# Patient Record
Sex: FEMALE | Race: WHITE | NOT HISPANIC OR LATINO | Employment: OTHER | URBAN - METROPOLITAN AREA
[De-identification: names, ages, dates, MRNs, and addresses within clinical notes are randomized per-mention and may not be internally consistent; named-entity substitution may affect disease eponyms.]

---

## 2017-12-07 ENCOUNTER — OFFICE VISIT (OUTPATIENT)
Dept: URGENT CARE | Facility: CLINIC | Age: 67
End: 2017-12-07
Payer: MEDICARE

## 2017-12-07 VITALS
SYSTOLIC BLOOD PRESSURE: 144 MMHG | BODY MASS INDEX: 43.39 KG/M2 | TEMPERATURE: 99 F | HEIGHT: 66 IN | HEART RATE: 91 BPM | DIASTOLIC BLOOD PRESSURE: 77 MMHG | WEIGHT: 270 LBS | RESPIRATION RATE: 16 BRPM | OXYGEN SATURATION: 95 %

## 2017-12-07 DIAGNOSIS — J32.9 SINUSITIS, UNSPECIFIED CHRONICITY, UNSPECIFIED LOCATION: Primary | ICD-10-CM

## 2017-12-07 DIAGNOSIS — J02.9 SORE THROAT: ICD-10-CM

## 2017-12-07 DIAGNOSIS — R06.02 SOB (SHORTNESS OF BREATH): ICD-10-CM

## 2017-12-07 DIAGNOSIS — J04.0 LARYNGITIS: ICD-10-CM

## 2017-12-07 LAB
CTP QC/QA: YES
CTP QC/QA: YES
FLUAV AG NPH QL: NEGATIVE
FLUBV AG NPH QL: NEGATIVE
S PYO RRNA THROAT QL PROBE: NEGATIVE

## 2017-12-07 PROCEDURE — 87804 INFLUENZA ASSAY W/OPTIC: CPT | Mod: QW,S$GLB,, | Performed by: PHYSICIAN ASSISTANT

## 2017-12-07 PROCEDURE — 87880 STREP A ASSAY W/OPTIC: CPT | Mod: QW,S$GLB,, | Performed by: PHYSICIAN ASSISTANT

## 2017-12-07 PROCEDURE — 99203 OFFICE O/P NEW LOW 30 MIN: CPT | Mod: S$GLB,,, | Performed by: PHYSICIAN ASSISTANT

## 2017-12-07 RX ORDER — AMOXICILLIN AND CLAVULANATE POTASSIUM 875; 125 MG/1; MG/1
1 TABLET, FILM COATED ORAL 2 TIMES DAILY
Qty: 20 TABLET | Refills: 0 | Status: SHIPPED | OUTPATIENT
Start: 2017-12-07 | End: 2017-12-17

## 2017-12-07 RX ORDER — ONDANSETRON HYDROCHLORIDE 8 MG/1
8 TABLET, FILM COATED ORAL EVERY 8 HOURS PRN
Qty: 15 TABLET | Refills: 0 | Status: SHIPPED | OUTPATIENT
Start: 2017-12-07 | End: 2018-01-22 | Stop reason: ALTCHOICE

## 2017-12-07 RX ORDER — BENZONATATE 200 MG/1
200 CAPSULE ORAL 3 TIMES DAILY PRN
Qty: 30 CAPSULE | Refills: 0 | Status: SHIPPED | OUTPATIENT
Start: 2017-12-07 | End: 2017-12-17

## 2017-12-08 NOTE — PATIENT INSTRUCTIONS
- Please return here or go to the Emergency Department for any concerns or worsening of condition.   - If you were prescribed antibiotics, please take them to completion.  - Please follow up with your primary care provider (PCP) or discussed specialist(s) as needed.         Sinusitis (Antibiotic Treatment)    The sinuses are air-filled spaces within the bones of the face. They connect to the inside of the nose. Sinusitis is an inflammation of the tissue lining the sinus cavity. Sinus inflammation can occur during a cold. It can also be due to allergies to pollens and other particles in the air. Sinusitis can cause symptoms of sinus congestion and fullness. A sinus infection causes fever, headache and facial pain. There is often green or yellow drainage from the nose or into the back of the throat (post-nasal drip). You have been given antibiotics to treat this condition.  Home care:  · Take the full course of antibiotics as instructed. Do not stop taking them, even if you feel better.  · Drink plenty of water, hot tea, and other liquids. This may help thin mucus. It also may promote sinus drainage.  · Heat may help soothe painful areas of the face. Use a towel soaked in hot water. Or,  the shower and direct the hot spray onto your face. Using a vaporizer along with a menthol rub at night may also help.   · An expectorant containing guaifenesin may help thin the mucus and promote drainage from the sinuses.  · Over-the-counter decongestants may be used unless a similar medicine was prescribed. Nasal sprays work the fastest. Use one that contains phenylephrine or oxymetazoline. First blow the nose gently. Then use the spray. Do not use these medicines more often than directed on the label or symptoms may get worse. You may also use tablets containing pseudoephedrine. Avoid products that combine ingredients, because side effects may be increased. Read labels. You can also ask the pharmacist for help.  (NOTE: Persons with high blood pressure should not use decongestants. They can raise blood pressure.)  · Over-the-counter antihistamines may help if allergies contributed to your sinusitis.    · Do not use nasal rinses or irrigation during an acute sinus infection, unless told to by your health care provider. Rinsing may spread the infection to other sinuses.  · Use acetaminophen or ibuprofen to control pain, unless another pain medicine was prescribed. (If you have chronic liver or kidney disease or ever had a stomach ulcer, talk with your doctor before using these medicines. Aspirin should never be used in anyone under 18 years of age who is ill with a fever. It may cause severe liver damage.)  · Don't smoke. This can worsen symptoms.  Follow-up care  Follow up with your healthcare provider or our staff if you are not improving within the next week.  When to seek medical advice  Call your healthcare provider if any of these occur:  · Facial pain or headache becoming more severe  · Stiff neck  · Unusual drowsiness or confusion  · Swelling of the forehead or eyelids  · Vision problems, including blurred or double vision  · Fever of 100.4ºF (38ºC) or higher, or as directed by your healthcare provider  · Seizure  · Breathing problems  · Symptoms not resolving within 10 days  Date Last Reviewed: 4/13/2015  © 7170-4969 [employer]. [address] [place] [address]. All rights reserved. This information is not intended as a substitute for professional medical care. Always follow your healthcare professional's instructions.      Laryngitis    Laryngitis is a swelling of the tissues around the vocal cords. Symptoms include a hoarse (scratchy) voice. The voice may be lost completely. It may be caused by a viral illness, such as a head or chest cold. It may also be due to overuse and strain of the voice. Smoking, drinking alcohol, acid reflux, allergies, or inhaling harsh chemicals may also lead to  symptoms. This condition will usually resolve in 1-2 weeks.  Home care  · Rest your voice until it recovers. Talk as little as possible. If your symptoms are severe, rest at home for a day or so.  · Breathing cool steam from a humidifier/vaporizer or in a steamy shower may be helpful.  · Drink plenty of fluids to stay well hydrated.  · Do not smoke  Follow-up care  Follow up with your healthcare provider or this facility if you have not improved after one week.  When to seek medical advice  Contact your healthcare provider for any of the following:  · Severe pain with swallowing  · Trouble opening mouth  · Neck swelling, neck pain, or trouble moving neck  · Noisy breathing or trouble breathing  · Fever of 100.4°F (38.ºC) or higher, or as directed by your healthcare provider  · Drooling  · Symptoms do not resolve in 2 weeks  Date Last Reviewed: 4/26/2015  © 7766-3950 [employer]. [address] [place] [address], [place] [address]. All rights reserved. This information is not intended as a substitute for professional medical care. Always follow your healthcare professional's instructions.

## 2017-12-08 NOTE — PROGRESS NOTES
"Subjective:       Patient ID: Sneha Mcghee is a 67 y.o. female.    Vitals:  height is 5' 6" (1.676 m) and weight is 122.5 kg (270 lb). Her oral temperature is 98.8 °F (37.1 °C). Her blood pressure is 144/77 (abnormal) and her pulse is 91. Her respiration is 16 and oxygen saturation is 95%.     Chief Complaint: Sore Throat (x 1 week); Nausea; and Fatigue (x 1 week)    HPI  ROS    Objective:      Physical Exam    Assessment:       1. Sore throat        Plan:         Sore throat  -     POCT Influenza A/B  -     POCT rapid strep A      ***    "

## 2017-12-08 NOTE — PROGRESS NOTES
"Subjective:       Patient ID: Sneha Mcghee is a 67 y.o. female.    Vitals:  height is 5' 6" (1.676 m) and weight is 122.5 kg (270 lb). Her oral temperature is 98.8 °F (37.1 °C). Her blood pressure is 144/77 (abnormal) and her pulse is 91. Her respiration is 16 and oxygen saturation is 95%.     Chief Complaint: Sore Throat (x 1 week); Nausea; and Fatigue (x 1 week)    Sore Throat    This is a new problem. The current episode started in the past 7 days. The problem has been gradually worsening. Neither side of throat is experiencing more pain than the other. There has been no fever. The pain is at a severity of 7/10. The pain is mild. Associated symptoms include congestion, coughing, headaches, a hoarse voice and shortness of breath. Pertinent negatives include no abdominal pain, diarrhea, ear pain, neck pain or vomiting. She has tried oral narcotic analgesics for the symptoms. The treatment provided no relief.   Nausea   Associated symptoms include congestion, coughing, fatigue, headaches, nausea, a sore throat and weakness. Pertinent negatives include no abdominal pain, chest pain, chills, fever, myalgias, neck pain, numbness, rash or vomiting.   Fatigue   Associated symptoms include congestion, coughing, fatigue, headaches, nausea, a sore throat and weakness. Pertinent negatives include no abdominal pain, chest pain, chills, fever, myalgias, neck pain, numbness, rash or vomiting.   Sinus Problem   Associated symptoms include congestion, coughing, headaches, a hoarse voice, shortness of breath, sinus pressure and a sore throat. Pertinent negatives include no chills, ear pain or neck pain.     Review of Systems   Constitution: Positive for fatigue, weakness and malaise/fatigue. Negative for chills and fever.   HENT: Positive for congestion, hoarse voice, sinus pressure and sore throat. Negative for ear pain.    Eyes: Negative for blurred vision, discharge, pain, redness and visual disturbance.   Cardiovascular: " Negative for chest pain, dyspnea on exertion, irregular heartbeat, leg swelling, near-syncope, orthopnea, palpitations and syncope.   Respiratory: Positive for cough, shortness of breath and sputum production. Negative for hemoptysis and wheezing.    Hematologic/Lymphatic: Negative for adenopathy.   Skin: Negative for itching and rash.   Musculoskeletal: Negative for back pain, myalgias, neck pain and stiffness.   Gastrointestinal: Positive for nausea. Negative for abdominal pain, diarrhea and vomiting.   Neurological: Positive for headaches. Negative for dizziness, light-headedness and numbness.   Psychiatric/Behavioral: Negative for altered mental status.   Allergic/Immunologic: Negative for hives.   All other systems reviewed and are negative.      Objective:      Physical Exam   Constitutional: She is oriented to person, place, and time. She appears well-developed and well-nourished.  Non-toxic appearance. She has a sickly appearance. She does not appear ill. No distress.   HENT:   Head: Normocephalic and atraumatic.   Right Ear: Tympanic membrane, external ear and ear canal normal.   Left Ear: Tympanic membrane, external ear and ear canal normal.   Nose: Mucosal edema present. No epistaxis. Right sinus exhibits maxillary sinus tenderness and frontal sinus tenderness. Left sinus exhibits maxillary sinus tenderness and frontal sinus tenderness.   Mouth/Throat: Uvula is midline and mucous membranes are normal. No uvula swelling. Posterior oropharyngeal erythema present.   Marked hoarseness while speaking   Eyes: Pupils are equal, round, and reactive to light.   Neck: Normal range of motion. Neck supple.   Cardiovascular: Normal rate, regular rhythm and normal heart sounds.  Exam reveals no gallop and no friction rub.    No murmur heard.  Pulmonary/Chest: Effort normal. No accessory muscle usage. No tachypnea and no bradypnea. No respiratory distress. She has no decreased breath sounds. She has no wheezes. She has  rhonchi (very mild) in the right middle field and the left middle field. She has no rales.   Musculoskeletal: Normal range of motion.   Lymphadenopathy:        Head (right side): No submental, no submandibular, no tonsillar, no preauricular, no posterior auricular and no occipital adenopathy present.        Head (left side): No submental, no submandibular, no tonsillar, no preauricular, no posterior auricular and no occipital adenopathy present.     She has no cervical adenopathy.        Right cervical: No posterior cervical adenopathy present.       Left cervical: No posterior cervical adenopathy present.        Right: No supraclavicular adenopathy present.        Left: No supraclavicular adenopathy present.   Neurological: She is alert and oriented to person, place, and time. She is not disoriented. Coordination and gait normal.   Skin: No abrasion, no ecchymosis, no laceration and no rash noted. No erythema.   Psychiatric: She has a normal mood and affect. Her behavior is normal.   Nursing note and vitals reviewed.        Chest PA and lateral. Comparison: None.    Examination somewhat limited by overpenetrated technique. Cardiac silhouette is normal in size. Postsurgical changes with left-sided pacer device and sternotomy wires are seen. Lungs are symmetrically expanded. No evidence of focal consolidative process, pneumothorax, or significant effusion. Bones appear intact. No free air visualized beneath the diaphragm.   Impression   No acute cardiopulmonary process identified.      Electronically signed by: BILLIE CONNER MD  Date: 12/07/17  Time: 19:41      Assessment:       1. Sinusitis, unspecified chronicity, unspecified location    2. Sore throat    3. Laryngitis    4. SOB (shortness of breath)        - Sp02 95% with mild SOB reported. No Hx of smoking. PE/Auscultation has very mild rhonchi. Do not suspect CHF exacerbation or PNA at this time as CXR WNL.   - Rapid Flu and Strep negative at this time.      Plan:         Sinusitis, unspecified chronicity, unspecified location  -     amoxicillin-clavulanate 875-125mg (AUGMENTIN) 875-125 mg per tablet; Take 1 tablet by mouth 2 (two) times daily.  Dispense: 20 tablet; Refill: 0  -     benzonatate (TESSALON) 200 MG capsule; Take 1 capsule (200 mg total) by mouth 3 (three) times daily as needed for Cough.  Dispense: 30 capsule; Refill: 0  -     ondansetron (ZOFRAN) 8 MG tablet; Take 1 tablet (8 mg total) by mouth every 8 (eight) hours as needed.  Dispense: 15 tablet; Refill: 0    Sore throat  -     POCT Influenza A/B  -     POCT rapid strep A    Laryngitis    SOB (shortness of breath)  -     X-Ray Chest PA And Lateral; Future; Expected date: 12/07/2017    - Use Symbicort and Flonase at home for help with symptoms as directed   - Please return here or go to the Emergency Department for any concerns or worsening of condition.   - If you were prescribed antibiotics, please take them to completion.  - Please follow up with your primary care provider (PCP) or discussed specialist(s) as needed.

## 2018-08-06 ENCOUNTER — TELEPHONE (OUTPATIENT)
Dept: OPHTHALMOLOGY | Facility: CLINIC | Age: 68
End: 2018-08-06

## 2018-08-06 NOTE — TELEPHONE ENCOUNTER
----- Message from Linda Spears sent at 8/6/2018 10:40 AM CDT -----  Contact: self  Needs to discuss the lens for her cataract surgery back in 2013. Please call back at

## 2018-08-06 NOTE — TELEPHONE ENCOUNTER
Called and spoke to patient, she stated that she was having eye surgery at [facility] and wanted to know what type of lens was placed in the right eye. I told her to have the office fax over a release form so we could fax over her operating note.

## 2019-07-20 ENCOUNTER — OFFICE VISIT (OUTPATIENT)
Dept: URGENT CARE | Facility: CLINIC | Age: 69
End: 2019-07-20
Payer: MEDICARE

## 2019-07-20 VITALS
BODY MASS INDEX: 43.39 KG/M2 | OXYGEN SATURATION: 96 % | SYSTOLIC BLOOD PRESSURE: 178 MMHG | RESPIRATION RATE: 20 BRPM | DIASTOLIC BLOOD PRESSURE: 100 MMHG | HEART RATE: 75 BPM | HEIGHT: 66 IN | TEMPERATURE: 98 F | WEIGHT: 270 LBS

## 2019-07-20 DIAGNOSIS — J40 BRONCHITIS: Primary | ICD-10-CM

## 2019-07-20 DIAGNOSIS — J32.9 SINUSITIS, UNSPECIFIED CHRONICITY, UNSPECIFIED LOCATION: ICD-10-CM

## 2019-07-20 DIAGNOSIS — R06.02 SHORTNESS OF BREATH: ICD-10-CM

## 2019-07-20 PROCEDURE — 99214 OFFICE O/P EST MOD 30 MIN: CPT | Mod: 25,S$GLB,, | Performed by: INTERNAL MEDICINE

## 2019-07-20 PROCEDURE — 3080F PR MOST RECENT DIASTOLIC BLOOD PRESSURE >= 90 MM HG: ICD-10-PCS | Mod: CPTII,S$GLB,, | Performed by: INTERNAL MEDICINE

## 2019-07-20 PROCEDURE — 3077F PR MOST RECENT SYSTOLIC BLOOD PRESSURE >= 140 MM HG: ICD-10-PCS | Mod: CPTII,S$GLB,, | Performed by: INTERNAL MEDICINE

## 2019-07-20 PROCEDURE — 3080F DIAST BP >= 90 MM HG: CPT | Mod: CPTII,S$GLB,, | Performed by: INTERNAL MEDICINE

## 2019-07-20 PROCEDURE — 99214 PR OFFICE/OUTPT VISIT, EST, LEVL IV, 30-39 MIN: ICD-10-PCS | Mod: 25,S$GLB,, | Performed by: INTERNAL MEDICINE

## 2019-07-20 PROCEDURE — 71046 X-RAY EXAM CHEST 2 VIEWS: CPT | Mod: S$GLB,,, | Performed by: RADIOLOGY

## 2019-07-20 PROCEDURE — 71046 XR CHEST PA AND LATERAL: ICD-10-PCS | Mod: S$GLB,,, | Performed by: RADIOLOGY

## 2019-07-20 PROCEDURE — 3077F SYST BP >= 140 MM HG: CPT | Mod: CPTII,S$GLB,, | Performed by: INTERNAL MEDICINE

## 2019-07-20 PROCEDURE — 94640 AIRWAY INHALATION TREATMENT: CPT | Mod: S$GLB,,, | Performed by: INTERNAL MEDICINE

## 2019-07-20 PROCEDURE — 94640 PR INHAL RX, AIRWAY OBST/DX SPUTUM INDUCT: ICD-10-PCS | Mod: S$GLB,,, | Performed by: INTERNAL MEDICINE

## 2019-07-20 RX ORDER — OMEPRAZOLE 40 MG/1
CAPSULE, DELAYED RELEASE ORAL
COMMUNITY
Start: 2019-06-03 | End: 2021-01-19

## 2019-07-20 RX ORDER — IPRATROPIUM BROMIDE 0.5 MG/2.5ML
0.5 SOLUTION RESPIRATORY (INHALATION)
Status: COMPLETED | OUTPATIENT
Start: 2019-07-20 | End: 2019-07-20

## 2019-07-20 RX ORDER — METOPROLOL SUCCINATE 100 MG/1
TABLET, EXTENDED RELEASE ORAL
COMMUNITY
Start: 2019-06-04 | End: 2021-02-01 | Stop reason: SDUPTHER

## 2019-07-20 RX ORDER — FUROSEMIDE 20 MG/1
20 TABLET ORAL
COMMUNITY
End: 2021-04-12 | Stop reason: SDUPTHER

## 2019-07-20 RX ORDER — SITAGLIPTIN 50 MG/1
TABLET, FILM COATED ORAL
COMMUNITY
Start: 2019-06-06 | End: 2021-01-15 | Stop reason: SDUPTHER

## 2019-07-20 RX ORDER — ALBUTEROL SULFATE 0.83 MG/ML
2.5 SOLUTION RESPIRATORY (INHALATION)
Status: COMPLETED | OUTPATIENT
Start: 2019-07-20 | End: 2019-07-20

## 2019-07-20 RX ORDER — DOXYCYCLINE 100 MG/1
CAPSULE ORAL
Refills: 0 | COMMUNITY
Start: 2019-07-16 | End: 2021-01-19

## 2019-07-20 RX ORDER — VALSARTAN 80 MG/1
80 TABLET ORAL
COMMUNITY
End: 2021-01-19

## 2019-07-20 RX ORDER — DEXBROMPHENIRAMINE MALEATE AND PHENYLEPHRINE HYDROCHLORIDE 2; 10 MG/1; MG/1
1 TABLET ORAL EVERY 8 HOURS PRN
Qty: 21 TABLET | Refills: 0 | Status: SHIPPED | OUTPATIENT
Start: 2019-07-20 | End: 2019-07-27

## 2019-07-20 RX ORDER — LEVOTHYROXINE SODIUM 88 UG/1
TABLET ORAL
COMMUNITY
Start: 2019-06-05 | End: 2021-03-05

## 2019-07-20 RX ORDER — POTASSIUM CHLORIDE 750 MG/1
10 TABLET, EXTENDED RELEASE ORAL DAILY
Status: ON HOLD | COMMUNITY
End: 2023-10-17 | Stop reason: HOSPADM

## 2019-07-20 RX ORDER — LOSARTAN POTASSIUM 25 MG/1
TABLET ORAL
COMMUNITY
Start: 2019-07-06 | End: 2021-01-19

## 2019-07-20 RX ORDER — METHYLPREDNISOLONE 4 MG/1
TABLET ORAL
Refills: 0 | COMMUNITY
Start: 2019-07-16 | End: 2021-01-15 | Stop reason: SDUPTHER

## 2019-07-20 RX ORDER — SIMVASTATIN 5 MG/1
TABLET, FILM COATED ORAL
COMMUNITY
Start: 2019-04-24 | End: 2021-01-15 | Stop reason: SDUPTHER

## 2019-07-20 RX ADMIN — IPRATROPIUM BROMIDE 0.5 MG: 0.5 SOLUTION RESPIRATORY (INHALATION) at 11:07

## 2019-07-20 RX ADMIN — ALBUTEROL SULFATE 2.5 MG: 0.83 SOLUTION RESPIRATORY (INHALATION) at 11:07

## 2019-07-20 NOTE — PROGRESS NOTES
"Subjective:       Patient ID: Sneha Mcghee is a 68 y.o. female.    Vitals:  height is 5' 6" (1.676 m) and weight is 122.5 kg (270 lb). Her oral temperature is 98 °F (36.7 °C). Her blood pressure is 178/100 (abnormal) and her pulse is 75. Her respiration is 20 and oxygen saturation is 96%.     Chief Complaint: Cough    Cough congestion over 2 weeks, was given prednisone, tessalon perles, and doxy 3 days ago but has not helped at all and actually feels worse, cough is purulent and productive. sayys it keeps her up all night. Minor sinus congestion but says she has a lot of chest congestion and SOB/wheezing. Says she is starting to feel weak and fatigue.  Denies any fever.  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  She takes albuterol as needed.  Patient was recently in the hospital for chest pain and she was told did not find any anything.  She was discharged on Medrol Dosepak and doxycycline and Tessalon Perles as needed for cough.    Cough   This is a new problem. The current episode started 1 to 4 weeks ago. The problem has been gradually worsening. The problem occurs constantly. The cough is productive of purulent sputum and productive of sputum. Associated symptoms include headaches, nasal congestion, postnasal drip, rhinorrhea, shortness of breath and wheezing. Pertinent negatives include no chills, ear pain, eye redness, fever, hemoptysis, myalgias, rash or sore throat.       Constitution: Positive for appetite change, sweating and fatigue. Negative for chills and fever.   HENT: Positive for congestion and postnasal drip. Negative for ear pain, ear discharge, sinus pain, sinus pressure, sore throat and voice change.    Neck: Negative for painful lymph nodes.   Eyes: Negative for eye redness.   Respiratory: Positive for chest tightness, cough, sputum production, shortness of breath and wheezing. Negative for bloody sputum, COPD, stridor and asthma.    Gastrointestinal: Negative for nausea, vomiting, " constipation and diarrhea.   Genitourinary: Negative for dysuria, frequency and urgency.   Musculoskeletal: Negative for muscle ache.   Skin: Negative for rash.   Allergic/Immunologic: Negative for seasonal allergies and asthma.   Neurological: Positive for headaches. Negative for light-headedness.   Hematologic/Lymphatic: Negative for swollen lymph nodes.       Objective:      Physical Exam   Constitutional: She is oriented to person, place, and time. She appears well-developed and well-nourished. She is cooperative.  Non-toxic appearance. She does not appear ill. No distress.   HENT:   Head: Normocephalic and atraumatic.   Right Ear: Hearing, tympanic membrane, external ear and ear canal normal.   Left Ear: Hearing, tympanic membrane, external ear and ear canal normal.   Nose: Mucosal edema and rhinorrhea present. No nasal deformity. No epistaxis. Right sinus exhibits no maxillary sinus tenderness and no frontal sinus tenderness. Left sinus exhibits no maxillary sinus tenderness and no frontal sinus tenderness.   Mouth/Throat: Uvula is midline and mucous membranes are normal. No trismus in the jaw. Normal dentition. No uvula swelling. Posterior oropharyngeal edema present. No posterior oropharyngeal erythema.   Eyes: Conjunctivae and lids are normal. Right eye exhibits no discharge. Left eye exhibits no discharge. No scleral icterus.   Sclera clear bilat   Neck: Trachea normal, normal range of motion, full passive range of motion without pain and phonation normal. Neck supple.   Cardiovascular: Normal rate, regular rhythm, normal heart sounds, intact distal pulses and normal pulses.   Pulmonary/Chest: Effort normal. No respiratory distress. She has wheezes.   Abdominal: Soft. Normal appearance and bowel sounds are normal. She exhibits no distension, no pulsatile midline mass and no mass. There is no tenderness.   Musculoskeletal: Normal range of motion. She exhibits no edema or deformity.   Neurological: She is  alert and oriented to person, place, and time. She exhibits normal muscle tone. Coordination normal.   Skin: Skin is warm, dry and intact. She is not diaphoretic. No pallor.   Psychiatric: She has a normal mood and affect. Her speech is normal and behavior is normal. Judgment and thought content normal. Cognition and memory are normal.   Nursing note and vitals reviewed.      Reexam and after breathing treatment, decreased wheezing bilateral and she is feeling much better  Assessment:       1. Bronchitis    2. Shortness of breath    3. Sinusitis, unspecified chronicity, unspecified location        Plan:         Bronchitis  -     albuterol nebulizer solution 2.5 mg  -     ipratropium 0.02 % nebulizer solution 0.5 mg  -     dexbrompheniramine-phenyleph (ALA-HIST PE) 2-10 mg Tab; Take 1 tablet by mouth every 8 (eight) hours as needed.  Dispense: 21 tablet; Refill: 0    Shortness of breath  -     XR CHEST PA AND LATERAL; Future; Expected date: 07/20/2019    Sinusitis, unspecified chronicity, unspecified location     She is confused about her home medication I advised her to take Breo every day and take albuterol as needed every 6 hr for shortness of breath.  Increase fluid intake.  Please note patient has sleep apnea and she is on CPAP  Patient Instructions       Continue prescribed antibiotics and steroids per last hospitalization recommendation  If your condition worsens we recommend that you receive another evaluation at the emergency room immediately or contact your primary medical clinics after hours call service to discuss your concerns. You must understand that you've received an Urgent Care treatment only and that you may be released before all of your medical problems are known or treated. You, the patient, will arrange for follow up care as instructed.  Drink plenty of Fluids  Wash hands frequently using mild antibacterial soap lathering for at least 15 seconds then rinse  Get plenty of Rest  Follow up in 1-2  weeks with Primary Care physician if not significantly better.   If you are not allergic please take Tylenol every 4-6 hours as needed and/or Ibuprofen every 6-8 hours as needed, over the counter for pain or fever.      Acute Bronchitis  Your healthcare provider has told you that you have acute bronchitis. Bronchitis is infection or inflammation of the bronchial tubes (airways in the lungs). Normally, air moves easily in and out of the airways. Bronchitis narrows the airways, making it harder for air to flow in and out of the lungs. This causes symptoms such as shortness of breath, coughing up yellow or green mucus, and wheezing. Bronchitis can be acute or chronic. Acute means the condition comes on quickly and goes away in a short time, usually within 3 to 10 days. Chronic means a condition lasts a long time and often comes back.    What causes acute bronchitis?  Acute bronchitis almost always starts as a viral respiratory infection, such as a cold or the flu. Certain factors make it more likely for a cold or flu to turn into bronchitis. These include being very young, being elderly, having a heart or lung problem, or having a weak immune system. Cigarette smoking also makes bronchitis more likely.  When bronchitis develops, the airways become swollen. The airways may also become infected with bacteria. This is known as a secondary infection.  Diagnosing acute bronchitis  Your healthcare provider will examine you and ask about your symptoms and health history. You may also have a sputum culture to test the fluid in your lungs. Chest X-rays may be done to look for infection in the lungs.  Treating acute bronchitis  Bronchitis usually clears up as the cold or flu goes away. You can help feel better faster by doing the following:  · Take medicine as directed. You may be told to take ibuprofen or other over-the-counter medicines. These help relieve inflammation in your bronchial tubes. Your healthcare provider may  prescribe an inhaler to help open up the bronchial tubes. Most of the time, acute bronchitis is caused by a viral infection. Antibiotics are usually not prescribed for viral infections.  · Drink plenty of fluids, such as water, juice, or warm soup. Fluids loosen mucus so that you can cough it up. This helps you breathe more easily. Fluids also prevent dehydration.  · Make sure you get plenty of rest.  · Do not smoke. Do not allow anyone else to smoke in your home.  Recovery and follow-up  Follow up with your doctor as you are told. You will likely feel better in a week or two. But a dry cough can linger beyond that time. Let your doctor know if you still have symptoms (other than a dry cough) after 2 weeks, or if youre prone to getting bronchial infections. Take steps to protect yourself from future infections. These steps include stopping smoking and avoiding tobacco smoke, washing your hands often, and getting a yearly flu shot.  When to call your healthcare provider  Call the healthcare provider if you have any of the following:  · Fever of 100.4°F (38.0°C) or higher, or as advised  · Symptoms that get worse, or new symptoms  · Trouble breathing  · Symptoms that dont start to improve within a week, or within 3 days of taking antibiotics   Date Last Reviewed: 12/1/2016  © 8991-0675 [employer]. [address] [place] [address], [place] [address]. All rights reserved. This information is not intended as a substitute for professional medical care. Always follow your healthcare professional's instructions.

## 2019-07-22 ENCOUNTER — OFFICE VISIT (OUTPATIENT)
Dept: PODIATRY | Facility: CLINIC | Age: 69
End: 2019-07-22
Payer: MEDICARE

## 2019-07-22 VITALS
BODY MASS INDEX: 43.4 KG/M2 | WEIGHT: 270.06 LBS | HEART RATE: 68 BPM | SYSTOLIC BLOOD PRESSURE: 153 MMHG | DIASTOLIC BLOOD PRESSURE: 76 MMHG | HEIGHT: 66 IN

## 2019-07-22 DIAGNOSIS — E11.51 TYPE II DIABETES MELLITUS WITH PERIPHERAL CIRCULATORY DISORDER: Primary | ICD-10-CM

## 2019-07-22 DIAGNOSIS — B35.1 ONYCHOMYCOSIS DUE TO DERMATOPHYTE: ICD-10-CM

## 2019-07-22 DIAGNOSIS — L60.0 INGROWN NAIL OF GREAT TOE OF RIGHT FOOT: ICD-10-CM

## 2019-07-22 DIAGNOSIS — L60.0 INGROWN NAIL OF GREAT TOE OF LEFT FOOT: ICD-10-CM

## 2019-07-22 PROCEDURE — 1101F PT FALLS ASSESS-DOCD LE1/YR: CPT | Mod: CPTII,S$GLB,, | Performed by: PODIATRIST

## 2019-07-22 PROCEDURE — 99999 PR PBB SHADOW E&M-EST. PATIENT-LVL III: ICD-10-PCS | Mod: PBBFAC,,, | Performed by: PODIATRIST

## 2019-07-22 PROCEDURE — 3077F SYST BP >= 140 MM HG: CPT | Mod: CPTII,S$GLB,, | Performed by: PODIATRIST

## 2019-07-22 PROCEDURE — 1101F PR PT FALLS ASSESS DOC 0-1 FALLS W/OUT INJ PAST YR: ICD-10-PCS | Mod: CPTII,S$GLB,, | Performed by: PODIATRIST

## 2019-07-22 PROCEDURE — 99203 PR OFFICE/OUTPT VISIT, NEW, LEVL III, 30-44 MIN: ICD-10-PCS | Mod: S$GLB,,, | Performed by: PODIATRIST

## 2019-07-22 PROCEDURE — 3078F PR MOST RECENT DIASTOLIC BLOOD PRESSURE < 80 MM HG: ICD-10-PCS | Mod: CPTII,S$GLB,, | Performed by: PODIATRIST

## 2019-07-22 PROCEDURE — 99203 OFFICE O/P NEW LOW 30 MIN: CPT | Mod: S$GLB,,, | Performed by: PODIATRIST

## 2019-07-22 PROCEDURE — 99999 PR PBB SHADOW E&M-EST. PATIENT-LVL III: CPT | Mod: PBBFAC,,, | Performed by: PODIATRIST

## 2019-07-22 PROCEDURE — 3078F DIAST BP <80 MM HG: CPT | Mod: CPTII,S$GLB,, | Performed by: PODIATRIST

## 2019-07-22 PROCEDURE — 3077F PR MOST RECENT SYSTOLIC BLOOD PRESSURE >= 140 MM HG: ICD-10-PCS | Mod: CPTII,S$GLB,, | Performed by: PODIATRIST

## 2019-07-22 NOTE — PROGRESS NOTES
Subjective:      Patient ID: Sneha Mcghee is a 68 y.o. female.    Chief Complaint: Ingrown Toenail (Right great toe, PCP--July 2019) and Diabetes Mellitus (A1c-7.0-Approx a few months ago)    Sneha is a 68 y.o. female who presents to the clinic complaining of painful ingrown toenail on both feet great toes. She has tried to cut them out herself without much improvement She has DM II. She relates that there is pain with pressure and with shoe wear. No other acute pedal complaints at this time.    Review of Systems   Constitution: Negative for chills and fever.   Cardiovascular: Positive for leg swelling. Negative for claudication.   Respiratory: Negative for shortness of breath.    Skin: Positive for nail changes. Negative for itching and rash.   Musculoskeletal: Negative for muscle cramps, muscle weakness and myalgias.   Gastrointestinal: Negative for nausea and vomiting.   Neurological: Negative for focal weakness, loss of balance, numbness and paresthesias.           Objective:      Physical Exam   Constitutional: She is oriented to person, place, and time. She appears well-developed and well-nourished. No distress.   Cardiovascular:   Pulses:       Dorsalis pedis pulses are 1+ on the right side, and 1+ on the left side.        Posterior tibial pulses are 2+ on the right side, and 2+ on the left side.   < 3 sec capillary refill time to toes 1-5 bilateral. Toes and feet are warm to touch proximally with normal distal cooling b/l. There is no hair growth on the feet and toes b/l. There is edema b/l with varicosities present b/l.      Musculoskeletal:   Equinus noted b/l ankles with < 10 deg DF noted. MMT 5/5 in DF/PF/Inv/Ev resistance with no reproduction of pain in any direction. Passive range of motion of ankle and pedal joints is painless b/l.     Neurological: She is alert and oriented to person, place, and time. She has normal strength. She displays no atrophy and no tremor. No sensory deficit. She  exhibits normal muscle tone.   Negative tinel sign bilateral.   Skin: Skin is warm, dry and intact. No abrasion, no bruising, no burn, no ecchymosis, no laceration, no lesion, no petechiae and no rash noted. She is not diaphoretic. No cyanosis or erythema. No pallor. Nails show no clubbing.   Skin is thin shiny and atrophic bilateral    Both hallux nail margins of both feet with ingrown nail plate. No erythema and minimal edema is noted there is no granuloma formation noted. No drainage or malodor      Psychiatric: She has a normal mood and affect. Her behavior is normal.             Assessment:       Encounter Diagnoses   Name Primary?    Type II diabetes mellitus with peripheral circulatory disorder Yes    Onychomycosis due to dermatophyte     Ingrown nail of great toe of left foot     Ingrown nail of great toe of right foot          Plan:       Sneha was seen today for ingrown toenail and diabetes mellitus.    Diagnoses and all orders for this visit:    Type II diabetes mellitus with peripheral circulatory disorder    Onychomycosis due to dermatophyte    Ingrown nail of great toe of left foot    Ingrown nail of great toe of right foot      I counseled the patient on her conditions, their implications and medical management.    Shoe inspection. Diabetic Foot Education. Patient reminded of the importance of good nutrition and blood sugar control to help prevent podiatric complications of diabetes. Patient instructed on proper foot hygeine. We discussed wearing proper shoe gear, daily foot inspections, never walking without protective shoe gear, never putting sharp instruments to feet    Utilizing sterile toenail clippers I aggressively debrided the offending nail border approximately 3 mm from its edge and carried the nail plate incision down at an angle in order to wedge out the offending cryptotic portion of the nail plate. The offending border was then removed in toto. The remaining nail was grinded down  with an electric  down to nail bed.  No blood was drawn. Patient tolerated the procedure well and related significant relief.    Discussed more permanent PNA with phenol, she will return to have the ingrown nail procedure done at my next available appointment    Osbaldo Hensley DPM

## 2019-07-23 ENCOUNTER — TELEPHONE (OUTPATIENT)
Dept: URGENT CARE | Facility: CLINIC | Age: 69
End: 2019-07-23

## 2020-02-18 ENCOUNTER — TELEPHONE (OUTPATIENT)
Dept: PODIATRY | Facility: CLINIC | Age: 70
End: 2020-02-18

## 2020-02-18 NOTE — TELEPHONE ENCOUNTER
Spoke with patient.  Offered her an appointment tomorrow.  She refused.  Stated that she will call back after thinking about it a while.

## 2020-02-18 NOTE — TELEPHONE ENCOUNTER
----- Message from Malini Beard sent at 2/18/2020  2:02 PM CST -----    Type:  Sooner Apoointment Request    Caller is requesting a sooner appointment.  Caller declined first available appointment listed below.  Caller will not accept being placed on the waitlist and is requesting a message be sent to doctor.    Name of Caller: pt  When is the first available appointment? March [facility] Call Back Number:  [phone]  Additional Information:   Calling to   Get   Fitted  In ASAP   Ingrown toenail  Both feet

## 2020-08-11 LAB
LEFT EYE DM RETINOPATHY: NEGATIVE
RIGHT EYE DM RETINOPATHY: NEGATIVE

## 2020-08-14 ENCOUNTER — LAB VISIT (OUTPATIENT)
Dept: PRIMARY CARE CLINIC | Facility: OTHER | Age: 70
End: 2020-08-14
Attending: INTERNAL MEDICINE
Payer: MEDICARE

## 2020-08-14 DIAGNOSIS — Z11.59 SPECIAL SCREENING EXAMINATION FOR UNSPECIFIED VIRAL DISEASE: ICD-10-CM

## 2020-08-14 PROCEDURE — U0003 INFECTIOUS AGENT DETECTION BY NUCLEIC ACID (DNA OR RNA); SEVERE ACUTE RESPIRATORY SYNDROME CORONAVIRUS 2 (SARS-COV-2) (CORONAVIRUS DISEASE [COVID-19]), AMPLIFIED PROBE TECHNIQUE, MAKING USE OF HIGH THROUGHPUT TECHNOLOGIES AS DESCRIBED BY CMS-2020-01-R: HCPCS

## 2020-08-16 LAB — SARS-COV-2 RNA RESP QL NAA+PROBE: NOT DETECTED

## 2021-01-15 RX ORDER — ALPRAZOLAM 0.5 MG/1
TABLET ORAL
COMMUNITY
Start: 2020-11-22 | End: 2021-01-19

## 2021-01-15 RX ORDER — FLUTICASONE FUROATE AND VILANTEROL 100; 25 UG/1; UG/1
POWDER RESPIRATORY (INHALATION)
COMMUNITY
End: 2021-06-08

## 2021-01-15 RX ORDER — INFLUENZA A VIRUS A/MICHIGAN/45/2015 X-275 (H1N1) ANTIGEN (FORMALDEHYDE INACTIVATED), INFLUENZA A VIRUS A/SINGAPORE/INFIMH-16-0019/2016 IVR-186 (H3N2) ANTIGEN (FORMALDEHYDE INACTIVATED), INFLUENZA B VIRUS B/PHUKET/3073/2013 ANTIGEN (FORMALDEHYDE INACTIVATED), AND INFLUENZA B VIRUS B/MARYLAND/15/2016 BX-69A ANTIGEN (FORMALDEHYDE INACTIVATED) 60; 60; 60; 60 UG/.7ML; UG/.7ML; UG/.7ML; UG/.7ML
INJECTION, SUSPENSION INTRAMUSCULAR
COMMUNITY
End: 2021-01-15 | Stop reason: SDUPTHER

## 2021-01-15 RX ORDER — SERTRALINE HYDROCHLORIDE 50 MG/1
TABLET, FILM COATED ORAL
COMMUNITY
End: 2021-06-08

## 2021-01-15 RX ORDER — LATANOPROST 50 UG/ML
SOLUTION/ DROPS OPHTHALMIC
COMMUNITY

## 2021-01-15 RX ORDER — NAPROXEN SODIUM 220 MG/1
1 TABLET ORAL DAILY
COMMUNITY
End: 2021-12-08

## 2021-01-15 RX ORDER — SIMVASTATIN 5 MG/1
TABLET, FILM COATED ORAL
COMMUNITY
End: 2021-02-12

## 2021-01-15 RX ORDER — ACETAMINOPHEN 500 MG
TABLET ORAL
Status: ON HOLD | COMMUNITY
End: 2023-10-17 | Stop reason: HOSPADM

## 2021-01-15 RX ORDER — ASPIRIN 325 MG
1 TABLET ORAL
COMMUNITY
End: 2021-01-15 | Stop reason: SDUPTHER

## 2021-01-15 RX ORDER — METFORMIN HYDROCHLORIDE 500 MG/1
TABLET ORAL
COMMUNITY
End: 2021-01-19

## 2021-01-15 RX ORDER — LANOLIN ALCOHOL/MO/W.PET/CERES
CREAM (GRAM) TOPICAL
COMMUNITY
End: 2022-01-06

## 2021-01-15 RX ORDER — PREGABALIN 50 MG/1
CAPSULE ORAL
COMMUNITY
End: 2021-01-19

## 2021-01-15 RX ORDER — ASPIRIN 325 MG
81 TABLET, DELAYED RELEASE (ENTERIC COATED) ORAL
COMMUNITY
End: 2023-11-27

## 2021-01-19 ENCOUNTER — OFFICE VISIT (OUTPATIENT)
Dept: FAMILY MEDICINE | Facility: CLINIC | Age: 71
End: 2021-01-19
Payer: MEDICARE

## 2021-01-19 VITALS
HEIGHT: 66 IN | TEMPERATURE: 98 F | OXYGEN SATURATION: 99 % | DIASTOLIC BLOOD PRESSURE: 90 MMHG | BODY MASS INDEX: 40.37 KG/M2 | SYSTOLIC BLOOD PRESSURE: 142 MMHG | HEART RATE: 64 BPM | WEIGHT: 251.19 LBS

## 2021-01-19 DIAGNOSIS — N18.32 TYPE 2 DIABETES MELLITUS WITH STAGE 3B CHRONIC KIDNEY DISEASE, WITHOUT LONG-TERM CURRENT USE OF INSULIN: ICD-10-CM

## 2021-01-19 DIAGNOSIS — I48.0 PAROXYSMAL ATRIAL FIBRILLATION: ICD-10-CM

## 2021-01-19 DIAGNOSIS — E11.22 TYPE 2 DIABETES MELLITUS WITH STAGE 3B CHRONIC KIDNEY DISEASE, WITHOUT LONG-TERM CURRENT USE OF INSULIN: ICD-10-CM

## 2021-01-19 DIAGNOSIS — J45.20 MILD INTERMITTENT ASTHMA WITHOUT COMPLICATION: ICD-10-CM

## 2021-01-19 DIAGNOSIS — E66.01 MORBID OBESITY WITH BMI OF 40.0-44.9, ADULT: ICD-10-CM

## 2021-01-19 DIAGNOSIS — F33.0 MAJOR DEPRESSIVE DISORDER, RECURRENT EPISODE, MILD: ICD-10-CM

## 2021-01-19 DIAGNOSIS — Z11.59 ENCOUNTER FOR HEPATITIS C SCREENING TEST FOR LOW RISK PATIENT: ICD-10-CM

## 2021-01-19 DIAGNOSIS — Z23 NEED FOR SHINGLES VACCINE: ICD-10-CM

## 2021-01-19 DIAGNOSIS — Z12.31 ENCOUNTER FOR SCREENING MAMMOGRAM FOR MALIGNANT NEOPLASM OF BREAST: ICD-10-CM

## 2021-01-19 DIAGNOSIS — I87.2 CHRONIC STASIS DERMATITIS: ICD-10-CM

## 2021-01-19 DIAGNOSIS — I50.9 CHRONIC HEART FAILURE, UNSPECIFIED HEART FAILURE TYPE: ICD-10-CM

## 2021-01-19 DIAGNOSIS — E78.5 HYPERLIPIDEMIA, UNSPECIFIED HYPERLIPIDEMIA TYPE: ICD-10-CM

## 2021-01-19 DIAGNOSIS — H40.9 GLAUCOMA OF BOTH EYES, UNSPECIFIED GLAUCOMA TYPE: ICD-10-CM

## 2021-01-19 DIAGNOSIS — M17.11 PRIMARY OSTEOARTHRITIS OF RIGHT KNEE: ICD-10-CM

## 2021-01-19 DIAGNOSIS — G47.33 OSA (OBSTRUCTIVE SLEEP APNEA): ICD-10-CM

## 2021-01-19 DIAGNOSIS — M54.16 LUMBAR RADICULAR PAIN: ICD-10-CM

## 2021-01-19 DIAGNOSIS — I10 ESSENTIAL HYPERTENSION: ICD-10-CM

## 2021-01-19 DIAGNOSIS — N32.81 OAB (OVERACTIVE BLADDER): ICD-10-CM

## 2021-01-19 DIAGNOSIS — I65.22 STENOSIS OF LEFT CAROTID ARTERY: ICD-10-CM

## 2021-01-19 DIAGNOSIS — R26.89 BALANCE PROBLEM: ICD-10-CM

## 2021-01-19 DIAGNOSIS — R41.3 MEMORY PROBLEM: ICD-10-CM

## 2021-01-19 DIAGNOSIS — I73.9 PVD (PERIPHERAL VASCULAR DISEASE): ICD-10-CM

## 2021-01-19 DIAGNOSIS — N18.30 STAGE 3 CHRONIC KIDNEY DISEASE, UNSPECIFIED WHETHER STAGE 3A OR 3B CKD: ICD-10-CM

## 2021-01-19 DIAGNOSIS — Z78.0 ASYMPTOMATIC MENOPAUSAL STATE: ICD-10-CM

## 2021-01-19 DIAGNOSIS — F51.01 PRIMARY INSOMNIA: ICD-10-CM

## 2021-01-19 DIAGNOSIS — I25.10 CORONARY ARTERY DISEASE WITHOUT ANGINA PECTORIS, UNSPECIFIED VESSEL OR LESION TYPE, UNSPECIFIED WHETHER NATIVE OR TRANSPLANTED HEART: Primary | ICD-10-CM

## 2021-01-19 DIAGNOSIS — K76.0 FATTY LIVER: ICD-10-CM

## 2021-01-19 DIAGNOSIS — E03.9 HYPOTHYROIDISM, UNSPECIFIED TYPE: ICD-10-CM

## 2021-01-19 DIAGNOSIS — Z23 NEED FOR DIPHTHERIA-TETANUS-PERTUSSIS (TDAP) VACCINE: ICD-10-CM

## 2021-01-19 DIAGNOSIS — Z12.11 SCREEN FOR COLON CANCER: ICD-10-CM

## 2021-01-19 PROCEDURE — 99204 OFFICE O/P NEW MOD 45 MIN: CPT | Mod: S$GLB,,, | Performed by: INTERNAL MEDICINE

## 2021-01-19 PROCEDURE — 99204 PR OFFICE/OUTPT VISIT, NEW, LEVL IV, 45-59 MIN: ICD-10-PCS | Mod: S$GLB,,, | Performed by: INTERNAL MEDICINE

## 2021-01-19 PROCEDURE — 3008F PR BODY MASS INDEX (BMI) DOCUMENTED: ICD-10-PCS | Mod: CPTII,S$GLB,, | Performed by: INTERNAL MEDICINE

## 2021-01-19 PROCEDURE — 86803 HEPATITIS C AB TEST: CPT

## 2021-01-19 PROCEDURE — 1159F MED LIST DOCD IN RCRD: CPT | Mod: S$GLB,,, | Performed by: INTERNAL MEDICINE

## 2021-01-19 PROCEDURE — 3080F DIAST BP >= 90 MM HG: CPT | Mod: CPTII,S$GLB,, | Performed by: INTERNAL MEDICINE

## 2021-01-19 PROCEDURE — 1125F AMNT PAIN NOTED PAIN PRSNT: CPT | Mod: S$GLB,,, | Performed by: INTERNAL MEDICINE

## 2021-01-19 PROCEDURE — 3008F BODY MASS INDEX DOCD: CPT | Mod: CPTII,S$GLB,, | Performed by: INTERNAL MEDICINE

## 2021-01-19 PROCEDURE — 99499 RISK ADDL DX/OHS AUDIT: ICD-10-PCS | Mod: S$GLB,,, | Performed by: INTERNAL MEDICINE

## 2021-01-19 PROCEDURE — 1125F PR PAIN SEVERITY QUANTIFIED, PAIN PRESENT: ICD-10-PCS | Mod: S$GLB,,, | Performed by: INTERNAL MEDICINE

## 2021-01-19 PROCEDURE — 84443 ASSAY THYROID STIM HORMONE: CPT

## 2021-01-19 PROCEDURE — 1159F PR MEDICATION LIST DOCUMENTED IN MEDICAL RECORD: ICD-10-PCS | Mod: S$GLB,,, | Performed by: INTERNAL MEDICINE

## 2021-01-19 PROCEDURE — 80053 COMPREHEN METABOLIC PANEL: CPT

## 2021-01-19 PROCEDURE — 84439 ASSAY OF FREE THYROXINE: CPT

## 2021-01-19 PROCEDURE — 3077F SYST BP >= 140 MM HG: CPT | Mod: CPTII,S$GLB,, | Performed by: INTERNAL MEDICINE

## 2021-01-19 PROCEDURE — 80061 LIPID PANEL: CPT

## 2021-01-19 PROCEDURE — 99499 UNLISTED E&M SERVICE: CPT | Mod: S$GLB,,, | Performed by: INTERNAL MEDICINE

## 2021-01-19 PROCEDURE — 3077F PR MOST RECENT SYSTOLIC BLOOD PRESSURE >= 140 MM HG: ICD-10-PCS | Mod: CPTII,S$GLB,, | Performed by: INTERNAL MEDICINE

## 2021-01-19 PROCEDURE — 3080F PR MOST RECENT DIASTOLIC BLOOD PRESSURE >= 90 MM HG: ICD-10-PCS | Mod: CPTII,S$GLB,, | Performed by: INTERNAL MEDICINE

## 2021-01-19 RX ORDER — ALBUTEROL SULFATE 90 UG/1
2 AEROSOL, METERED RESPIRATORY (INHALATION) EVERY 6 HOURS PRN
COMMUNITY
End: 2021-01-19 | Stop reason: SDUPTHER

## 2021-01-19 RX ORDER — LEVOTHYROXINE SODIUM 88 UG/1
88 TABLET ORAL
Qty: 90 TABLET | Refills: 3 | Status: SHIPPED | OUTPATIENT
Start: 2021-01-19 | End: 2022-02-03

## 2021-01-19 RX ORDER — ALBUTEROL SULFATE 90 UG/1
2 AEROSOL, METERED RESPIRATORY (INHALATION) EVERY 6 HOURS PRN
Qty: 18 G | Refills: 6 | Status: SHIPPED | OUTPATIENT
Start: 2021-01-19 | End: 2022-01-06 | Stop reason: SDUPTHER

## 2021-01-19 RX ORDER — VALSARTAN AND HYDROCHLOROTHIAZIDE 80; 12.5 MG/1; MG/1
1 TABLET, FILM COATED ORAL DAILY
Qty: 90 TABLET | Refills: 3 | Status: SHIPPED | OUTPATIENT
Start: 2021-01-19 | End: 2021-03-05

## 2021-01-19 RX ORDER — ZOSTER VACCINE RECOMBINANT, ADJUVANTED 50 MCG/0.5
0.5 KIT INTRAMUSCULAR ONCE
Qty: 1 EACH | Refills: 1 | Status: SHIPPED | OUTPATIENT
Start: 2021-01-19 | End: 2021-01-19

## 2021-01-19 RX ORDER — TETANUS TOXOID, REDUCED DIPHTHERIA TOXOID AND ACELLULAR PERTUSSIS VACCINE, ADSORBED 5; 2.5; 8; 8; 2.5 [IU]/.5ML; [IU]/.5ML; UG/.5ML; UG/.5ML; UG/.5ML
0.5 SUSPENSION INTRAMUSCULAR ONCE
Qty: 0.5 ML | Refills: 0 | Status: SHIPPED | OUTPATIENT
Start: 2021-01-19 | End: 2021-01-19

## 2021-01-19 RX ORDER — PROPAFENONE HYDROCHLORIDE 150 MG/1
150 TABLET, COATED ORAL EVERY 8 HOURS
Qty: 180 TABLET | Refills: 2 | Status: SHIPPED | OUTPATIENT
Start: 2021-01-19 | End: 2022-02-02

## 2021-01-19 RX ORDER — OXYBUTYNIN CHLORIDE 5 MG/1
5 TABLET, EXTENDED RELEASE ORAL DAILY
Qty: 90 TABLET | Refills: 3 | Status: SHIPPED | OUTPATIENT
Start: 2021-01-19 | End: 2021-12-10

## 2021-01-19 RX ORDER — SUVOREXANT 15 MG/1
1 TABLET, FILM COATED ORAL NIGHTLY PRN
Status: ON HOLD | COMMUNITY
End: 2023-10-17 | Stop reason: HOSPADM

## 2021-01-20 ENCOUNTER — TELEPHONE (OUTPATIENT)
Dept: GASTROENTEROLOGY | Facility: CLINIC | Age: 71
End: 2021-01-20

## 2021-01-20 DIAGNOSIS — Z11.52 ENCOUNTER FOR PREOPERATIVE SCREENING LABORATORY TESTING FOR COVID-19 VIRUS: ICD-10-CM

## 2021-01-20 DIAGNOSIS — Z01.812 ENCOUNTER FOR PREOPERATIVE SCREENING LABORATORY TESTING FOR COVID-19 VIRUS: ICD-10-CM

## 2021-01-20 LAB
ALBUMIN SERPL BCP-MCNC: 3.9 G/DL (ref 3.5–5.2)
ALP SERPL-CCNC: 89 U/L (ref 55–135)
ALT SERPL W/O P-5'-P-CCNC: 10 U/L (ref 10–44)
ANION GAP SERPL CALC-SCNC: 9 MMOL/L (ref 8–16)
AST SERPL-CCNC: 16 U/L (ref 10–40)
BILIRUB SERPL-MCNC: 0.4 MG/DL (ref 0.1–1)
BUN SERPL-MCNC: 16 MG/DL (ref 8–23)
CALCIUM SERPL-MCNC: 9.7 MG/DL (ref 8.7–10.5)
CHLORIDE SERPL-SCNC: 102 MMOL/L (ref 95–110)
CHOLEST SERPL-MCNC: 207 MG/DL (ref 120–199)
CHOLEST/HDLC SERPL: 5 {RATIO} (ref 2–5)
CO2 SERPL-SCNC: 27 MMOL/L (ref 23–29)
CREAT SERPL-MCNC: 0.8 MG/DL (ref 0.5–1.4)
EST. GFR  (AFRICAN AMERICAN): >60 ML/MIN/1.73 M^2
EST. GFR  (NON AFRICAN AMERICAN): >60 ML/MIN/1.73 M^2
GLUCOSE SERPL-MCNC: 153 MG/DL (ref 70–110)
HCV AB SERPL QL IA: NEGATIVE
HDLC SERPL-MCNC: 41 MG/DL (ref 40–75)
HDLC SERPL: 19.8 % (ref 20–50)
LDLC SERPL CALC-MCNC: 132.4 MG/DL (ref 63–159)
NONHDLC SERPL-MCNC: 166 MG/DL
POTASSIUM SERPL-SCNC: 4.5 MMOL/L (ref 3.5–5.1)
PROT SERPL-MCNC: 7.7 G/DL (ref 6–8.4)
SODIUM SERPL-SCNC: 138 MMOL/L (ref 136–145)
T4 FREE SERPL-MCNC: 1.05 NG/DL (ref 0.71–1.51)
TRIGL SERPL-MCNC: 168 MG/DL (ref 30–150)
TSH SERPL DL<=0.005 MIU/L-ACNC: 4.5 UIU/ML (ref 0.4–4)

## 2021-01-21 ENCOUNTER — PATIENT OUTREACH (OUTPATIENT)
Dept: ADMINISTRATIVE | Facility: HOSPITAL | Age: 71
End: 2021-01-21

## 2021-01-21 ENCOUNTER — TELEPHONE (OUTPATIENT)
Dept: FAMILY MEDICINE | Facility: CLINIC | Age: 71
End: 2021-01-21

## 2021-01-21 DIAGNOSIS — N18.32 TYPE 2 DIABETES MELLITUS WITH STAGE 3B CHRONIC KIDNEY DISEASE, WITHOUT LONG-TERM CURRENT USE OF INSULIN: Primary | ICD-10-CM

## 2021-01-21 DIAGNOSIS — E11.22 TYPE 2 DIABETES MELLITUS WITH STAGE 3B CHRONIC KIDNEY DISEASE, WITHOUT LONG-TERM CURRENT USE OF INSULIN: Primary | ICD-10-CM

## 2021-01-22 ENCOUNTER — PATIENT OUTREACH (OUTPATIENT)
Dept: ADMINISTRATIVE | Facility: HOSPITAL | Age: 71
End: 2021-01-22

## 2021-02-01 PROBLEM — E78.2 MIXED HYPERLIPIDEMIA: Status: ACTIVE | Noted: 2021-01-19

## 2021-02-01 PROBLEM — Z98.62: Status: ACTIVE | Noted: 2021-02-01

## 2021-02-01 PROBLEM — Z95.1 S/P CABG X 4: Status: ACTIVE | Noted: 2021-02-01

## 2021-02-01 PROBLEM — Z95.820 S/P ANGIOPLASTY WITH STENT: Status: ACTIVE | Noted: 2021-02-01

## 2021-02-02 RX ORDER — METOPROLOL SUCCINATE 100 MG/1
100 TABLET, EXTENDED RELEASE ORAL DAILY
Qty: 90 TABLET | Refills: 3 | Status: SHIPPED | OUTPATIENT
Start: 2021-02-02 | End: 2023-11-27

## 2021-02-08 ENCOUNTER — TELEPHONE (OUTPATIENT)
Dept: FAMILY MEDICINE | Facility: CLINIC | Age: 71
End: 2021-02-08

## 2021-02-10 ENCOUNTER — TELEPHONE (OUTPATIENT)
Dept: FAMILY MEDICINE | Facility: CLINIC | Age: 71
End: 2021-02-10

## 2021-02-11 ENCOUNTER — OFFICE VISIT (OUTPATIENT)
Dept: FAMILY MEDICINE | Facility: CLINIC | Age: 71
End: 2021-02-11
Payer: MEDICARE

## 2021-02-11 ENCOUNTER — HOSPITAL ENCOUNTER (OUTPATIENT)
Dept: RADIOLOGY | Facility: HOSPITAL | Age: 71
Discharge: HOME | End: 2021-02-11
Attending: INTERNAL MEDICINE
Payer: MEDICARE

## 2021-02-11 VITALS
OXYGEN SATURATION: 97 % | HEART RATE: 68 BPM | DIASTOLIC BLOOD PRESSURE: 98 MMHG | WEIGHT: 249.81 LBS | BODY MASS INDEX: 40.32 KG/M2 | SYSTOLIC BLOOD PRESSURE: 150 MMHG

## 2021-02-11 DIAGNOSIS — I10 ESSENTIAL HYPERTENSION: ICD-10-CM

## 2021-02-11 DIAGNOSIS — M54.31 RIGHT SIDED SCIATICA: Primary | ICD-10-CM

## 2021-02-11 DIAGNOSIS — Z78.0 ASYMPTOMATIC MENOPAUSAL STATE: ICD-10-CM

## 2021-02-11 PROCEDURE — 99213 PR OFFICE/OUTPT VISIT, EST, LEVL III, 20-29 MIN: ICD-10-PCS | Mod: S$GLB,,, | Performed by: PHYSICIAN ASSISTANT

## 2021-02-11 PROCEDURE — 3008F PR BODY MASS INDEX (BMI) DOCUMENTED: ICD-10-PCS | Mod: CPTII,S$GLB,, | Performed by: PHYSICIAN ASSISTANT

## 2021-02-11 PROCEDURE — 3008F BODY MASS INDEX DOCD: CPT | Mod: CPTII,S$GLB,, | Performed by: PHYSICIAN ASSISTANT

## 2021-02-11 PROCEDURE — 99213 OFFICE O/P EST LOW 20 MIN: CPT | Mod: S$GLB,,, | Performed by: PHYSICIAN ASSISTANT

## 2021-02-11 PROCEDURE — 1125F PR PAIN SEVERITY QUANTIFIED, PAIN PRESENT: ICD-10-PCS | Mod: S$GLB,,, | Performed by: PHYSICIAN ASSISTANT

## 2021-02-11 PROCEDURE — 3080F DIAST BP >= 90 MM HG: CPT | Mod: CPTII,S$GLB,, | Performed by: PHYSICIAN ASSISTANT

## 2021-02-11 PROCEDURE — 3077F SYST BP >= 140 MM HG: CPT | Mod: CPTII,S$GLB,, | Performed by: PHYSICIAN ASSISTANT

## 2021-02-11 PROCEDURE — 1159F PR MEDICATION LIST DOCUMENTED IN MEDICAL RECORD: ICD-10-PCS | Mod: S$GLB,,, | Performed by: PHYSICIAN ASSISTANT

## 2021-02-11 PROCEDURE — 3077F PR MOST RECENT SYSTOLIC BLOOD PRESSURE >= 140 MM HG: ICD-10-PCS | Mod: CPTII,S$GLB,, | Performed by: PHYSICIAN ASSISTANT

## 2021-02-11 PROCEDURE — 99999 PR PBB SHADOW E&M-EST. PATIENT-LVL V: CPT | Mod: PBBFAC,,, | Performed by: PHYSICIAN ASSISTANT

## 2021-02-11 PROCEDURE — 1159F MED LIST DOCD IN RCRD: CPT | Mod: S$GLB,,, | Performed by: PHYSICIAN ASSISTANT

## 2021-02-11 PROCEDURE — 3080F PR MOST RECENT DIASTOLIC BLOOD PRESSURE >= 90 MM HG: ICD-10-PCS | Mod: CPTII,S$GLB,, | Performed by: PHYSICIAN ASSISTANT

## 2021-02-11 PROCEDURE — 99999 PR PBB SHADOW E&M-EST. PATIENT-LVL V: ICD-10-PCS | Mod: PBBFAC,,, | Performed by: PHYSICIAN ASSISTANT

## 2021-02-11 PROCEDURE — 1125F AMNT PAIN NOTED PAIN PRSNT: CPT | Mod: S$GLB,,, | Performed by: PHYSICIAN ASSISTANT

## 2021-02-11 RX ORDER — AMLODIPINE BESYLATE 2.5 MG/1
2.5 TABLET ORAL DAILY
Qty: 30 TABLET | Refills: 11 | Status: SHIPPED | OUTPATIENT
Start: 2021-02-11 | End: 2021-03-05

## 2021-02-12 ENCOUNTER — OFFICE VISIT (OUTPATIENT)
Dept: FAMILY MEDICINE | Facility: CLINIC | Age: 71
End: 2021-02-12
Payer: MEDICARE

## 2021-02-12 ENCOUNTER — TELEPHONE (OUTPATIENT)
Dept: FAMILY MEDICINE | Facility: CLINIC | Age: 71
End: 2021-02-12

## 2021-02-12 DIAGNOSIS — N18.32 TYPE 2 DIABETES MELLITUS WITH STAGE 3B CHRONIC KIDNEY DISEASE, WITHOUT LONG-TERM CURRENT USE OF INSULIN: ICD-10-CM

## 2021-02-12 DIAGNOSIS — E03.9 HYPOTHYROIDISM, UNSPECIFIED TYPE: ICD-10-CM

## 2021-02-12 DIAGNOSIS — E11.22 TYPE 2 DIABETES MELLITUS WITH STAGE 3B CHRONIC KIDNEY DISEASE, WITHOUT LONG-TERM CURRENT USE OF INSULIN: ICD-10-CM

## 2021-02-12 DIAGNOSIS — M54.41 ACUTE LOW BACK PAIN WITH RIGHT-SIDED SCIATICA, UNSPECIFIED BACK PAIN LATERALITY: Primary | ICD-10-CM

## 2021-02-12 DIAGNOSIS — E78.5 HYPERLIPIDEMIA, UNSPECIFIED HYPERLIPIDEMIA TYPE: ICD-10-CM

## 2021-02-12 DIAGNOSIS — I10 ESSENTIAL HYPERTENSION: ICD-10-CM

## 2021-02-12 PROCEDURE — 1159F PR MEDICATION LIST DOCUMENTED IN MEDICAL RECORD: ICD-10-PCS | Mod: 95,,, | Performed by: INTERNAL MEDICINE

## 2021-02-12 PROCEDURE — 3051F HG A1C>EQUAL 7.0%<8.0%: CPT | Mod: CPTII,95,, | Performed by: INTERNAL MEDICINE

## 2021-02-12 PROCEDURE — 99214 OFFICE O/P EST MOD 30 MIN: CPT | Mod: 95,,, | Performed by: INTERNAL MEDICINE

## 2021-02-12 PROCEDURE — 1159F MED LIST DOCD IN RCRD: CPT | Mod: 95,,, | Performed by: INTERNAL MEDICINE

## 2021-02-12 PROCEDURE — 99214 PR OFFICE/OUTPT VISIT, EST, LEVL IV, 30-39 MIN: ICD-10-PCS | Mod: 95,,, | Performed by: INTERNAL MEDICINE

## 2021-02-12 PROCEDURE — 3051F PR MOST RECENT HEMOGLOBIN A1C LEVEL 7.0 - < 8.0%: ICD-10-PCS | Mod: CPTII,95,, | Performed by: INTERNAL MEDICINE

## 2021-02-12 RX ORDER — PREDNISONE 10 MG/1
TABLET ORAL
Qty: 42 TABLET | Refills: 0 | Status: SHIPPED | OUTPATIENT
Start: 2021-02-12 | End: 2021-03-05

## 2021-02-12 RX ORDER — SIMVASTATIN 20 MG/1
20 TABLET, FILM COATED ORAL NIGHTLY
Qty: 90 TABLET | Refills: 3 | Status: SHIPPED | OUTPATIENT
Start: 2021-02-12 | End: 2022-03-08

## 2021-02-12 RX ORDER — VITAMIN E 268 MG
CAPSULE ORAL
COMMUNITY
End: 2021-12-08

## 2021-02-12 RX ORDER — GABAPENTIN 300 MG/1
CAPSULE ORAL
Qty: 180 CAPSULE | Refills: 3 | Status: SHIPPED | OUTPATIENT
Start: 2021-02-12 | End: 2021-06-08

## 2021-02-15 ENCOUNTER — TELEPHONE (OUTPATIENT)
Dept: FAMILY MEDICINE | Facility: CLINIC | Age: 71
End: 2021-02-15

## 2021-02-18 ENCOUNTER — PATIENT OUTREACH (OUTPATIENT)
Dept: ADMINISTRATIVE | Facility: HOSPITAL | Age: 71
End: 2021-02-18

## 2021-02-18 ENCOUNTER — PATIENT OUTREACH (OUTPATIENT)
Dept: ADMINISTRATIVE | Facility: OTHER | Age: 71
End: 2021-02-18

## 2021-02-18 ENCOUNTER — TELEPHONE (OUTPATIENT)
Dept: FAMILY MEDICINE | Facility: CLINIC | Age: 71
End: 2021-02-18

## 2021-02-18 ENCOUNTER — HOSPITAL ENCOUNTER (OUTPATIENT)
Dept: RADIOLOGY | Facility: HOSPITAL | Age: 71
Discharge: HOME | End: 2021-02-18
Attending: INTERNAL MEDICINE
Payer: MEDICARE

## 2021-02-18 PROCEDURE — 77080 DEXA BONE DENSITY SPINE HIP: ICD-10-PCS | Mod: 26,,, | Performed by: RADIOLOGY

## 2021-02-18 PROCEDURE — 77080 DXA BONE DENSITY AXIAL: CPT | Mod: 26,,, | Performed by: RADIOLOGY

## 2021-02-18 PROCEDURE — 77080 DXA BONE DENSITY AXIAL: CPT | Mod: TC,PO

## 2021-02-22 ENCOUNTER — TELEPHONE (OUTPATIENT)
Dept: GASTROENTEROLOGY | Facility: CLINIC | Age: 71
End: 2021-02-22

## 2021-03-05 ENCOUNTER — OFFICE VISIT (OUTPATIENT)
Dept: FAMILY MEDICINE | Facility: CLINIC | Age: 71
End: 2021-03-05
Payer: MEDICARE

## 2021-03-05 DIAGNOSIS — N18.32 TYPE 2 DIABETES MELLITUS WITH STAGE 3B CHRONIC KIDNEY DISEASE, WITHOUT LONG-TERM CURRENT USE OF INSULIN: ICD-10-CM

## 2021-03-05 DIAGNOSIS — E78.5 HYPERLIPIDEMIA, UNSPECIFIED HYPERLIPIDEMIA TYPE: ICD-10-CM

## 2021-03-05 DIAGNOSIS — L40.9 PSORIASIS: ICD-10-CM

## 2021-03-05 DIAGNOSIS — E11.22 TYPE 2 DIABETES MELLITUS WITH STAGE 3B CHRONIC KIDNEY DISEASE, WITHOUT LONG-TERM CURRENT USE OF INSULIN: ICD-10-CM

## 2021-03-05 DIAGNOSIS — M54.41 ACUTE LOW BACK PAIN WITH RIGHT-SIDED SCIATICA, UNSPECIFIED BACK PAIN LATERALITY: ICD-10-CM

## 2021-03-05 DIAGNOSIS — E03.9 HYPOTHYROIDISM, UNSPECIFIED TYPE: ICD-10-CM

## 2021-03-05 DIAGNOSIS — I10 ESSENTIAL HYPERTENSION: Primary | ICD-10-CM

## 2021-03-05 PROCEDURE — 1159F MED LIST DOCD IN RCRD: CPT | Mod: S$GLB,,, | Performed by: INTERNAL MEDICINE

## 2021-03-05 PROCEDURE — 82043 UR ALBUMIN QUANTITATIVE: CPT | Performed by: INTERNAL MEDICINE

## 2021-03-05 PROCEDURE — 82570 ASSAY OF URINE CREATININE: CPT | Performed by: INTERNAL MEDICINE

## 2021-03-05 PROCEDURE — 3077F SYST BP >= 140 MM HG: CPT | Mod: CPTII,S$GLB,, | Performed by: INTERNAL MEDICINE

## 2021-03-05 PROCEDURE — 1159F PR MEDICATION LIST DOCUMENTED IN MEDICAL RECORD: ICD-10-PCS | Mod: S$GLB,,, | Performed by: INTERNAL MEDICINE

## 2021-03-05 PROCEDURE — 3077F PR MOST RECENT SYSTOLIC BLOOD PRESSURE >= 140 MM HG: ICD-10-PCS | Mod: CPTII,S$GLB,, | Performed by: INTERNAL MEDICINE

## 2021-03-05 PROCEDURE — 3079F PR MOST RECENT DIASTOLIC BLOOD PRESSURE 80-89 MM HG: ICD-10-PCS | Mod: CPTII,S$GLB,, | Performed by: INTERNAL MEDICINE

## 2021-03-05 PROCEDURE — 3051F PR MOST RECENT HEMOGLOBIN A1C LEVEL 7.0 - < 8.0%: ICD-10-PCS | Mod: CPTII,S$GLB,, | Performed by: INTERNAL MEDICINE

## 2021-03-05 PROCEDURE — 3051F HG A1C>EQUAL 7.0%<8.0%: CPT | Mod: CPTII,S$GLB,, | Performed by: INTERNAL MEDICINE

## 2021-03-05 PROCEDURE — 1126F AMNT PAIN NOTED NONE PRSNT: CPT | Mod: S$GLB,,, | Performed by: INTERNAL MEDICINE

## 2021-03-05 PROCEDURE — 99214 PR OFFICE/OUTPT VISIT, EST, LEVL IV, 30-39 MIN: ICD-10-PCS | Mod: S$GLB,,, | Performed by: INTERNAL MEDICINE

## 2021-03-05 PROCEDURE — 3008F BODY MASS INDEX DOCD: CPT | Mod: CPTII,S$GLB,, | Performed by: INTERNAL MEDICINE

## 2021-03-05 PROCEDURE — 99214 OFFICE O/P EST MOD 30 MIN: CPT | Mod: S$GLB,,, | Performed by: INTERNAL MEDICINE

## 2021-03-05 PROCEDURE — 1126F PR PAIN SEVERITY QUANTIFIED, NO PAIN PRESENT: ICD-10-PCS | Mod: S$GLB,,, | Performed by: INTERNAL MEDICINE

## 2021-03-05 PROCEDURE — 3008F PR BODY MASS INDEX (BMI) DOCUMENTED: ICD-10-PCS | Mod: CPTII,S$GLB,, | Performed by: INTERNAL MEDICINE

## 2021-03-05 PROCEDURE — 3079F DIAST BP 80-89 MM HG: CPT | Mod: CPTII,S$GLB,, | Performed by: INTERNAL MEDICINE

## 2021-03-05 RX ORDER — MOMETASONE FUROATE 1 MG/G
CREAM TOPICAL DAILY
Qty: 50 G | Refills: 4 | Status: SHIPPED | OUTPATIENT
Start: 2021-03-05 | End: 2021-03-05 | Stop reason: SDUPTHER

## 2021-03-05 RX ORDER — METFORMIN HYDROCHLORIDE 500 MG/1
500 TABLET, EXTENDED RELEASE ORAL 2 TIMES DAILY WITH MEALS
Qty: 180 TABLET | Refills: 3 | Status: SHIPPED | OUTPATIENT
Start: 2021-03-05 | End: 2021-03-05 | Stop reason: SDUPTHER

## 2021-03-05 RX ORDER — AMLODIPINE BESYLATE 5 MG/1
5 TABLET ORAL DAILY
Qty: 90 TABLET | Refills: 3 | Status: SHIPPED | OUTPATIENT
Start: 2021-03-05 | End: 2021-03-05 | Stop reason: SDUPTHER

## 2021-03-06 VITALS
DIASTOLIC BLOOD PRESSURE: 80 MMHG | OXYGEN SATURATION: 98 % | HEART RATE: 69 BPM | WEIGHT: 248.25 LBS | SYSTOLIC BLOOD PRESSURE: 134 MMHG | BODY MASS INDEX: 39.9 KG/M2 | TEMPERATURE: 98 F | HEIGHT: 66 IN

## 2021-03-06 RX ORDER — MOMETASONE FUROATE 1 MG/G
CREAM TOPICAL DAILY
Qty: 50 G | Refills: 4 | Status: SHIPPED | OUTPATIENT
Start: 2021-03-06 | End: 2021-06-08

## 2021-03-06 RX ORDER — AMLODIPINE BESYLATE 5 MG/1
5 TABLET ORAL DAILY
Qty: 90 TABLET | Refills: 3 | Status: SHIPPED | OUTPATIENT
Start: 2021-03-06 | End: 2022-08-23

## 2021-03-06 RX ORDER — METFORMIN HYDROCHLORIDE 500 MG/1
500 TABLET, EXTENDED RELEASE ORAL 2 TIMES DAILY WITH MEALS
Qty: 180 TABLET | Refills: 3 | Status: SHIPPED | OUTPATIENT
Start: 2021-03-06 | End: 2021-12-10

## 2021-03-12 ENCOUNTER — TELEPHONE (OUTPATIENT)
Dept: FAMILY MEDICINE | Facility: CLINIC | Age: 71
End: 2021-03-12

## 2021-03-18 ENCOUNTER — TELEPHONE (OUTPATIENT)
Dept: FAMILY MEDICINE | Facility: CLINIC | Age: 71
End: 2021-03-18

## 2021-03-18 LAB
ALBUMIN/CREAT UR: 8.9 UG/MG (ref 0–30)
CREAT UR-MCNC: 90 MG/DL (ref 15–325)
MICROALBUMIN UR DL<=1MG/L-MCNC: 8 UG/ML

## 2021-03-19 ENCOUNTER — TELEPHONE (OUTPATIENT)
Dept: FAMILY MEDICINE | Facility: CLINIC | Age: 71
End: 2021-03-19

## 2021-04-07 ENCOUNTER — TELEPHONE (OUTPATIENT)
Dept: FAMILY MEDICINE | Facility: CLINIC | Age: 71
End: 2021-04-07

## 2021-04-07 DIAGNOSIS — M25.562 PAIN IN BOTH KNEES, UNSPECIFIED CHRONICITY: Primary | ICD-10-CM

## 2021-04-07 DIAGNOSIS — M25.561 PAIN IN BOTH KNEES, UNSPECIFIED CHRONICITY: Primary | ICD-10-CM

## 2021-04-09 ENCOUNTER — TELEPHONE (OUTPATIENT)
Dept: FAMILY MEDICINE | Facility: CLINIC | Age: 71
End: 2021-04-09

## 2021-04-12 RX ORDER — FUROSEMIDE 20 MG/1
20 TABLET ORAL DAILY
Qty: 90 TABLET | Refills: 1 | Status: SHIPPED | OUTPATIENT
Start: 2021-04-12 | End: 2021-08-18

## 2021-04-13 ENCOUNTER — TELEPHONE (OUTPATIENT)
Dept: FAMILY MEDICINE | Facility: CLINIC | Age: 71
End: 2021-04-13

## 2021-05-25 ENCOUNTER — PATIENT OUTREACH (OUTPATIENT)
Dept: ADMINISTRATIVE | Facility: HOSPITAL | Age: 71
End: 2021-05-25

## 2021-06-04 ENCOUNTER — LAB VISIT (OUTPATIENT)
Dept: LAB | Facility: HOSPITAL | Age: 71
End: 2021-06-04
Attending: INTERNAL MEDICINE
Payer: MEDICARE

## 2021-06-04 DIAGNOSIS — E11.22 TYPE 2 DIABETES MELLITUS WITH STAGE 3B CHRONIC KIDNEY DISEASE, WITHOUT LONG-TERM CURRENT USE OF INSULIN: ICD-10-CM

## 2021-06-04 DIAGNOSIS — N18.32 TYPE 2 DIABETES MELLITUS WITH STAGE 3B CHRONIC KIDNEY DISEASE, WITHOUT LONG-TERM CURRENT USE OF INSULIN: ICD-10-CM

## 2021-06-04 DIAGNOSIS — I10 ESSENTIAL HYPERTENSION: ICD-10-CM

## 2021-06-04 LAB
ANION GAP SERPL CALC-SCNC: 16 MMOL/L (ref 8–16)
BUN SERPL-MCNC: 17 MG/DL (ref 8–23)
CALCIUM SERPL-MCNC: 9.4 MG/DL (ref 8.7–10.5)
CHLORIDE SERPL-SCNC: 105 MMOL/L (ref 95–110)
CHOLEST SERPL-MCNC: 130 MG/DL (ref 120–199)
CHOLEST/HDLC SERPL: 3.6 {RATIO} (ref 2–5)
CO2 SERPL-SCNC: 19 MMOL/L (ref 23–29)
CREAT SERPL-MCNC: 1 MG/DL (ref 0.5–1.4)
EST. GFR  (AFRICAN AMERICAN): >60 ML/MIN/1.73 M^2
EST. GFR  (NON AFRICAN AMERICAN): 57.2 ML/MIN/1.73 M^2
ESTIMATED AVG GLUCOSE: 151 MG/DL (ref 68–131)
GLUCOSE SERPL-MCNC: 132 MG/DL (ref 70–110)
HBA1C MFR BLD: 6.9 % (ref 4–5.6)
HDLC SERPL-MCNC: 36 MG/DL (ref 40–75)
HDLC SERPL: 27.7 % (ref 20–50)
LDLC SERPL CALC-MCNC: 62.2 MG/DL (ref 63–159)
NONHDLC SERPL-MCNC: 94 MG/DL
POTASSIUM SERPL-SCNC: 4.7 MMOL/L (ref 3.5–5.1)
SODIUM SERPL-SCNC: 140 MMOL/L (ref 136–145)
TRIGL SERPL-MCNC: 159 MG/DL (ref 30–150)
TSH SERPL DL<=0.005 MIU/L-ACNC: 3.06 UIU/ML (ref 0.4–4)

## 2021-06-04 PROCEDURE — 84443 ASSAY THYROID STIM HORMONE: CPT | Performed by: INTERNAL MEDICINE

## 2021-06-04 PROCEDURE — 36415 COLL VENOUS BLD VENIPUNCTURE: CPT | Mod: PO | Performed by: INTERNAL MEDICINE

## 2021-06-04 PROCEDURE — 80061 LIPID PANEL: CPT | Performed by: INTERNAL MEDICINE

## 2021-06-04 PROCEDURE — 83036 HEMOGLOBIN GLYCOSYLATED A1C: CPT | Performed by: INTERNAL MEDICINE

## 2021-06-04 PROCEDURE — 80048 BASIC METABOLIC PNL TOTAL CA: CPT | Performed by: INTERNAL MEDICINE

## 2021-06-08 ENCOUNTER — OFFICE VISIT (OUTPATIENT)
Dept: FAMILY MEDICINE | Facility: CLINIC | Age: 71
End: 2021-06-08
Payer: MEDICARE

## 2021-06-08 VITALS
BODY MASS INDEX: 41.01 KG/M2 | HEART RATE: 63 BPM | WEIGHT: 255.19 LBS | DIASTOLIC BLOOD PRESSURE: 86 MMHG | OXYGEN SATURATION: 95 % | SYSTOLIC BLOOD PRESSURE: 142 MMHG | HEIGHT: 66 IN | RESPIRATION RATE: 16 BRPM | TEMPERATURE: 97 F

## 2021-06-08 DIAGNOSIS — I50.9 CHRONIC HEART FAILURE, UNSPECIFIED HEART FAILURE TYPE: ICD-10-CM

## 2021-06-08 DIAGNOSIS — F51.01 PRIMARY INSOMNIA: ICD-10-CM

## 2021-06-08 DIAGNOSIS — I48.0 PAROXYSMAL ATRIAL FIBRILLATION: ICD-10-CM

## 2021-06-08 DIAGNOSIS — I25.10 CORONARY ARTERY DISEASE WITHOUT ANGINA PECTORIS, UNSPECIFIED VESSEL OR LESION TYPE, UNSPECIFIED WHETHER NATIVE OR TRANSPLANTED HEART: Primary | ICD-10-CM

## 2021-06-08 DIAGNOSIS — I87.2 CHRONIC STASIS DERMATITIS: ICD-10-CM

## 2021-06-08 DIAGNOSIS — F33.0 MAJOR DEPRESSIVE DISORDER, RECURRENT EPISODE, MILD: ICD-10-CM

## 2021-06-08 DIAGNOSIS — E66.01 MORBID OBESITY WITH BMI OF 40.0-44.9, ADULT: ICD-10-CM

## 2021-06-08 DIAGNOSIS — I10 ESSENTIAL HYPERTENSION: ICD-10-CM

## 2021-06-08 DIAGNOSIS — Z12.31 ENCOUNTER FOR SCREENING MAMMOGRAM FOR MALIGNANT NEOPLASM OF BREAST: ICD-10-CM

## 2021-06-08 DIAGNOSIS — Z23 NEED FOR DIPHTHERIA-TETANUS-PERTUSSIS (TDAP) VACCINE: ICD-10-CM

## 2021-06-08 DIAGNOSIS — G47.33 OSA (OBSTRUCTIVE SLEEP APNEA): ICD-10-CM

## 2021-06-08 DIAGNOSIS — H40.9 GLAUCOMA OF BOTH EYES, UNSPECIFIED GLAUCOMA TYPE: ICD-10-CM

## 2021-06-08 DIAGNOSIS — E03.9 HYPOTHYROIDISM, UNSPECIFIED TYPE: ICD-10-CM

## 2021-06-08 DIAGNOSIS — Z23 NEED FOR SHINGLES VACCINE: ICD-10-CM

## 2021-06-08 DIAGNOSIS — K76.0 FATTY LIVER: ICD-10-CM

## 2021-06-08 DIAGNOSIS — B37.2 CANDIDAL SKIN INFECTION: ICD-10-CM

## 2021-06-08 DIAGNOSIS — E11.22 TYPE 2 DIABETES MELLITUS WITH STAGE 3B CHRONIC KIDNEY DISEASE, WITHOUT LONG-TERM CURRENT USE OF INSULIN: ICD-10-CM

## 2021-06-08 DIAGNOSIS — L40.0 PLAQUE PSORIASIS: ICD-10-CM

## 2021-06-08 DIAGNOSIS — N32.81 OAB (OVERACTIVE BLADDER): ICD-10-CM

## 2021-06-08 DIAGNOSIS — I65.22 STENOSIS OF LEFT CAROTID ARTERY: ICD-10-CM

## 2021-06-08 DIAGNOSIS — M17.12 PRIMARY OSTEOARTHRITIS OF LEFT KNEE: ICD-10-CM

## 2021-06-08 DIAGNOSIS — Z12.11 SCREEN FOR COLON CANCER: ICD-10-CM

## 2021-06-08 DIAGNOSIS — J45.20 MILD INTERMITTENT ASTHMA WITHOUT COMPLICATION: ICD-10-CM

## 2021-06-08 DIAGNOSIS — I73.9 PVD (PERIPHERAL VASCULAR DISEASE): ICD-10-CM

## 2021-06-08 DIAGNOSIS — L30.9 DERMATITIS: ICD-10-CM

## 2021-06-08 DIAGNOSIS — E78.5 HYPERLIPIDEMIA, UNSPECIFIED HYPERLIPIDEMIA TYPE: ICD-10-CM

## 2021-06-08 DIAGNOSIS — N18.32 TYPE 2 DIABETES MELLITUS WITH STAGE 3B CHRONIC KIDNEY DISEASE, WITHOUT LONG-TERM CURRENT USE OF INSULIN: ICD-10-CM

## 2021-06-08 DIAGNOSIS — N18.30 STAGE 3 CHRONIC KIDNEY DISEASE, UNSPECIFIED WHETHER STAGE 3A OR 3B CKD: ICD-10-CM

## 2021-06-08 PROCEDURE — 1101F PT FALLS ASSESS-DOCD LE1/YR: CPT | Mod: CPTII,S$GLB,, | Performed by: INTERNAL MEDICINE

## 2021-06-08 PROCEDURE — 3008F PR BODY MASS INDEX (BMI) DOCUMENTED: ICD-10-PCS | Mod: CPTII,S$GLB,, | Performed by: INTERNAL MEDICINE

## 2021-06-08 PROCEDURE — 99499 RISK ADDL DX/OHS AUDIT: ICD-10-PCS | Mod: S$GLB,,, | Performed by: INTERNAL MEDICINE

## 2021-06-08 PROCEDURE — 3008F BODY MASS INDEX DOCD: CPT | Mod: CPTII,S$GLB,, | Performed by: INTERNAL MEDICINE

## 2021-06-08 PROCEDURE — 3288F PR FALLS RISK ASSESSMENT DOCUMENTED: ICD-10-PCS | Mod: CPTII,S$GLB,, | Performed by: INTERNAL MEDICINE

## 2021-06-08 PROCEDURE — 99215 OFFICE O/P EST HI 40 MIN: CPT | Mod: S$GLB,,, | Performed by: INTERNAL MEDICINE

## 2021-06-08 PROCEDURE — 1159F MED LIST DOCD IN RCRD: CPT | Mod: S$GLB,,, | Performed by: INTERNAL MEDICINE

## 2021-06-08 PROCEDURE — 1126F AMNT PAIN NOTED NONE PRSNT: CPT | Mod: S$GLB,,, | Performed by: INTERNAL MEDICINE

## 2021-06-08 PROCEDURE — 99499 UNLISTED E&M SERVICE: CPT | Mod: S$GLB,,, | Performed by: INTERNAL MEDICINE

## 2021-06-08 PROCEDURE — 1101F PR PT FALLS ASSESS DOC 0-1 FALLS W/OUT INJ PAST YR: ICD-10-PCS | Mod: CPTII,S$GLB,, | Performed by: INTERNAL MEDICINE

## 2021-06-08 PROCEDURE — 1126F PR PAIN SEVERITY QUANTIFIED, NO PAIN PRESENT: ICD-10-PCS | Mod: S$GLB,,, | Performed by: INTERNAL MEDICINE

## 2021-06-08 PROCEDURE — 1159F PR MEDICATION LIST DOCUMENTED IN MEDICAL RECORD: ICD-10-PCS | Mod: S$GLB,,, | Performed by: INTERNAL MEDICINE

## 2021-06-08 PROCEDURE — 99215 PR OFFICE/OUTPT VISIT, EST, LEVL V, 40-54 MIN: ICD-10-PCS | Mod: S$GLB,,, | Performed by: INTERNAL MEDICINE

## 2021-06-08 PROCEDURE — 3288F FALL RISK ASSESSMENT DOCD: CPT | Mod: CPTII,S$GLB,, | Performed by: INTERNAL MEDICINE

## 2021-06-08 RX ORDER — NYSTATIN 100000 [USP'U]/G
POWDER TOPICAL 3 TIMES DAILY
Qty: 60 G | Refills: 2 | Status: ON HOLD | OUTPATIENT
Start: 2021-06-08 | End: 2023-10-17 | Stop reason: HOSPADM

## 2021-06-08 RX ORDER — TETANUS TOXOID, REDUCED DIPHTHERIA TOXOID AND ACELLULAR PERTUSSIS VACCINE, ADSORBED 5; 2.5; 8; 8; 2.5 [IU]/.5ML; [IU]/.5ML; UG/.5ML; UG/.5ML; UG/.5ML
0.5 SUSPENSION INTRAMUSCULAR ONCE
Qty: 0.5 ML | Refills: 0 | Status: SHIPPED | OUTPATIENT
Start: 2021-06-08 | End: 2021-06-08

## 2021-06-08 RX ORDER — FLUTICASONE PROPIONATE 50 MCG
2 SPRAY, SUSPENSION (ML) NASAL DAILY
Qty: 16 G | Refills: 6 | Status: SHIPPED | OUTPATIENT
Start: 2021-06-08 | End: 2022-07-20

## 2021-06-08 RX ORDER — ZOSTER VACCINE RECOMBINANT, ADJUVANTED 50 MCG/0.5
0.5 KIT INTRAMUSCULAR ONCE
Qty: 1 EACH | Refills: 1 | Status: SHIPPED | OUTPATIENT
Start: 2021-06-08 | End: 2021-06-08

## 2021-06-08 RX ORDER — MOMETASONE FUROATE 1 MG/G
CREAM TOPICAL DAILY
Qty: 45 G | Refills: 3 | Status: ON HOLD | OUTPATIENT
Start: 2021-06-08 | End: 2023-10-17 | Stop reason: HOSPADM

## 2021-06-08 RX ORDER — SERTRALINE HYDROCHLORIDE 100 MG/1
100 TABLET, FILM COATED ORAL DAILY
Qty: 90 TABLET | Refills: 3 | Status: SHIPPED | OUTPATIENT
Start: 2021-06-08 | End: 2022-06-21 | Stop reason: DRUGHIGH

## 2021-06-12 ENCOUNTER — PATIENT OUTREACH (OUTPATIENT)
Dept: ADMINISTRATIVE | Facility: HOSPITAL | Age: 71
End: 2021-06-12

## 2021-08-12 ENCOUNTER — TELEPHONE (OUTPATIENT)
Dept: FAMILY MEDICINE | Facility: CLINIC | Age: 71
End: 2021-08-12

## 2021-08-12 DIAGNOSIS — M17.12 PRIMARY OSTEOARTHRITIS OF LEFT KNEE: Primary | ICD-10-CM

## 2021-08-12 DIAGNOSIS — M65.312 TRIGGER FINGER OF LEFT THUMB: ICD-10-CM

## 2021-08-20 LAB
LEFT EYE DM RETINOPATHY: POSITIVE
RIGHT EYE DM RETINOPATHY: POSITIVE

## 2021-08-21 ENCOUNTER — PATIENT OUTREACH (OUTPATIENT)
Dept: ADMINISTRATIVE | Facility: HOSPITAL | Age: 71
End: 2021-08-21

## 2021-09-08 ENCOUNTER — TELEPHONE (OUTPATIENT)
Dept: FAMILY MEDICINE | Facility: CLINIC | Age: 71
End: 2021-09-08

## 2021-09-08 DIAGNOSIS — M65.319 TRIGGER FINGER OF THUMB, UNSPECIFIED LATERALITY: Primary | ICD-10-CM

## 2021-09-20 ENCOUNTER — OFFICE VISIT (OUTPATIENT)
Dept: ORTHOPEDICS | Facility: CLINIC | Age: 71
End: 2021-09-20
Payer: MEDICARE

## 2021-09-20 VITALS — WEIGHT: 255 LBS | BODY MASS INDEX: 40.98 KG/M2 | HEIGHT: 66 IN

## 2021-09-20 DIAGNOSIS — M65.319 TRIGGER FINGER OF THUMB, UNSPECIFIED LATERALITY: ICD-10-CM

## 2021-09-20 PROCEDURE — 99999 PR PBB SHADOW E&M-EST. PATIENT-LVL IV: CPT | Mod: PBBFAC,,, | Performed by: ORTHOPAEDIC SURGERY

## 2021-09-20 PROCEDURE — 3288F FALL RISK ASSESSMENT DOCD: CPT | Mod: CPTII,S$GLB,, | Performed by: ORTHOPAEDIC SURGERY

## 2021-09-20 PROCEDURE — 99203 OFFICE O/P NEW LOW 30 MIN: CPT | Mod: 25,S$GLB,, | Performed by: ORTHOPAEDIC SURGERY

## 2021-09-20 PROCEDURE — 3061F PR NEG MICROALBUMINURIA RESULT DOCUMENTED/REVIEW: ICD-10-PCS | Mod: CPTII,S$GLB,, | Performed by: ORTHOPAEDIC SURGERY

## 2021-09-20 PROCEDURE — 20550 NJX 1 TENDON SHEATH/LIGAMENT: CPT | Mod: FA,S$GLB,, | Performed by: ORTHOPAEDIC SURGERY

## 2021-09-20 PROCEDURE — 99203 PR OFFICE/OUTPT VISIT, NEW, LEVL III, 30-44 MIN: ICD-10-PCS | Mod: 25,S$GLB,, | Performed by: ORTHOPAEDIC SURGERY

## 2021-09-20 PROCEDURE — 1125F PR PAIN SEVERITY QUANTIFIED, PAIN PRESENT: ICD-10-PCS | Mod: CPTII,S$GLB,, | Performed by: ORTHOPAEDIC SURGERY

## 2021-09-20 PROCEDURE — 3008F PR BODY MASS INDEX (BMI) DOCUMENTED: ICD-10-PCS | Mod: CPTII,S$GLB,, | Performed by: ORTHOPAEDIC SURGERY

## 2021-09-20 PROCEDURE — 1125F AMNT PAIN NOTED PAIN PRSNT: CPT | Mod: CPTII,S$GLB,, | Performed by: ORTHOPAEDIC SURGERY

## 2021-09-20 PROCEDURE — 1160F PR REVIEW ALL MEDS BY PRESCRIBER/CLIN PHARMACIST DOCUMENTED: ICD-10-PCS | Mod: CPTII,S$GLB,, | Performed by: ORTHOPAEDIC SURGERY

## 2021-09-20 PROCEDURE — 3044F HG A1C LEVEL LT 7.0%: CPT | Mod: CPTII,S$GLB,, | Performed by: ORTHOPAEDIC SURGERY

## 2021-09-20 PROCEDURE — 1101F PT FALLS ASSESS-DOCD LE1/YR: CPT | Mod: CPTII,S$GLB,, | Performed by: ORTHOPAEDIC SURGERY

## 2021-09-20 PROCEDURE — 20550 TENDON SHEATH: ICD-10-PCS | Mod: FA,S$GLB,, | Performed by: ORTHOPAEDIC SURGERY

## 2021-09-20 PROCEDURE — 99999 PR PBB SHADOW E&M-EST. PATIENT-LVL IV: ICD-10-PCS | Mod: PBBFAC,,, | Performed by: ORTHOPAEDIC SURGERY

## 2021-09-20 PROCEDURE — 1159F PR MEDICATION LIST DOCUMENTED IN MEDICAL RECORD: ICD-10-PCS | Mod: CPTII,S$GLB,, | Performed by: ORTHOPAEDIC SURGERY

## 2021-09-20 PROCEDURE — 3008F BODY MASS INDEX DOCD: CPT | Mod: CPTII,S$GLB,, | Performed by: ORTHOPAEDIC SURGERY

## 2021-09-20 PROCEDURE — 3044F PR MOST RECENT HEMOGLOBIN A1C LEVEL <7.0%: ICD-10-PCS | Mod: CPTII,S$GLB,, | Performed by: ORTHOPAEDIC SURGERY

## 2021-09-20 PROCEDURE — 1160F RVW MEDS BY RX/DR IN RCRD: CPT | Mod: CPTII,S$GLB,, | Performed by: ORTHOPAEDIC SURGERY

## 2021-09-20 PROCEDURE — 1159F MED LIST DOCD IN RCRD: CPT | Mod: CPTII,S$GLB,, | Performed by: ORTHOPAEDIC SURGERY

## 2021-09-20 PROCEDURE — 3288F PR FALLS RISK ASSESSMENT DOCUMENTED: ICD-10-PCS | Mod: CPTII,S$GLB,, | Performed by: ORTHOPAEDIC SURGERY

## 2021-09-20 PROCEDURE — 3066F NEPHROPATHY DOC TX: CPT | Mod: CPTII,S$GLB,, | Performed by: ORTHOPAEDIC SURGERY

## 2021-09-20 PROCEDURE — 3061F NEG MICROALBUMINURIA REV: CPT | Mod: CPTII,S$GLB,, | Performed by: ORTHOPAEDIC SURGERY

## 2021-09-20 PROCEDURE — 3066F PR DOCUMENTATION OF TREATMENT FOR NEPHROPATHY: ICD-10-PCS | Mod: CPTII,S$GLB,, | Performed by: ORTHOPAEDIC SURGERY

## 2021-09-20 PROCEDURE — 1101F PR PT FALLS ASSESS DOC 0-1 FALLS W/OUT INJ PAST YR: ICD-10-PCS | Mod: CPTII,S$GLB,, | Performed by: ORTHOPAEDIC SURGERY

## 2021-09-20 RX ADMIN — TRIAMCINOLONE ACETONIDE 40 MG: 40 INJECTION, SUSPENSION INTRA-ARTICULAR; INTRAMUSCULAR at 01:09

## 2021-09-28 RX ORDER — TRIAMCINOLONE ACETONIDE 40 MG/ML
40 INJECTION, SUSPENSION INTRA-ARTICULAR; INTRAMUSCULAR
Status: DISCONTINUED | OUTPATIENT
Start: 2021-09-20 | End: 2021-09-28 | Stop reason: HOSPADM

## 2021-10-26 ENCOUNTER — TELEPHONE (OUTPATIENT)
Dept: FAMILY MEDICINE | Facility: CLINIC | Age: 71
End: 2021-10-26
Payer: MEDICARE

## 2021-10-26 ENCOUNTER — OFFICE VISIT (OUTPATIENT)
Dept: FAMILY MEDICINE | Facility: CLINIC | Age: 71
End: 2021-10-26
Payer: MEDICARE

## 2021-10-26 DIAGNOSIS — J40 BRONCHITIS: Primary | ICD-10-CM

## 2021-10-26 PROCEDURE — 99213 PR OFFICE/OUTPT VISIT, EST, LEVL III, 20-29 MIN: ICD-10-PCS | Mod: 95,,, | Performed by: PHYSICIAN ASSISTANT

## 2021-10-26 PROCEDURE — 3044F HG A1C LEVEL LT 7.0%: CPT | Mod: CPTII,95,, | Performed by: PHYSICIAN ASSISTANT

## 2021-10-26 PROCEDURE — 1159F PR MEDICATION LIST DOCUMENTED IN MEDICAL RECORD: ICD-10-PCS | Mod: CPTII,95,, | Performed by: PHYSICIAN ASSISTANT

## 2021-10-26 PROCEDURE — 3044F PR MOST RECENT HEMOGLOBIN A1C LEVEL <7.0%: ICD-10-PCS | Mod: CPTII,95,, | Performed by: PHYSICIAN ASSISTANT

## 2021-10-26 PROCEDURE — 3066F NEPHROPATHY DOC TX: CPT | Mod: CPTII,95,, | Performed by: PHYSICIAN ASSISTANT

## 2021-10-26 PROCEDURE — 1160F PR REVIEW ALL MEDS BY PRESCRIBER/CLIN PHARMACIST DOCUMENTED: ICD-10-PCS | Mod: CPTII,95,, | Performed by: PHYSICIAN ASSISTANT

## 2021-10-26 PROCEDURE — 3066F PR DOCUMENTATION OF TREATMENT FOR NEPHROPATHY: ICD-10-PCS | Mod: CPTII,95,, | Performed by: PHYSICIAN ASSISTANT

## 2021-10-26 PROCEDURE — 3061F NEG MICROALBUMINURIA REV: CPT | Mod: CPTII,95,, | Performed by: PHYSICIAN ASSISTANT

## 2021-10-26 PROCEDURE — 1160F RVW MEDS BY RX/DR IN RCRD: CPT | Mod: CPTII,95,, | Performed by: PHYSICIAN ASSISTANT

## 2021-10-26 PROCEDURE — 3061F PR NEG MICROALBUMINURIA RESULT DOCUMENTED/REVIEW: ICD-10-PCS | Mod: CPTII,95,, | Performed by: PHYSICIAN ASSISTANT

## 2021-10-26 PROCEDURE — 99213 OFFICE O/P EST LOW 20 MIN: CPT | Mod: 95,,, | Performed by: PHYSICIAN ASSISTANT

## 2021-10-26 PROCEDURE — 1159F MED LIST DOCD IN RCRD: CPT | Mod: CPTII,95,, | Performed by: PHYSICIAN ASSISTANT

## 2021-10-26 RX ORDER — AMOXICILLIN AND CLAVULANATE POTASSIUM 875; 125 MG/1; MG/1
1 TABLET, FILM COATED ORAL 2 TIMES DAILY
Qty: 14 TABLET | Refills: 0 | Status: SHIPPED | OUTPATIENT
Start: 2021-10-26 | End: 2021-11-02

## 2021-10-26 RX ORDER — PROMETHAZINE HYDROCHLORIDE AND DEXTROMETHORPHAN HYDROBROMIDE 6.25; 15 MG/5ML; MG/5ML
5 SYRUP ORAL 3 TIMES DAILY PRN
Qty: 180 ML | Refills: 0 | Status: SHIPPED | OUTPATIENT
Start: 2021-10-26 | End: 2021-11-05

## 2021-11-03 ENCOUNTER — PATIENT MESSAGE (OUTPATIENT)
Dept: ADMINISTRATIVE | Facility: OTHER | Age: 71
End: 2021-11-03
Payer: MEDICARE

## 2021-12-03 ENCOUNTER — TELEPHONE (OUTPATIENT)
Dept: FAMILY MEDICINE | Facility: CLINIC | Age: 71
End: 2021-12-03
Payer: MEDICARE

## 2021-12-10 ENCOUNTER — OFFICE VISIT (OUTPATIENT)
Dept: FAMILY MEDICINE | Facility: CLINIC | Age: 71
End: 2021-12-10
Payer: MEDICARE

## 2021-12-10 VITALS
SYSTOLIC BLOOD PRESSURE: 136 MMHG | HEART RATE: 63 BPM | TEMPERATURE: 98 F | RESPIRATION RATE: 18 BRPM | DIASTOLIC BLOOD PRESSURE: 82 MMHG | OXYGEN SATURATION: 96 % | BODY MASS INDEX: 40.97 KG/M2 | WEIGHT: 254.94 LBS | HEIGHT: 66 IN

## 2021-12-10 DIAGNOSIS — M17.12 PRIMARY OSTEOARTHRITIS OF LEFT KNEE: ICD-10-CM

## 2021-12-10 DIAGNOSIS — I50.9 CHRONIC HEART FAILURE, UNSPECIFIED HEART FAILURE TYPE: ICD-10-CM

## 2021-12-10 DIAGNOSIS — I65.22 STENOSIS OF LEFT CAROTID ARTERY: ICD-10-CM

## 2021-12-10 DIAGNOSIS — N32.81 OAB (OVERACTIVE BLADDER): ICD-10-CM

## 2021-12-10 DIAGNOSIS — G47.33 OSA (OBSTRUCTIVE SLEEP APNEA): ICD-10-CM

## 2021-12-10 DIAGNOSIS — K76.0 FATTY LIVER: ICD-10-CM

## 2021-12-10 DIAGNOSIS — I73.9 PVD (PERIPHERAL VASCULAR DISEASE): ICD-10-CM

## 2021-12-10 DIAGNOSIS — I10 ESSENTIAL HYPERTENSION: ICD-10-CM

## 2021-12-10 DIAGNOSIS — I25.10 CORONARY ARTERY DISEASE WITHOUT ANGINA PECTORIS, UNSPECIFIED VESSEL OR LESION TYPE, UNSPECIFIED WHETHER NATIVE OR TRANSPLANTED HEART: Primary | ICD-10-CM

## 2021-12-10 DIAGNOSIS — I87.2 CHRONIC STASIS DERMATITIS: ICD-10-CM

## 2021-12-10 DIAGNOSIS — E66.01 MORBID OBESITY WITH BMI OF 40.0-44.9, ADULT: ICD-10-CM

## 2021-12-10 DIAGNOSIS — L65.9 HAIR LOSS: ICD-10-CM

## 2021-12-10 DIAGNOSIS — L40.0 PLAQUE PSORIASIS: ICD-10-CM

## 2021-12-10 DIAGNOSIS — Z12.11 SCREEN FOR COLON CANCER: ICD-10-CM

## 2021-12-10 DIAGNOSIS — H40.9 GLAUCOMA OF BOTH EYES, UNSPECIFIED GLAUCOMA TYPE: ICD-10-CM

## 2021-12-10 DIAGNOSIS — N18.30 STAGE 3 CHRONIC KIDNEY DISEASE, UNSPECIFIED WHETHER STAGE 3A OR 3B CKD: ICD-10-CM

## 2021-12-10 DIAGNOSIS — F33.0 MAJOR DEPRESSIVE DISORDER, RECURRENT EPISODE, MILD: ICD-10-CM

## 2021-12-10 DIAGNOSIS — I48.0 PAROXYSMAL ATRIAL FIBRILLATION: ICD-10-CM

## 2021-12-10 DIAGNOSIS — N18.32 TYPE 2 DIABETES MELLITUS WITH STAGE 3B CHRONIC KIDNEY DISEASE, WITHOUT LONG-TERM CURRENT USE OF INSULIN: ICD-10-CM

## 2021-12-10 DIAGNOSIS — E03.9 HYPOTHYROIDISM, UNSPECIFIED TYPE: ICD-10-CM

## 2021-12-10 DIAGNOSIS — E78.5 HYPERLIPIDEMIA, UNSPECIFIED HYPERLIPIDEMIA TYPE: ICD-10-CM

## 2021-12-10 DIAGNOSIS — E11.22 TYPE 2 DIABETES MELLITUS WITH STAGE 3B CHRONIC KIDNEY DISEASE, WITHOUT LONG-TERM CURRENT USE OF INSULIN: ICD-10-CM

## 2021-12-10 DIAGNOSIS — J45.20 MILD INTERMITTENT ASTHMA WITHOUT COMPLICATION: ICD-10-CM

## 2021-12-10 DIAGNOSIS — F51.01 PRIMARY INSOMNIA: ICD-10-CM

## 2021-12-10 LAB
ALBUMIN SERPL BCP-MCNC: 3.6 G/DL (ref 3.5–5.2)
ALP SERPL-CCNC: 102 U/L (ref 55–135)
ALT SERPL W/O P-5'-P-CCNC: 8 U/L (ref 10–44)
ANION GAP SERPL CALC-SCNC: 11 MMOL/L (ref 8–16)
AST SERPL-CCNC: 11 U/L (ref 10–40)
BASOPHILS # BLD AUTO: 0.06 K/UL (ref 0–0.2)
BASOPHILS NFR BLD: 0.7 % (ref 0–1.9)
BILIRUB SERPL-MCNC: 0.7 MG/DL (ref 0.1–1)
BUN SERPL-MCNC: 16 MG/DL (ref 8–23)
CALCIUM SERPL-MCNC: 8.8 MG/DL (ref 8.7–10.5)
CHLORIDE SERPL-SCNC: 103 MMOL/L (ref 95–110)
CO2 SERPL-SCNC: 23 MMOL/L (ref 23–29)
CREAT SERPL-MCNC: 0.8 MG/DL (ref 0.5–1.4)
DIFFERENTIAL METHOD: ABNORMAL
EOSINOPHIL # BLD AUTO: 0.2 K/UL (ref 0–0.5)
EOSINOPHIL NFR BLD: 1.9 % (ref 0–8)
ERYTHROCYTE [DISTWIDTH] IN BLOOD BY AUTOMATED COUNT: 15.3 % (ref 11.5–14.5)
EST. GFR  (AFRICAN AMERICAN): >60 ML/MIN/1.73 M^2
EST. GFR  (NON AFRICAN AMERICAN): >60 ML/MIN/1.73 M^2
ESTIMATED AVG GLUCOSE: 143 MG/DL (ref 68–131)
GLUCOSE SERPL-MCNC: 133 MG/DL (ref 70–110)
HBA1C MFR BLD: 6.6 % (ref 4–5.6)
HCT VFR BLD AUTO: 39.7 % (ref 37–48.5)
HGB BLD-MCNC: 12.5 G/DL (ref 12–16)
IMM GRANULOCYTES # BLD AUTO: 0.03 K/UL (ref 0–0.04)
IMM GRANULOCYTES NFR BLD AUTO: 0.3 % (ref 0–0.5)
IRON SERPL-MCNC: 50 UG/DL (ref 30–160)
LYMPHOCYTES # BLD AUTO: 1.5 K/UL (ref 1–4.8)
LYMPHOCYTES NFR BLD: 16.9 % (ref 18–48)
MCH RBC QN AUTO: 26.5 PG (ref 27–31)
MCHC RBC AUTO-ENTMCNC: 31.5 G/DL (ref 32–36)
MCV RBC AUTO: 84 FL (ref 82–98)
MONOCYTES # BLD AUTO: 0.9 K/UL (ref 0.3–1)
MONOCYTES NFR BLD: 10 % (ref 4–15)
NEUTROPHILS # BLD AUTO: 6.3 K/UL (ref 1.8–7.7)
NEUTROPHILS NFR BLD: 70.2 % (ref 38–73)
NRBC BLD-RTO: 0 /100 WBC
PLATELET # BLD AUTO: 276 K/UL (ref 150–450)
PMV BLD AUTO: 10.5 FL (ref 9.2–12.9)
POTASSIUM SERPL-SCNC: 4.4 MMOL/L (ref 3.5–5.1)
PROT SERPL-MCNC: 6.8 G/DL (ref 6–8.4)
RBC # BLD AUTO: 4.72 M/UL (ref 4–5.4)
SATURATED IRON: 13 % (ref 20–50)
SODIUM SERPL-SCNC: 137 MMOL/L (ref 136–145)
TOTAL IRON BINDING CAPACITY: 395 UG/DL (ref 250–450)
TRANSFERRIN SERPL-MCNC: 267 MG/DL (ref 200–375)
WBC # BLD AUTO: 8.93 K/UL (ref 3.9–12.7)

## 2021-12-10 PROCEDURE — 84443 ASSAY THYROID STIM HORMONE: CPT | Performed by: INTERNAL MEDICINE

## 2021-12-10 PROCEDURE — 99499 RISK ADDL DX/OHS AUDIT: ICD-10-PCS | Mod: S$GLB,,, | Performed by: INTERNAL MEDICINE

## 2021-12-10 PROCEDURE — 82728 ASSAY OF FERRITIN: CPT | Performed by: INTERNAL MEDICINE

## 2021-12-10 PROCEDURE — 36415 COLL VENOUS BLD VENIPUNCTURE: CPT | Mod: S$GLB,,, | Performed by: INTERNAL MEDICINE

## 2021-12-10 PROCEDURE — 99499 UNLISTED E&M SERVICE: CPT | Mod: S$GLB,,, | Performed by: INTERNAL MEDICINE

## 2021-12-10 PROCEDURE — 3061F PR NEG MICROALBUMINURIA RESULT DOCUMENTED/REVIEW: ICD-10-PCS | Mod: CPTII,S$GLB,, | Performed by: INTERNAL MEDICINE

## 2021-12-10 PROCEDURE — 36415 PR COLLECTION VENOUS BLOOD,VENIPUNCTURE: ICD-10-PCS | Mod: S$GLB,,, | Performed by: INTERNAL MEDICINE

## 2021-12-10 PROCEDURE — 99215 PR OFFICE/OUTPT VISIT, EST, LEVL V, 40-54 MIN: ICD-10-PCS | Mod: S$GLB,,, | Performed by: INTERNAL MEDICINE

## 2021-12-10 PROCEDURE — 84466 ASSAY OF TRANSFERRIN: CPT | Performed by: INTERNAL MEDICINE

## 2021-12-10 PROCEDURE — 3061F NEG MICROALBUMINURIA REV: CPT | Mod: CPTII,S$GLB,, | Performed by: INTERNAL MEDICINE

## 2021-12-10 PROCEDURE — 3066F PR DOCUMENTATION OF TREATMENT FOR NEPHROPATHY: ICD-10-PCS | Mod: CPTII,S$GLB,, | Performed by: INTERNAL MEDICINE

## 2021-12-10 PROCEDURE — 83036 HEMOGLOBIN GLYCOSYLATED A1C: CPT | Performed by: INTERNAL MEDICINE

## 2021-12-10 PROCEDURE — 85025 COMPLETE CBC W/AUTO DIFF WBC: CPT | Performed by: INTERNAL MEDICINE

## 2021-12-10 PROCEDURE — 80053 COMPREHEN METABOLIC PANEL: CPT | Performed by: INTERNAL MEDICINE

## 2021-12-10 PROCEDURE — 3066F NEPHROPATHY DOC TX: CPT | Mod: CPTII,S$GLB,, | Performed by: INTERNAL MEDICINE

## 2021-12-10 PROCEDURE — 99215 OFFICE O/P EST HI 40 MIN: CPT | Mod: S$GLB,,, | Performed by: INTERNAL MEDICINE

## 2021-12-11 LAB
FERRITIN SERPL-MCNC: 115 NG/ML (ref 20–300)
TSH SERPL DL<=0.005 MIU/L-ACNC: 2.79 UIU/ML (ref 0.4–4)

## 2021-12-13 ENCOUNTER — PATIENT MESSAGE (OUTPATIENT)
Dept: FAMILY MEDICINE | Facility: CLINIC | Age: 71
End: 2021-12-13
Payer: MEDICARE

## 2021-12-28 ENCOUNTER — LAB VISIT (OUTPATIENT)
Dept: PRIMARY CARE CLINIC | Facility: OTHER | Age: 71
End: 2021-12-28
Payer: MEDICARE

## 2021-12-28 DIAGNOSIS — Z11.52 ENCOUNTER FOR PREOPERATIVE SCREENING LABORATORY TESTING FOR COVID-19 VIRUS: ICD-10-CM

## 2021-12-28 DIAGNOSIS — Z01.812 ENCOUNTER FOR PREOPERATIVE SCREENING LABORATORY TESTING FOR COVID-19 VIRUS: ICD-10-CM

## 2021-12-28 PROCEDURE — U0003 INFECTIOUS AGENT DETECTION BY NUCLEIC ACID (DNA OR RNA); SEVERE ACUTE RESPIRATORY SYNDROME CORONAVIRUS 2 (SARS-COV-2) (CORONAVIRUS DISEASE [COVID-19]), AMPLIFIED PROBE TECHNIQUE, MAKING USE OF HIGH THROUGHPUT TECHNOLOGIES AS DESCRIBED BY CMS-2020-01-R: HCPCS | Performed by: INTERNAL MEDICINE

## 2021-12-29 LAB
SARS-COV-2 RNA RESP QL NAA+PROBE: NOT DETECTED
SARS-COV-2- CYCLE NUMBER: NORMAL

## 2021-12-30 ENCOUNTER — TELEPHONE (OUTPATIENT)
Dept: FAMILY MEDICINE | Facility: CLINIC | Age: 71
End: 2021-12-30
Payer: MEDICARE

## 2021-12-30 ENCOUNTER — OFFICE VISIT (OUTPATIENT)
Dept: URGENT CARE | Facility: CLINIC | Age: 71
End: 2021-12-30
Payer: MEDICARE

## 2021-12-30 VITALS
DIASTOLIC BLOOD PRESSURE: 84 MMHG | OXYGEN SATURATION: 93 % | HEART RATE: 68 BPM | RESPIRATION RATE: 20 BRPM | TEMPERATURE: 98 F | SYSTOLIC BLOOD PRESSURE: 166 MMHG

## 2021-12-30 DIAGNOSIS — R05.9 COUGH: Primary | ICD-10-CM

## 2021-12-30 LAB
CTP QC/QA: YES
POC MOLECULAR INFLUENZA A AGN: NEGATIVE
POC MOLECULAR INFLUENZA B AGN: NEGATIVE

## 2021-12-30 PROCEDURE — 1159F PR MEDICATION LIST DOCUMENTED IN MEDICAL RECORD: ICD-10-PCS | Mod: CPTII,S$GLB,, | Performed by: FAMILY MEDICINE

## 2021-12-30 PROCEDURE — 1160F RVW MEDS BY RX/DR IN RCRD: CPT | Mod: CPTII,S$GLB,, | Performed by: FAMILY MEDICINE

## 2021-12-30 PROCEDURE — 3079F PR MOST RECENT DIASTOLIC BLOOD PRESSURE 80-89 MM HG: ICD-10-PCS | Mod: CPTII,S$GLB,, | Performed by: FAMILY MEDICINE

## 2021-12-30 PROCEDURE — 3061F PR NEG MICROALBUMINURIA RESULT DOCUMENTED/REVIEW: ICD-10-PCS | Mod: CPTII,S$GLB,, | Performed by: FAMILY MEDICINE

## 2021-12-30 PROCEDURE — 87502 INFLUENZA DNA AMP PROBE: CPT | Mod: QW,S$GLB,, | Performed by: FAMILY MEDICINE

## 2021-12-30 PROCEDURE — 1160F PR REVIEW ALL MEDS BY PRESCRIBER/CLIN PHARMACIST DOCUMENTED: ICD-10-PCS | Mod: CPTII,S$GLB,, | Performed by: FAMILY MEDICINE

## 2021-12-30 PROCEDURE — 3077F PR MOST RECENT SYSTOLIC BLOOD PRESSURE >= 140 MM HG: ICD-10-PCS | Mod: CPTII,S$GLB,, | Performed by: FAMILY MEDICINE

## 2021-12-30 PROCEDURE — 3066F NEPHROPATHY DOC TX: CPT | Mod: CPTII,S$GLB,, | Performed by: FAMILY MEDICINE

## 2021-12-30 PROCEDURE — 3066F PR DOCUMENTATION OF TREATMENT FOR NEPHROPATHY: ICD-10-PCS | Mod: CPTII,S$GLB,, | Performed by: FAMILY MEDICINE

## 2021-12-30 PROCEDURE — 1159F MED LIST DOCD IN RCRD: CPT | Mod: CPTII,S$GLB,, | Performed by: FAMILY MEDICINE

## 2021-12-30 PROCEDURE — 3077F SYST BP >= 140 MM HG: CPT | Mod: CPTII,S$GLB,, | Performed by: FAMILY MEDICINE

## 2021-12-30 PROCEDURE — 71046 X-RAY EXAM CHEST 2 VIEWS: CPT | Mod: S$GLB,,, | Performed by: RADIOLOGY

## 2021-12-30 PROCEDURE — 3061F NEG MICROALBUMINURIA REV: CPT | Mod: CPTII,S$GLB,, | Performed by: FAMILY MEDICINE

## 2021-12-30 PROCEDURE — 3044F PR MOST RECENT HEMOGLOBIN A1C LEVEL <7.0%: ICD-10-PCS | Mod: CPTII,S$GLB,, | Performed by: FAMILY MEDICINE

## 2021-12-30 PROCEDURE — 3079F DIAST BP 80-89 MM HG: CPT | Mod: CPTII,S$GLB,, | Performed by: FAMILY MEDICINE

## 2021-12-30 PROCEDURE — 3044F HG A1C LEVEL LT 7.0%: CPT | Mod: CPTII,S$GLB,, | Performed by: FAMILY MEDICINE

## 2021-12-30 PROCEDURE — 71046 XR CHEST PA AND LATERAL: ICD-10-PCS | Mod: S$GLB,,, | Performed by: RADIOLOGY

## 2021-12-30 PROCEDURE — 87502 POCT INFLUENZA A/B MOLECULAR: ICD-10-PCS | Mod: QW,S$GLB,, | Performed by: FAMILY MEDICINE

## 2021-12-30 PROCEDURE — 99214 OFFICE O/P EST MOD 30 MIN: CPT | Mod: S$GLB,,, | Performed by: FAMILY MEDICINE

## 2021-12-30 PROCEDURE — 99214 PR OFFICE/OUTPT VISIT, EST, LEVL IV, 30-39 MIN: ICD-10-PCS | Mod: S$GLB,,, | Performed by: FAMILY MEDICINE

## 2021-12-30 RX ORDER — ALBUTEROL SULFATE 90 UG/1
2 AEROSOL, METERED RESPIRATORY (INHALATION) EVERY 6 HOURS PRN
Qty: 18 G | Refills: 2 | Status: SHIPPED | OUTPATIENT
Start: 2021-12-30 | End: 2022-08-23 | Stop reason: SDUPTHER

## 2021-12-30 RX ORDER — DOXYCYCLINE 100 MG/1
100 CAPSULE ORAL 2 TIMES DAILY
Qty: 20 CAPSULE | Refills: 0 | Status: SHIPPED | OUTPATIENT
Start: 2021-12-30 | End: 2022-01-09

## 2022-01-04 ENCOUNTER — NURSE TRIAGE (OUTPATIENT)
Dept: ADMINISTRATIVE | Facility: CLINIC | Age: 72
End: 2022-01-04
Payer: MEDICARE

## 2022-01-04 NOTE — TELEPHONE ENCOUNTER
Spoke with patient she was seen on 12/20 at the Urgent Care and prescribed Atrovent and doxycycline 100 mg. States cough has worsened and she has shortness of breath when walking.  She tested negative for flu and covid.  Temp 96.4 and she has weakness.  Advised patient to be seen today.  No appointment availability per Kady. Message sent to office.  Patient advised to be seen in person if office cannot see her she will go to Urgent Care.   Reason for Disposition   MILD difficulty breathing (e.g., minimal/no SOB at rest, SOB with walking, pulse <100) and still present when not coughing    Additional Information   Negative: Bluish (or gray) lips or face   Negative: SEVERE difficulty breathing (e.g., struggling for each breath, speaks in single words)   Negative: Rapid onset of cough and has hives   Negative: Coughing started suddenly after medicine, an allergic food or bee sting   Negative: Difficulty breathing after exposure to flames, smoke, or fumes   Negative: Sounds like a life-threatening emergency to the triager   Negative: MODERATE difficulty breathing (e.g., speaks in phrases, SOB even at rest, pulse 100-120) and still present when not coughing   Negative: Chest pain present when not coughing   Negative: Passed out (i.e., fainted, collapsed and was not responding)   Negative: Patient sounds very sick or weak to the triager    Protocols used: COUGH-A-[place]

## 2022-01-05 ENCOUNTER — PATIENT MESSAGE (OUTPATIENT)
Dept: FAMILY MEDICINE | Facility: CLINIC | Age: 72
End: 2022-01-05
Payer: MEDICARE

## 2022-01-05 ENCOUNTER — TELEPHONE (OUTPATIENT)
Dept: FAMILY MEDICINE | Facility: CLINIC | Age: 72
End: 2022-01-05
Payer: MEDICARE

## 2022-01-06 ENCOUNTER — OFFICE VISIT (OUTPATIENT)
Dept: FAMILY MEDICINE | Facility: CLINIC | Age: 72
End: 2022-01-06
Payer: MEDICARE

## 2022-01-06 ENCOUNTER — TELEPHONE (OUTPATIENT)
Dept: FAMILY MEDICINE | Facility: CLINIC | Age: 72
End: 2022-01-06

## 2022-01-06 VITALS
OXYGEN SATURATION: 96 % | HEART RATE: 64 BPM | BODY MASS INDEX: 40.97 KG/M2 | WEIGHT: 254.94 LBS | DIASTOLIC BLOOD PRESSURE: 82 MMHG | TEMPERATURE: 98 F | RESPIRATION RATE: 16 BRPM | SYSTOLIC BLOOD PRESSURE: 148 MMHG | HEIGHT: 66 IN

## 2022-01-06 DIAGNOSIS — J18.9 PNEUMONIA DUE TO INFECTIOUS ORGANISM, UNSPECIFIED LATERALITY, UNSPECIFIED PART OF LUNG: ICD-10-CM

## 2022-01-06 DIAGNOSIS — J45.41 MODERATE PERSISTENT ASTHMA WITH EXACERBATION: Primary | ICD-10-CM

## 2022-01-06 DIAGNOSIS — J45.41 MODERATE PERSISTENT ASTHMA WITH EXACERBATION: ICD-10-CM

## 2022-01-06 PROCEDURE — 3008F PR BODY MASS INDEX (BMI) DOCUMENTED: ICD-10-PCS | Mod: CPTII,S$GLB,, | Performed by: INTERNAL MEDICINE

## 2022-01-06 PROCEDURE — 1101F PT FALLS ASSESS-DOCD LE1/YR: CPT | Mod: CPTII,S$GLB,, | Performed by: INTERNAL MEDICINE

## 2022-01-06 PROCEDURE — 1160F PR REVIEW ALL MEDS BY PRESCRIBER/CLIN PHARMACIST DOCUMENTED: ICD-10-PCS | Mod: CPTII,S$GLB,, | Performed by: INTERNAL MEDICINE

## 2022-01-06 PROCEDURE — 99214 PR OFFICE/OUTPT VISIT, EST, LEVL IV, 30-39 MIN: ICD-10-PCS | Mod: 25,S$GLB,, | Performed by: INTERNAL MEDICINE

## 2022-01-06 PROCEDURE — 3288F PR FALLS RISK ASSESSMENT DOCUMENTED: ICD-10-PCS | Mod: CPTII,S$GLB,, | Performed by: INTERNAL MEDICINE

## 2022-01-06 PROCEDURE — 3079F PR MOST RECENT DIASTOLIC BLOOD PRESSURE 80-89 MM HG: ICD-10-PCS | Mod: CPTII,S$GLB,, | Performed by: INTERNAL MEDICINE

## 2022-01-06 PROCEDURE — 3008F BODY MASS INDEX DOCD: CPT | Mod: CPTII,S$GLB,, | Performed by: INTERNAL MEDICINE

## 2022-01-06 PROCEDURE — 3288F FALL RISK ASSESSMENT DOCD: CPT | Mod: CPTII,S$GLB,, | Performed by: INTERNAL MEDICINE

## 2022-01-06 PROCEDURE — 1159F PR MEDICATION LIST DOCUMENTED IN MEDICAL RECORD: ICD-10-PCS | Mod: CPTII,S$GLB,, | Performed by: INTERNAL MEDICINE

## 2022-01-06 PROCEDURE — 99214 OFFICE O/P EST MOD 30 MIN: CPT | Mod: 25,S$GLB,, | Performed by: INTERNAL MEDICINE

## 2022-01-06 PROCEDURE — 1101F PR PT FALLS ASSESS DOC 0-1 FALLS W/OUT INJ PAST YR: ICD-10-PCS | Mod: CPTII,S$GLB,, | Performed by: INTERNAL MEDICINE

## 2022-01-06 PROCEDURE — 3077F SYST BP >= 140 MM HG: CPT | Mod: CPTII,S$GLB,, | Performed by: INTERNAL MEDICINE

## 2022-01-06 PROCEDURE — 1126F AMNT PAIN NOTED NONE PRSNT: CPT | Mod: CPTII,S$GLB,, | Performed by: INTERNAL MEDICINE

## 2022-01-06 PROCEDURE — 3077F PR MOST RECENT SYSTOLIC BLOOD PRESSURE >= 140 MM HG: ICD-10-PCS | Mod: CPTII,S$GLB,, | Performed by: INTERNAL MEDICINE

## 2022-01-06 PROCEDURE — 96372 PR INJECTION,THERAP/PROPH/DIAG2ST, IM OR SUBCUT: ICD-10-PCS | Mod: S$GLB,,, | Performed by: INTERNAL MEDICINE

## 2022-01-06 PROCEDURE — 96372 THER/PROPH/DIAG INJ SC/IM: CPT | Mod: S$GLB,,, | Performed by: INTERNAL MEDICINE

## 2022-01-06 PROCEDURE — 1159F MED LIST DOCD IN RCRD: CPT | Mod: CPTII,S$GLB,, | Performed by: INTERNAL MEDICINE

## 2022-01-06 PROCEDURE — 1160F RVW MEDS BY RX/DR IN RCRD: CPT | Mod: CPTII,S$GLB,, | Performed by: INTERNAL MEDICINE

## 2022-01-06 PROCEDURE — 3079F DIAST BP 80-89 MM HG: CPT | Mod: CPTII,S$GLB,, | Performed by: INTERNAL MEDICINE

## 2022-01-06 PROCEDURE — 1126F PR PAIN SEVERITY QUANTIFIED, NO PAIN PRESENT: ICD-10-PCS | Mod: CPTII,S$GLB,, | Performed by: INTERNAL MEDICINE

## 2022-01-06 RX ORDER — DEXAMETHASONE SODIUM PHOSPHATE 4 MG/ML
8 INJECTION, SOLUTION INTRA-ARTICULAR; INTRALESIONAL; INTRAMUSCULAR; INTRAVENOUS; SOFT TISSUE
Status: COMPLETED | OUTPATIENT
Start: 2022-01-06 | End: 2022-01-06

## 2022-01-06 RX ORDER — PREDNISONE 20 MG/1
60 TABLET ORAL DAILY
Qty: 15 TABLET | Refills: 0 | Status: SHIPPED | OUTPATIENT
Start: 2022-01-06 | End: 2022-01-11

## 2022-01-06 RX ORDER — ALBUTEROL SULFATE 0.83 MG/ML
2.5 SOLUTION RESPIRATORY (INHALATION) EVERY 6 HOURS PRN
Qty: 1 EACH | Refills: 6 | Status: SHIPPED | OUTPATIENT
Start: 2022-01-06 | End: 2023-01-06

## 2022-01-06 RX ORDER — ALBUTEROL SULFATE 0.83 MG/ML
2.5 SOLUTION RESPIRATORY (INHALATION) EVERY 6 HOURS PRN
Qty: 1 EACH | Refills: 6 | Status: SHIPPED | OUTPATIENT
Start: 2022-01-06 | End: 2022-01-06 | Stop reason: SDUPTHER

## 2022-01-06 RX ADMIN — DEXAMETHASONE SODIUM PHOSPHATE 8 MG: 4 INJECTION, SOLUTION INTRA-ARTICULAR; INTRALESIONAL; INTRAMUSCULAR; INTRAVENOUS; SOFT TISSUE at 11:01

## 2022-01-06 NOTE — TELEPHONE ENCOUNTER
----- Message from Tammie Ziegler sent at 1/6/2022  3:41 PM CST -----  Type:  RX Refill Request    Who Called:  Pt  Refill or New Rx:  Refill  RX Name and Strength:  albuterol (PROVENTIL) 2.5 mg /3 mL (0.083 %) nebulizer solution  How is the patient currently taking it? (ex. 1XDay):  As directed  Is this a 30 day or 90 day RX:  30  Preferred Pharmacy with phone number:    [facility][id number] - [facility] [address] [facility] [address] [facility] [address]  [address] [address] [address]  Phone: [phone] Fax: [phone]  Local or Mail Order:  Local  Ordering Provider:  Donnell Knapp Call Back Number:  [phone]  Additional Information:  Pt [facility] pharmacy told her they wouldn't cover the rx--please advise--Thank you

## 2022-01-06 NOTE — PROGRESS NOTES
Subjective:       Patient ID: Sneha Mcghee is a 71 y.o. female.    Medication List with Changes/Refills   New Medications    ALBUTEROL (PROVENTIL) 2.5 MG /3 ML (0.083 %) NEBULIZER SOLUTION    Take 3 mLs (2.5 mg total) by nebulization every 6 (six) hours as needed for Wheezing. Rescue. Dispense one box    PREDNISONE (DELTASONE) 20 MG TABLET    Take 3 tablets (60 mg total) by mouth once daily. for 5 days   Current Medications    ALBUTEROL (PROVENTIL/VENTOLIN HFA) 90 MCG/ACTUATION INHALER    Inhale 2 puffs into the lungs every 6 (six) hours as needed for Wheezing. Rescue    AMLODIPINE (NORVASC) 5 MG TABLET    Take 1 tablet (5 mg total) by mouth once daily.    APIXABAN (ELIQUIS) 5 MG TAB    Take 1 tablet (5 mg total) by mouth 2 (two) times daily.    ASPIRIN (ECOTRIN) 325 MG EC TABLET    aspirin 325 mg tablet,delayed release   Take 1 tablet every day by oral route.    CHOLECALCIFEROL, VITAMIN D3, 125 MCG (5,000 UNIT) TAB    cholecalciferol (vitamin D3) 125 mcg (5,000 unit) tablet   Take by oral route.    DOXYCYCLINE (VIBRAMYCIN) 100 MG CAP    Take 1 capsule (100 mg total) by mouth 2 (two) times daily. for 10 days    FLUTICASONE PROPIONATE (FLONASE) 50 MCG/ACTUATION NASAL SPRAY    2 sprays (100 mcg total) by Each Nostril route once daily.    FUROSEMIDE (LASIX) 20 MG TABLET    TAKE 1 TABLET BY MOUTH EVERY DAY.    IPRATROPIUM (ATROVENT HFA) 17 MCG/ACTUATION INHALER    Inhale 2 puffs into the lungs every 6 (six) hours. Rescue    LATANOPROST 0.005 % OPHTHALMIC SOLUTION    latanoprost 0.005 % eye drops   Instill 1 drop every day by ophthalmic route for 90 days.    LEVOTHYROXINE (SYNTHROID) 88 MCG TABLET    Take 1 tablet (88 mcg total) by mouth before breakfast.    METOPROLOL SUCCINATE (TOPROL-XL) 100 MG 24 HR TABLET    Take 1 tablet (100 mg total) by mouth once daily.    MOMETASONE 0.1% (ELOCON) 0.1 % CREAM    Apply topically once daily.    NYSTATIN (MYCOSTATIN) POWDER    Apply topically 3 (three) times daily.     NYSTATIN-TRIAMCINOLONE (MYCOLOG II) CREAM    PRN    POTASSIUM CHLORIDE (KLOR-CON) 10 MEQ TBSR    Take 10 mEq by mouth.    PROPAFENONE (RHTHYMOL) 150 MG TAB    Take 1 tablet (150 mg total) by mouth every 8 (eight) hours.    SERTRALINE (ZOLOFT) 100 MG TABLET    Take 1 tablet (100 mg total) by mouth once daily.    SIMVASTATIN (ZOCOR) 20 MG TABLET    Take 1 tablet (20 mg total) by mouth every evening.    SITAGLIPTIN (JANUVIA) 100 MG TAB    Take 1 tablet (100 mg total) by mouth once daily.    SUVOREXANT (BELSOMRA) 15 MG TAB    Take 1 tablet by mouth nightly as needed.    TIMOLOL MALEATE 0.5% (TIMOPTIC) 0.5 % DROP    Place 1 drop into both eyes 2 (two) times daily.   Discontinued Medications    ALBUTEROL (VENTOLIN HFA) 90 MCG/ACTUATION INHALER    Inhale 2 puffs into the lungs every 6 (six) hours as needed for Wheezing. Rescue    ASCORBIC ACID (VITAMIN C) 500 MG CPSR    Vitamin C 500 mg capsule,extended release   Take by oral route.       Chief Complaint: Cough, Wheezing, Shortness of Breath (Lack of appetite/), lack of appetite, and Fatigue  She presents with shortness of breath and wheezing. She has known asthma.     She started with symptoms and was seen at [facility] on 12/26/2021. She had CXR and was given doxycycline and atrovent nasal spray. She has albuterol at home. She is using a couple times a day without improvement. No fevers. Her cough is productive of yellow to brown sputum. No blood in sputum. This has improved mildly on doxycyline (day 7/10).  Her covid and influenza vaccine were negative. No sinus pressure. No fevers. No headaches. She is short of breath and wheezing. She has increase work of breathing. She was not given any oral or IM steroids. She is very fatigued and poor appetite.     Review of Systems   Constitutional: Positive for fatigue. Negative for activity change, appetite change, chills and fever.   HENT: Positive for congestion. Negative for ear discharge, ear pain, mouth sores, postnasal  "drip, rhinorrhea, sinus pressure and sore throat.    Eyes: Negative for pain, discharge and redness.   Respiratory: Positive for cough, shortness of breath and wheezing. Negative for chest tightness.    Gastrointestinal: Negative for abdominal pain, constipation, diarrhea, nausea and vomiting.   Genitourinary: Negative for dysuria.   Musculoskeletal: Negative for arthralgias and neck stiffness.   Skin: Negative for rash.   Neurological: Negative for headaches.   Hematological: Negative for adenopathy.       Objective:      Vitals:    01/06/22 1014   BP: (!) 148/82   BP Location: Right arm   Patient Position: Sitting   BP Method: Large (Manual)   Pulse: 64   Resp: 16   Temp: 98.2 °F (36.8 °C)   TempSrc: Skin   SpO2: 96%   Weight: 115.6 kg (254 lb 15.4 oz)   Height: 5' 6" (1.676 m)     Body mass index is 41.15 kg/m².  Physical Exam    General appearance: alert, no acute distress  Head: atraumatic  Eyes: PERRL, EMOI, normal conjunctiva, no drainage  Ears: tm normal with good visualization of landmarks, no erythema or pus, canals normal, external ear normal  Nose: normal mucosa, no polyps or sores, no rhinorrhea  Throat: no erythema, no exudates, tonsils appear normal  Mouth: no sores or lesion, moist mucous membranes  Neck: supple, FROM, no masses, no tenderness  Lymph: no posterior or cervical adenopathy  Lungs: mild distress, no retractions, diffuse wheezing with poor air movement   Heart:: Regular rate and rhythm, no murmur  Abdomen: soft, non-tender, no guarding, no rebound, no peritoneal signs, bowel sounds normal, no hepatosplenomegaly, no masses  Skin: no rashes or lesion  Perfusion: good capillary refill, normal pulses      Assessment:       1. Moderate persistent asthma with exacerbation    2. Pneumonia due to infectious organism, unspecified laterality, unspecified part of lung        Plan:       Moderate persistent asthma with exacerbation  Acute flare and given IM dexamethasone in office today and will " start 5 days of high dose steroids. ADvised to use albuterol via nebulizer every 4-6 hrs as needed. To f/u with me in 3 days (after the weekend).    -     NEBULIZER FOR HOME USE  -     albuterol (PROVENTIL) 2.5 mg /3 mL (0.083 %) nebulizer solution; Take 3 mLs (2.5 mg total) by nebulization every 6 (six) hours as needed for Wheezing. Rescue. Dispense one box  Dispense: 1 each; Refill: 6  -     predniSONE (DELTASONE) 20 MG tablet; Take 3 tablets (60 mg total) by mouth once daily. for 5 days  Dispense: 15 tablet; Refill: 0  -     dexamethasone injection 8 mg    Pneumonia due to infectious organism, unspecified laterality, unspecified part of lung  Improvement on doxycycline. Advised to continue full course.     Follow up in about 3 days (around 1/9/2022) for recheck asthma .

## 2022-01-10 ENCOUNTER — PES CALL (OUTPATIENT)
Dept: ADMINISTRATIVE | Facility: CLINIC | Age: 72
End: 2022-01-10
Payer: MEDICARE

## 2022-01-10 ENCOUNTER — OFFICE VISIT (OUTPATIENT)
Dept: FAMILY MEDICINE | Facility: CLINIC | Age: 72
End: 2022-01-10
Payer: MEDICARE

## 2022-01-10 VITALS
HEART RATE: 69 BPM | HEIGHT: 66 IN | BODY MASS INDEX: 42.31 KG/M2 | TEMPERATURE: 97 F | DIASTOLIC BLOOD PRESSURE: 78 MMHG | SYSTOLIC BLOOD PRESSURE: 134 MMHG | RESPIRATION RATE: 18 BRPM | WEIGHT: 263.25 LBS | OXYGEN SATURATION: 95 %

## 2022-01-10 DIAGNOSIS — J45.41 MODERATE PERSISTENT ASTHMA WITH EXACERBATION: Primary | ICD-10-CM

## 2022-01-10 DIAGNOSIS — J18.9 PNEUMONIA DUE TO INFECTIOUS ORGANISM, UNSPECIFIED LATERALITY, UNSPECIFIED PART OF LUNG: ICD-10-CM

## 2022-01-10 PROCEDURE — 1160F PR REVIEW ALL MEDS BY PRESCRIBER/CLIN PHARMACIST DOCUMENTED: ICD-10-PCS | Mod: CPTII,S$GLB,, | Performed by: INTERNAL MEDICINE

## 2022-01-10 PROCEDURE — 3078F DIAST BP <80 MM HG: CPT | Mod: CPTII,S$GLB,, | Performed by: INTERNAL MEDICINE

## 2022-01-10 PROCEDURE — 1101F PT FALLS ASSESS-DOCD LE1/YR: CPT | Mod: CPTII,S$GLB,, | Performed by: INTERNAL MEDICINE

## 2022-01-10 PROCEDURE — 1159F MED LIST DOCD IN RCRD: CPT | Mod: CPTII,S$GLB,, | Performed by: INTERNAL MEDICINE

## 2022-01-10 PROCEDURE — 99213 PR OFFICE/OUTPT VISIT, EST, LEVL III, 20-29 MIN: ICD-10-PCS | Mod: S$GLB,,, | Performed by: INTERNAL MEDICINE

## 2022-01-10 PROCEDURE — 3008F BODY MASS INDEX DOCD: CPT | Mod: CPTII,S$GLB,, | Performed by: INTERNAL MEDICINE

## 2022-01-10 PROCEDURE — 1126F PR PAIN SEVERITY QUANTIFIED, NO PAIN PRESENT: ICD-10-PCS | Mod: CPTII,S$GLB,, | Performed by: INTERNAL MEDICINE

## 2022-01-10 PROCEDURE — 1126F AMNT PAIN NOTED NONE PRSNT: CPT | Mod: CPTII,S$GLB,, | Performed by: INTERNAL MEDICINE

## 2022-01-10 PROCEDURE — 1160F RVW MEDS BY RX/DR IN RCRD: CPT | Mod: CPTII,S$GLB,, | Performed by: INTERNAL MEDICINE

## 2022-01-10 PROCEDURE — 1159F PR MEDICATION LIST DOCUMENTED IN MEDICAL RECORD: ICD-10-PCS | Mod: CPTII,S$GLB,, | Performed by: INTERNAL MEDICINE

## 2022-01-10 PROCEDURE — 3078F PR MOST RECENT DIASTOLIC BLOOD PRESSURE < 80 MM HG: ICD-10-PCS | Mod: CPTII,S$GLB,, | Performed by: INTERNAL MEDICINE

## 2022-01-10 PROCEDURE — 3288F FALL RISK ASSESSMENT DOCD: CPT | Mod: CPTII,S$GLB,, | Performed by: INTERNAL MEDICINE

## 2022-01-10 PROCEDURE — 3075F PR MOST RECENT SYSTOLIC BLOOD PRESS GE 130-139MM HG: ICD-10-PCS | Mod: CPTII,S$GLB,, | Performed by: INTERNAL MEDICINE

## 2022-01-10 PROCEDURE — 1101F PR PT FALLS ASSESS DOC 0-1 FALLS W/OUT INJ PAST YR: ICD-10-PCS | Mod: CPTII,S$GLB,, | Performed by: INTERNAL MEDICINE

## 2022-01-10 PROCEDURE — 99213 OFFICE O/P EST LOW 20 MIN: CPT | Mod: S$GLB,,, | Performed by: INTERNAL MEDICINE

## 2022-01-10 PROCEDURE — 3288F PR FALLS RISK ASSESSMENT DOCUMENTED: ICD-10-PCS | Mod: CPTII,S$GLB,, | Performed by: INTERNAL MEDICINE

## 2022-01-10 PROCEDURE — 3075F SYST BP GE 130 - 139MM HG: CPT | Mod: CPTII,S$GLB,, | Performed by: INTERNAL MEDICINE

## 2022-01-10 PROCEDURE — 3008F PR BODY MASS INDEX (BMI) DOCUMENTED: ICD-10-PCS | Mod: CPTII,S$GLB,, | Performed by: INTERNAL MEDICINE

## 2022-01-10 NOTE — PROGRESS NOTES
Subjective:       Patient ID: Sneha Mcghee is a 71 y.o. female.    Medication List with Changes/Refills   Current Medications    ALBUTEROL (PROVENTIL) 2.5 MG /3 ML (0.083 %) NEBULIZER SOLUTION    Take 3 mLs (2.5 mg total) by nebulization every 6 (six) hours as needed for Wheezing. Rescue. Dispense one box    ALBUTEROL (PROVENTIL/VENTOLIN HFA) 90 MCG/ACTUATION INHALER    Inhale 2 puffs into the lungs every 6 (six) hours as needed for Wheezing. Rescue    AMLODIPINE (NORVASC) 5 MG TABLET    Take 1 tablet (5 mg total) by mouth once daily.    APIXABAN (ELIQUIS) 5 MG TAB    Take 1 tablet (5 mg total) by mouth 2 (two) times daily.    ASPIRIN (ECOTRIN) 325 MG EC TABLET    aspirin 325 mg tablet,delayed release   Take 1 tablet every day by oral route.    CHOLECALCIFEROL, VITAMIN D3, 125 MCG (5,000 UNIT) TAB    cholecalciferol (vitamin D3) 125 mcg (5,000 unit) tablet   Take by oral route.    FLUTICASONE PROPIONATE (FLONASE) 50 MCG/ACTUATION NASAL SPRAY    2 sprays (100 mcg total) by Each Nostril route once daily.    FUROSEMIDE (LASIX) 20 MG TABLET    TAKE 1 TABLET BY MOUTH EVERY DAY.    IPRATROPIUM (ATROVENT HFA) 17 MCG/ACTUATION INHALER    Inhale 2 puffs into the lungs every 6 (six) hours. Rescue    LATANOPROST 0.005 % OPHTHALMIC SOLUTION    latanoprost 0.005 % eye drops   Instill 1 drop every day by ophthalmic route for 90 days.    LEVOTHYROXINE (SYNTHROID) 88 MCG TABLET    Take 1 tablet (88 mcg total) by mouth before breakfast.    METOPROLOL SUCCINATE (TOPROL-XL) 100 MG 24 HR TABLET    Take 1 tablet (100 mg total) by mouth once daily.    MOMETASONE 0.1% (ELOCON) 0.1 % CREAM    Apply topically once daily.    NYSTATIN (MYCOSTATIN) POWDER    Apply topically 3 (three) times daily.    NYSTATIN-TRIAMCINOLONE (MYCOLOG II) CREAM    PRN    POTASSIUM CHLORIDE (KLOR-CON) 10 MEQ TBSR    Take 10 mEq by mouth.    PREDNISONE (DELTASONE) 20 MG TABLET    Take 3 tablets (60 mg total) by mouth once daily. for 5 days    PROPAFENONE  "(RHTHYMOL) 150 MG TAB    Take 1 tablet (150 mg total) by mouth every 8 (eight) hours.    SERTRALINE (ZOLOFT) 100 MG TABLET    Take 1 tablet (100 mg total) by mouth once daily.    SIMVASTATIN (ZOCOR) 20 MG TABLET    Take 1 tablet (20 mg total) by mouth every evening.    SITAGLIPTIN (JANUVIA) 100 MG TAB    Take 1 tablet (100 mg total) by mouth once daily.    SUVOREXANT (BELSOMRA) 15 MG TAB    Take 1 tablet by mouth nightly as needed.    TIMOLOL MALEATE 0.5% (TIMOPTIC) 0.5 % DROP    Place 1 drop into both eyes 2 (two) times daily.       Chief Complaint: Follow-up (Follow up to illness)  She was seen on 1/6/2022 with asthma exacerbation and pneumonia. She was given IM dexamethasone in office and started on prednisone 60 mg daily for 5 days. She is on day 5 of 5.  She is using albuterol via nebulizer bid. She has completed a 10 day course of doxycycline.  She reports she is doing much better. Her wheezing and shortness of breath has resolved. She continues to have a mild cough. No sore throat or ear pain.  Her cough is not productive. No fevers.     Review of Systems   Constitutional: Negative for activity change, appetite change, chills, fatigue and fever.   HENT: Negative for congestion, ear discharge, ear pain, mouth sores, postnasal drip, rhinorrhea, sinus pressure and sore throat.    Eyes: Negative for pain, discharge and redness.   Respiratory: Positive for cough. Negative for chest tightness, shortness of breath and wheezing.    Gastrointestinal: Negative for abdominal pain, constipation, diarrhea, nausea and vomiting.   Genitourinary: Negative for dysuria.   Musculoskeletal: Negative for arthralgias and neck stiffness.   Skin: Negative for rash.   Neurological: Negative for headaches.   Hematological: Negative for adenopathy.       Objective:      Vitals:    01/10/22 1351   BP: 134/78   Pulse: 69   Resp: 18   Temp: 96.8 °F (36 °C)   SpO2: 95%   Weight: 119.4 kg (263 lb 3.7 oz)   Height: 5' 6" (1.676 m)     Body " mass index is 42.49 kg/m².  Physical Exam    General appearance: alert, no acute distress  Head: atraumatic  Eyes: PERRL, EMOI, normal conjunctiva, no drainage  Ears: tm normal with good visualization of landmarks, no erythema or pus, canals normal, external ear normal  Nose: normal mucosa, no polyps or sores, no rhinorrhea  Throat: no erythema, no exudates, tonsils appear normal  Mouth: no sores or lesion, moist mucous membranes  Neck: supple, FROM, no masses, no tenderness  Lymph: no posterior or cervical adenopathy  Lungs: no distress, no retractions, course breath sounds, no wheezing, bibasilar, no rhonchi  Heart:: Regular rate and rhythm, no murmur  Abdomen: soft, non-tender, no guarding, no rebound, no peritoneal signs, bowel sounds normal, no hepatosplenomegaly, no masses  Skin: no rashes or lesion  Perfusion: good capillary refill, normal pulses      Assessment:       1. Moderate persistent asthma with exacerbation    2. Pneumonia due to infectious organism, unspecified laterality, unspecified part of lung        Plan:       Moderate persistent asthma with exacerbation  She is doing very well and given reassurance. Okay to wean albuterol as tolerates.    Pneumonia due to infectious organism, unspecified laterality, unspecified part of lung  Improved after completion of doxycyline.     Follow up for already scheduled.

## 2022-01-21 ENCOUNTER — TELEPHONE (OUTPATIENT)
Dept: FAMILY MEDICINE | Facility: CLINIC | Age: 72
End: 2022-01-21
Payer: MEDICARE

## 2022-01-21 NOTE — TELEPHONE ENCOUNTER
Returned call to patient.  She advised that she has a history of venous insufficiency and previously had a wound that it took two months to heal.  She bumped her leg on her  and is requesting an appointment to have it checked.  Denies any redness outside of her normal, no drainage.  Patient was scheduled for an appt.  Aware of the time and date.  Patient was instructed to go to urgent care for any worsening symptoms including changes in redness, pain, swelling, drainage.  Verbalized understanding.

## 2022-01-21 NOTE — TELEPHONE ENCOUNTER
----- Message from Noa Zavaleta, Patient Care Assistant sent at 1/21/2022 12:05 PM CST -----  Regarding: advice  Contact: pt  Type: Needs Medical Advice  Who Called:  pt   Symptoms (please be specific):  bruise right leg   How long has patient had these symptoms:  2 days   Pharmacy name and phone #:    [facility][id number] - [facility] [id number] [place] [address] AT [facility] [address]  [address] [place] [address]  Phone: [phone] Fax: [phone]    Best Call Back Number: [phone] (home)   Additional Information: please call pt to advise. Thanks!

## 2022-01-24 DIAGNOSIS — E03.9 HYPOTHYROIDISM, UNSPECIFIED TYPE: ICD-10-CM

## 2022-01-25 ENCOUNTER — OFFICE VISIT (OUTPATIENT)
Dept: FAMILY MEDICINE | Facility: CLINIC | Age: 72
End: 2022-01-25
Payer: MEDICARE

## 2022-01-25 VITALS
RESPIRATION RATE: 16 BRPM | TEMPERATURE: 98 F | SYSTOLIC BLOOD PRESSURE: 124 MMHG | BODY MASS INDEX: 42.98 KG/M2 | HEIGHT: 65 IN | DIASTOLIC BLOOD PRESSURE: 76 MMHG | WEIGHT: 257.94 LBS | OXYGEN SATURATION: 98 % | HEART RATE: 62 BPM

## 2022-01-25 DIAGNOSIS — S81.802A WOUND, OPEN, KNEE, LOWER LEG, OR ANKLE WITH COMPLICATION, LEFT, INITIAL ENCOUNTER: Primary | ICD-10-CM

## 2022-01-25 DIAGNOSIS — S91.002A WOUND, OPEN, KNEE, LOWER LEG, OR ANKLE WITH COMPLICATION, LEFT, INITIAL ENCOUNTER: Primary | ICD-10-CM

## 2022-01-25 DIAGNOSIS — L03.116 CELLULITIS OF LEFT LOWER EXTREMITY: ICD-10-CM

## 2022-01-25 DIAGNOSIS — S81.002A WOUND, OPEN, KNEE, LOWER LEG, OR ANKLE WITH COMPLICATION, LEFT, INITIAL ENCOUNTER: Primary | ICD-10-CM

## 2022-01-25 PROCEDURE — 99214 PR OFFICE/OUTPT VISIT, EST, LEVL IV, 30-39 MIN: ICD-10-PCS | Mod: S$GLB,,, | Performed by: INTERNAL MEDICINE

## 2022-01-25 PROCEDURE — 3288F PR FALLS RISK ASSESSMENT DOCUMENTED: ICD-10-PCS | Mod: CPTII,S$GLB,, | Performed by: INTERNAL MEDICINE

## 2022-01-25 PROCEDURE — 3078F DIAST BP <80 MM HG: CPT | Mod: CPTII,S$GLB,, | Performed by: INTERNAL MEDICINE

## 2022-01-25 PROCEDURE — 3008F PR BODY MASS INDEX (BMI) DOCUMENTED: ICD-10-PCS | Mod: CPTII,S$GLB,, | Performed by: INTERNAL MEDICINE

## 2022-01-25 PROCEDURE — 3078F PR MOST RECENT DIASTOLIC BLOOD PRESSURE < 80 MM HG: ICD-10-PCS | Mod: CPTII,S$GLB,, | Performed by: INTERNAL MEDICINE

## 2022-01-25 PROCEDURE — 1101F PT FALLS ASSESS-DOCD LE1/YR: CPT | Mod: CPTII,S$GLB,, | Performed by: INTERNAL MEDICINE

## 2022-01-25 PROCEDURE — 1159F MED LIST DOCD IN RCRD: CPT | Mod: CPTII,S$GLB,, | Performed by: INTERNAL MEDICINE

## 2022-01-25 PROCEDURE — 3008F BODY MASS INDEX DOCD: CPT | Mod: CPTII,S$GLB,, | Performed by: INTERNAL MEDICINE

## 2022-01-25 PROCEDURE — 99214 OFFICE O/P EST MOD 30 MIN: CPT | Mod: S$GLB,,, | Performed by: INTERNAL MEDICINE

## 2022-01-25 PROCEDURE — 3074F SYST BP LT 130 MM HG: CPT | Mod: CPTII,S$GLB,, | Performed by: INTERNAL MEDICINE

## 2022-01-25 PROCEDURE — 1159F PR MEDICATION LIST DOCUMENTED IN MEDICAL RECORD: ICD-10-PCS | Mod: CPTII,S$GLB,, | Performed by: INTERNAL MEDICINE

## 2022-01-25 PROCEDURE — 1160F RVW MEDS BY RX/DR IN RCRD: CPT | Mod: CPTII,S$GLB,, | Performed by: INTERNAL MEDICINE

## 2022-01-25 PROCEDURE — 1101F PR PT FALLS ASSESS DOC 0-1 FALLS W/OUT INJ PAST YR: ICD-10-PCS | Mod: CPTII,S$GLB,, | Performed by: INTERNAL MEDICINE

## 2022-01-25 PROCEDURE — 1125F AMNT PAIN NOTED PAIN PRSNT: CPT | Mod: CPTII,S$GLB,, | Performed by: INTERNAL MEDICINE

## 2022-01-25 PROCEDURE — 3288F FALL RISK ASSESSMENT DOCD: CPT | Mod: CPTII,S$GLB,, | Performed by: INTERNAL MEDICINE

## 2022-01-25 PROCEDURE — 1125F PR PAIN SEVERITY QUANTIFIED, PAIN PRESENT: ICD-10-PCS | Mod: CPTII,S$GLB,, | Performed by: INTERNAL MEDICINE

## 2022-01-25 PROCEDURE — 1160F PR REVIEW ALL MEDS BY PRESCRIBER/CLIN PHARMACIST DOCUMENTED: ICD-10-PCS | Mod: CPTII,S$GLB,, | Performed by: INTERNAL MEDICINE

## 2022-01-25 PROCEDURE — 3074F PR MOST RECENT SYSTOLIC BLOOD PRESSURE < 130 MM HG: ICD-10-PCS | Mod: CPTII,S$GLB,, | Performed by: INTERNAL MEDICINE

## 2022-01-25 RX ORDER — CLINDAMYCIN HYDROCHLORIDE 300 MG/1
300 CAPSULE ORAL 3 TIMES DAILY
Qty: 30 CAPSULE | Refills: 0 | Status: SHIPPED | OUTPATIENT
Start: 2022-01-25 | End: 2022-02-08 | Stop reason: ALTCHOICE

## 2022-01-25 NOTE — PROGRESS NOTES
Subjective:       Patient ID: Sneha Mcghee is a 71 y.o. female.    Medication List with Changes/Refills   New Medications    CLINDAMYCIN (CLEOCIN) 300 MG CAPSULE    Take 1 capsule (300 mg total) by mouth 3 (three) times daily.   Current Medications    ALBUTEROL (PROVENTIL) 2.5 MG /3 ML (0.083 %) NEBULIZER SOLUTION    Take 3 mLs (2.5 mg total) by nebulization every 6 (six) hours as needed for Wheezing. Rescue. Dispense one box    ALBUTEROL (PROVENTIL/VENTOLIN HFA) 90 MCG/ACTUATION INHALER    Inhale 2 puffs into the lungs every 6 (six) hours as needed for Wheezing. Rescue    AMLODIPINE (NORVASC) 5 MG TABLET    Take 1 tablet (5 mg total) by mouth once daily.    ASPIRIN (ECOTRIN) 325 MG EC TABLET    aspirin 325 mg tablet,delayed release   Take 1 tablet every day by oral route.    CHOLECALCIFEROL, VITAMIN D3, 125 MCG (5,000 UNIT) TAB    cholecalciferol (vitamin D3) 125 mcg (5,000 unit) tablet   Take by oral route.    FLUTICASONE PROPIONATE (FLONASE) 50 MCG/ACTUATION NASAL SPRAY    2 sprays (100 mcg total) by Each Nostril route once daily.    FUROSEMIDE (LASIX) 20 MG TABLET    TAKE 1 TABLET BY MOUTH EVERY DAY.    IPRATROPIUM (ATROVENT HFA) 17 MCG/ACTUATION INHALER    Inhale 2 puffs into the lungs every 6 (six) hours. Rescue    LATANOPROST 0.005 % OPHTHALMIC SOLUTION    latanoprost 0.005 % eye drops   Instill 1 drop every day by ophthalmic route for 90 days.    LEVOTHYROXINE (SYNTHROID) 88 MCG TABLET    Take 1 tablet (88 mcg total) by mouth before breakfast.    METOPROLOL SUCCINATE (TOPROL-XL) 100 MG 24 HR TABLET    Take 1 tablet (100 mg total) by mouth once daily.    MOMETASONE 0.1% (ELOCON) 0.1 % CREAM    Apply topically once daily.    NYSTATIN (MYCOSTATIN) POWDER    Apply topically 3 (three) times daily.    NYSTATIN-TRIAMCINOLONE (MYCOLOG II) CREAM    PRN    POTASSIUM CHLORIDE (KLOR-CON) 10 MEQ TBSR    Take 10 mEq by mouth once daily.    PROPAFENONE (RHTHYMOL) 150 MG TAB    Take 1 tablet (150 mg total) by mouth every  8 (eight) hours.    SERTRALINE (ZOLOFT) 100 MG TABLET    Take 1 tablet (100 mg total) by mouth once daily.    SIMVASTATIN (ZOCOR) 20 MG TABLET    Take 1 tablet (20 mg total) by mouth every evening.    SITAGLIPTIN (JANUVIA) 100 MG TAB    Take 1 tablet (100 mg total) by mouth once daily.    SUVOREXANT (BELSOMRA) 15 MG TAB    Take 1 tablet by mouth nightly as needed.    TIMOLOL MALEATE 0.5% (TIMOPTIC) 0.5 % DROP    Place 1 drop into both eyes 2 (two) times daily.   Discontinued Medications    APIXABAN (ELIQUIS) 5 MG TAB    Take 1 tablet (5 mg total) by mouth 2 (two) times daily.       Chief Complaint: Recurrent Skin Infections (Lower left leg   since Friday )  She hit her left lower leg on the  5 days ago and had a large skin lesion. She wrapped her leg with a compression bandaged and then started with increasing swelling and pain of lower leg. Over the last three days her leg has become red and swollen. It is tender to touch and warm. Drainage is yellow to bloody. No fevers or chills. In the past she has difficulty healing a lower leg lesion due to poor circulation per patient.     Review of Systems   Constitutional: Negative for activity change, appetite change, chills, fatigue and fever.   HENT: Negative for congestion, ear discharge, ear pain, mouth sores, postnasal drip, rhinorrhea, sinus pressure and sore throat.    Eyes: Negative for pain, discharge and redness.   Respiratory: Negative for cough, chest tightness, shortness of breath and wheezing.    Gastrointestinal: Negative for abdominal pain, constipation, diarrhea, nausea and vomiting.   Genitourinary: Negative for dysuria.   Musculoskeletal: Negative for arthralgias and neck stiffness.   Skin: Negative for rash.   Neurological: Negative for headaches.   Hematological: Negative for adenopathy.       Objective:      Vitals:    01/25/22 0923   BP: 124/76   Pulse: 62   Resp: 16   Temp: 97.7 °F (36.5 °C)   TempSrc: Temporal   SpO2: 98%   Weight: 117 kg  "(257 lb 15 oz)   Height: 5' 5" (1.651 m)     Body mass index is 42.92 kg/m².  Physical Exam    General appearance: No acute distress, cooperative  Neck: FROM, soft, supple  Lymph: no anterior or posterior cervical adenopathy  Heart::  Regular rate and rhythm, no murmur  Lung: Clear to ascultation bilaterally, no wheezing, no rales, no rhonchi, no distress  Abdomen: Soft, nontender, no distention, no hepatosplenomegaly, bowel sounds normal, no guarding, no rebound, no peritoneal signs  Skin: no rashes, 2 cm x 3 cm skin tear with surrounding erythema and warmth on left lower leg that extends behind her leg, tense edema, serosanguinous drainage  Extremities: no edema, no cyanosis  Peripheral pulses: diminished pedal pulses bilaterally     left lower leg lesion 2 cm x 3 cm      left lower leg         Assessment:       1. Wound, open, knee, lower leg, or ankle with complication, left, initial encounter    2. Cellulitis of left lower extremity        Plan:       Wound, open, knee, lower leg, or ankle with complication, left, initial encounter  History of difficulty healing wounds in the past due to poor circulation. Will refer to wound clinic and get an arterial u/s.   -     Ambulatory referral/consult to Wound Clinic; Future; Expected date: 02/01/2022  -     US Lower Extremity Arteries Bilateral; Future; Expected date: 01/25/2022    Cellulitis of left lower extremity  Secondary cellulitis and will start treatment with clindamycin. She is to f/u with wound clinic tomorrow.   -     Ambulatory referral/consult to Wound Clinic; Future; Expected date: 02/01/2022  -     clindamycin (CLEOCIN) 300 MG capsule; Take 1 capsule (300 mg total) by mouth 3 (three) times daily.  Dispense: 30 capsule; Refill: 0    Follow up for to f/u with wound care .        "

## 2022-01-25 NOTE — TELEPHONE ENCOUNTER
No new care gaps identified.  Powered by [employer]. Reference number: [id number].   1/24/2022 10:39:52 PM CST

## 2022-01-26 ENCOUNTER — OFFICE VISIT (OUTPATIENT)
Dept: WOUND CARE | Facility: CLINIC | Age: 72
End: 2022-01-26
Payer: MEDICARE

## 2022-01-26 VITALS
WEIGHT: 257 LBS | SYSTOLIC BLOOD PRESSURE: 150 MMHG | BODY MASS INDEX: 42.82 KG/M2 | DIASTOLIC BLOOD PRESSURE: 83 MMHG | HEART RATE: 80 BPM | RESPIRATION RATE: 20 BRPM | TEMPERATURE: 98 F | HEIGHT: 65 IN

## 2022-01-26 DIAGNOSIS — S81.802A WOUND, OPEN, KNEE, LOWER LEG, OR ANKLE WITH COMPLICATION, LEFT, INITIAL ENCOUNTER: ICD-10-CM

## 2022-01-26 DIAGNOSIS — I87.2 VENOUS INSUFFICIENCY: ICD-10-CM

## 2022-01-26 DIAGNOSIS — L03.116 CELLULITIS OF LEFT LOWER EXTREMITY: ICD-10-CM

## 2022-01-26 DIAGNOSIS — S81.802A TRAUMATIC OPEN WOUND OF LEFT LOWER LEG, INITIAL ENCOUNTER: Primary | ICD-10-CM

## 2022-01-26 DIAGNOSIS — S91.002A WOUND, OPEN, KNEE, LOWER LEG, OR ANKLE WITH COMPLICATION, LEFT, INITIAL ENCOUNTER: ICD-10-CM

## 2022-01-26 DIAGNOSIS — S81.002A WOUND, OPEN, KNEE, LOWER LEG, OR ANKLE WITH COMPLICATION, LEFT, INITIAL ENCOUNTER: ICD-10-CM

## 2022-01-26 PROCEDURE — 99999 PR PBB SHADOW E&M-EST. PATIENT-LVL V: ICD-10-PCS | Mod: PBBFAC,,, | Performed by: FAMILY MEDICINE

## 2022-01-26 PROCEDURE — 3077F SYST BP >= 140 MM HG: CPT | Mod: CPTII,S$GLB,, | Performed by: FAMILY MEDICINE

## 2022-01-26 PROCEDURE — 1159F PR MEDICATION LIST DOCUMENTED IN MEDICAL RECORD: ICD-10-PCS | Mod: CPTII,S$GLB,, | Performed by: FAMILY MEDICINE

## 2022-01-26 PROCEDURE — 1159F MED LIST DOCD IN RCRD: CPT | Mod: CPTII,S$GLB,, | Performed by: FAMILY MEDICINE

## 2022-01-26 PROCEDURE — 1101F PR PT FALLS ASSESS DOC 0-1 FALLS W/OUT INJ PAST YR: ICD-10-PCS | Mod: CPTII,S$GLB,, | Performed by: FAMILY MEDICINE

## 2022-01-26 PROCEDURE — 1126F AMNT PAIN NOTED NONE PRSNT: CPT | Mod: CPTII,S$GLB,, | Performed by: FAMILY MEDICINE

## 2022-01-26 PROCEDURE — 1126F PR PAIN SEVERITY QUANTIFIED, NO PAIN PRESENT: ICD-10-PCS | Mod: CPTII,S$GLB,, | Performed by: FAMILY MEDICINE

## 2022-01-26 PROCEDURE — 99202 OFFICE O/P NEW SF 15 MIN: CPT | Mod: S$GLB,,, | Performed by: FAMILY MEDICINE

## 2022-01-26 PROCEDURE — 1101F PT FALLS ASSESS-DOCD LE1/YR: CPT | Mod: CPTII,S$GLB,, | Performed by: FAMILY MEDICINE

## 2022-01-26 PROCEDURE — 3288F FALL RISK ASSESSMENT DOCD: CPT | Mod: CPTII,S$GLB,, | Performed by: FAMILY MEDICINE

## 2022-01-26 PROCEDURE — 3288F PR FALLS RISK ASSESSMENT DOCUMENTED: ICD-10-PCS | Mod: CPTII,S$GLB,, | Performed by: FAMILY MEDICINE

## 2022-01-26 PROCEDURE — 99999 PR PBB SHADOW E&M-EST. PATIENT-LVL V: CPT | Mod: PBBFAC,,, | Performed by: FAMILY MEDICINE

## 2022-01-26 PROCEDURE — 3077F PR MOST RECENT SYSTOLIC BLOOD PRESSURE >= 140 MM HG: ICD-10-PCS | Mod: CPTII,S$GLB,, | Performed by: FAMILY MEDICINE

## 2022-01-26 PROCEDURE — 3008F PR BODY MASS INDEX (BMI) DOCUMENTED: ICD-10-PCS | Mod: CPTII,S$GLB,, | Performed by: FAMILY MEDICINE

## 2022-01-26 PROCEDURE — 1160F RVW MEDS BY RX/DR IN RCRD: CPT | Mod: CPTII,S$GLB,, | Performed by: FAMILY MEDICINE

## 2022-01-26 PROCEDURE — 99202 PR OFFICE/OUTPT VISIT, NEW, LEVL II, 15-29 MIN: ICD-10-PCS | Mod: S$GLB,,, | Performed by: FAMILY MEDICINE

## 2022-01-26 PROCEDURE — 3079F PR MOST RECENT DIASTOLIC BLOOD PRESSURE 80-89 MM HG: ICD-10-PCS | Mod: CPTII,S$GLB,, | Performed by: FAMILY MEDICINE

## 2022-01-26 PROCEDURE — 3079F DIAST BP 80-89 MM HG: CPT | Mod: CPTII,S$GLB,, | Performed by: FAMILY MEDICINE

## 2022-01-26 PROCEDURE — 1160F PR REVIEW ALL MEDS BY PRESCRIBER/CLIN PHARMACIST DOCUMENTED: ICD-10-PCS | Mod: CPTII,S$GLB,, | Performed by: FAMILY MEDICINE

## 2022-01-26 PROCEDURE — 3008F BODY MASS INDEX DOCD: CPT | Mod: CPTII,S$GLB,, | Performed by: FAMILY MEDICINE

## 2022-01-26 NOTE — PATIENT INSTRUCTIONS
"Dressings:    MediHoney, silicone border foam  Compression:    Elevate legs ("recliner position") as able, even when sleeping  Offloading:   Avoid pressure to area  Activity    Limit activity: Rest several times per day. - Keep off area as much as possible.   Dietary:    As tolerated: 3 well-balanced meals   Increased protein: Boost or Ensure, [place] Instant Breakfast (purchase sugarfree if Diabetic) Increasing your protein intake is very helpful with wound healing; if you are a kidney or dialysis patient please check with your Nephrologist as to how much protein you are allowed to take in with your condition. Taking a daily Multivitamin with Zinc as well as Vitamin C, 1000 mg minimum per day will also help with healing your wound. If you are on Coumadin, please contact the Coumadin Clinic before beginning a Multivitamin. High protein items that can be added to your diet include, extra helpings of meats, fish, beans, also High protein bars that add as little as 12 gms of protein and up to 32 gms of protein per bar.   Supplement with: Multivitamin with Zinc and Vitamin C 500mg twice a day.  Return Appointment: 2/2/22      Patient Instructions:  Clean wound and change dressing every day, if possible. Wash hands before and after wound care. Remove old dressing, clean with soap/water or saline/wound cleanser; pat dry. May clean wound while in the shower with soap/water. Apply a small amount of MediHoney to wound then cover with a bandage of choice. If pt is unable to change dressing daily, every other day is ok. Pt may lift silicone border foam, clean wound, apply MediHoney and reapply silicone border foam. Pt should keep wound covered at all times and avoid getting dressing wet. If dressing is saturated, it will need to be changed. Elevate legs when possible. Incorporate a probiotic into diet, such as yogurt or a supplement. Notify wound care clinic for any concerns or changes in wound. Wound care clinic to " request home health for pt for wound care, pt to be seen weekly by home health.     [facility]:  Please admit pt to home health services for wound care. Please see pt once a week when pt goes to wound care and twice a week when pt does not. Pt has a left lower leg wound. Change dressing at each home health visit. Clean with wound cleanser or normal saline; pat dry. Apply a small amount of MediHoney and cover with bandage of choice. Encourage pt to elevate legs when sitting. May use moisturizer of choice to BLE and feet, avoiding spaces between toes. Please leave extra supplies with patient for dressing changes in between home health visits.         Discharge Instructions.    Return Appointment: Should you experience any significant changes in your wound(s) or have any questions regarding your home care instructions please contact [facility], ask for the wound center- [phone].  If after hours, contact your primary care physician or go to the hospital emergency room.     In a diabetic patient with a wound, keeping your blood glucose levels as close to normal can be a great help towards healing your wound. Wounds are very difficult to heal in a high sugar environment so controlling the glucose well can go a long way towards successful wound healing.      Increasing your protein intake is very helpful with wound healing; if you are a kidney or dialysis patient please check with your Nephrologist as to how much protein you are allowed to take in with your condition. Taking a daily Multivitamin with Zinc as well as Vitamin C, 1000 mg minimum per day will also help with healing your wound.If you are on Coumadin, please contact the Coumadin Clinic before beginning a Multivitamin. High protein items that can be added to your diet include, extra helpings of meats, fish, beans, also High protein bars that add as little as 12 gms of protein and up to 32 gms of protein per bar.      The clinic is  open Mon-Fri from 8:00 a.m. until 5:00 p.m. During business hours call [facility], ask for Wound Care @ [phone] for any worsening symptoms; fever >101.0, increased pain, increased drainage, foul odor or problems with the dressing. For after hours problems or emergencies please report to the nearest emergency room.      Patient instructed on proper handwashing technique. Using warm water and soap, apply soap, lather well and vigorously apply friction for a minimum of 20 seconds, rinse well and dry well; wash before and after toileting, before and after woundcare, after sneezing, coughing etc.      Patient to keep the dressing clean, dry and intact. Call for any worsening symptoms or problems. [phone]. - Use cast protector if showering. Keep wrap dry and intact until next appt.       Patient Education       Cellulitis (Skin Infection) Discharge Instructions, Adult   About this topic   Cellulitis is a skin infection. All people have germs on their skin. Most of the time, these germs do not cause a problem. A skin infection means the germs have gotten into the layers of your skin. Germs can enter your body from a cut, scratch, or bite. The infected part gets red, warm, painful, swollen, and irritated. You need antibiotics to treat the infection. It is important to take all of your antibiotics even if you start to feel better. If not, your infection may come back and be more serious than before.     What care is needed at home?   · Ask your doctor what you need to do when you go home. Make sure you ask questions if you do not understand what the doctor says. This way you will know what you need to do.  · Prop your painful body part on pillows, keeping it above the level of your heart. This may help lessen pain and swelling.  · Keep your infected area clean and dry. Do not squeeze, scratch, or rub it. You can gently wash the area with soap and water or take a shower. Pat the area dry with a clean  towel.  · Wash your hands before and after you touch your infected area.  · Do not put an antibiotic ointment on the infected area.  · Use clean, warm compresses to help ease pain and swelling. Wet a clean wash cloth or small towel with hot water. Put it on the area for 5 to 10 minutes.  · You can draw a line with a waterproof marker around the red area to see if it is getting bigger or smaller.  What follow-up care is needed?   · Your doctor may ask you to make visits to the office to check on your progress. Be sure to keep these visits.  · If surgery was needed to drain the infection, your doctor may not stitch the area closed. Keep this area clean and covered.  · If you do have stitches or staples, you will need to have them taken out. Your doctor will often want to do this in 1 to 2 weeks.  What drugs may be needed?   The doctor may order drugs to:  · Treat the infection. Finish all of the antibiotics, even if the infection appears to have gone away.  · Help with pain and swelling  Will physical activity be limited?   Your activity may be decreased if a leg or arm is infected. It may be painful to move that part of the body. You may not feel well for a few days until the drug to treat the infection starts working.  What problems could happen?   · Infection in the blood. This is sepsis and is very serious.  · Bone infection  · Inflammation of the lymph vessels  · Inflammation of the heart  · Meningitis  · Shock  · Tissue death or gangrene  What can be done to prevent this health problem?   · Wear proper clothing and shoes to cover your body and prevent cuts and bruises.  · Keep your nails trimmed to avoid scratches.  · Keep skin moisturized. Use lotion that does not have fragrance added to avoid dry, cracked skin.  · Treat athlete's foot as directed by your doctor.  · If you have a chronic skin condition, talk with your doctor about how to keep it under the best control.  · If you have chronic swelling (edema) of  an arm or leg, talk with your doctor about how to lower the swelling. Your doctor may want you to wear support stockings.  · Practice good hygiene. Wash your hands often with soap and water.       · If you often have fungal infections, ask your doctor if you should be using an antifungal drug to prevent the cellulitis from occurring again.  · If you scratch or cut your skin, wash the area carefully with soap and water.  When do I need to call the doctor?   · Signs of infection. These include a fever of 100.4°F (38°C) or higher, chills, or wound that will not heal.  · You have a fever of 100.4°F (38°C) or higher or chills.  · The area becomes more red, swollen, or painful.  · The redness or swelling spreads up your leg or arm or to a larger area.  · The infected area is not better after 3 days of taking antibiotics.  Teach Back: Helping You Understand   The Teach Back Method helps you understand the information we are giving you. After you talk with the staff, tell them in your own words what you learned. This helps to make sure the staff has described each thing clearly. It also helps to explain things that may have been confusing. Before going home, make sure you can do these:  · I can tell you about my condition.  · I can tell you about to care for my wound.  · I can tell you what I will do if I have swelling, redness, or warmth around my wound.  Where can I learn more?   American Academy of Family Physicians  https://familydoctor.org/condition/cellulitis/   [facility] Choices  http://www.nhs.uk/Conditions/Cellulitis/Pages/Causes.aspx   Last Reviewed Date   2021-06-03  Consumer Information Use and Disclaimer   This information is not specific medical advice and does not replace information you receive from your health care provider. This is only a brief summary of general information. It does NOT include all information about conditions, illnesses, injuries, tests, procedures, treatments, therapies, discharge  instructions or life-style choices that may apply to you. You must talk with your health care provider for complete information about your health and treatment options. This information should not be used to decide whether or not to accept your health care providers advice, instructions or recommendations. Only your health care provider has the knowledge and training to provide advice that is right for you.  Copyright   Copyright © 2021 [employer] and its affiliates and/or licensors. All rights reserved.  Patient Education       Wound Care   Why is this procedure done?   A wound is an injury to the skin that breaks the barrier to the outside. The skin protects the inside of the body from the outside world. When the skin is damaged or broken open, the wound can become infected or bleed.  Cuts and scrapes are one type of wound. Scrapes on the surface leave deeper skin layers in place. These are often caused by friction or rubbing against a rough surface.  Another kind is a puncture wound. This comes from something like a bite or stepping on a nail. Puncture wounds are caused by a pointed or sharp object, such as a nail, needle, or tooth entering the skin. Bleeding can be very little, and the wound may be barely obvious. Bites from a human or an animal always have germs in them and need extra care.  A laceration is a cut on your skin. It is most often caused by a sharp object like a knife blade, glass, or from other things with sharp edges. If the cut is shallow, it does not need stitches.     What happens before the procedure?   Different kinds of wounds may need different types of care. This is based on how they happened and how bad they are. Your doctor will look at your wound and decide how to treat it. Some wounds are closed with strips of tape, special skin glue, stitches, or staples. Some wounds need a surgeon to do the repair. Other wounds just need to be cleaned and covered with a bandage.  · Your  doctor will ask you about your health history. Talk to your doctor about:  ? All the drugs you are taking. Be sure to include all prescription and over-the-counter (OTC) drugs, and herbal supplements. Tell the doctor about any drug allergy. Bring a list of drugs you take with you.  ? Any bleeding problems. Be sure to tell your doctor if you are taking any drugs that may cause bleeding. Some of these are warfarin, rivaroxaban, apixaban, ticagrelor, clopidogrel, ketorolac, ibuprofen, naproxen, or aspirin. Certain vitamins and herbs, such as garlic and fish oil, may also add to the risk for bleeding. You may need to stop these drugs as well. Talk to your doctor about them.  ? If you will need someone to drive you home  What happens during the procedure?   · Your wound is cleaned using a special soap.  · The doctor may give you a drug to numb the area.  · Abrasions   ? Most often, abrasions only need cleaning and a bandage.  ? The doctor may put a thin layer of antibiotic ointment on your wound and cover it with a bandage.  · Puncture Wounds   ? These wounds may or may not need to have stitches. This will depend on what has caused the wound, how bad the puncture is, and where it is located on the body.  ? The doctor may order drugs to prevent infection. This is more common with animal bites.  · Lacerations   ? These wounds may or may not need to have stitches. This will depend on what has caused the cut, how bad it is, and where it is located on the body.  ? After the cut is clean, the doctor may use special strips of tape called steri-strips to hold the edges together while the cut heals.  · The time the procedure will take depends on the size of the wound and how much cleaning is needed.  What happens after the procedure?   Most often, you will be able to go home after the procedure.  What drugs may be needed?   The doctor may order drugs to:  · Help with pain  · Prevent infection  You may need to have a tetanus shot.    What problems could happen?   · Bleeding  · Infection  · Scarring  · Poor healing  Where can I learn more?   American Academy of Pediatrics  https://www.healthychildren.org/English/health-issues/injuries-emergencies/Pages/Treating-Cuts.aspx   [employer] [facility]  https://www.betterhealth.kj.gov.au/health/conditionsandtreatments/skin-cuts-and-abrasions   Kids Health  http://kidshealth.org/en/teens/wounds.html?ref=search   Last Reviewed Date   2021-06-22  Consumer Information Use and Disclaimer   This information is not specific medical advice and does not replace information you receive from your health care provider. This is only a brief summary of general information. It does NOT include all information about conditions, illnesses, injuries, tests, procedures, treatments, therapies, discharge instructions or life-style choices that may apply to you. You must talk with your health care provider for complete information about your health and treatment options. This information should not be used to decide whether or not to accept your health care providers advice, instructions or recommendations. Only your health care provider has the knowledge and training to provide advice that is right for you.  Copyright   Copyright © 2021 [employer] and its affiliates and/or licensors. All rights reserved.

## 2022-01-26 NOTE — PROGRESS NOTES
[facility]                         Wound Care Clinic                Progress Note    Subjective:       Patient ID: Sneha Mcghee is a 71 y.o. female.    Chief Complaint: Wound Check (Left lower leg)    HPI   Case Summary:    Initial Consult Date: 1/26/22  Wound onset: Jan 21 or 22, 2022  Referring MD:  Physician List:  Vascular Status/SARITHA:  Has ultrasound scheduled 1/27/22  Sensilase or Vascular Studies:  Nutrition Risk/Labs: 12/21  Other Labs:  Recent Cultures & Dates:  Radiology/Xray:  Diabetes Status/HbA1c: Dec' 2021: 6.6  Offloading/Immobilization:  Edema/Compression: has compression stockings but does not wear  Tobacco Use:  Other:  Recent Antibiotics:  Recent MD visit:  Upcoming MD Visit:    1/26/22: Injured left knee a few years ago- does not stand for long periods of time. Had EBLT due to venous insufficiency, Dr. BRENDAN Arias at [place] approx 2 years ago.     Review of Systems      Objective:        Physical Exam    Vitals:    01/26/22 1259   BP: (!) 150/83   Pulse: 80   Resp: 20   Temp: 97.7 °F (36.5 °C)       Assessment:           ICD-10-CM ICD-9-CM   1. Traumatic open wound of left lower leg, initial encounter  S81.802A 891.0   2. Wound, open, knee, lower leg, or ankle with complication, left, initial encounter  S81.002A 891.1    S81.802A     S91.002A    3. Cellulitis of left lower extremity  L03.116 682.6   4. Venous insufficiency  I87.2 459.81            Wound 01/26/22 1312 Traumatic Left lower;lateral Leg (Active)   01/26/22 1312    Pre-existing:    Primary Wound Type: Traumatic   Side: Left   Orientation: lower;lateral   Location: Leg   Wound Number:    Ankle-Brachial Index:    Pulses:    Removal Indication and Assessment:    Wound Outcome:    (Retired) Wound Type:    (Retired) Wound Length (cm):    (Retired) Wound Width (cm):    (Retired) Depth (cm):    Wound Description (Comments):    Removal Indications:    Wound Image   01/26/22 1300  "  Dressing Appearance Intact;Dried drainage 01/26/22 1300   Drainage Amount Scant 01/26/22 1300   Drainage Characteristics/Odor Serosanguineous;No odor 01/26/22 1300   Appearance Black;Maroon;Purple;Red;Moist;Dry 01/26/22 1300   Black (%), Wound Tissue Color 10 % 01/26/22 1300   Red (%), Wound Tissue Color 90 % 01/26/22 1300   Periwound Area Hemosiderin Staining;Redness;Swelling;Scar tissue 01/26/22 1300   Wound Edges Irregular;Jagged 01/26/22 1300   Wound Length (cm) 3 cm 01/26/22 1300   Wound Width (cm) 2.7 cm 01/26/22 1300   Wound Depth (cm) 0.1 cm 01/26/22 1300   Wound Volume (cm^3) 0.81 cm^3 01/26/22 1300   Wound Surface Area (cm^2) 8.1 cm^2 01/26/22 1300   Care Cleansed with:;Wound cleanser 01/26/22 1300   Dressing Applied;Honey;Foam 01/26/22 1300   Periwound Care Skin barrier film applied 01/26/22 1300   Dressing Change Due 01/28/22 01/26/22 1300           Plan:            MD Orders:  Obtained wound photos and measurements.  Anesthetic:    Topical 4% Lidocaine Cream to wound bed prior to cleansing and/or debridement: none used today  Wound cleansing:    Saline or wound cleanser to cleanse wound   Debridement:   Wounds were mechanically debrided with gauze and wound cleanser by RN; dried skin lifted by Dr. Carlson  Topical Treatments:   Skin prep as needed to periwound areas  Dressings:    MediHoney, silicone border foam  Compression:    Elevate legs ("recliner position") as able, even when sleeping  Offloading:   Avoid pressure to area  Activity    Limit activity: Rest several times per day. - Keep off area as much as possible.   Dietary:    As tolerated: 3 well-balanced meals   Increased protein: Boost or Ensure, [place] Instant Breakfast (purchase sugarfree if Diabetic) Increasing your protein intake is very helpful with wound healing; if you are a kidney or dialysis patient please check with your Nephrologist as to how much protein you are allowed to take in with your condition. Taking a daily " Multivitamin with Zinc as well as Vitamin C, 1000 mg minimum per day will also help with healing your wound. If you are on Coumadin, please contact the Coumadin Clinic before beginning a Multivitamin. High protein items that can be added to your diet include, extra helpings of meats, fish, beans, also High protein bars that add as little as 12 gms of protein and up to 32 gms of protein per bar.   Supplement with: Multivitamin with Zinc and Vitamin C 500mg twice a day.  Return Appointment: 2/2/22      Patient Instructions:  Clean wound and change dressing every day, if possible. Wash hands before and after wound care. Remove old dressing, clean with soap/water or saline/wound cleanser; pat dry. May clean wound while in the shower with soap/water. Apply a small amount of MediHoney to wound then cover with a bandage of choice. If pt is unable to change dressing daily, every other day is ok. Pt may lift silicone border foam, clean wound, apply MediHoney and reapply silicone border foam. Pt should keep wound covered at all times and avoid getting dressing wet. If dressing is saturated, it will need to be changed. Elevate legs when possible. Incorporate a probiotic into diet, such as yogurt or a supplement. Notify wound care clinic for any concerns or changes in wound. Wound care clinic to request home health for pt for wound care, pt to be seen weekly by home health.     HOME HEALTH:  Please admit pt to home health services for wound care. Please see pt once a week when pt goes to wound care and twice a week when pt does not. Pt has a left lower leg wound. Change dressing at each home health visit. Clean with wound cleanser or normal saline; pat dry. Apply a small amount of MediHoney and cover with bandage of choice. Encourage pt to elevate legs when sitting. May use moisturizer of choice to BLE and feet, avoiding spaces between toes. Please leave extra supplies with patient for dressing changes in between home health  visits.       Sneha was seen today for wound check.    Diagnoses and all orders for this visit:    Traumatic open wound of left lower leg, initial encounter  -     US Lower Extremity Veins Bilateral; Future  -     Change dressing    Wound, open, knee, lower leg, or ankle with complication, left, initial encounter  -     Ambulatory referral/consult to Wound Clinic  -     US Lower Extremity Veins Bilateral; Future    Cellulitis of left lower extremity  -     Ambulatory referral/consult to Wound Clinic  -     Change dressing    Venous insufficiency  -     US Lower Extremity Veins Bilateral; Future  -     Change dressing

## 2022-01-27 ENCOUNTER — HOSPITAL ENCOUNTER (OUTPATIENT)
Dept: RADIOLOGY | Facility: HOSPITAL | Age: 72
Discharge: HOME | End: 2022-01-27
Attending: FAMILY MEDICINE
Payer: MEDICARE

## 2022-01-27 ENCOUNTER — HOSPITAL ENCOUNTER (OUTPATIENT)
Dept: RADIOLOGY | Facility: HOSPITAL | Age: 72
Discharge: HOME | End: 2022-01-27
Attending: INTERNAL MEDICINE
Payer: MEDICARE

## 2022-01-27 DIAGNOSIS — S91.002A WOUND, OPEN, KNEE, LOWER LEG, OR ANKLE WITH COMPLICATION, LEFT, INITIAL ENCOUNTER: ICD-10-CM

## 2022-01-27 DIAGNOSIS — S81.002A WOUND, OPEN, KNEE, LOWER LEG, OR ANKLE WITH COMPLICATION, LEFT, INITIAL ENCOUNTER: ICD-10-CM

## 2022-01-27 DIAGNOSIS — S81.802A TRAUMATIC OPEN WOUND OF LEFT LOWER LEG, INITIAL ENCOUNTER: ICD-10-CM

## 2022-01-27 DIAGNOSIS — S81.802A WOUND, OPEN, KNEE, LOWER LEG, OR ANKLE WITH COMPLICATION, LEFT, INITIAL ENCOUNTER: ICD-10-CM

## 2022-01-27 DIAGNOSIS — I87.2 VENOUS INSUFFICIENCY: ICD-10-CM

## 2022-01-27 PROCEDURE — 93922 US ARTERIAL LOWER EXTREMITY BILAT WITH ABI (XPD): ICD-10-PCS | Mod: 26,,, | Performed by: RADIOLOGY

## 2022-01-27 PROCEDURE — 93970 EXTREMITY STUDY: CPT | Mod: 26,,, | Performed by: RADIOLOGY

## 2022-01-27 PROCEDURE — 93925 LOWER EXTREMITY STUDY: CPT | Mod: 26,,, | Performed by: RADIOLOGY

## 2022-01-27 PROCEDURE — 93925 US ARTERIAL LOWER EXTREMITY BILAT WITH ABI (XPD): ICD-10-PCS | Mod: 26,,, | Performed by: RADIOLOGY

## 2022-01-27 PROCEDURE — 93925 LOWER EXTREMITY STUDY: CPT | Mod: TC,PO

## 2022-01-27 PROCEDURE — 93970 US LOWER EXTREMITY VEINS BILATERAL INSUFFICIENCY: ICD-10-PCS | Mod: 26,,, | Performed by: RADIOLOGY

## 2022-01-27 PROCEDURE — 93970 EXTREMITY STUDY: CPT | Mod: TC,PO

## 2022-01-27 PROCEDURE — 93922 UPR/L XTREMITY ART 2 LEVELS: CPT | Mod: 26,,, | Performed by: RADIOLOGY

## 2022-01-27 NOTE — PROGRESS NOTES
Chief complaint:  Wound Check (Left lower leg)      HPI:  Sneha Mcghee is a 71 y.o. female presenting with an open wound to the left lower leg. Pt walked into the open door of her  at home last week and tore the skin open. Pt has a Hx of venous insufficiency, and had multiple venous ablations. She has been putting medihoney on the wound since it occurred and it seems to be helping. She has no new complaints at this time other than her ambulatory difficulties.    PMH:  As per HPI and below:  Past Medical History:   Diagnosis Date    Asthma     Atrial fibrillation     CAD (coronary artery disease)     CABG x 4    Cataract     os    Depression     Diabetes mellitus type II     Heart failure     Hyperlipidemia     Hypertension     Primary osteoarthritis of right knee 1/19/2021    PVD (peripheral vascular disease)     Thyroid disease        Social History     Socioeconomic History    Marital status:    Occupational History    Occupation: retired     Tobacco Use    Smoking status: Never Smoker    Smokeless tobacco: Never Used   Substance and Sexual Activity    Alcohol use: Not Currently    Drug use: No    Sexual activity: Not Currently       Past Surgical History:   Procedure Laterality Date    APPENDECTOMY      BACK SURGERY      discetomy     CARPAL TUNNEL RELEASE      R     CATARACT EXTRACTION      od d 5-15-13    CORONARY ARTERY BYPASS GRAFT  2012    x 4    CORONARY STENT PLACEMENT      8 stents    KNEE ARTHROSCOPY W/ PARTIAL MEDIAL MENISCECTOMY         Family History   Problem Relation Age of Onset    Glaucoma Paternal Grandmother     Glaucoma Paternal Grandfather     Diabetes Mother     Breast cancer Mother     Coronary artery disease Mother     Skin cancer Father     Coronary artery disease Father     Stroke Father     Diabetes Brother     Coronary artery disease Maternal Grandmother     Coronary artery disease Maternal Grandfather      Amblyopia Neg Hx     Blindness Neg Hx     Cataracts Neg Hx     Hypertension Neg Hx     Macular degeneration Neg Hx     Retinal detachment Neg Hx     Strabismus Neg Hx     Thyroid disease Neg Hx        Review of patient's allergies indicates:   Allergen Reactions    Codeine Nausea Only       Current Outpatient Medications on File Prior to Visit   Medication Sig Dispense Refill    albuterol (PROVENTIL) 2.5 mg /3 mL (0.083 %) nebulizer solution Take 3 mLs (2.5 mg total) by nebulization every 6 (six) hours as needed for Wheezing. Rescue. Dispense one box 1 each 6    albuterol (PROVENTIL/VENTOLIN HFA) 90 mcg/actuation inhaler Inhale 2 puffs into the lungs every 6 (six) hours as needed for Wheezing. Rescue 18 g 2    amLODIPine (NORVASC) 5 MG tablet Take 1 tablet (5 mg total) by mouth once daily. 90 tablet 3    aspirin (ECOTRIN) 325 MG EC tablet aspirin 325 mg tablet,delayed release   Take 1 tablet every day by oral route.      cholecalciferol, vitamin D3, 125 mcg (5,000 unit) Tab cholecalciferol (vitamin D3) 125 mcg (5,000 unit) tablet   Take by oral route.      clindamycin (CLEOCIN) 300 MG capsule Take 1 capsule (300 mg total) by mouth 3 (three) times daily. 30 capsule 0    fluticasone propionate (FLONASE) 50 mcg/actuation nasal spray 2 sprays (100 mcg total) by Each Nostril route once daily. 16 g 6    furosemide (LASIX) 20 MG tablet TAKE 1 TABLET BY MOUTH EVERY DAY. 90 tablet 1    ipratropium (ATROVENT HFA) 17 mcg/actuation inhaler Inhale 2 puffs into the lungs every 6 (six) hours. Rescue 12.9 g 0    latanoprost 0.005 % ophthalmic solution latanoprost 0.005 % eye drops   Instill 1 drop every day by ophthalmic route for 90 days.      metoprolol succinate (TOPROL-XL) 100 MG 24 hr tablet Take 1 tablet (100 mg total) by mouth once daily. 90 tablet 3    mometasone 0.1% (ELOCON) 0.1 % cream Apply topically once daily. (Patient taking differently: Apply topically once daily. PRN) 45 g 3    nystatin  "(MYCOSTATIN) powder Apply topically 3 (three) times daily. (Patient taking differently: Apply topically 3 (three) times daily. PRN) 60 g 2    nystatin-triamcinolone (MYCOLOG II) cream PRN      potassium chloride (KLOR-CON) 10 MEQ TbSR Take 10 mEq by mouth once daily.      propafenone (RHTHYMOL) 150 MG Tab Take 1 tablet (150 mg total) by mouth every 8 (eight) hours. 180 tablet 2    sertraline (ZOLOFT) 100 MG tablet Take 1 tablet (100 mg total) by mouth once daily. 90 tablet 3    simvastatin (ZOCOR) 20 MG tablet Take 1 tablet (20 mg total) by mouth every evening. 90 tablet 3    SITagliptin (JANUVIA) 100 MG Tab Take 1 tablet (100 mg total) by mouth once daily. 90 tablet 3    suvorexant (BELSOMRA) 15 mg Tab Take 1 tablet by mouth nightly as needed.      timolol maleate 0.5% (TIMOPTIC) 0.5 % Drop Place 1 drop into both eyes 2 (two) times daily. 5 mL 11    levothyroxine (SYNTHROID) 88 MCG tablet Take 1 tablet (88 mcg total) by mouth before breakfast. 90 tablet 3     No current facility-administered medications on file prior to visit.       ROS: As per HPI and below:  101 point ROS all negative except that noted in the HPI      Physical Exam:     Vitals:    01/26/22 1259   BP: (!) 150/83   BP Location: Left arm   Patient Position: Sitting   BP Method: Large (Automatic)   Pulse: 80   Resp: 20   Temp: 97.7 °F (36.5 °C)   TempSrc: Oral   Weight: 116.6 kg (257 lb)   Height: 5' 5" (1.651 m)           Body mass index is 42.77 kg/m².          General:             Well developed, well nourished, no apparent distress  HEENT:              NCAT, no JVD, mucous membranes moist, EOM intact  Cardiovascular:  Regular rate and rhythm, normal S1, normal S2, No murmurs, rubs, or gallops  Respiratory:        Normal breath sounds, no wheezes, no rales, no rhonchi  Abdomen:           Bowel sounds present, non tender, non distended, no masses, no hepatojugular reflux  Extremities:        No clubbing, no cyanosis, no " edema  Neurological:      No focal deficits  Skin:                   No obvious rashes or erythema, traumatic wound to the left leg    Lab Results   Component Value Date    WBC 8.93 12/10/2021    HGB 12.5 12/10/2021    HCT 39.7 12/10/2021    MCV 84 12/10/2021     12/10/2021     Lab Results   Component Value Date    CHOL 130 06/04/2021    CHOL 207 (H) 01/19/2021    CHOL 194 02/04/2013     Lab Results   Component Value Date    HDL 36 (L) 06/04/2021    HDL 41 01/19/2021    HDL 34 (L) 02/04/2013     Lab Results   Component Value Date    LDLCALC 62.2 (L) 06/04/2021    LDLCALC 132.4 01/19/2021    LDLCALC 132.0 02/04/2013     Lab Results   Component Value Date    TRIG 159 (H) 06/04/2021    TRIG 168 (H) 01/19/2021    TRIG 140 02/04/2013     Lab Results   Component Value Date    CHOLHDL 27.7 06/04/2021    CHOLHDL 19.8 (L) 01/19/2021    CHOLHDL 17.5 (L) 02/04/2013     CMP     Lab Results   Component Value Date    TSH 2.790 12/10/2021             Assessment and Recommendations       Diagnoses:    1. Traumatic open wound to the left leg with cellulitis    Plan:  1. Continue medihoney daily to the wound  2. FU 1 week

## 2022-01-28 ENCOUNTER — CLINICAL SUPPORT (OUTPATIENT)
Dept: CARDIOLOGY | Facility: HOSPITAL | Age: 72
End: 2022-01-28
Attending: INTERNAL MEDICINE
Payer: MEDICARE

## 2022-01-28 VITALS — BODY MASS INDEX: 42.82 KG/M2 | WEIGHT: 257 LBS | HEIGHT: 65 IN

## 2022-01-28 DIAGNOSIS — I50.9 CHRONIC HEART FAILURE, UNSPECIFIED HEART FAILURE TYPE: ICD-10-CM

## 2022-01-28 PROCEDURE — 93306 TTE W/DOPPLER COMPLETE: CPT | Mod: PO

## 2022-01-28 PROCEDURE — 93306 TTE W/DOPPLER COMPLETE: CPT | Mod: 26,,, | Performed by: INTERNAL MEDICINE

## 2022-01-28 PROCEDURE — 93306 ECHO (CUPID ONLY): ICD-10-PCS | Mod: 26,,, | Performed by: INTERNAL MEDICINE

## 2022-01-31 ENCOUNTER — PATIENT MESSAGE (OUTPATIENT)
Dept: FAMILY MEDICINE | Facility: CLINIC | Age: 72
End: 2022-01-31
Payer: MEDICARE

## 2022-01-31 ENCOUNTER — TELEPHONE (OUTPATIENT)
Dept: WOUND CARE | Facility: CLINIC | Age: 72
End: 2022-01-31
Payer: MEDICARE

## 2022-01-31 LAB
ASCENDING AORTA: 2.5 CM
AV INDEX (PROSTH): 0.34
AV MEAN GRADIENT: 23 MMHG
AV PEAK GRADIENT: 38 MMHG
AV VALVE AREA: 1.19 CM2
AV VELOCITY RATIO: 0.35
BSA FOR ECHO PROCEDURE: 2.31 M2
CV ECHO LV RWT: 0.68 CM
DOP CALC AO PEAK VEL: 3.08 M/S
DOP CALC AO VTI: 79.6 CM
DOP CALC LVOT AREA: 3.5 CM2
DOP CALC LVOT DIAMETER: 2.1 CM
DOP CALC LVOT PEAK VEL: 1.07 M/S
DOP CALC LVOT STROKE VOLUME: 94.47 CM3
DOP CALCLVOT PEAK VEL VTI: 27.29 CM
E WAVE DECELERATION TIME: 212.96 MSEC
E/A RATIO: 1.16
E/E' RATIO: 19.33 M/S
ECHO LV POSTERIOR WALL: 1.3 CM (ref 0.6–1.1)
EJECTION FRACTION: 65 %
FRACTIONAL SHORTENING: 39 % (ref 28–44)
INTERVENTRICULAR SEPTUM: 0.96 CM (ref 0.6–1.1)
IVRT: 119.89 MSEC
LA MAJOR: 5.9 CM
LA MINOR: 5.76 CM
LA WIDTH: 4.56 CM
LEFT ATRIUM SIZE: 5.16 CM
LEFT ATRIUM VOLUME INDEX: 53 ML/M2
LEFT ATRIUM VOLUME: 116.58 CM3
LEFT INTERNAL DIMENSION IN SYSTOLE: 2.34 CM (ref 2.1–4)
LEFT VENTRICLE DIASTOLIC VOLUME INDEX: 28.78 ML/M2
LEFT VENTRICLE DIASTOLIC VOLUME: 63.31 ML
LEFT VENTRICLE MASS INDEX: 64 G/M2
LEFT VENTRICLE SYSTOLIC VOLUME INDEX: 8.6 ML/M2
LEFT VENTRICLE SYSTOLIC VOLUME: 18.98 ML
LEFT VENTRICULAR INTERNAL DIMENSION IN DIASTOLE: 3.83 CM (ref 3.5–6)
LEFT VENTRICULAR MASS: 141.78 G
LV LATERAL E/E' RATIO: 16.57 M/S
LV SEPTAL E/E' RATIO: 23.2 M/S
MV A" WAVE DURATION": 7.71 MSEC
MV PEAK A VEL: 1 M/S
MV PEAK E VEL: 1.16 M/S
MV STENOSIS PRESSURE HALF TIME: 61.76 MS
MV VALVE AREA P 1/2 METHOD: 3.56 CM2
PISA TR MAX VEL: 2.98 M/S
PULM VEIN S/D RATIO: 0.93
PV PEAK D VEL: 0.55 M/S
PV PEAK S VEL: 0.51 M/S
RA MAJOR: 4.52 CM
RA PRESSURE: 3 MMHG
RA WIDTH: 3.89 CM
RIGHT VENTRICULAR END-DIASTOLIC DIMENSION: 4.64 CM
RV TISSUE DOPPLER FREE WALL SYSTOLIC VELOCITY 1 (APICAL 4 CHAMBER VIEW): 7.17 CM/S
SINUS: 2.52 CM
STJ: 2.41 CM
TDI LATERAL: 0.07 M/S
TDI SEPTAL: 0.05 M/S
TDI: 0.06 M/S
TR MAX PG: 36 MMHG
TRICUSPID ANNULAR PLANE SYSTOLIC EXCURSION: 1.7 CM
TV REST PULMONARY ARTERY PRESSURE: 39 MMHG

## 2022-01-31 NOTE — TELEPHONE ENCOUNTER
----- Message from Dian Koroma sent at 1/29/2022  1:05 PM CST -----  Regarding: reschedule  Contact: [phone]  Reschedule Request      Date/Time/Type:  2/2 she will like to see if 2/1 is available   Contact #[phone]

## 2022-01-31 NOTE — TELEPHONE ENCOUNTER
Patient requested to change appointment. Rescheduled for 2/1/22 at 8 am. Verbalized understanding and agreeable.

## 2022-02-01 ENCOUNTER — OFFICE VISIT (OUTPATIENT)
Dept: WOUND CARE | Facility: CLINIC | Age: 72
End: 2022-02-01
Payer: MEDICARE

## 2022-02-01 VITALS
TEMPERATURE: 99 F | RESPIRATION RATE: 20 BRPM | SYSTOLIC BLOOD PRESSURE: 148 MMHG | DIASTOLIC BLOOD PRESSURE: 72 MMHG | HEART RATE: 68 BPM

## 2022-02-01 DIAGNOSIS — I48.0 PAROXYSMAL ATRIAL FIBRILLATION: ICD-10-CM

## 2022-02-01 DIAGNOSIS — I73.9 PVD (PERIPHERAL VASCULAR DISEASE): ICD-10-CM

## 2022-02-01 DIAGNOSIS — L03.116 CELLULITIS OF LEFT LOWER EXTREMITY: ICD-10-CM

## 2022-02-01 DIAGNOSIS — S81.802D OPEN WOUND OF LEFT LOWER LEG, SUBSEQUENT ENCOUNTER: Primary | ICD-10-CM

## 2022-02-01 DIAGNOSIS — I87.2 VENOUS INSUFFICIENCY: ICD-10-CM

## 2022-02-01 PROBLEM — S81.802A OPEN WOUND OF LEFT LOWER LEG: Status: ACTIVE | Noted: 2022-02-01

## 2022-02-01 PROCEDURE — 1159F PR MEDICATION LIST DOCUMENTED IN MEDICAL RECORD: ICD-10-PCS | Mod: CPTII,S$GLB,, | Performed by: FAMILY MEDICINE

## 2022-02-01 PROCEDURE — 3077F PR MOST RECENT SYSTOLIC BLOOD PRESSURE >= 140 MM HG: ICD-10-PCS | Mod: CPTII,S$GLB,, | Performed by: FAMILY MEDICINE

## 2022-02-01 PROCEDURE — 3288F FALL RISK ASSESSMENT DOCD: CPT | Mod: CPTII,S$GLB,, | Performed by: FAMILY MEDICINE

## 2022-02-01 PROCEDURE — 3078F DIAST BP <80 MM HG: CPT | Mod: CPTII,S$GLB,, | Performed by: FAMILY MEDICINE

## 2022-02-01 PROCEDURE — 11042 PR DEBRIDEMENT, SKIN, SUB-Q TISSUE,=<20 SQ CM: ICD-10-PCS | Mod: S$GLB,,, | Performed by: FAMILY MEDICINE

## 2022-02-01 PROCEDURE — 3077F SYST BP >= 140 MM HG: CPT | Mod: CPTII,S$GLB,, | Performed by: FAMILY MEDICINE

## 2022-02-01 PROCEDURE — 3078F PR MOST RECENT DIASTOLIC BLOOD PRESSURE < 80 MM HG: ICD-10-PCS | Mod: CPTII,S$GLB,, | Performed by: FAMILY MEDICINE

## 2022-02-01 PROCEDURE — 99499 NO LOS: ICD-10-PCS | Mod: S$GLB,,, | Performed by: FAMILY MEDICINE

## 2022-02-01 PROCEDURE — 1125F AMNT PAIN NOTED PAIN PRSNT: CPT | Mod: CPTII,S$GLB,, | Performed by: FAMILY MEDICINE

## 2022-02-01 PROCEDURE — 1101F PR PT FALLS ASSESS DOC 0-1 FALLS W/OUT INJ PAST YR: ICD-10-PCS | Mod: CPTII,S$GLB,, | Performed by: FAMILY MEDICINE

## 2022-02-01 PROCEDURE — 1160F RVW MEDS BY RX/DR IN RCRD: CPT | Mod: CPTII,S$GLB,, | Performed by: FAMILY MEDICINE

## 2022-02-01 PROCEDURE — 3288F PR FALLS RISK ASSESSMENT DOCUMENTED: ICD-10-PCS | Mod: CPTII,S$GLB,, | Performed by: FAMILY MEDICINE

## 2022-02-01 PROCEDURE — 99499 UNLISTED E&M SERVICE: CPT | Mod: S$GLB,,, | Performed by: FAMILY MEDICINE

## 2022-02-01 PROCEDURE — 99999 PR PBB SHADOW E&M-EST. PATIENT-LVL V: ICD-10-PCS | Mod: PBBFAC,,, | Performed by: FAMILY MEDICINE

## 2022-02-01 PROCEDURE — 1101F PT FALLS ASSESS-DOCD LE1/YR: CPT | Mod: CPTII,S$GLB,, | Performed by: FAMILY MEDICINE

## 2022-02-01 PROCEDURE — 11042 DBRDMT SUBQ TIS 1ST 20SQCM/<: CPT | Mod: S$GLB,,, | Performed by: FAMILY MEDICINE

## 2022-02-01 PROCEDURE — 1159F MED LIST DOCD IN RCRD: CPT | Mod: CPTII,S$GLB,, | Performed by: FAMILY MEDICINE

## 2022-02-01 PROCEDURE — 1160F PR REVIEW ALL MEDS BY PRESCRIBER/CLIN PHARMACIST DOCUMENTED: ICD-10-PCS | Mod: CPTII,S$GLB,, | Performed by: FAMILY MEDICINE

## 2022-02-01 PROCEDURE — 1125F PR PAIN SEVERITY QUANTIFIED, PAIN PRESENT: ICD-10-PCS | Mod: CPTII,S$GLB,, | Performed by: FAMILY MEDICINE

## 2022-02-01 PROCEDURE — 99999 PR PBB SHADOW E&M-EST. PATIENT-LVL V: CPT | Mod: PBBFAC,,, | Performed by: FAMILY MEDICINE

## 2022-02-01 NOTE — PATIENT INSTRUCTIONS
"  Dressings:   · MediHoney, silicone border foam  Compression:   · Elevate legs ("recliner position") as able, even when sleeping  Offloading:  · Avoid pressure to area  Activity   · Limit activity: Rest several times per day. - Keep off area as much as possible.   Dietary:   · As tolerated: 3 well-balanced meals  · Increased protein: Boost or Ensure, [place] Instant Breakfast (purchase sugarfree if Diabetic) Increasing your protein intake is very helpful with wound healing; if you are a kidney or dialysis patient please check with your Nephrologist as to how much protein you are allowed to take in with your condition. Taking a daily Multivitamin with Zinc as well as Vitamin C, 1000 mg minimum per day will also help with healing your wound. If you are on Coumadin, please contact the Coumadin Clinic before beginning a Multivitamin. High protein items that can be added to your diet include, extra helpings of meats, fish, beans, also High protein bars that add as little as 12 gms of protein and up to 32 gms of protein per bar.  · Supplement with: Multivitamin with Zinc and Vitamin C 500mg twice a day.  Return Appointment:2/8/2022        Patient Instructions:  Clean wound and change dressing every day, if possible. Wash hands before and after wound care. Remove old dressing, clean with soap/water or saline/wound cleanser; pat dry. May clean wound while in the shower with soap/water. Apply a small amount of MediHoney to wound then cover with a bandage of choice. If pt is unable to change dressing daily, every other day is ok. Pt may lift silicone border foam, clean wound, apply MediHoney and reapply silicone border foam. Pt should keep wound covered at all times and avoid getting dressing wet. If dressing is saturated, it will need to be changed. Elevate legs when possible. Incorporate a probiotic into diet, such as yogurt or a supplement. Notify wound care clinic for any concerns or changes in wound. Home Health on " hold at this time.    Discharge Instructions:     The clinic is open Mon-Fri from 8:00 a.m. until 5:00 p.m. During business hours call the [facility] at [phone]  and ask for Wound Care Department for any worsening symptoms; fever >101.0, increased pain, increased drainage, foul odor or problems with the dressing. For after hours problems or emergencies please report to the nearest emergency room.      Patient instructed on proper handwashing technique. Using warm water and soap, apply soap, lather well and vigorously apply friction for a minimum of 20 seconds, rinse well and dry well; wash before and after toileting, before and after woundcare, after sneezing, coughing etc.     Routine Dressing instructions: Patient to keep the dressing clean, dry and intact. Call for any worsening symptoms or problems. [phone].

## 2022-02-01 NOTE — PROGRESS NOTES
[facility]                         Wound Care Clinic                Progress Note    Subjective:       Patient ID: Sneha Mcghee is a 71 y.o. female.    Chief Complaint: Wound Check    HPI   Pt seen in clinic for a FU visit for a left leg open wound. Pt stillm on the clindamycin, tolerating, but has some loose stools. She has no new complaints at this visit.    Initial Consult Date: 1/26/22  Wound onset: Jan 21 or 22, 2022  Referring MD:Dr. Elizabeth Jacobo  Physician List:  Vascular Status/SARITHA:  Had ultrasounds done 1/27/22  Sensilase or Vascular Studies:  Nutrition Risk/Labs: 12/21  Other Labs:  Recent Cultures & Dates:  Radiology/Xray:  Diabetes Status/HbA1c: Dec' 2021: 6.6  Offloading/Immobilization:  Edema/Compression: has compression stockings but does not wear  Tobacco Use:  Other:  Recent Antibiotics:Clindamycin 1/2022  Recent MD visit:1/25/22 Dr. Jacobo  Upcoming MD Visit:3/8/22 Dr. Parson 3/8/22; Dr. Jacobo 6/21/22 2/1/2022: Patient states she does not want Home Health at this time. She feels she is able to perform her own wound care.            Review of Systems   Skin: Positive for wound.        Left leg open wound   All other systems reviewed and are negative.        Objective:        Physical Exam  Vitals and nursing note reviewed.   Constitutional:       General: She is not in acute distress.     Appearance: Normal appearance. She is not ill-appearing, toxic-appearing or diaphoretic.   Skin:     General: Skin is warm and dry.      Coloration: Skin is not jaundiced or pale.      Findings: Lesion present. No bruising, erythema or rash.      Comments: Left leg open wound, slough covered, serous drainage   Neurological:      General: No focal deficit present.      Mental Status: She is alert and oriented to person, place, and time.   Psychiatric:         Mood and Affect: Mood normal.         Behavior: Behavior normal.         Thought Content:  Thought content normal.         Judgment: Judgment normal.         Vitals:    02/01/22 0758   BP: (!) 148/72   Pulse: 68   Resp: 20   Temp: 98.5 °F (36.9 °C)       Assessment:           ICD-10-CM ICD-9-CM   1. Open wound of left lower leg, subsequent encounter  S81.802D V58.89     891.0   2. PVD (peripheral vascular disease)  I73.9 443.9   3. Venous insufficiency  I87.2 459.81   4. Cellulitis of left lower extremity  L03.116 682.6            Wound 01/26/22 1312 Traumatic Left lower;lateral Leg (Active)   01/26/22 1312    Pre-existing:    Primary Wound Type: Traumatic   Side: Left   Orientation: lower;lateral   Location: Leg   Wound Number:    Ankle-Brachial Index:    Pulses:    Removal Indication and Assessment:    Wound Outcome:    (Retired) Wound Type:    (Retired) Wound Length (cm):    (Retired) Wound Width (cm):    (Retired) Depth (cm):    Wound Description (Comments):    Removal Indications:    Wound Image   02/01/22 0829   Dressing Appearance Intact;Moist drainage 02/01/22 0800   Drainage Amount Small 02/01/22 0800   Drainage Characteristics/Odor Serosanguineous;Yellow 02/01/22 0800   Appearance Pink;Red 02/01/22 0829   Tissue loss description Full thickness 02/01/22 0800   Red (%), Wound Tissue Color 75 % 02/01/22 0829   Yellow (%), Wound Tissue Color 25 % 02/01/22 0829   Periwound Area Intact;[place]iv[place] 02/01/22 0800   Wound Edges Irregular 02/01/22 0800   Wound Length (cm) 3 cm 02/01/22 0800   Wound Width (cm) 2.5 cm 02/01/22 0800   Wound Depth (cm) 0.3 cm 02/01/22 0800   Wound Volume (cm^3) 2.25 cm^3 02/01/22 0800   Wound Surface Area (cm^2) 7.5 cm^2 02/01/22 0800   Care Cleansed with:;Wound cleanser 02/01/22 0829   Dressing Applied;Honey;Foam 02/01/22 0829   Periwound Care Skin barrier film applied 02/01/22 0829   Dressing Change Due 02/02/22 02/01/22 0829           Plan:          MD Orders:  Obtained wound photos and measurements.  Anesthetic:    No anesthetic needed.    Topical 4% Lidocaine Cream to wound  "bed prior to cleansing and/or debridement:used today  Wound cleansing:    Saline or wound cleanser to cleanse wound   Debridement:   Wounds were mechanically debrided with gauze and wound cleanser by RN   Dr Carlson debrided with 3.0 curette  Topical Treatments:   Skin prep as needed to periwound areas  Dressings:   · MediHoney, silicone border foam  Compression:   · Elevate legs ("recliner position") as able, even when sleeping  Offloading:  · Avoid pressure to area  Activity   · Limit activity: Rest several times per day. - Keep off area as much as possible.   Dietary:   · As tolerated: 3 well-balanced meals  · Increased protein: Boost or Ensure, [place] Instant Breakfast (purchase sugarfree if Diabetic) Increasing your protein intake is very helpful with wound healing; if you are a kidney or dialysis patient please check with your Nephrologist as to how much protein you are allowed to take in with your condition. Taking a daily Multivitamin with Zinc as well as Vitamin C, 1000 mg minimum per day will also help with healing your wound. If you are on Coumadin, please contact the Coumadin Clinic before beginning a Multivitamin. High protein items that can be added to your diet include, extra helpings of meats, fish, beans, also High protein bars that add as little as 12 gms of protein and up to 32 gms of protein per bar.  · Supplement with: Multivitamin with Zinc and Vitamin C 500mg twice a day.  Return Appointment:2/8/2022        Patient Instructions:  Clean wound and change dressing every day, if possible. Wash hands before and after wound care. Remove old dressing, clean with soap/water or saline/wound cleanser; pat dry. May clean wound while in the shower with soap/water. Apply a small amount of MediHoney to wound then cover with a bandage of choice. If pt is unable to change dressing daily, every other day is ok. Pt may lift silicone border foam, clean wound, apply MediHoney and reapply silicone border " foam. Pt should keep wound covered at all times and avoid getting dressing wet. If dressing is saturated, it will need to be changed. Elevate legs when possible. Incorporate a probiotic into diet, such as yogurt or a supplement. Notify wound care clinic for any concerns or changes in wound. Home Health on hold at this time.      HOME HEALTH:  On hold at this time         Sneha was seen today for wound check.    Diagnoses and all orders for this visit:    Open wound of left lower leg, subsequent encounter  -     Change dressing  -     Debridement    PVD (peripheral vascular disease)  -     Ambulatory referral/consult to Cardiology; Future  -     Change dressing    Venous insufficiency  -     Change dressing    Cellulitis of left lower extremity  -     Change dressing

## 2022-02-02 RX ORDER — PROPAFENONE HYDROCHLORIDE 150 MG/1
TABLET, COATED ORAL
Qty: 180 TABLET | Refills: 2 | Status: SHIPPED | OUTPATIENT
Start: 2022-02-02 | End: 2022-11-02

## 2022-02-02 NOTE — TELEPHONE ENCOUNTER
----- Message from Ann-Marie Serna sent at 2/2/2022  3:24 PM CST -----  Regarding: refill  Contact: GISSELLE CORONADO [[id number]]  Patient requesting a refill on levothyroxine (SYNTHROID) 88 MCG tablet.  Patient stated that she is completely out of medication, and has been out for a couple days.     Patient will be using   [id number] [id number] [place] [address] AT [facility] [place] [address]  [address] [place] [id number]  Phone: [phone] Fax: [phone]    Please call patient at [phone].    Thanks!

## 2022-02-03 ENCOUNTER — TELEPHONE (OUTPATIENT)
Dept: CARDIOLOGY | Facility: CLINIC | Age: 72
End: 2022-02-03
Payer: MEDICARE

## 2022-02-03 ENCOUNTER — TELEPHONE (OUTPATIENT)
Dept: FAMILY MEDICINE | Facility: CLINIC | Age: 72
End: 2022-02-03
Payer: MEDICARE

## 2022-02-03 RX ORDER — LEVOTHYROXINE SODIUM 88 UG/1
TABLET ORAL
Qty: 90 TABLET | Refills: 3 | Status: SHIPPED | OUTPATIENT
Start: 2022-02-03 | End: 2023-01-22

## 2022-02-03 NOTE — TELEPHONE ENCOUNTER
----- Message from Georgia Graham MA sent at 2/3/2022  2:19 PM CST -----  Type:  Sooner Apoointment Request    Caller is requesting a sooner appointment.      Name of Caller:  Sneha  When is the first available appointment?  March 8  Symptoms:  Echo Results   Best Call Back Number:  [phone]  Additional Information:

## 2022-02-03 NOTE — TELEPHONE ENCOUNTER
----- Message from Georgia Graham MA sent at 2/3/2022  2:17 PM CST -----  Type: Needs Medical Advice  Who Called:  Sneha Knapp Call Back Number: [phone]  Additional Information: patient would like for someone to go over the results of her Echo.  Please call to discuss

## 2022-02-03 NOTE — TELEPHONE ENCOUNTER
This is what I sent in her [facility] message    Your echo showed normal pumping function. There is some filling dysfunction which contributes to your lower leg swelling.  You have a tight valve and we will continue to monitor it.  You have some enlargement of the upper chambers of your heart. Cardiology can also review this with you.        Thanks

## 2022-02-08 ENCOUNTER — OFFICE VISIT (OUTPATIENT)
Dept: WOUND CARE | Facility: CLINIC | Age: 72
End: 2022-02-08
Payer: MEDICARE

## 2022-02-08 VITALS
RESPIRATION RATE: 20 BRPM | SYSTOLIC BLOOD PRESSURE: 177 MMHG | DIASTOLIC BLOOD PRESSURE: 98 MMHG | HEART RATE: 77 BPM | TEMPERATURE: 98 F

## 2022-02-08 DIAGNOSIS — S81.802D OPEN WOUND OF LEFT LOWER LEG, SUBSEQUENT ENCOUNTER: ICD-10-CM

## 2022-02-08 DIAGNOSIS — L03.116 CELLULITIS OF LEFT LOWER EXTREMITY: Primary | ICD-10-CM

## 2022-02-08 DIAGNOSIS — I73.9 PVD (PERIPHERAL VASCULAR DISEASE): ICD-10-CM

## 2022-02-08 PROCEDURE — 1159F PR MEDICATION LIST DOCUMENTED IN MEDICAL RECORD: ICD-10-PCS | Mod: CPTII,S$GLB,, | Performed by: FAMILY MEDICINE

## 2022-02-08 PROCEDURE — 1125F PR PAIN SEVERITY QUANTIFIED, PAIN PRESENT: ICD-10-PCS | Mod: CPTII,S$GLB,, | Performed by: FAMILY MEDICINE

## 2022-02-08 PROCEDURE — 99213 OFFICE O/P EST LOW 20 MIN: CPT | Mod: 25,S$GLB,, | Performed by: FAMILY MEDICINE

## 2022-02-08 PROCEDURE — 1159F MED LIST DOCD IN RCRD: CPT | Mod: CPTII,S$GLB,, | Performed by: FAMILY MEDICINE

## 2022-02-08 PROCEDURE — 1101F PT FALLS ASSESS-DOCD LE1/YR: CPT | Mod: CPTII,S$GLB,, | Performed by: FAMILY MEDICINE

## 2022-02-08 PROCEDURE — 99499 UNLISTED E&M SERVICE: CPT | Mod: S$GLB,,, | Performed by: FAMILY MEDICINE

## 2022-02-08 PROCEDURE — 99213 PR OFFICE/OUTPT VISIT, EST, LEVL III, 20-29 MIN: ICD-10-PCS | Mod: 25,S$GLB,, | Performed by: FAMILY MEDICINE

## 2022-02-08 PROCEDURE — 87186 SC STD MICRODIL/AGAR DIL: CPT | Performed by: FAMILY MEDICINE

## 2022-02-08 PROCEDURE — 87070 CULTURE OTHR SPECIMN AEROBIC: CPT | Performed by: FAMILY MEDICINE

## 2022-02-08 PROCEDURE — 99999 PR PBB SHADOW E&M-EST. PATIENT-LVL IV: CPT | Mod: PBBFAC,,, | Performed by: FAMILY MEDICINE

## 2022-02-08 PROCEDURE — 1101F PR PT FALLS ASSESS DOC 0-1 FALLS W/OUT INJ PAST YR: ICD-10-PCS | Mod: CPTII,S$GLB,, | Performed by: FAMILY MEDICINE

## 2022-02-08 PROCEDURE — 3288F FALL RISK ASSESSMENT DOCD: CPT | Mod: CPTII,S$GLB,, | Performed by: FAMILY MEDICINE

## 2022-02-08 PROCEDURE — 3077F SYST BP >= 140 MM HG: CPT | Mod: CPTII,S$GLB,, | Performed by: FAMILY MEDICINE

## 2022-02-08 PROCEDURE — 87077 CULTURE AEROBIC IDENTIFY: CPT | Performed by: FAMILY MEDICINE

## 2022-02-08 PROCEDURE — 11042 DBRDMT SUBQ TIS 1ST 20SQCM/<: CPT | Mod: S$GLB,,, | Performed by: FAMILY MEDICINE

## 2022-02-08 PROCEDURE — 1125F AMNT PAIN NOTED PAIN PRSNT: CPT | Mod: CPTII,S$GLB,, | Performed by: FAMILY MEDICINE

## 2022-02-08 PROCEDURE — 99999 PR PBB SHADOW E&M-EST. PATIENT-LVL IV: ICD-10-PCS | Mod: PBBFAC,,, | Performed by: FAMILY MEDICINE

## 2022-02-08 PROCEDURE — 1160F RVW MEDS BY RX/DR IN RCRD: CPT | Mod: CPTII,S$GLB,, | Performed by: FAMILY MEDICINE

## 2022-02-08 PROCEDURE — 99499 RISK ADDL DX/OHS AUDIT: ICD-10-PCS | Mod: S$GLB,,, | Performed by: FAMILY MEDICINE

## 2022-02-08 PROCEDURE — 3288F PR FALLS RISK ASSESSMENT DOCUMENTED: ICD-10-PCS | Mod: CPTII,S$GLB,, | Performed by: FAMILY MEDICINE

## 2022-02-08 PROCEDURE — 3080F DIAST BP >= 90 MM HG: CPT | Mod: CPTII,S$GLB,, | Performed by: FAMILY MEDICINE

## 2022-02-08 PROCEDURE — 3077F PR MOST RECENT SYSTOLIC BLOOD PRESSURE >= 140 MM HG: ICD-10-PCS | Mod: CPTII,S$GLB,, | Performed by: FAMILY MEDICINE

## 2022-02-08 PROCEDURE — 1160F PR REVIEW ALL MEDS BY PRESCRIBER/CLIN PHARMACIST DOCUMENTED: ICD-10-PCS | Mod: CPTII,S$GLB,, | Performed by: FAMILY MEDICINE

## 2022-02-08 PROCEDURE — 11042 DEBRIDEMENT: ICD-10-PCS | Mod: S$GLB,,, | Performed by: FAMILY MEDICINE

## 2022-02-08 PROCEDURE — 3080F PR MOST RECENT DIASTOLIC BLOOD PRESSURE >= 90 MM HG: ICD-10-PCS | Mod: CPTII,S$GLB,, | Performed by: FAMILY MEDICINE

## 2022-02-08 RX ORDER — AMOXICILLIN AND CLAVULANATE POTASSIUM 875; 125 MG/1; MG/1
1 TABLET, FILM COATED ORAL 2 TIMES DAILY
Qty: 20 TABLET | Refills: 0 | Status: SHIPPED | OUTPATIENT
Start: 2022-02-08 | End: 2022-02-18

## 2022-02-08 NOTE — PROGRESS NOTES
[facility]                         Wound Care Clinic                Progress Note    Subjective:       Patient ID: Sneha Mcghee is a 71 y.o. female.    Chief Complaint: Wound Check (F/u left lower leg)    HPI   Pt seen in clinic for a FU visit for a Left LE open wound. Pt today c/o increased redness and tenderness suggesting infected wound or cellulitis of the leg. Pt has no other new complaints today at this visit.   Initial Consult Date: 1/26/22  Wound onset: Jan 21 or 22, 2022  Referring MD:Dr. Elizabeth Jacobo  Physician List:  Vascular Status/SARITHA:  Had ultrasounds done 1/27/22  Sensilase or Vascular Studies:  Nutrition Risk/Labs: 12/21  Other Labs:  Recent Cultures & Dates:  Radiology/Xray:  Diabetes Status/HbA1c: Dec' 2021: 6.6  Offloading/Immobilization:  Edema/Compression: has compression stockings but does not wear  Tobacco Use:  Other:  Recent Antibiotics:Clindamycin 1/2022  Recent MD visit:1/25/22 Dr. Jacobo  Upcoming MD Visit:3/8/22 Dr. Parson 3/8/22; Dr. Jacobo 6/21/22            Review of Systems   Skin: Positive for color change and wound.        Left leg open wound  Increased redness   All other systems reviewed and are negative.        Objective:        Physical Exam  Vitals and nursing note reviewed.   Constitutional:       General: She is not in acute distress.     Appearance: Normal appearance. She is not ill-appearing, toxic-appearing or diaphoretic.   Skin:     General: Skin is dry.      Coloration: Skin is not jaundiced or pale.      Findings: Erythema and lesion present. No bruising or rash.      Comments: Left leg open wound, increased erythema, tender to touch   Neurological:      General: No focal deficit present.      Mental Status: She is alert and oriented to person, place, and time.   Psychiatric:         Mood and Affect: Mood normal.         Behavior: Behavior normal.         Thought Content: Thought content normal.          Judgment: Judgment normal.         Vitals:    02/08/22 0851   BP: (!) 177/98   Pulse: 77   Resp: 20   Temp: 97.9 °F (36.6 °C)       Assessment:           ICD-10-CM ICD-9-CM   1. Cellulitis of left lower extremity  L03.116 682.6   2. PVD (peripheral vascular disease)  I73.9 443.9   3. Open wound of left lower leg, subsequent encounter  S81.802D V58.89     891.0            Wound 01/26/22 1312 Traumatic Left lower;lateral Leg (Active)   01/26/22 1312    Pre-existing:    Primary Wound Type: Traumatic   Side: Left   Orientation: lower;lateral   Location: Leg   Wound Number:    Ankle-Brachial Index:    Pulses:    Removal Indication and Assessment:    Wound Outcome:    (Retired) Wound Type:    (Retired) Wound Length (cm):    (Retired) Wound Width (cm):    (Retired) Depth (cm):    Wound Description (Comments):    Removal Indications:    Wound Image   02/08/22 0830   Dressing Appearance Intact;Dried drainage 02/08/22 0800   Drainage Amount Small 02/08/22 0800   Drainage Characteristics/Odor Serosanguineous;No odor 02/08/22 0800   Appearance Red;Yellow;Moist;Fibrin 02/08/22 0830   Red (%), Wound Tissue Color 30 % 02/08/22 0830   Yellow (%), Wound Tissue Color 70 % 02/08/22 0830   Periwound Area Hemosiderin Staining;Moist;Redness;Pink 02/08/22 0800   Wound Edges Irregular 02/08/22 0800   Wound Length (cm) 3.5 cm 02/08/22 0830   Wound Width (cm) 3 cm 02/08/22 0830   Wound Depth (cm) 0.3 cm 02/08/22 0830   Wound Volume (cm^3) 3.15 cm^3 02/08/22 0830   Wound Surface Area (cm^2) 10.5 cm^2 02/08/22 0830   Care Cleansed with:;Wound cleanser 02/08/22 0830   Dressing Applied;Honey;Foam;Non-adherent 02/08/22 0830   Periwound Care Skin barrier film applied 02/08/22 0830   Dressing Change Due 02/09/22 02/08/22 0830           Plan:            MD Orders:  Obtained wound photos and measurements.  Anesthetic:   · Topical 4% Lidocaine Cream to wound bed prior to cleansing and/or debridement:used today  Wound cleansing:   · Saline or wound  "cleanser to cleanse wound   Debridement:  · Wounds were mechanically debrided with gauze and wound cleanser by RN  · Dr Carlson debrided with 3.0 curette  Topical Treatments:  · Skin prep as needed to periwound areas  Dressings:   · MediHoney, adaptic, silicone border foam  Compression:   · Elevate legs ("recliner position") as able, even when sleeping  Offloading:  · Avoid pressure to area  Activity   · Limit activity: Rest several times per day. - Keep off area as much as possible.   Dietary:   · As tolerated: 3 well-balanced meals  · Increased protein: Boost or Ensure, [place] Instant Breakfast (purchase sugarfree if Diabetic) Increasing your protein intake is very helpful with wound healing; if you are a kidney or dialysis patient please check with your Nephrologist as to how much protein you are allowed to take in with your condition. Taking a daily Multivitamin with Zinc as well as Vitamin C, 1000 mg minimum per day will also help with healing your wound. If you are on Coumadin, please contact the Coumadin Clinic before beginning a Multivitamin. High protein items that can be added to your diet include, extra helpings of meats, fish, beans, also High protein bars that add as little as 12 gms of protein and up to 32 gms of protein per bar.  · Supplement with: Multivitamin with Zinc and Vitamin C 500mg twice a day.  Return Appointment: 2/15/2022        Patient Instructions:  Clean wound and change dressing every day, if possible. Wash hands before and after wound care. Remove old dressing, clean with soap/water or saline/wound cleanser; pat dry. May clean wound while in the shower with soap/water. Apply a small amount of MediHoney to wound then cover with a bandage of choice. If pt is unable to change dressing daily, every other day is ok. Pt may lift silicone border foam, clean wound, apply MediHoney and reapply silicone border foam. Pt should keep wound covered at all times and avoid getting dressing wet. If " dressing is saturated, it will need to be changed. Elevate legs when possible. Incorporate a probiotic into diet, such as yogurt or a supplement. Notify wound care clinic for any concerns or changes in wound. Home Health on hold at this time.      Sneha was seen today for wound check.    Diagnoses and all orders for this visit:    Cellulitis of left lower extremity  -     Aerobic culture    PVD (peripheral vascular disease)    Open wound of left lower leg, subsequent encounter    Other orders  -     amoxicillin-clavulanate 875-125mg (AUGMENTIN) 875-125 mg per tablet; Take 1 tablet by mouth 2 (two) times daily. for 10 days

## 2022-02-08 NOTE — PATIENT INSTRUCTIONS
"Dressings:   · MediHoney, adaptic, silicone border foam  Compression:   · Elevate legs ("recliner position") as able, even when sleeping  Offloading:  · Avoid pressure to area  Activity   · Limit activity: Rest several times per day. - Keep off area as much as possible.   Dietary:   · As tolerated: 3 well-balanced meals  · Increased protein: Boost or Ensure, [place] Instant Breakfast (purchase sugarfree if Diabetic) Increasing your protein intake is very helpful with wound healing; if you are a kidney or dialysis patient please check with your Nephrologist as to how much protein you are allowed to take in with your condition. Taking a daily Multivitamin with Zinc as well as Vitamin C, 1000 mg minimum per day will also help with healing your wound. If you are on Coumadin, please contact the Coumadin Clinic before beginning a Multivitamin. High protein items that can be added to your diet include, extra helpings of meats, fish, beans, also High protein bars that add as little as 12 gms of protein and up to 32 gms of protein per bar.  · Supplement with: Multivitamin with Zinc and Vitamin C 500mg twice a day.  Return Appointment: 2/15/2022        Patient Instructions:  Clean wound and change dressing every day, if possible. Wash hands before and after wound care. Remove old dressing, clean with soap/water or saline/wound cleanser; pat dry. May clean wound while in the shower with soap/water. Apply a small amount of MediHoney to wound then cover with a bandage of choice. If pt is unable to change dressing daily, every other day is ok. Pt may lift silicone border foam, clean wound, apply MediHoney and reapply silicone border foam. Pt should keep wound covered at all times and avoid getting dressing wet. If dressing is saturated, it will need to be changed. Elevate legs when possible. Incorporate a probiotic into diet, such as yogurt or a supplement. Notify wound care clinic for any concerns or changes in wound. Home " Health on hold at this time.    Discharge Instructions.    Return Appointment: Should you experience any significant changes in your wound(s) or have any questions regarding your home care instructions please contact [facility], ask for the wound center- [phone].  If after hours, contact your primary care physician or go to the hospital emergency room.     In a diabetic patient with a wound, keeping your blood glucose levels as close to normal can be a great help towards healing your wound. Wounds are very difficult to heal in a high sugar environment so controlling the glucose well can go a long way towards successful wound healing.      Increasing your protein intake is very helpful with wound healing; if you are a kidney or dialysis patient please check with your Nephrologist as to how much protein you are allowed to take in with your condition. Taking a daily Multivitamin with Zinc as well as Vitamin C, 1000 mg minimum per day will also help with healing your wound.If you are on Coumadin, please contact the Coumadin Clinic before beginning a Multivitamin. High protein items that can be added to your diet include, extra helpings of meats, fish, beans, also High protein bars that add as little as 12 gms of protein and up to 32 gms of protein per bar.      The clinic is open Mon-Fri from 8:00 a.m. until 5:00 p.m. During business hours call [facility], ask for Wound Care @ [phone] for any worsening symptoms; fever >101.0, increased pain, increased drainage, foul odor or problems with the dressing. For after hours problems or emergencies please report to the nearest emergency room.      Patient instructed on proper handwashing technique. Using warm water and soap, apply soap, lather well and vigorously apply friction for a minimum of 20 seconds, rinse well and dry well; wash before and after toileting, before and after woundcare, after sneezing, coughing etc.      Patient to  keep the dressing clean, dry and intact. Call for any worsening symptoms or problems. [phone]. - Use cast protector if showering. Keep wrap dry and intact until next appt.

## 2022-02-08 NOTE — PROCEDURES
"Debridement    Date/Time: 2/1/2022 8:00 AM  Performed by: Didier Carlson DO  Authorized by: Didier Carlson DO     Time out: Immediately prior to procedure a "time out" was called to verify the correct patient, procedure, equipment, support staff and site/side marked as required.    Consent Done?:  Yes (Written)    Preparation: Patient was prepped and draped in usual sterile fashion    Local anesthesia used?: Yes    Anesthesia:  Local infiltration  Local anesthetic:  Lidocaine 2% topical gel  Anesthetic total (ml):  5    Wound Details:    Location:  Left leg    Type of Debridement:  Excisional       Length (cm):  3       Area (sq cm):  7.5       Width (cm):  2.5       Percent Debrided (%):  100       Depth (cm):  0.3       Total Area Debrided (sq cm):  7.5    Depth of debridement:  Subcutaneous tissue    Tissue debrided:  Subcutaneous    Devitalized tissue debrided:  Biofilm, Exudate, Fibrin and Slough    Instruments:  Curette    Bleeding:  Minimal  Hemostasis Achieved: Yes    Method Used:  Pressure  Patient tolerance:  Patient tolerated the procedure well with no immediate complications      "

## 2022-02-09 NOTE — PROCEDURES
"Debridement    Date/Time: 2/8/2022 9:00 AM  Performed by: Didier Carlson DO  Authorized by: Didier Carlson DO     Time out: Immediately prior to procedure a "time out" was called to verify the correct patient, procedure, equipment, support staff and site/side marked as required.    Consent Done?:  Yes (Written)    Preparation: Patient was prepped and draped in usual sterile fashion    Local anesthetic:  Lidocaine 2% topical gel  Anesthetic total (ml):  5    Wound Details:    Location:  Left leg       Length (cm):  3.5       Area (sq cm):  10.5       Width (cm):  3       Percent Debrided (%):  100       Depth (cm):  0.3       Total Area Debrided (sq cm):  10.5    Depth of debridement:  Subcutaneous tissue    Tissue debrided:  Subcutaneous    Devitalized tissue debrided:  Biofilm, Exudate, Fibrin, Necrotic/Eschar and Slough    Instruments:  Curette    Bleeding:  Minimal  Hemostasis Achieved: Yes    Method Used:  Pressure  Patient tolerance:  Patient tolerated the procedure well with no immediate complications      "

## 2022-02-10 ENCOUNTER — TELEPHONE (OUTPATIENT)
Dept: WOUND CARE | Facility: CLINIC | Age: 72
End: 2022-02-10
Payer: MEDICARE

## 2022-02-10 NOTE — TELEPHONE ENCOUNTER
Returned call to pt; discussed pt's concerns, reviewed wound care plan. Pt verbalized understanding. Pt has a f/u appmt scheduled for 2/15/22.    ----- Message from Madalyn Gregory sent at 2/10/2022  3:05 PM CST -----  Contact: patient  Type: Needs Medical Advice  Who Called:  patient   Symptoms (please be specific): skin around ankle wound feel  red, itchy around the edges, is moist  How long has patient had these symptoms:  since yesterday  Pharmacy name and phone #:    [id number] - [facility] [id number] [place] [address] AT [facility] [address]  [id number] [place] [id number]  Phone: [phone] Fax: [phone]  Best Call Back Number: [phone]  Additional Information: would like to know what type of soap to use and would like to discuss home health

## 2022-02-12 LAB — BACTERIA SPEC AEROBE CULT: ABNORMAL

## 2022-02-15 ENCOUNTER — TELEPHONE (OUTPATIENT)
Dept: WOUND CARE | Facility: CLINIC | Age: 72
End: 2022-02-15
Payer: MEDICARE

## 2022-02-15 ENCOUNTER — OFFICE VISIT (OUTPATIENT)
Dept: WOUND CARE | Facility: CLINIC | Age: 72
End: 2022-02-15
Payer: MEDICARE

## 2022-02-15 VITALS
HEART RATE: 66 BPM | DIASTOLIC BLOOD PRESSURE: 91 MMHG | TEMPERATURE: 98 F | SYSTOLIC BLOOD PRESSURE: 166 MMHG | RESPIRATION RATE: 20 BRPM

## 2022-02-15 DIAGNOSIS — L03.116 CELLULITIS OF LEFT LOWER EXTREMITY: Primary | ICD-10-CM

## 2022-02-15 DIAGNOSIS — I87.2 VENOUS INSUFFICIENCY: ICD-10-CM

## 2022-02-15 DIAGNOSIS — S81.802D OPEN WOUND OF LEFT LOWER LEG, SUBSEQUENT ENCOUNTER: ICD-10-CM

## 2022-02-15 DIAGNOSIS — I73.9 PVD (PERIPHERAL VASCULAR DISEASE): ICD-10-CM

## 2022-02-15 PROCEDURE — 99499 UNLISTED E&M SERVICE: CPT | Mod: S$GLB,,, | Performed by: FAMILY MEDICINE

## 2022-02-15 PROCEDURE — 3288F PR FALLS RISK ASSESSMENT DOCUMENTED: ICD-10-PCS | Mod: CPTII,S$GLB,, | Performed by: FAMILY MEDICINE

## 2022-02-15 PROCEDURE — 3080F DIAST BP >= 90 MM HG: CPT | Mod: CPTII,S$GLB,, | Performed by: FAMILY MEDICINE

## 2022-02-15 PROCEDURE — 3288F FALL RISK ASSESSMENT DOCD: CPT | Mod: CPTII,S$GLB,, | Performed by: FAMILY MEDICINE

## 2022-02-15 PROCEDURE — 1159F PR MEDICATION LIST DOCUMENTED IN MEDICAL RECORD: ICD-10-PCS | Mod: CPTII,S$GLB,, | Performed by: FAMILY MEDICINE

## 2022-02-15 PROCEDURE — 1160F RVW MEDS BY RX/DR IN RCRD: CPT | Mod: CPTII,S$GLB,, | Performed by: FAMILY MEDICINE

## 2022-02-15 PROCEDURE — 99999 PR PBB SHADOW E&M-EST. PATIENT-LVL V: CPT | Mod: PBBFAC,,, | Performed by: FAMILY MEDICINE

## 2022-02-15 PROCEDURE — 3077F SYST BP >= 140 MM HG: CPT | Mod: CPTII,S$GLB,, | Performed by: FAMILY MEDICINE

## 2022-02-15 PROCEDURE — 1125F PR PAIN SEVERITY QUANTIFIED, PAIN PRESENT: ICD-10-PCS | Mod: CPTII,S$GLB,, | Performed by: FAMILY MEDICINE

## 2022-02-15 PROCEDURE — 1160F PR REVIEW ALL MEDS BY PRESCRIBER/CLIN PHARMACIST DOCUMENTED: ICD-10-PCS | Mod: CPTII,S$GLB,, | Performed by: FAMILY MEDICINE

## 2022-02-15 PROCEDURE — 11042 DBRDMT SUBQ TIS 1ST 20SQCM/<: CPT | Mod: S$GLB,,, | Performed by: FAMILY MEDICINE

## 2022-02-15 PROCEDURE — 3077F PR MOST RECENT SYSTOLIC BLOOD PRESSURE >= 140 MM HG: ICD-10-PCS | Mod: CPTII,S$GLB,, | Performed by: FAMILY MEDICINE

## 2022-02-15 PROCEDURE — 99499 NO LOS: ICD-10-PCS | Mod: S$GLB,,, | Performed by: FAMILY MEDICINE

## 2022-02-15 PROCEDURE — 1125F AMNT PAIN NOTED PAIN PRSNT: CPT | Mod: CPTII,S$GLB,, | Performed by: FAMILY MEDICINE

## 2022-02-15 PROCEDURE — 1159F MED LIST DOCD IN RCRD: CPT | Mod: CPTII,S$GLB,, | Performed by: FAMILY MEDICINE

## 2022-02-15 PROCEDURE — 11042 DEBRIDEMENT: ICD-10-PCS | Mod: S$GLB,,, | Performed by: FAMILY MEDICINE

## 2022-02-15 PROCEDURE — 3080F PR MOST RECENT DIASTOLIC BLOOD PRESSURE >= 90 MM HG: ICD-10-PCS | Mod: CPTII,S$GLB,, | Performed by: FAMILY MEDICINE

## 2022-02-15 PROCEDURE — 1101F PR PT FALLS ASSESS DOC 0-1 FALLS W/OUT INJ PAST YR: ICD-10-PCS | Mod: CPTII,S$GLB,, | Performed by: FAMILY MEDICINE

## 2022-02-15 PROCEDURE — 1101F PT FALLS ASSESS-DOCD LE1/YR: CPT | Mod: CPTII,S$GLB,, | Performed by: FAMILY MEDICINE

## 2022-02-15 PROCEDURE — 99999 PR PBB SHADOW E&M-EST. PATIENT-LVL V: ICD-10-PCS | Mod: PBBFAC,,, | Performed by: FAMILY MEDICINE

## 2022-02-15 RX ORDER — COLLAGENASE SANTYL 250 [ARB'U]/G
OINTMENT TOPICAL DAILY
Qty: 30 G | Refills: 2 | Status: SHIPPED | OUTPATIENT
Start: 2022-02-15 | End: 2022-03-17

## 2022-02-15 NOTE — PATIENT INSTRUCTIONS
"  Topical Treatments:  · Skin prep as needed to periwound areas  Dressings:   · Lidocaine silicone border foam  Compression:   · Elevate legs ("recliner position") as able, even when sleeping  Offloading:  · Avoid pressure to area  Activity   · Limit activity: Rest several times per day. - Keep off area as much as possible.   Dietary:   · As tolerated: 3 well-balanced meals  · Increased protein: Boost or Ensure, [place] Instant Breakfast (purchase sugarfree if Diabetic) Increasing your protein intake is very helpful with wound healing; if you are a kidney or dialysis patient please check with your Nephrologist as to how much protein you are allowed to take in with your condition. Taking a daily Multivitamin with Zinc as well as Vitamin C, 1000 mg minimum per day will also help with healing your wound. If you are on Coumadin, please contact the Coumadin Clinic before beginning a Multivitamin. High protein items that can be added to your diet include, extra helpings of meats, fish, beans, also High protein bars that add as little as 12 gms of protein and up to 32 gms of protein per bar.  · Supplement with: Multivitamin with Zinc and Vitamin C 500mg twice a day.  Return Appointment: 2/22/22        Patient Instructions:  Clean wound and change dressing every day. Wash hands before and after wound care. Remove old dressing, clean with soap/water or saline/wound cleanser; pat dry. May clean wound while in the shower with soap/water. Apply a nickel size amount of Santyl to wound then cover with a bandage of choice daily. Pt may lift silicone border foam, clean wound, apply Santyl and reapply silicone border foam. Printed written instruction on application of Santyl for patient. Pt should keep wound covered at all times and avoid getting dressing wet. If dressing is saturated, it will need to be changed. Elevate legs when possible. Incorporate a probiotic into diet, such as yogurt or a supplement. Notify wound care clinic " for any concerns or changes in wound. Home Health on hold at this time. Instructed patient on using Medi honey if unable to obtain Santyl.    Discharge Instructions.    Return Appointment: Should you experience any significant changes in your wound(s) or have any questions regarding your home care instructions please contact [facility], ask for the wound center- [phone].  If after hours, contact your primary care physician or go to the hospital emergency room.     In a diabetic patient with a wound, keeping your blood glucose levels as close to normal can be a great help towards healing your wound. Wounds are very difficult to heal in a high sugar environment so controlling the glucose well can go a long way towards successful wound healing.      Increasing your protein intake is very helpful with wound healing; if you are a kidney or dialysis patient please check with your Nephrologist as to how much protein you are allowed to take in with your condition. Taking a daily Multivitamin with Zinc as well as Vitamin C, 1000 mg minimum per day will also help with healing your wound.If you are on Coumadin, please contact the Coumadin Clinic before beginning a Multivitamin. High protein items that can be added to your diet include, extra helpings of meats, fish, beans, also High protein bars that add as little as 12 gms of protein and up to 32 gms of protein per bar.      The clinic is open Mon-Fri from 8:00 a.m. until 5:00 p.m. During business hours call [facility], ask for Wound Care @ [phone] for any worsening symptoms; fever >101.0, increased pain, increased drainage, foul odor or problems with the dressing. For after hours problems or emergencies please report to the nearest emergency room.      Patient instructed on proper handwashing technique. Using warm water and soap, apply soap, lather well and vigorously apply friction for a minimum of 20 seconds, rinse well and dry well;  wash before and after toileting, before and after woundcare, after sneezing, coughing etc.      Patient to keep the dressing clean, dry and intact. Call for any worsening symptoms or problems. [phone]. - Use cast protector if showering. Keep wrap dry and intact until next appt.

## 2022-02-15 NOTE — TELEPHONE ENCOUNTER
----- Message from Alexandro Samuels sent at 2/15/2022  8:31 AM CST -----  Type: Needs Medical Advice  Who Called:  Patient    Best Call Back Number: [phone]  Additional Information:  Patient states that she would like a callback regarding rescheduling today's appointment due to traffic delays.

## 2022-02-15 NOTE — PROGRESS NOTES
[facility]                         Wound Care Clinic                Progress Note    Subjective:       Patient ID: Sneha Mcghee is a 71 y.o. female.    Chief Complaint: Wound Check    HPI   Pt seen in clinic for a FU visit for a left leg open wound. Pt is doing well, taking her antibiotics. She has no new complaints at this visit.    Initial Consult Date: 1/26/22  Wound onset: Jan 21 or 22, 2022  Referring MD:Dr. Elizabeth Jacobo  Physician List:  Vascular Status/SARITHA:  Had ultrasounds done 1/27/22  Sensilase or Vascular Studies:  Nutrition Risk/Labs: 12/21  Other Labs:  Recent Cultures & Dates:2/8/22  Radiology/Xray:  Diabetes Status/HbA1c: Dec' 2021: 6.6  Offloading/Immobilization:  Edema/Compression: has compression stockings but does not wear  Tobacco Use:  Other:  Recent Antibiotics:Clindamycin 1/2022 2/2022 Amoxicillin   Recent MD visit:1/25/22 Dr. Jacobo  Upcoming MD Visit:3/8/22 Dr. Parson 3/8/22; Dr. Jacobo 6/21/22          Review of Systems   Skin: Positive for wound.        Left leg open wound   All other systems reviewed and are negative.        Objective:        Physical Exam  Vitals and nursing note reviewed.   Constitutional:       General: She is not in acute distress.     Appearance: Normal appearance. She is not ill-appearing, toxic-appearing or diaphoretic.   Skin:     General: Skin is warm and dry.      Coloration: Skin is not jaundiced or pale.      Findings: Lesion present. No bruising, erythema or rash.      Comments: Left leg open wound, decreased erythema, slough covered   Neurological:      General: No focal deficit present.      Mental Status: She is alert and oriented to person, place, and time.   Psychiatric:         Mood and Affect: Mood normal.         Behavior: Behavior normal.         Thought Content: Thought content normal.         Judgment: Judgment normal.         Vitals:    02/15/22 1028   BP: (!) 166/91   Pulse: 66    Resp: 20   Temp: 97.8 °F (36.6 °C)       Assessment:           ICD-10-CM ICD-9-CM   1. Cellulitis of left lower extremity  L03.116 682.6   2. PVD (peripheral vascular disease)  I73.9 443.9   3. Open wound of left lower leg, subsequent encounter  S81.802D V58.89     891.0   4. Venous insufficiency  I87.2 459.81            Wound 01/26/22 1312 Traumatic Left lower;lateral Leg (Active)   01/26/22 1312    Pre-existing:    Primary Wound Type: Traumatic   Side: Left   Orientation: lower;lateral   Location: Leg   Wound Number:    Ankle-Brachial Index:    Pulses:    Removal Indication and Assessment:    Wound Outcome:    (Retired) Wound Type:    (Retired) Wound Length (cm):    (Retired) Wound Width (cm):    (Retired) Depth (cm):    Wound Description (Comments):    Removal Indications:    Wound Image   02/15/22 1100   Dressing Appearance Moist drainage 02/15/22 1000   Drainage Amount Small 02/15/22 1000   Drainage Characteristics/Odor Serosanguineous 02/15/22 1000   Appearance Yellow;Slough 02/15/22 1000   Tissue loss description Full thickness 02/15/22 1000   Red (%), Wound Tissue Color 15 % 02/15/22 1100   Yellow (%), Wound Tissue Color 85 % 02/15/22 1100   Periwound Area [place] 02/15/22 1000   Wound Edges Irregular 02/15/22 1000   Foley Classification (diabetic foot ulcers only) Grade 2 02/15/22 1000   Wound Length (cm) 2.9 cm 02/15/22 1100   Wound Width (cm) 3.1 cm 02/15/22 1100   Wound Depth (cm) 0.03 cm 02/15/22 1100   Wound Volume (cm^3) 0.2697 cm^3 02/15/22 1100   Wound Surface Area (cm^2) 8.99 cm^2 02/15/22 1100   Care Cleansed with:;Wound cleanser;Applied:;Skin Barrier 02/15/22 1100   Dressing Applied;Foam 02/15/22 1100   Dressing Change Due 02/16/22 02/15/22 1100           Plan:          MD Orders:  Obtained wound photos and measurements.  Anesthetic:   · Topical 4% Lidocaine Cream to wound bed prior to cleansing and/or debridement:used today  Wound cleansing:   · Saline or wound cleanser to cleanse wound  "  Debridement:  · Wounds were mechanically debrided with gauze and wound cleanser by RN  · Dr Carlson debrided with 5.0 curette  Topical Treatments:  · Skin prep as needed to periwound areas  Dressings:   · Lidocaine silicone border foam  Compression:   · Elevate legs ("recliner position") as able, even when sleeping  Offloading:  · Avoid pressure to area  Activity   · Limit activity: Rest several times per day. - Keep off area as much as possible.   Dietary:   · As tolerated: 3 well-balanced meals  · Increased protein: Boost or Ensure, [place] Instant Breakfast (purchase sugarfree if Diabetic) Increasing your protein intake is very helpful with wound healing; if you are a kidney or dialysis patient please check with your Nephrologist as to how much protein you are allowed to take in with your condition. Taking a daily Multivitamin with Zinc as well as Vitamin C, 1000 mg minimum per day will also help with healing your wound. If you are on Coumadin, please contact the Coumadin Clinic before beginning a Multivitamin. High protein items that can be added to your diet include, extra helpings of meats, fish, beans, also High protein bars that add as little as 12 gms of protein and up to 32 gms of protein per bar.  · Supplement with: Multivitamin with Zinc and Vitamin C 500mg twice a day.  Return Appointment: 2/22/22        Patient Instructions:  Clean wound and change dressing every day. Wash hands before and after wound care. Remove old dressing, clean with soap/water or saline/wound cleanser; pat dry. May clean wound while in the shower with soap/water. Apply a nickel size amount of Santyl to wound then cover with a bandage of choice daily. Pt may lift silicone border foam, clean wound, apply Santyl and reapply silicone border foam. Printed written instruction on application of Santyl for patient. Pt should keep wound covered at all times and avoid getting dressing wet. If dressing is saturated, it will need to be " changed. Elevate legs when possible. Incorporate a probiotic into diet, such as yogurt or a supplement. Notify wound care clinic for any concerns or changes in wound. Home Health on hold at this time. Instructed patient on using Medi honey if unable to obtain Santyl.           Sneha was seen today for wound check.    Diagnoses and all orders for this visit:    Cellulitis of left lower extremity  -     Change dressing    PVD (peripheral vascular disease)  -     Change dressing    Open wound of left lower leg, subsequent encounter  -     Change dressing    Venous insufficiency  -     Change dressing    Other orders  -     collagenase (SANTYL) ointment; Apply topically once daily.

## 2022-02-16 NOTE — PROCEDURES
"Debridement    Date/Time: 2/15/2022 10:30 AM  Performed by: Didier Carlson DO  Authorized by: Didier Carlson DO     Time out: Immediately prior to procedure a "time out" was called to verify the correct patient, procedure, equipment, support staff and site/side marked as required.    Consent Done?:  Yes (Written)    Preparation: Patient was prepped and draped in usual sterile fashion    Local anesthesia used?: Yes    Anesthesia:  Local infiltration  Local anesthetic:  Lidocaine 2% topical gel  Anesthetic total (ml):  5    Wound Details:    Location:  Left leg       Length (cm):  2.9       Area (sq cm):  8.99       Width (cm):  3.1       Percent Debrided (%):  100       Depth (cm):  0       Total Area Debrided (sq cm):  8.99    Depth of debridement:  Subcutaneous tissue    Tissue debrided:  Subcutaneous    Devitalized tissue debrided:  Biofilm, Exudate, Fibrin, Slough and Necrotic/Eschar    Instruments:  Curette    Bleeding:  Minimal  Hemostasis Achieved: Yes    Method Used:  Pressure  Patient tolerance:  Patient tolerated the procedure well with no immediate complications      "

## 2022-02-22 ENCOUNTER — OFFICE VISIT (OUTPATIENT)
Dept: WOUND CARE | Facility: CLINIC | Age: 72
End: 2022-02-22
Payer: MEDICARE

## 2022-02-22 VITALS
TEMPERATURE: 98 F | WEIGHT: 258.38 LBS | HEART RATE: 75 BPM | BODY MASS INDEX: 43 KG/M2 | RESPIRATION RATE: 20 BRPM | DIASTOLIC BLOOD PRESSURE: 86 MMHG | SYSTOLIC BLOOD PRESSURE: 145 MMHG

## 2022-02-22 DIAGNOSIS — S81.802D OPEN WOUND OF LEFT LOWER LEG, SUBSEQUENT ENCOUNTER: ICD-10-CM

## 2022-02-22 DIAGNOSIS — A49.8 ENTEROCOCCUS FAECALIS INFECTION: ICD-10-CM

## 2022-02-22 DIAGNOSIS — L03.116 CELLULITIS OF LEFT LOWER EXTREMITY: Primary | ICD-10-CM

## 2022-02-22 DIAGNOSIS — I73.9 PVD (PERIPHERAL VASCULAR DISEASE): ICD-10-CM

## 2022-02-22 PROCEDURE — 3288F PR FALLS RISK ASSESSMENT DOCUMENTED: ICD-10-PCS | Mod: CPTII,S$GLB,, | Performed by: FAMILY MEDICINE

## 2022-02-22 PROCEDURE — 99499 RISK ADDL DX/OHS AUDIT: ICD-10-PCS | Mod: S$GLB,,, | Performed by: FAMILY MEDICINE

## 2022-02-22 PROCEDURE — 99213 PR OFFICE/OUTPT VISIT, EST, LEVL III, 20-29 MIN: ICD-10-PCS | Mod: S$GLB,,, | Performed by: FAMILY MEDICINE

## 2022-02-22 PROCEDURE — 3008F BODY MASS INDEX DOCD: CPT | Mod: CPTII,S$GLB,, | Performed by: FAMILY MEDICINE

## 2022-02-22 PROCEDURE — 3079F DIAST BP 80-89 MM HG: CPT | Mod: CPTII,S$GLB,, | Performed by: FAMILY MEDICINE

## 2022-02-22 PROCEDURE — 3079F PR MOST RECENT DIASTOLIC BLOOD PRESSURE 80-89 MM HG: ICD-10-PCS | Mod: CPTII,S$GLB,, | Performed by: FAMILY MEDICINE

## 2022-02-22 PROCEDURE — 1101F PR PT FALLS ASSESS DOC 0-1 FALLS W/OUT INJ PAST YR: ICD-10-PCS | Mod: CPTII,S$GLB,, | Performed by: FAMILY MEDICINE

## 2022-02-22 PROCEDURE — 3288F FALL RISK ASSESSMENT DOCD: CPT | Mod: CPTII,S$GLB,, | Performed by: FAMILY MEDICINE

## 2022-02-22 PROCEDURE — 1125F PR PAIN SEVERITY QUANTIFIED, PAIN PRESENT: ICD-10-PCS | Mod: CPTII,S$GLB,, | Performed by: FAMILY MEDICINE

## 2022-02-22 PROCEDURE — 3008F PR BODY MASS INDEX (BMI) DOCUMENTED: ICD-10-PCS | Mod: CPTII,S$GLB,, | Performed by: FAMILY MEDICINE

## 2022-02-22 PROCEDURE — 1101F PT FALLS ASSESS-DOCD LE1/YR: CPT | Mod: CPTII,S$GLB,, | Performed by: FAMILY MEDICINE

## 2022-02-22 PROCEDURE — 1159F PR MEDICATION LIST DOCUMENTED IN MEDICAL RECORD: ICD-10-PCS | Mod: CPTII,S$GLB,, | Performed by: FAMILY MEDICINE

## 2022-02-22 PROCEDURE — 1160F PR REVIEW ALL MEDS BY PRESCRIBER/CLIN PHARMACIST DOCUMENTED: ICD-10-PCS | Mod: CPTII,S$GLB,, | Performed by: FAMILY MEDICINE

## 2022-02-22 PROCEDURE — 99999 PR PBB SHADOW E&M-EST. PATIENT-LVL III: ICD-10-PCS | Mod: PBBFAC,,, | Performed by: FAMILY MEDICINE

## 2022-02-22 PROCEDURE — 1125F AMNT PAIN NOTED PAIN PRSNT: CPT | Mod: CPTII,S$GLB,, | Performed by: FAMILY MEDICINE

## 2022-02-22 PROCEDURE — 99999 PR PBB SHADOW E&M-EST. PATIENT-LVL III: CPT | Mod: PBBFAC,,, | Performed by: FAMILY MEDICINE

## 2022-02-22 PROCEDURE — 99213 OFFICE O/P EST LOW 20 MIN: CPT | Mod: S$GLB,,, | Performed by: FAMILY MEDICINE

## 2022-02-22 PROCEDURE — 3077F SYST BP >= 140 MM HG: CPT | Mod: CPTII,S$GLB,, | Performed by: FAMILY MEDICINE

## 2022-02-22 PROCEDURE — 1159F MED LIST DOCD IN RCRD: CPT | Mod: CPTII,S$GLB,, | Performed by: FAMILY MEDICINE

## 2022-02-22 PROCEDURE — 3077F PR MOST RECENT SYSTOLIC BLOOD PRESSURE >= 140 MM HG: ICD-10-PCS | Mod: CPTII,S$GLB,, | Performed by: FAMILY MEDICINE

## 2022-02-22 PROCEDURE — 99499 UNLISTED E&M SERVICE: CPT | Mod: S$GLB,,, | Performed by: FAMILY MEDICINE

## 2022-02-22 PROCEDURE — 1160F RVW MEDS BY RX/DR IN RCRD: CPT | Mod: CPTII,S$GLB,, | Performed by: FAMILY MEDICINE

## 2022-02-22 RX ORDER — AMPICILLIN 500 MG/1
500 CAPSULE ORAL 4 TIMES DAILY
Qty: 40 CAPSULE | Refills: 0 | Status: SHIPPED | OUTPATIENT
Start: 2022-02-22 | End: 2022-03-04

## 2022-02-22 NOTE — PATIENT INSTRUCTIONS
"MDressings:   Medi-Honey, foam, kerlix/coban  Compression:   Elevate legs ("recliner position") as able, even when sleeping  Offloading:  Avoid pressure to area  Activity   Limit activity: Rest several times per day. - Keep off area as much as possible.   Dietary:   As tolerated: 3 well-balanced meals  Increased protein: Boost or Ensure, [place] Instant Breakfast (purchase sugarfree if Diabetic) Increasing your protein intake is very helpful with wound healing; if you are a kidney or dialysis patient please check with your Nephrologist as to how much protein you are allowed to take in with your condition. Taking a daily Multivitamin with Zinc as well as Vitamin C, 1000 mg minimum per day will also help with healing your wound. If you are on Coumadin, please contact the Coumadin Clinic before beginning a Multivitamin. High protein items that can be added to your diet include, extra helpings of meats, fish, beans, also High protein bars that add as little as 12 gms of protein and up to 32 gms of protein per bar.  Supplement with: Multivitamin with Zinc and Vitamin C 500mg twice a day.  Return Appointment: 3/9/22        Patient Instructions:  Clean wound and change dressing every day. Wash hands before and after wound care. Remove old dressing, clean with soap/water or saline/wound cleanser; pat dry. May clean wound while in the shower with soap/water. Apply Medi-Honey to wound then cover with a bandage of choice daily. Pt should keep wound covered at all times and avoid getting dressing wet. If dressing is saturated, it will need to be changed. Elevate legs when possible. Take full course of antibiotics. Incorporate a probiotic into diet, such as yogurt or a supplement. Notify wound care clinic for any concerns or changes in wound. Referral placed for  at [facility] for Interventional Cardiology. Home Health on hold at this time.    Discharge Instructions.   Return Appointment: Should you experience " any significant changes in your wound(s) or have any questions regarding your home care instructions please contact [facility], ask for the wound center- [phone].  If after hours, contact your primary care physician or go to the hospital emergency room.    In a diabetic patient with a wound, keeping your blood glucose levels as close to normal can be a great help towards healing your wound. Wounds are very difficult to heal in a high sugar environment so controlling the glucose well can go a long way towards successful wound healing.     Increasing your protein intake is very helpful with wound healing; if you are a kidney or dialysis patient please check with your Nephrologist as to how much protein you are allowed to take in with your condition. Taking a daily Multivitamin with Zinc as well as Vitamin C, 1000 mg minimum per day will also help with healing your wound.If you are on Coumadin, please contact the Coumadin Clinic before beginning a Multivitamin. High protein items that can be added to your diet include, extra helpings of meats, fish, beans, also High protein bars that add as little as 12 gms of protein and up to 32 gms of protein per bar.     The clinic is open Mon-Fri from 8:00 a.m. until 5:00 p.m. During business hours call [facility], ask for Wound Care @ [phone] for any worsening symptoms; fever >101.0, increased pain, increased drainage, foul odor or problems with the dressing. For after hours problems or emergencies please report to the nearest emergency room.     Patient instructed on proper handwashing technique. Using warm water and soap, apply soap, lather well and vigorously apply friction for a minimum of 20 seconds, rinse well and dry well; wash before and after toileting, before and after woundcare, after sneezing, coughing etc.     Patient to keep the dressing clean, dry and intact. Call for any worsening symptoms or problems. [phone]. - Use cast  protector if showering. Keep wrap dry and intact until next appt.

## 2022-02-22 NOTE — PROGRESS NOTES
[facility]                         Wound Care Clinic                Progress Note    Subjective:       Patient ID: Sneha Mcghee is a 71 y.o. female.    Chief Complaint: Wound Check (F/u left lower leg)    HPI   Pt seen in clinic for a FU visit for left leg open wound. Wound is + fro enterococcus that is sensitive to ampicillin. Pt also needs a consult from [facility] cardiology and would like to go to [place] for the intervention. She has no other new complaints at this visit.   Initial Consult Date: 1/26/22  Wound onset: Jan 21 or 22, 2022  Referring MD:Dr. Elizabeth Jacobo  Physician List:  Vascular Status/SARITHA:  Had ultrasounds done 1/27/22  Sensilase or Vascular Studies:  Nutrition Risk/Labs: 12/21  Other Labs:  Recent Cultures & Dates:2/8/22  Radiology/Xray:  Diabetes Status/HbA1c: Dec' 2021: 6.6  Offloading/Immobilization:  Edema/Compression: has compression stockings but does not wear  Tobacco Use:  Other:  Recent Antibiotics:Clindamycin 1/2022 2/2022 Amoxicillin   Recent MD visit:1/25/22 Dr. Jacobo  Upcoming MD Visit:3/8/22 Dr. Parson 3/8/22; Dr. Jacobo 6/21    Review of Systems   Skin: Positive for wound.        Left leg venous ulcer   All other systems reviewed and are negative.        Objective:        Physical Exam  Vitals and nursing note reviewed.   Constitutional:       General: She is not in acute distress.     Appearance: Normal appearance. She is not ill-appearing, toxic-appearing or diaphoretic.   Skin:     General: Skin is warm and dry.      Coloration: Skin is not jaundiced or pale.      Findings: Erythema and lesion present. No bruising or rash.      Comments: Left leg venous ulcer, increased erythema, tender to touch   Neurological:      General: No focal deficit present.      Mental Status: She is alert and oriented to person, place, and time.   Psychiatric:         Mood and Affect: Mood normal.         Behavior: Behavior normal.          Thought Content: Thought content normal.         Judgment: Judgment normal.         Vitals:    02/22/22 0955   BP: (!) 145/86   Pulse: 75   Resp: 20   Temp: 97.8 °F (36.6 °C)       Assessment:           ICD-10-CM ICD-9-CM   1. Cellulitis of left lower extremity  L03.116 682.6   2. Open wound of left lower leg, subsequent encounter  S81.802D V58.89     891.0   3. PVD (peripheral vascular disease)  I73.9 443.9   4. Enterococcus faecalis infection  B95.2 041.04            Wound 01/26/22 1312 Traumatic Left lower;lateral Leg (Active)   01/26/22 1312    Pre-existing:    Primary Wound Type: Traumatic   Side: Left   Orientation: lower;lateral   Location: Leg   Wound Number:    Ankle-Brachial Index:    Pulses:    Removal Indication and Assessment:    Wound Outcome:    (Retired) Wound Type:    (Retired) Wound Length (cm):    (Retired) Wound Width (cm):    (Retired) Depth (cm):    Wound Description (Comments):    Removal Indications:    Wound Image   02/22/22 1000   Dressing Appearance Intact;Moist drainage 02/22/22 1000   Drainage Amount Small 02/22/22 1000   Drainage Characteristics/Odor Serosanguineous;No odor 02/22/22 1000   Appearance Slough;Moist;Yellow;Red 02/22/22 1000   Red (%), Wound Tissue Color 10 % 02/22/22 1000   Yellow (%), Wound Tissue Color 90 % 02/22/22 1000   Periwound Area Satellite lesion;Dry;Redness 02/22/22 1000   Wound Edges Irregular 02/22/22 1000   Wound Length (cm) 4.2 cm 02/22/22 1000   Wound Width (cm) 3 cm 02/22/22 1000   Wound Depth (cm) 0.3 cm 02/22/22 1000   Wound Volume (cm^3) 3.78 cm^3 02/22/22 1000   Wound Surface Area (cm^2) 12.6 cm^2 02/22/22 1000   Care Wound cleanser;Cleansed with: 02/22/22 1000   Dressing Applied;Honey;Foam;Rolled gauze 02/22/22 1000   Dressing Change Due 02/23/22 02/22/22 1000           Plan:            MD Orders:  Obtained wound photos and measurements.  Anesthetic:   · Topical 4% Lidocaine Cream to wound bed prior to cleansing and/or debridement:used  "today  Wound cleansing:   · Saline or wound cleanser to cleanse wound   Debridement:  · Wounds were mechanically debrided with gauze and wound cleanser by RN  Topical Treatments:  · Skin prep as needed to periwound areas  Dressings:   · Medi-Honey, foam, kerlix/coban  Compression:   · Elevate legs ("recliner position") as able, even when sleeping  Offloading:  · Avoid pressure to area  Activity   · Limit activity: Rest several times per day. - Keep off area as much as possible.   Dietary:   · As tolerated: 3 well-balanced meals  · Increased protein: Boost or Ensure, [place] Instant Breakfast (purchase sugarfree if Diabetic) Increasing your protein intake is very helpful with wound healing; if you are a kidney or dialysis patient please check with your Nephrologist as to how much protein you are allowed to take in with your condition. Taking a daily Multivitamin with Zinc as well as Vitamin C, 1000 mg minimum per day will also help with healing your wound. If you are on Coumadin, please contact the Coumadin Clinic before beginning a Multivitamin. High protein items that can be added to your diet include, extra helpings of meats, fish, beans, also High protein bars that add as little as 12 gms of protein and up to 32 gms of protein per bar.  · Supplement with: Multivitamin with Zinc and Vitamin C 500mg twice a day.  Return Appointment: 3/9/22        Patient Instructions:  Clean wound and change dressing every day. Wash hands before and after wound care. Remove old dressing, clean with soap/water or saline/wound cleanser; pat dry. May clean wound while in the shower with soap/water. Apply Medi-Honey to wound then cover with a bandage of choice daily. Pt should keep wound covered at all times and avoid getting dressing wet. If dressing is saturated, it will need to be changed. Elevate legs when possible. Take full course of antibiotics. Incorporate a probiotic into diet, such as yogurt or a supplement. Notify wound " care clinic for any concerns or changes in wound. Referral placed for  at [facility] for Interventional Cardiology. Home Health on hold at this time.            Sneha was seen today for wound check.    Diagnoses and all orders for this visit:    Cellulitis of left lower extremity  -     Change dressing    Open wound of left lower leg, subsequent encounter  -     Change dressing    PVD (peripheral vascular disease)  -     Ambulatory referral/consult to Interventional Cardiology; Future  -     Change dressing    Enterococcus faecalis infection  -     Change dressing    Other orders  -     ampicillin (PRINCIPEN) 500 MG capsule; Take 1 capsule (500 mg total) by mouth 4 (four) times daily. for 10 days

## 2022-02-23 NOTE — PROGRESS NOTES
Patient, Sneha Mcghee (MRN #[id number]), presented with a recorded BMI of 43 kg/m^2 consistent with the definition of morbid obesity (ICD-10 E66.01). The patient's morbid obesity was monitored, evaluated, addressed and/or treated. This addendum to the medical record is made on 02/23/2022.

## 2022-02-25 ENCOUNTER — TELEPHONE (OUTPATIENT)
Dept: FAMILY MEDICINE | Facility: CLINIC | Age: 72
End: 2022-02-25
Payer: MEDICARE

## 2022-02-25 NOTE — TELEPHONE ENCOUNTER
----- Message from Briseida Robles sent at 2/25/2022 10:53 AM CST -----  Type: Needs Medical Advice  Who Called:  Rhianna at [employer]   Symptoms (please be specific):  Pt is needing wound care supplies, asking for clinicals to get her approved     Best Call Back Number: Fax- [phone]   Additional Information: Please send

## 2022-03-04 ENCOUNTER — TELEPHONE (OUTPATIENT)
Dept: VASCULAR SURGERY | Facility: CLINIC | Age: 72
End: 2022-03-04
Payer: MEDICARE

## 2022-03-04 NOTE — TELEPHONE ENCOUNTER
----- Message from Lin Jaime sent at 3/4/2022 10:45 AM CST -----  Contact: marixa  Type: Needs Medical Advice  Who Called:  Marixa with [facility] Call Back Number: [phone]  Additional Information: requesting a call back from nurse- drainage has to be moderate for the dressing that they requested, is the length 4.2 or 42? You can only get the foam 3x a week so patient won't be able to change daily unless you want them to change to something else. Please advise

## 2022-03-04 NOTE — TELEPHONE ENCOUNTER
----- Message from Cathryn Nunez sent at 3/4/2022 10:09 AM CST -----  Type:  Needs Medical Advice    Who Called:  Iris with [facility]       Would the patient rather a call back or a response via [facility]?  Call    Best Call Back Number:  [phone]    Additional Information: Iris greg [facility] needs to speak to the nurse about wound care orders that were sent to [facility] for patient     Please call to advise

## 2022-03-08 ENCOUNTER — OFFICE VISIT (OUTPATIENT)
Dept: CARDIOLOGY | Facility: CLINIC | Age: 72
End: 2022-03-08
Payer: MEDICARE

## 2022-03-08 ENCOUNTER — DOCUMENTATION ONLY (OUTPATIENT)
Dept: CARDIOLOGY | Facility: HOSPITAL | Age: 72
End: 2022-03-08
Payer: MEDICARE

## 2022-03-08 ENCOUNTER — CLINICAL SUPPORT (OUTPATIENT)
Dept: CARDIOLOGY | Facility: HOSPITAL | Age: 72
End: 2022-03-08
Attending: INTERNAL MEDICINE
Payer: MEDICARE

## 2022-03-08 VITALS
DIASTOLIC BLOOD PRESSURE: 72 MMHG | BODY MASS INDEX: 42.54 KG/M2 | HEART RATE: 69 BPM | WEIGHT: 255.31 LBS | HEIGHT: 65 IN | SYSTOLIC BLOOD PRESSURE: 131 MMHG

## 2022-03-08 DIAGNOSIS — I50.9 CHRONIC HEART FAILURE, UNSPECIFIED HEART FAILURE TYPE: ICD-10-CM

## 2022-03-08 DIAGNOSIS — Z98.62 HX OF CAROTID ANGIOPLASTY: ICD-10-CM

## 2022-03-08 DIAGNOSIS — I50.32 CHRONIC DIASTOLIC HEART FAILURE: ICD-10-CM

## 2022-03-08 DIAGNOSIS — Z95.820 S/P ANGIOPLASTY WITH STENT: ICD-10-CM

## 2022-03-08 DIAGNOSIS — Z95.1 S/P CABG X 4: ICD-10-CM

## 2022-03-08 DIAGNOSIS — Z95.0 STATUS POST PLACEMENT OF CARDIAC PACEMAKER: Primary | ICD-10-CM

## 2022-03-08 DIAGNOSIS — I10 ESSENTIAL HYPERTENSION: ICD-10-CM

## 2022-03-08 DIAGNOSIS — Z95.0 STATUS POST PLACEMENT OF CARDIAC PACEMAKER: ICD-10-CM

## 2022-03-08 DIAGNOSIS — E78.2 MIXED HYPERLIPIDEMIA: ICD-10-CM

## 2022-03-08 DIAGNOSIS — I48.0 PAROXYSMAL ATRIAL FIBRILLATION: ICD-10-CM

## 2022-03-08 DIAGNOSIS — I25.10 CORONARY ARTERY DISEASE INVOLVING NATIVE CORONARY ARTERY OF NATIVE HEART WITHOUT ANGINA PECTORIS: Primary | ICD-10-CM

## 2022-03-08 DIAGNOSIS — I35.0 NONRHEUMATIC AORTIC VALVE STENOSIS: ICD-10-CM

## 2022-03-08 DIAGNOSIS — R09.89 BRUIT: ICD-10-CM

## 2022-03-08 DIAGNOSIS — I48.0 PAROXYSMAL ATRIAL FIBRILLATION: Primary | ICD-10-CM

## 2022-03-08 DIAGNOSIS — I25.10 CORONARY ARTERY DISEASE WITHOUT ANGINA PECTORIS, UNSPECIFIED VESSEL OR LESION TYPE, UNSPECIFIED WHETHER NATIVE OR TRANSPLANTED HEART: ICD-10-CM

## 2022-03-08 PROBLEM — I65.22 STENOSIS OF LEFT CAROTID ARTERY: Status: RESOLVED | Noted: 2021-01-19 | Resolved: 2022-03-08

## 2022-03-08 PROCEDURE — 1101F PT FALLS ASSESS-DOCD LE1/YR: CPT | Mod: CPTII,S$GLB,, | Performed by: INTERNAL MEDICINE

## 2022-03-08 PROCEDURE — 99205 OFFICE O/P NEW HI 60 MIN: CPT | Mod: S$GLB,,, | Performed by: INTERNAL MEDICINE

## 2022-03-08 PROCEDURE — 99999 PR PBB SHADOW E&M-EST. PATIENT-LVL V: CPT | Mod: PBBFAC,,, | Performed by: INTERNAL MEDICINE

## 2022-03-08 PROCEDURE — 1160F RVW MEDS BY RX/DR IN RCRD: CPT | Mod: CPTII,S$GLB,, | Performed by: INTERNAL MEDICINE

## 2022-03-08 PROCEDURE — 3078F PR MOST RECENT DIASTOLIC BLOOD PRESSURE < 80 MM HG: ICD-10-PCS | Mod: CPTII,S$GLB,, | Performed by: INTERNAL MEDICINE

## 2022-03-08 PROCEDURE — 3288F PR FALLS RISK ASSESSMENT DOCUMENTED: ICD-10-PCS | Mod: CPTII,S$GLB,, | Performed by: INTERNAL MEDICINE

## 2022-03-08 PROCEDURE — 93005 ELECTROCARDIOGRAM TRACING: CPT | Mod: PO

## 2022-03-08 PROCEDURE — 99205 PR OFFICE/OUTPT VISIT, NEW, LEVL V, 60-74 MIN: ICD-10-PCS | Mod: S$GLB,,, | Performed by: INTERNAL MEDICINE

## 2022-03-08 PROCEDURE — 3288F FALL RISK ASSESSMENT DOCD: CPT | Mod: CPTII,S$GLB,, | Performed by: INTERNAL MEDICINE

## 2022-03-08 PROCEDURE — 1159F PR MEDICATION LIST DOCUMENTED IN MEDICAL RECORD: ICD-10-PCS | Mod: CPTII,S$GLB,, | Performed by: INTERNAL MEDICINE

## 2022-03-08 PROCEDURE — 1101F PR PT FALLS ASSESS DOC 0-1 FALLS W/OUT INJ PAST YR: ICD-10-PCS | Mod: CPTII,S$GLB,, | Performed by: INTERNAL MEDICINE

## 2022-03-08 PROCEDURE — 93280 CARDIAC DEVICE CHECK - IN CLINIC & HOSPITAL: ICD-10-PCS | Mod: 26,,, | Performed by: INTERNAL MEDICINE

## 2022-03-08 PROCEDURE — 3008F PR BODY MASS INDEX (BMI) DOCUMENTED: ICD-10-PCS | Mod: CPTII,S$GLB,, | Performed by: INTERNAL MEDICINE

## 2022-03-08 PROCEDURE — 1159F MED LIST DOCD IN RCRD: CPT | Mod: CPTII,S$GLB,, | Performed by: INTERNAL MEDICINE

## 2022-03-08 PROCEDURE — 3075F SYST BP GE 130 - 139MM HG: CPT | Mod: CPTII,S$GLB,, | Performed by: INTERNAL MEDICINE

## 2022-03-08 PROCEDURE — 1126F AMNT PAIN NOTED NONE PRSNT: CPT | Mod: CPTII,S$GLB,, | Performed by: INTERNAL MEDICINE

## 2022-03-08 PROCEDURE — 93010 EKG 12-LEAD: ICD-10-PCS | Mod: S$GLB,,, | Performed by: INTERNAL MEDICINE

## 2022-03-08 PROCEDURE — 3075F PR MOST RECENT SYSTOLIC BLOOD PRESS GE 130-139MM HG: ICD-10-PCS | Mod: CPTII,S$GLB,, | Performed by: INTERNAL MEDICINE

## 2022-03-08 PROCEDURE — 93280 PM DEVICE PROGR EVAL DUAL: CPT | Mod: 26,,, | Performed by: INTERNAL MEDICINE

## 2022-03-08 PROCEDURE — 3008F BODY MASS INDEX DOCD: CPT | Mod: CPTII,S$GLB,, | Performed by: INTERNAL MEDICINE

## 2022-03-08 PROCEDURE — 99999 PR PBB SHADOW E&M-EST. PATIENT-LVL V: ICD-10-PCS | Mod: PBBFAC,,, | Performed by: INTERNAL MEDICINE

## 2022-03-08 PROCEDURE — 1160F PR REVIEW ALL MEDS BY PRESCRIBER/CLIN PHARMACIST DOCUMENTED: ICD-10-PCS | Mod: CPTII,S$GLB,, | Performed by: INTERNAL MEDICINE

## 2022-03-08 PROCEDURE — 1126F PR PAIN SEVERITY QUANTIFIED, NO PAIN PRESENT: ICD-10-PCS | Mod: CPTII,S$GLB,, | Performed by: INTERNAL MEDICINE

## 2022-03-08 PROCEDURE — 93010 ELECTROCARDIOGRAM REPORT: CPT | Mod: S$GLB,,, | Performed by: INTERNAL MEDICINE

## 2022-03-08 PROCEDURE — 3078F DIAST BP <80 MM HG: CPT | Mod: CPTII,S$GLB,, | Performed by: INTERNAL MEDICINE

## 2022-03-08 RX ORDER — ATORVASTATIN CALCIUM 40 MG/1
40 TABLET, FILM COATED ORAL DAILY
Qty: 90 TABLET | Refills: 3 | Status: SHIPPED | OUTPATIENT
Start: 2022-03-08 | End: 2023-04-14

## 2022-03-08 RX ORDER — ASCORBIC ACID 500 MG
500 TABLET ORAL DAILY
Status: ON HOLD | COMMUNITY
End: 2023-10-17 | Stop reason: HOSPADM

## 2022-03-08 RX ORDER — VITAMIN E MIXED 400 UNIT
400 CAPSULE ORAL DAILY
Status: ON HOLD | COMMUNITY
End: 2023-10-17 | Stop reason: HOSPADM

## 2022-03-08 RX ORDER — AMOXICILLIN 500 MG
1 CAPSULE ORAL DAILY
COMMUNITY

## 2022-03-08 NOTE — PROGRESS NOTES
Subjective:    Patient ID:  Sneha Mcghee is a 71 y.o. female who presents for evaluation of Establish Care      Problem List Items Addressed This Visit        Cardiac/Vascular    Chronic diastolic heart failure    Coronary artery disease involving native coronary artery of native heart without angina pectoris - Primary    Essential hypertension    Relevant Orders    Comprehensive Metabolic Panel    Hx of carotid angioplasty    Overview     Left           Mixed hyperlipidemia    Nonrheumatic aortic valve stenosis    Relevant Orders    Echo    Paroxysmal atrial fibrillation    S/P angioplasty with stent    Overview     PCI x 8           S/P CABG x 4    Overview     2012           Status post placement of cardiac pacemaker      Other Visit Diagnoses     Coronary artery disease without angina pectoris, unspecified vessel or lesion type, unspecified whether native or transplanted heart        Chronic heart failure, unspecified heart failure type        Bruit        Relevant Orders    CV Ultrasound Bilateral Doppler Carotid          HPI    Referred by Dr. Jacobo    The patient states that she feels like she has a lot going on.  Was told by a doctor that she needs EVLT  50-75% left anterior tibial artery stenosis, but no obvious claudication symptoms    No chest pain.  No shortness of breath.    Frequency of Lasix use - 20mg once daily  AFib history - On rhythmol  Anticoagulation history - Never on full anticoagulant, Eliquis was recommended, but felt like it was too much    Personal history of heart attack or stroke - as above  Family history of heart disease - Extensive family history of heart disease    Past Medical History:   Diagnosis Date    Asthma     Atrial fibrillation     CAD (coronary artery disease)     CABG x 4    Cataract     os    Depression     Diabetes mellitus type II     Heart failure     Hyperlipidemia     Hypertension     Primary osteoarthritis of right knee 1/19/2021    PVD (peripheral  vascular disease)     Stenosis of left carotid artery 1/19/2021    Thyroid disease        Past Surgical History:   Procedure Laterality Date    APPENDECTOMY      BACK SURGERY      discetomy     CARPAL TUNNEL RELEASE      R     CATARACT EXTRACTION      od d 5-15-13    CORONARY ARTERY BYPASS GRAFT  2012    x 4    CORONARY STENT PLACEMENT      8 stents    KNEE ARTHROSCOPY W/ PARTIAL MEDIAL MENISCECTOMY         Family History   Problem Relation Age of Onset    Glaucoma Paternal Grandmother     Glaucoma Paternal Grandfather     Diabetes Mother     Breast cancer Mother     Coronary artery disease Mother     Skin cancer Father     Coronary artery disease Father     Stroke Father     Diabetes Brother     Coronary artery disease Maternal Grandmother     Coronary artery disease Maternal Grandfather     Amblyopia Neg Hx     Blindness Neg Hx     Cataracts Neg Hx     Hypertension Neg Hx     Macular degeneration Neg Hx     Retinal detachment Neg Hx     Strabismus Neg Hx     Thyroid disease Neg Hx        Social History     Socioeconomic History    Marital status:    Occupational History    Occupation: retired     Tobacco Use    Smoking status: Never Smoker    Smokeless tobacco: Never Used   Substance and Sexual Activity    Alcohol use: Not Currently    Drug use: No    Sexual activity: Not Currently       Review of patient's allergies indicates:   Allergen Reactions    Codeine Nausea Only       Review of Systems   Constitutional: Negative for decreased appetite, fever and malaise/fatigue.   Eyes: Negative for blurred vision.   Cardiovascular: Negative for chest pain, dyspnea on exertion, irregular heartbeat and leg swelling.   Respiratory: Negative for cough, hemoptysis, shortness of breath and wheezing.    Endocrine: Negative for cold intolerance and heat intolerance.   Hematologic/Lymphatic: Negative for bleeding problem.   Musculoskeletal: Negative for muscle  "weakness and myalgias.   Gastrointestinal: Negative for abdominal pain, constipation and diarrhea.   Genitourinary: Negative for bladder incontinence.   Neurological: Negative for dizziness and weakness.   Psychiatric/Behavioral: Negative for depression.        Objective:     Vitals:    03/08/22 1433   BP: 131/72   BP Location: Left arm   Patient Position: Sitting   BP Method: Large (Automatic)   Pulse: 69   Weight: 115.8 kg (255 lb 4.7 oz)   Height: 5' 5" (1.651 m)        Physical Exam  Vitals and nursing note reviewed.   Constitutional:       General: She is not in acute distress.     Appearance: She is well-developed.   HENT:      Head: Normocephalic and atraumatic.   Neck:      Vascular: No JVD.   Cardiovascular:      Rate and Rhythm: Normal rate and regular rhythm.      Heart sounds: Normal heart sounds. No murmur heard.    No friction rub. No gallop.   Pulmonary:      Effort: Pulmonary effort is normal. No respiratory distress.      Breath sounds: Normal breath sounds. No wheezing or rales.   Abdominal:      General: Bowel sounds are normal.      Palpations: Abdomen is soft.      Tenderness: There is no abdominal tenderness. There is no guarding or rebound.   Musculoskeletal:         General: No tenderness.      Cervical back: Neck supple.   Skin:     General: Skin is warm and dry.   Neurological:      Mental Status: She is alert and oriented to person, place, and time.   Psychiatric:         Behavior: Behavior normal.             Current Outpatient Medications on File Prior to Visit   Medication Sig    ascorbic acid, vitamin C, (VITAMIN C WITH HAYDEN HIPS) 500 MG tablet Take 500 mg by mouth once daily.    aspirin (ECOTRIN) 325 MG EC tablet aspirin 325 mg tablet,delayed release   Take 1 tablet every day by oral route.    calcium/magnesium (CALCIUM AND MAGNESIUM ORAL) Take by mouth.    cholecalciferol, vitamin D3, 125 mcg (5,000 unit) Tab cholecalciferol (vitamin D3) 125 mcg (5,000 unit) tablet   Take by oral " route.    fluticasone propionate (FLONASE) 50 mcg/actuation nasal spray 2 sprays (100 mcg total) by Each Nostril route once daily.    furosemide (LASIX) 20 MG tablet TAKE 1 TABLET BY MOUTH EVERY DAY.    Lactobacillus rhamnosus GG (CULTURELLE) 10 billion cell capsule Take 1 capsule by mouth once daily.    latanoprost 0.005 % ophthalmic solution latanoprost 0.005 % eye drops   Instill 1 drop every day by ophthalmic route for 90 days.    levothyroxine (SYNTHROID) 88 MCG tablet TAKE 1 TABLET(88 MCG) BY MOUTH BEFORE BREAKFAST    metoprolol succinate (TOPROL-XL) 100 MG 24 hr tablet Take 1 tablet (100 mg total) by mouth once daily.    omega-3 fatty acids/fish oil (FISH OIL-OMEGA-3 FATTY ACIDS) 300-1,000 mg capsule Take 1 capsule by mouth once daily.    potassium chloride (KLOR-CON) 10 MEQ TbSR Take 10 mEq by mouth once daily.    propafenone (RHTHYMOL) 150 MG Tab TAKE 1 TABLET(150 MG) BY MOUTH EVERY 8 HOURS    SITagliptin (JANUVIA) 100 MG Tab Take 1 tablet (100 mg total) by mouth once daily.    timolol maleate 0.5% (TIMOPTIC) 0.5 % Drop Place 1 drop into both eyes 2 (two) times daily.    vit C/E/Zn/coppr/lutein/zeaxan (PRESERVISION AREDS-2 ORAL) Take by mouth.    vitamin E, dl,tocopheryl acet, (VITAMIN E, DL, ACETATE,) 180 mg (400 unit) Cap Take 400 Units by mouth once daily.    [DISCONTINUED] simvastatin (ZOCOR) 20 MG tablet Take 1 tablet (20 mg total) by mouth every evening.    albuterol (PROVENTIL) 2.5 mg /3 mL (0.083 %) nebulizer solution Take 3 mLs (2.5 mg total) by nebulization every 6 (six) hours as needed for Wheezing. Rescue. Dispense one box (Patient not taking: Reported on 3/8/2022)    albuterol (PROVENTIL/VENTOLIN HFA) 90 mcg/actuation inhaler Inhale 2 puffs into the lungs every 6 (six) hours as needed for Wheezing. Rescue (Patient not taking: Reported on 3/8/2022)    amLODIPine (NORVASC) 5 MG tablet Take 1 tablet (5 mg total) by mouth once daily.    collagenase (SANTYL) ointment Apply  topically once daily. (Patient not taking: Reported on 3/8/2022)    ipratropium (ATROVENT HFA) 17 mcg/actuation inhaler Inhale 2 puffs into the lungs every 6 (six) hours. Rescue (Patient not taking: Reported on 3/8/2022)    mometasone 0.1% (ELOCON) 0.1 % cream Apply topically once daily. (Patient not taking: Reported on 3/8/2022)    nystatin (MYCOSTATIN) powder Apply topically 3 (three) times daily. (Patient not taking: Reported on 3/8/2022)    nystatin-triamcinolone (MYCOLOG II) cream PRN    sertraline (ZOLOFT) 100 MG tablet Take 1 tablet (100 mg total) by mouth once daily. (Patient not taking: Reported on 3/8/2022)    suvorexant (BELSOMRA) 15 mg Tab Take 1 tablet by mouth nightly as needed.     No current facility-administered medications on file prior to visit.       Lipid Panel:   Lab Results   Component Value Date    CHOL 130 06/04/2021    HDL 36 (L) 06/04/2021    LDLCALC 62.2 (L) 06/04/2021    TRIG 159 (H) 06/04/2021    CHOLHDL 27.7 06/04/2021         The 10-year ASCVD risk score (Gallitoadalid ELKINS Jr., et al., 2013) is: 25.3%    Values used to calculate the score:      Age: 71 years      Sex: Female      Is Non- : No      Diabetic: Yes      Tobacco smoker: No      Systolic Blood Pressure: 131 mmHg      Is BP treated: Yes      HDL Cholesterol: 36 mg/dL      Total Cholesterol: 130 mg/dL    All pertinent labs, imaging, and EKGs reviewed.  Patient's most recent EKG tracing was personally interpreted by this provider.    Assessment:       1. Coronary artery disease involving native coronary artery of native heart without angina pectoris    2. Paroxysmal atrial fibrillation    3. Essential hypertension    4. Mixed hyperlipidemia    5. S/P CABG x 4    6. S/P angioplasty with stent    7. Hx of carotid angioplasty    8. Chronic diastolic heart failure    9. Nonrheumatic aortic valve stenosis    10. Coronary artery disease without angina pectoris, unspecified vessel or lesion type, unspecified  whether native or transplanted heart    11. Chronic heart failure, unspecified heart failure type    12. Bruit    13. Status post placement of cardiac pacemaker         Plan:     Symptoms OK today  BP/Pulse OK today  Most recent echocardiogram reviewed personally   Was told by a doctor that she needs EVLT  50-75% left anterior tibial artery stenosis, but no obvious claudication symptoms  Volume OK on exam    Continue amlodipine 5mg PO Daily  Continue aspirin, 81mg O Daily OK, but OK to keep on 325mg PO Daily if patient prefers  Continue Lasix 20 mg PO Daily   Continue metoprolol succinate 100 mg PO Daily  Continue propafenone 150 mg PO BID  Change simvastatin to atorvastatin 40 mg PO Daily - Educated on risks/benefits of medication   CMP in 3 weeks   Echocardiogram prior to next visit to reassess aortic stenosis   Carotid Doppler prior to next visit to reassess carotid intervention   Will watch for recurrence of aFib on the pacemaker, if aFib recurs, will need to start anticoagulation  Enroll in our device clinic    Continue other cardiac medications  Mediterranean Diet/Cardiovascular Exercise Program    Greater than 60 minutes of total time was spent for this patient on the date of the encounter including chart review, interview, and medical decision making.    Patient queried and all questions were answered.    F/u in 4-6 months with echo and carotid prior      Signed:    Justin Parson MD  3/8/2022 8:31 AM

## 2022-03-09 ENCOUNTER — OFFICE VISIT (OUTPATIENT)
Dept: WOUND CARE | Facility: CLINIC | Age: 72
End: 2022-03-09
Payer: MEDICARE

## 2022-03-09 VITALS
TEMPERATURE: 99 F | DIASTOLIC BLOOD PRESSURE: 77 MMHG | HEART RATE: 67 BPM | RESPIRATION RATE: 20 BRPM | SYSTOLIC BLOOD PRESSURE: 119 MMHG

## 2022-03-09 DIAGNOSIS — I73.9 PVD (PERIPHERAL VASCULAR DISEASE): ICD-10-CM

## 2022-03-09 DIAGNOSIS — L03.116 CELLULITIS OF LEFT LOWER EXTREMITY: Primary | ICD-10-CM

## 2022-03-09 DIAGNOSIS — S81.802D OPEN WOUND OF LEFT LOWER LEG, SUBSEQUENT ENCOUNTER: ICD-10-CM

## 2022-03-09 PROCEDURE — 99999 PR PBB SHADOW E&M-EST. PATIENT-LVL III: CPT | Mod: PBBFAC,,, | Performed by: FAMILY MEDICINE

## 2022-03-09 PROCEDURE — 1159F MED LIST DOCD IN RCRD: CPT | Mod: CPTII,S$GLB,, | Performed by: FAMILY MEDICINE

## 2022-03-09 PROCEDURE — 1101F PR PT FALLS ASSESS DOC 0-1 FALLS W/OUT INJ PAST YR: ICD-10-PCS | Mod: CPTII,S$GLB,, | Performed by: FAMILY MEDICINE

## 2022-03-09 PROCEDURE — 3074F PR MOST RECENT SYSTOLIC BLOOD PRESSURE < 130 MM HG: ICD-10-PCS | Mod: CPTII,S$GLB,, | Performed by: FAMILY MEDICINE

## 2022-03-09 PROCEDURE — 99499 NO LOS: ICD-10-PCS | Mod: S$GLB,,, | Performed by: FAMILY MEDICINE

## 2022-03-09 PROCEDURE — 99999 PR PBB SHADOW E&M-EST. PATIENT-LVL III: ICD-10-PCS | Mod: PBBFAC,,, | Performed by: FAMILY MEDICINE

## 2022-03-09 PROCEDURE — 1160F PR REVIEW ALL MEDS BY PRESCRIBER/CLIN PHARMACIST DOCUMENTED: ICD-10-PCS | Mod: CPTII,S$GLB,, | Performed by: FAMILY MEDICINE

## 2022-03-09 PROCEDURE — 99499 UNLISTED E&M SERVICE: CPT | Mod: S$GLB,,, | Performed by: FAMILY MEDICINE

## 2022-03-09 PROCEDURE — 3078F DIAST BP <80 MM HG: CPT | Mod: CPTII,S$GLB,, | Performed by: FAMILY MEDICINE

## 2022-03-09 PROCEDURE — 1101F PT FALLS ASSESS-DOCD LE1/YR: CPT | Mod: CPTII,S$GLB,, | Performed by: FAMILY MEDICINE

## 2022-03-09 PROCEDURE — 1160F RVW MEDS BY RX/DR IN RCRD: CPT | Mod: CPTII,S$GLB,, | Performed by: FAMILY MEDICINE

## 2022-03-09 PROCEDURE — 3288F FALL RISK ASSESSMENT DOCD: CPT | Mod: CPTII,S$GLB,, | Performed by: FAMILY MEDICINE

## 2022-03-09 PROCEDURE — 3078F PR MOST RECENT DIASTOLIC BLOOD PRESSURE < 80 MM HG: ICD-10-PCS | Mod: CPTII,S$GLB,, | Performed by: FAMILY MEDICINE

## 2022-03-09 PROCEDURE — 1125F PR PAIN SEVERITY QUANTIFIED, PAIN PRESENT: ICD-10-PCS | Mod: CPTII,S$GLB,, | Performed by: FAMILY MEDICINE

## 2022-03-09 PROCEDURE — 3074F SYST BP LT 130 MM HG: CPT | Mod: CPTII,S$GLB,, | Performed by: FAMILY MEDICINE

## 2022-03-09 PROCEDURE — 11042 PR DEBRIDEMENT, SKIN, SUB-Q TISSUE,=<20 SQ CM: ICD-10-PCS | Mod: S$GLB,,, | Performed by: FAMILY MEDICINE

## 2022-03-09 PROCEDURE — 3288F PR FALLS RISK ASSESSMENT DOCUMENTED: ICD-10-PCS | Mod: CPTII,S$GLB,, | Performed by: FAMILY MEDICINE

## 2022-03-09 PROCEDURE — 11042 DBRDMT SUBQ TIS 1ST 20SQCM/<: CPT | Mod: S$GLB,,, | Performed by: FAMILY MEDICINE

## 2022-03-09 PROCEDURE — 1125F AMNT PAIN NOTED PAIN PRSNT: CPT | Mod: CPTII,S$GLB,, | Performed by: FAMILY MEDICINE

## 2022-03-09 PROCEDURE — 1159F PR MEDICATION LIST DOCUMENTED IN MEDICAL RECORD: ICD-10-PCS | Mod: CPTII,S$GLB,, | Performed by: FAMILY MEDICINE

## 2022-03-09 NOTE — PROGRESS NOTES
[facility]                         Wound Care Clinic                Progress Note    Subjective:       Patient ID: Sneha Mcghee is a 71 y.o. female.    Chief Complaint: Wound Check    HPI     Pt seen in clinic for a FU visit for a left leg open wound. Wound is slough covered today, cannot see the wound base. She has some mild db-wound erythema, and leg is mildly tender. She has no new complaints at this visit.    Pt seen in clinic for a FU visit for left leg open wound. Wound is + fro enterococcus that is sensitive to ampicillin. Pt also needs a consult from [facility] cardiology and would like to go to [place] for the intervention. She has no other new complaints at this visit.   Initial Consult Date: 1/26/22  Wound onset: Jan 21 or 22, 2022  Referring MD:Dr. Elizabeth Jacobo  Physician List:  Vascular Status/SARITHA:  Had ultrasounds done 1/27/22  Sensilase or Vascular Studies:  Nutrition Risk/Labs: 12/21  Other Labs:  Recent Cultures & Dates:2/8/22  Radiology/Xray:  Diabetes Status/HbA1c: Dec' 2021: 6.6  Offloading/Immobilization:  Edema/Compression: has compression stockings but does not wear  Tobacco Use:  Other:  Recent Antibiotics:Clindamycin 1/2022 2/2022 Amoxicillin   Recent MD visit:1/25/22 Dr. Donnell Parson 3/8/22  Upcoming MD Visit:Dr. Jacobo 6/21    Review of Systems   Skin: Positive for wound.        Left leg open wound   All other systems reviewed and are negative.        Objective:        Physical Exam  Vitals and nursing note reviewed.   Constitutional:       General: She is not in acute distress.     Appearance: Normal appearance. She is not ill-appearing, toxic-appearing or diaphoretic.   Skin:     General: Skin is warm and dry.      Coloration: Skin is not jaundiced or pale.      Findings: Lesion present. No bruising, erythema or rash.      Comments: Left leg open wound, mild db-wound erythema   Neurological:      General: No  focal deficit present.      Mental Status: She is alert and oriented to person, place, and time.   Psychiatric:         Mood and Affect: Mood normal.         Behavior: Behavior normal.         Thought Content: Thought content normal.         Judgment: Judgment normal.         Vitals:    03/09/22 1019   BP: 119/77   Pulse: 67   Resp: 20   Temp: 98.5 °F (36.9 °C)       Assessment:           ICD-10-CM ICD-9-CM   1. Cellulitis of left lower extremity  L03.116 682.6   2. Open wound of left lower leg, subsequent encounter  S81.802D V58.89     891.0   3. PVD (peripheral vascular disease)  I73.9 443.9            Wound 01/26/22 1312 Traumatic Left lower;lateral Leg (Active)   01/26/22 1312    Pre-existing:    Primary Wound Type: Traumatic   Side: Left   Orientation: lower;lateral   Location: Leg   Wound Number:    Ankle-Brachial Index:    Pulses:    Removal Indication and Assessment:    Wound Outcome:    (Retired) Wound Type:    (Retired) Wound Length (cm):    (Retired) Wound Width (cm):    (Retired) Depth (cm):    Wound Description (Comments):    Removal Indications:    Wound Image   03/09/22 1000   Dressing Appearance Moist drainage;Intact 03/09/22 1000   Drainage Amount Small 03/09/22 1000   Appearance Yellow;Slough;Fibrin 03/09/22 1000   Tissue loss description Full thickness 03/09/22 1000   Yellow (%), Wound Tissue Color 100 % 03/09/22 1000   Periwound Area Hemosiderin Staining;Macerated;Redness 03/09/22 1000   Wound Edges Irregular 03/09/22 1000   Wound Length (cm) 3.2 cm 03/09/22 1000   Wound Width (cm) 3 cm 03/09/22 1000   Wound Depth (cm) 0.2 cm 03/09/22 1000   Wound Volume (cm^3) 1.92 cm^3 03/09/22 1000   Wound Surface Area (cm^2) 9.6 cm^2 03/09/22 1000   Care Cleansed with:;Wound cleanser 03/09/22 1000   Dressing Applied;Silver;Foam 03/09/22 1000   Periwound Care Moisture barrier applied 03/09/22 1000   Dressing Change Due 03/10/22 03/09/22 1000           Plan:               MD Orders:  Obtained wound photos and  "measurements.  Anesthetic:   · Topical 4% Lidocaine Cream to wound bed prior to cleansing and/or debridement:used today  Wound cleansing:   · Saline or wound cleanser to cleanse wound   Debridement:  · Wounds were mechanically debrided with gauze and wound cleanser by RN  Topical Treatments:  · Skin prep as needed to periwound areas  Dressings:   Silver and silicone bordered foam applied  Compression:   · Elevate legs ("recliner position") as able, even when sleeping  Offloading:  · Avoid pressure to area  Activity   · Limit activity: Rest several times per day. - Keep off area as much as possible.   Dietary:   · As tolerated: 3 well-balanced meals  · Increased protein: Boost or Ensure, [place] Instant Breakfast (purchase sugarfree if Diabetic) Increasing your protein intake is very helpful with wound healing; if you are a kidney or dialysis patient please check with your Nephrologist as to how much protein you are allowed to take in with your condition. Taking a daily Multivitamin with Zinc as well as Vitamin C, 1000 mg minimum per day will also help with healing your wound. If you are on Coumadin, please contact the Coumadin Clinic before beginning a Multivitamin. High protein items that can be added to your diet include, extra helpings of meats, fish, beans, also High protein bars that add as little as 12 gms of protein and up to 32 gms of protein per bar.  · Supplement with: Multivitamin with Zinc and Vitamin C 500mg twice a day.  Return Appointment: 3/22/22        Patient Instructions:  Clean wound and change dressing every day. Wash hands before and after wound care. Remove old dressing, clean with soap/water or saline/wound cleanser; pat dry. May clean wound while in the shower with soap/water. Apply Medi-Honey to wound then cover with a bandage of choice daily. Pt should keep wound covered at all times and avoid getting dressing wet. If dressing is saturated, it will need to be changed. Elevate legs when " possible. Take full course of antibiotics. Incorporate a probiotic into diet, such as yogurt or a supplement. Notify wound care clinic for any concerns or changes in wound. Referral placed for  at [facility] for Interventional Cardiology. Home Health on hold at this time.          Sneha was seen today for wound check.    Diagnoses and all orders for this visit:    Cellulitis of left lower extremity  -     Change dressing    Open wound of left lower leg, subsequent encounter  -     Change dressing  -     Debridement    PVD (peripheral vascular disease)  -     Change dressing

## 2022-03-09 NOTE — PROCEDURES
"Debridement    Date/Time: 3/9/2022 10:00 AM  Performed by: Didier Carlson DO  Authorized by: Didier Carlson DO     Time out: Immediately prior to procedure a "time out" was called to verify the correct patient, procedure, equipment, support staff and site/side marked as required.    Consent Done?:  Yes (Verbal)    Preparation: Patient was prepped and draped in usual sterile fashion    Local anesthesia used?: Yes    Local anesthetic:  Lidocaine 2% topical gel  Anesthetic total (ml):  5    Wound Details:    Location:  Left leg       Length (cm):  3.3       Area (sq cm):  9.9       Width (cm):  3       Percent Debrided (%):  100       Depth (cm):  0.2       Total Area Debrided (sq cm):  9.9    Depth of debridement:  Subcutaneous tissue    Tissue debrided:  Subcutaneous    Devitalized tissue debrided:  Biofilm, Exudate, Fibrin, Necrotic/Eschar and Slough    Instruments:  Curette    Bleeding:  Minimal  Hemostasis Achieved: Yes    Method Used:  Pressure  Patient tolerance:  Patient tolerated the procedure well with no immediate complications      "

## 2022-03-09 NOTE — PATIENT INSTRUCTIONS
"  Dressings:   Silver and silicone bordered foam applied  Compression:   Elevate legs ("recliner position") as able, even when sleeping  Offloading:  Avoid pressure to area  Activity   Limit activity: Rest several times per day. - Keep off area as much as possible.   Dietary:   As tolerated: 3 well-balanced meals  Increased protein: Boost or Ensure, [place] Instant Breakfast (purchase sugarfree if Diabetic) Increasing your protein intake is very helpful with wound healing; if you are a kidney or dialysis patient please check with your Nephrologist as to how much protein you are allowed to take in with your condition. Taking a daily Multivitamin with Zinc as well as Vitamin C, 1000 mg minimum per day will also help with healing your wound. If you are on Coumadin, please contact the Coumadin Clinic before beginning a Multivitamin. High protein items that can be added to your diet include, extra helpings of meats, fish, beans, also High protein bars that add as little as 12 gms of protein and up to 32 gms of protein per bar.  Supplement with: Multivitamin with Zinc and Vitamin C 500mg twice a day.  Return Appointment: 3/22/22        Patient Instructions:  Clean wound and change dressing every day. Wash hands before and after wound care. Remove old dressing, clean with soap/water or saline/wound cleanser; pat dry. May clean wound while in the shower with soap/water. Apply Medi-Honey to wound then cover with a bandage of choice daily. Pt should keep wound covered at all times and avoid getting dressing wet. If dressing is saturated, it will need to be changed. Elevate legs when possible. Take full course of antibiotics. Incorporate a probiotic into diet, such as yogurt or a supplement. Notify wound care clinic for any concerns or changes in wound. Referral placed for  at [facility] for Interventional Cardiology. Home Health on hold at this time.    Discharge Instructions:     The clinic is open Mon-Fri " from 8:00 a.m. until 5:00 p.m. During business hours call the [facility] at [phone]  and ask for Wound Care Department for any worsening symptoms; fever >101.0, increased pain, increased drainage, foul odor or problems with the dressing. For after hours problems or emergencies please report to the nearest emergency room.      Patient instructed on proper handwashing technique. Using warm water and soap, apply soap, lather well and vigorously apply friction for a minimum of 20 seconds, rinse well and dry well; wash before and after toileting, before and after woundcare, after sneezing, coughing etc.     Routine Dressing instructions: Patient to keep the dressing clean, dry and intact. Call for any worsening symptoms or problems. [phone].   +

## 2022-03-15 ENCOUNTER — TELEPHONE (OUTPATIENT)
Dept: WOUND CARE | Facility: CLINIC | Age: 72
End: 2022-03-15
Payer: MEDICARE

## 2022-03-15 NOTE — TELEPHONE ENCOUNTER
----- Message from Kong Bernabe sent at 3/15/2022 12:06 PM CDT -----  Regarding: advice  Contact: representative  Type: Needs Medical Advice  Who Called:  Maverick Simmons  Symptoms (please be specific):    How long has patient had these symptoms:    Pharmacy name and phone #:    Best Call Back Number: [phone]  Additional Information: [facility] care simon, representative calling regarding whether the provider signed the order and updated the chart notes

## 2022-03-17 ENCOUNTER — OFFICE VISIT (OUTPATIENT)
Dept: FAMILY MEDICINE | Facility: CLINIC | Age: 72
End: 2022-03-17
Payer: MEDICARE

## 2022-03-17 VITALS
WEIGHT: 255.63 LBS | OXYGEN SATURATION: 95 % | DIASTOLIC BLOOD PRESSURE: 88 MMHG | RESPIRATION RATE: 20 BRPM | TEMPERATURE: 98 F | BODY MASS INDEX: 42.59 KG/M2 | HEIGHT: 65 IN | SYSTOLIC BLOOD PRESSURE: 122 MMHG | HEART RATE: 63 BPM

## 2022-03-17 DIAGNOSIS — R26.81 GAIT INSTABILITY: ICD-10-CM

## 2022-03-17 DIAGNOSIS — M25.562 CHRONIC PAIN OF LEFT KNEE: ICD-10-CM

## 2022-03-17 DIAGNOSIS — G89.29 CHRONIC PAIN OF LEFT KNEE: ICD-10-CM

## 2022-03-17 DIAGNOSIS — M54.9 LEFT-SIDED BACK PAIN, UNSPECIFIED BACK LOCATION, UNSPECIFIED CHRONICITY: Primary | ICD-10-CM

## 2022-03-17 PROCEDURE — 1101F PT FALLS ASSESS-DOCD LE1/YR: CPT | Mod: CPTII,S$GLB,, | Performed by: FAMILY MEDICINE

## 2022-03-17 PROCEDURE — 1101F PR PT FALLS ASSESS DOC 0-1 FALLS W/OUT INJ PAST YR: ICD-10-PCS | Mod: CPTII,S$GLB,, | Performed by: FAMILY MEDICINE

## 2022-03-17 PROCEDURE — 1125F PR PAIN SEVERITY QUANTIFIED, PAIN PRESENT: ICD-10-PCS | Mod: CPTII,S$GLB,, | Performed by: FAMILY MEDICINE

## 2022-03-17 PROCEDURE — 1159F MED LIST DOCD IN RCRD: CPT | Mod: CPTII,S$GLB,, | Performed by: FAMILY MEDICINE

## 2022-03-17 PROCEDURE — 99213 OFFICE O/P EST LOW 20 MIN: CPT | Mod: S$GLB,,, | Performed by: FAMILY MEDICINE

## 2022-03-17 PROCEDURE — 1125F AMNT PAIN NOTED PAIN PRSNT: CPT | Mod: CPTII,S$GLB,, | Performed by: FAMILY MEDICINE

## 2022-03-17 PROCEDURE — 3288F FALL RISK ASSESSMENT DOCD: CPT | Mod: CPTII,S$GLB,, | Performed by: FAMILY MEDICINE

## 2022-03-17 PROCEDURE — 1159F PR MEDICATION LIST DOCUMENTED IN MEDICAL RECORD: ICD-10-PCS | Mod: CPTII,S$GLB,, | Performed by: FAMILY MEDICINE

## 2022-03-17 PROCEDURE — 99213 PR OFFICE/OUTPT VISIT, EST, LEVL III, 20-29 MIN: ICD-10-PCS | Mod: S$GLB,,, | Performed by: FAMILY MEDICINE

## 2022-03-17 PROCEDURE — 3008F PR BODY MASS INDEX (BMI) DOCUMENTED: ICD-10-PCS | Mod: CPTII,S$GLB,, | Performed by: FAMILY MEDICINE

## 2022-03-17 PROCEDURE — 3288F PR FALLS RISK ASSESSMENT DOCUMENTED: ICD-10-PCS | Mod: CPTII,S$GLB,, | Performed by: FAMILY MEDICINE

## 2022-03-17 PROCEDURE — 3079F PR MOST RECENT DIASTOLIC BLOOD PRESSURE 80-89 MM HG: ICD-10-PCS | Mod: CPTII,S$GLB,, | Performed by: FAMILY MEDICINE

## 2022-03-17 PROCEDURE — 3079F DIAST BP 80-89 MM HG: CPT | Mod: CPTII,S$GLB,, | Performed by: FAMILY MEDICINE

## 2022-03-17 PROCEDURE — 3074F PR MOST RECENT SYSTOLIC BLOOD PRESSURE < 130 MM HG: ICD-10-PCS | Mod: CPTII,S$GLB,, | Performed by: FAMILY MEDICINE

## 2022-03-17 PROCEDURE — 3008F BODY MASS INDEX DOCD: CPT | Mod: CPTII,S$GLB,, | Performed by: FAMILY MEDICINE

## 2022-03-17 PROCEDURE — 3074F SYST BP LT 130 MM HG: CPT | Mod: CPTII,S$GLB,, | Performed by: FAMILY MEDICINE

## 2022-03-17 PROCEDURE — 1160F PR REVIEW ALL MEDS BY PRESCRIBER/CLIN PHARMACIST DOCUMENTED: ICD-10-PCS | Mod: CPTII,S$GLB,, | Performed by: FAMILY MEDICINE

## 2022-03-17 PROCEDURE — 1160F RVW MEDS BY RX/DR IN RCRD: CPT | Mod: CPTII,S$GLB,, | Performed by: FAMILY MEDICINE

## 2022-03-19 NOTE — PROGRESS NOTES
Subjective:       Patient ID: Sneha Mcghee is a 71 y.o. female.    Chief Complaint: Flank Pain    HPI   The patient is a 71-year-old who is here today with low back pain at her waist.  Her left sided back pain started on Tuesday.  Initially the pain was a sharp stabbing pain and she wondered if she may have a UTI.  She started increasing her fluid intake and then her pain improved.  Currently her pain is occurring in her left back at her waistline with certain movement.  She denies any abdominal pain.  She denies any nausea or vomiting.  She denies any dysuria, urinary frequency urgency hesitancy or hematuria.  She denies any diarrhea or constipation    She also asks for a new physical therapy order for her knee.  She has had persistent left knee issues and did find physical therapy helped previously    Review of Systems   Constitutional: Negative for appetite change, chills, diaphoresis, fatigue, fever and unexpected weight change.   HENT: Negative for congestion, ear pain, postnasal drip, rhinorrhea, sinus pressure, sneezing, sore throat and trouble swallowing.    Eyes: Negative for pain, discharge and visual disturbance.   Respiratory: Negative for cough, chest tightness, shortness of breath and wheezing.    Cardiovascular: Negative for chest pain, palpitations and leg swelling.   Gastrointestinal: Negative for abdominal distention, abdominal pain, blood in stool, constipation, diarrhea, nausea and vomiting.   Musculoskeletal: Positive for back pain.   Skin: Positive for wound (Left lower extremity currently being followed by wound care for this). Negative for rash.       Objective:      Physical Exam  Constitutional:       General: She is not in acute distress.     Appearance: Normal appearance. She is well-developed. She is morbidly obese.   HENT:      Head: Normocephalic and atraumatic.      Right Ear: Hearing, tympanic membrane, ear canal and external ear normal.      Left Ear: Hearing, tympanic membrane,  ear canal and external ear normal.      Nose: Nose normal.      Mouth/Throat:      Mouth: No oral lesions.      Pharynx: No oropharyngeal exudate or posterior oropharyngeal erythema.   Eyes:      General: Lids are normal. No scleral icterus.     Extraocular Movements: Extraocular movements intact.      Conjunctiva/sclera: Conjunctivae normal.      Pupils: Pupils are equal, round, and reactive to light.   Neck:      Thyroid: No thyroid mass or thyromegaly.      Vascular: No carotid bruit.   Cardiovascular:      Rate and Rhythm: Normal rate and regular rhythm.  No extrasystoles are present.     Chest Wall: PMI is not displaced.      Heart sounds: Normal heart sounds. No murmur heard.    No gallop.   Pulmonary:      Effort: Pulmonary effort is normal. No accessory muscle usage or respiratory distress.      Breath sounds: Normal breath sounds.   Chest:   Breasts:      Right: No supraclavicular adenopathy.      Left: No supraclavicular adenopathy.       Abdominal:      General: Bowel sounds are normal. There is no abdominal bruit.      Palpations: Abdomen is soft.      Tenderness: There is no abdominal tenderness. There is no rebound.   Musculoskeletal:      Cervical back: Normal range of motion and neck supple.      Lumbar back: Spasms and tenderness present. No swelling, deformity or bony tenderness. Negative right straight leg raise test and negative left straight leg raise test.        Legs:    Lymphadenopathy:      Head:      Right side of head: No submental or submandibular adenopathy.      Left side of head: No submental or submandibular adenopathy.      Cervical:      Right cervical: No superficial, deep or posterior cervical adenopathy.     Left cervical: No superficial, deep or posterior cervical adenopathy.      Upper Body:      Right upper body: No supraclavicular adenopathy.      Left upper body: No supraclavicular adenopathy.   Skin:     General: Skin is warm and dry.   Neurological:      Mental Status: She  "is alert and oriented to person, place, and time.      Gait: Gait abnormal.       Blood pressure 122/88, pulse 63, temperature 97.5 °F (36.4 °C), resp. rate 20, height 5' 5" (1.651 m), weight 115.9 kg (255 lb 10 oz), SpO2 95 %.Body mass index is 42.54 kg/m².            A/P:  1)  Musculoskeletal low back pain.  Improving.  She will continue with symptomatic/supportive care.  If her pain does not resolve or if she develops any new or worsening symptoms, she will let me know.  We are going to send her to physical therapy.    2) chronic knee pain.  New to me.  I did renew a physical therapy order for her  3) gait instability likely multifactorial.  We will also address this with physical therapy      "

## 2022-03-21 ENCOUNTER — TELEPHONE (OUTPATIENT)
Dept: FAMILY MEDICINE | Facility: CLINIC | Age: 72
End: 2022-03-21
Payer: MEDICARE

## 2022-03-21 DIAGNOSIS — H40.9 GLAUCOMA OF BOTH EYES, UNSPECIFIED GLAUCOMA TYPE: Primary | ICD-10-CM

## 2022-03-21 NOTE — TELEPHONE ENCOUNTER
Dr. Burnette referral pended per patient request as she would like to see a provider closer than [place]

## 2022-03-21 NOTE — TELEPHONE ENCOUNTER
Dr Jacobo would like to refer this pt to Dr Valdovinos for advanced macular degeneration  And glaucoma. Pls call pt to sched the appt . Thx --lp

## 2022-03-21 NOTE — TELEPHONE ENCOUNTER
----- Message from Princess Coy sent at 3/21/2022 11:48 AM CDT -----  Contact: pt  Type: Needs Medical Advice    Who Called:  PT  Best Call Back Number: [phone]    Additional Information: Requesting a call back regarding referral for dr obrien  Please Advise ---Thank you

## 2022-03-22 ENCOUNTER — OFFICE VISIT (OUTPATIENT)
Dept: WOUND CARE | Facility: CLINIC | Age: 72
End: 2022-03-22
Payer: MEDICARE

## 2022-03-22 VITALS
RESPIRATION RATE: 20 BRPM | HEART RATE: 71 BPM | TEMPERATURE: 98 F | DIASTOLIC BLOOD PRESSURE: 89 MMHG | SYSTOLIC BLOOD PRESSURE: 168 MMHG

## 2022-03-22 DIAGNOSIS — L03.116 CELLULITIS OF LEFT LOWER EXTREMITY: ICD-10-CM

## 2022-03-22 DIAGNOSIS — I73.9 PVD (PERIPHERAL VASCULAR DISEASE): ICD-10-CM

## 2022-03-22 DIAGNOSIS — I87.2 VENOUS INSUFFICIENCY: ICD-10-CM

## 2022-03-22 DIAGNOSIS — S81.802D OPEN WOUND OF LEFT LOWER LEG, SUBSEQUENT ENCOUNTER: Primary | ICD-10-CM

## 2022-03-22 PROCEDURE — 1125F AMNT PAIN NOTED PAIN PRSNT: CPT | Mod: CPTII,S$GLB,, | Performed by: FAMILY MEDICINE

## 2022-03-22 PROCEDURE — 1159F PR MEDICATION LIST DOCUMENTED IN MEDICAL RECORD: ICD-10-PCS | Mod: CPTII,S$GLB,, | Performed by: FAMILY MEDICINE

## 2022-03-22 PROCEDURE — 3288F FALL RISK ASSESSMENT DOCD: CPT | Mod: CPTII,S$GLB,, | Performed by: FAMILY MEDICINE

## 2022-03-22 PROCEDURE — 3079F DIAST BP 80-89 MM HG: CPT | Mod: CPTII,S$GLB,, | Performed by: FAMILY MEDICINE

## 2022-03-22 PROCEDURE — 1101F PR PT FALLS ASSESS DOC 0-1 FALLS W/OUT INJ PAST YR: ICD-10-PCS | Mod: CPTII,S$GLB,, | Performed by: FAMILY MEDICINE

## 2022-03-22 PROCEDURE — 99499 UNLISTED E&M SERVICE: CPT | Mod: S$GLB,,, | Performed by: FAMILY MEDICINE

## 2022-03-22 PROCEDURE — 99999 PR PBB SHADOW E&M-EST. PATIENT-LVL V: ICD-10-PCS | Mod: PBBFAC,,, | Performed by: FAMILY MEDICINE

## 2022-03-22 PROCEDURE — 1101F PT FALLS ASSESS-DOCD LE1/YR: CPT | Mod: CPTII,S$GLB,, | Performed by: FAMILY MEDICINE

## 2022-03-22 PROCEDURE — 99499 NO LOS: ICD-10-PCS | Mod: S$GLB,,, | Performed by: FAMILY MEDICINE

## 2022-03-22 PROCEDURE — 3077F PR MOST RECENT SYSTOLIC BLOOD PRESSURE >= 140 MM HG: ICD-10-PCS | Mod: CPTII,S$GLB,, | Performed by: FAMILY MEDICINE

## 2022-03-22 PROCEDURE — 1160F PR REVIEW ALL MEDS BY PRESCRIBER/CLIN PHARMACIST DOCUMENTED: ICD-10-PCS | Mod: CPTII,S$GLB,, | Performed by: FAMILY MEDICINE

## 2022-03-22 PROCEDURE — 1159F MED LIST DOCD IN RCRD: CPT | Mod: CPTII,S$GLB,, | Performed by: FAMILY MEDICINE

## 2022-03-22 PROCEDURE — 1160F RVW MEDS BY RX/DR IN RCRD: CPT | Mod: CPTII,S$GLB,, | Performed by: FAMILY MEDICINE

## 2022-03-22 PROCEDURE — 3077F SYST BP >= 140 MM HG: CPT | Mod: CPTII,S$GLB,, | Performed by: FAMILY MEDICINE

## 2022-03-22 PROCEDURE — 11042 DEBRIDEMENT: ICD-10-PCS | Mod: S$GLB,,, | Performed by: FAMILY MEDICINE

## 2022-03-22 PROCEDURE — 1125F PR PAIN SEVERITY QUANTIFIED, PAIN PRESENT: ICD-10-PCS | Mod: CPTII,S$GLB,, | Performed by: FAMILY MEDICINE

## 2022-03-22 PROCEDURE — 11042 DBRDMT SUBQ TIS 1ST 20SQCM/<: CPT | Mod: S$GLB,,, | Performed by: FAMILY MEDICINE

## 2022-03-22 PROCEDURE — 3288F PR FALLS RISK ASSESSMENT DOCUMENTED: ICD-10-PCS | Mod: CPTII,S$GLB,, | Performed by: FAMILY MEDICINE

## 2022-03-22 PROCEDURE — 87077 CULTURE AEROBIC IDENTIFY: CPT | Performed by: FAMILY MEDICINE

## 2022-03-22 PROCEDURE — 87186 SC STD MICRODIL/AGAR DIL: CPT | Performed by: FAMILY MEDICINE

## 2022-03-22 PROCEDURE — 99999 PR PBB SHADOW E&M-EST. PATIENT-LVL V: CPT | Mod: PBBFAC,,, | Performed by: FAMILY MEDICINE

## 2022-03-22 PROCEDURE — 3079F PR MOST RECENT DIASTOLIC BLOOD PRESSURE 80-89 MM HG: ICD-10-PCS | Mod: CPTII,S$GLB,, | Performed by: FAMILY MEDICINE

## 2022-03-22 PROCEDURE — 87070 CULTURE OTHR SPECIMN AEROBIC: CPT | Performed by: FAMILY MEDICINE

## 2022-03-22 NOTE — PATIENT INSTRUCTIONS
" MD Orders:  Obtained wound photos and measurements.  Anesthetic:   Topical 4% Lidocaine Cream to wound bed prior to cleansing and/or debridement:used today  Wound cleansing:   Saline or wound cleanser to cleanse wound   Debridement:  Wounds were mechanically debrided with gauze and wound cleanser by RN  Dr. Carlson debrided with 5mm curette  Topical Treatments:  Calmoseptine to periwound areas  Dressings:   Santly and silicone bordered foam applied  Compression:   Elevate legs ("recliner position") as able, even when sleeping  Offloading:  Avoid pressure to area  Activity   Limit activity: Rest several times per day. - Keep off area as much as possible.   Dietary:   As tolerated: 3 well-balanced meals  Increased protein: Boost or Ensure, [place] Instant Breakfast (purchase sugarfree if Diabetic) Increasing your protein intake is very helpful with wound healing; if you are a kidney or dialysis patient please check with your Nephrologist as to how much protein you are allowed to take in with your condition. Taking a daily Multivitamin with Zinc as well as Vitamin C, 1000 mg minimum per day will also help with healing your wound. If you are on Coumadin, please contact the Coumadin Clinic before beginning a Multivitamin. High protein items that can be added to your diet include, extra helpings of meats, fish, beans, also High protein bars that add as little as 12 gms of protein and up to 32 gms of protein per bar.  Supplement with: Multivitamin with Zinc and Vitamin C 500mg twice a day.  Return Appointment: 3/29/22        Patient Instructions:  Clean wound and change dressing every day. Wash hands before and after wound care. Remove old dressing, clean with soap/water or saline/wound cleanser; pat dry. May clean wound while in the shower with soap/water. Apply Santyl, nickel layer thick, to wound then cover with a bandage of choice daily. Pt should keep wound covered at all times and avoid getting dressing wet. If " dressing is saturated, it will need to be changed. Elevate legs when possible. Incorporate a probiotic into diet, such as yogurt or a supplement. Notify wound care clinic for any concerns or changes in wound. Referral placed for  at [facility]. Home Health on hold at this time.    Discharge Instructions.   Return Appointment: Should you experience any significant changes in your wound(s) or have any questions regarding your home care instructions please contact [facility], ask for the wound center- [phone].  If after hours, contact your primary care physician or go to the hospital emergency room.    In a diabetic patient with a wound, keeping your blood glucose levels as close to normal can be a great help towards healing your wound. Wounds are very difficult to heal in a high sugar environment so controlling the glucose well can go a long way towards successful wound healing.     Increasing your protein intake is very helpful with wound healing; if you are a kidney or dialysis patient please check with your Nephrologist as to how much protein you are allowed to take in with your condition. Taking a daily Multivitamin with Zinc as well as Vitamin C, 1000 mg minimum per day will also help with healing your wound.If you are on Coumadin, please contact the Coumadin Clinic before beginning a Multivitamin. High protein items that can be added to your diet include, extra helpings of meats, fish, beans, also High protein bars that add as little as 12 gms of protein and up to 32 gms of protein per bar.     The clinic is open Mon-Fri from 8:00 a.m. until 5:00 p.m. During business hours call [facility], ask for Wound Care @ [phone] for any worsening symptoms; fever >101.0, increased pain, increased drainage, foul odor or problems with the dressing. For after hours problems or emergencies please report to the nearest emergency room.     Patient  instructed on proper handwashing technique. Using warm water and soap, apply soap, lather well and vigorously apply friction for a minimum of 20 seconds, rinse well and dry well; wash before and after toileting, before and after woundcare, after sneezing, coughing etc.     Patient to keep the dressing clean, dry and intact. Call for any worsening symptoms or problems. [phone]. - Use cast protector if showering. Keep wrap dry and intact until next appt.

## 2022-03-22 NOTE — PROGRESS NOTES
[facility]                         Wound Care Clinic                Progress Note    Subjective:       Patient ID: Sneha Mcghee is a 71 y.o. female.    Chief Complaint: Wound Check    HPI     Pt seen in clinic for a FU visit for Left leg open wound. Pt leg is red, and wound slough covered. Pt has now got the Santyl that was orederd and just started using the ointment. Pt is finished with her second round of antibiotics but still has res leg. Pt  Was on Ampicillin last. Wound to be cultured post debridement today  Initial Consult Date: 1/26/22  Wound onset: Jan 21 or 22, 2022  Referring MD:Dr. Elizabeth Jacobo  Physician List:  Vascular Status/SARITHA:  Had ultrasounds done 1/27/22  Sensilase or Vascular Studies:  Nutrition Risk/Labs: 12/21  Other Labs:  Recent Cultures & Dates:2/8/22  Radiology/Xray:  Diabetes Status/HbA1c: Dec' 2021: 6.6  Offloading/Immobilization:  Edema/Compression: has compression stockings but does not wear  Tobacco Use:  Other:  Recent Antibiotics:Clindamycin 1/2022 2/2022 Amoxicillin   Recent MD visit:3/17/22 Dr. Mcgovern  Upcoming MD Visit:Dr. Jacobo 6/21    3/22/22: Patient states obtained Santyl. Reports wound area more tender and painful    Review of Systems   Skin: Positive for wound.        Left leg open wound   All other systems reviewed and are negative.        Objective:        Physical Exam  Vitals and nursing note reviewed.   Constitutional:       General: She is not in acute distress.     Appearance: Normal appearance. She is not ill-appearing, toxic-appearing or diaphoretic.   Skin:     General: Skin is warm and dry.      Coloration: Skin is not jaundiced or pale.      Findings: Erythema and lesion present. No bruising or rash.      Comments: Left leg venous ulcer, erythema persists, slough covered   Neurological:      General: No focal deficit present.      Mental Status: She is alert and oriented to person, place, and time.    Psychiatric:         Mood and Affect: Mood normal.         Behavior: Behavior normal.         Thought Content: Thought content normal.         Judgment: Judgment normal.         Vitals:    03/22/22 0809   BP: (!) 168/89   Pulse: 71   Resp: 20   Temp: 98.4 °F (36.9 °C)       Assessment:           ICD-10-CM ICD-9-CM   1. Open wound of left lower leg, subsequent encounter  S81.802D V58.89     891.0   2. PVD (peripheral vascular disease)  I73.9 443.9   3. Venous insufficiency  I87.2 459.81   4. Cellulitis of left lower extremity  L03.116 682.6            Wound 01/26/22 1312 Traumatic Left lower;lateral Leg (Active)   01/26/22 1312    Pre-existing:    Primary Wound Type: Traumatic   Side: Left   Orientation: lower;lateral   Location: Leg   Wound Number:    Ankle-Brachial Index:    Pulses:    Removal Indication and Assessment:    Wound Outcome:    (Retired) Wound Type:    (Retired) Wound Length (cm):    (Retired) Wound Width (cm):    (Retired) Depth (cm):    Wound Description (Comments):    Removal Indications:    Wound Image   03/22/22 0900   Dressing Appearance Moist drainage;Intact 03/22/22 0817   Drainage Amount Moderate 03/22/22 0817   Drainage Characteristics/Odor Serous 03/22/22 0817   Appearance Pink;Red;Slough;Granulating 03/22/22 0817   Tissue loss description Full thickness 03/22/22 0817   Red (%), Wound Tissue Color 10 % 03/22/22 0817   Yellow (%), Wound Tissue Color 90 % 03/22/22 0817   Periwound Area Denuded;Macerated 03/22/22 0817   Wound Edges Irregular 03/22/22 0817   Wound Length (cm) 3 cm 03/22/22 0817   Wound Width (cm) 3 cm 03/22/22 0817   Wound Depth (cm) 0.1 cm 03/22/22 0817   Wound Volume (cm^3) 0.9 cm^3 03/22/22 0817   Wound Surface Area (cm^2) 9 cm^2 03/22/22 0817   Care Cleansed with:;Wound cleanser 03/22/22 0817   Dressing Applied;Foam 03/22/22 0817   Periwound Care Moisture barrier applied 03/22/22 0817   Dressing Change Due 03/23/22 03/22/22 0817           Plan:               MD  "Orders:  Obtained wound photos and measurements.  Anesthetic:   · Topical 4% Lidocaine Cream to wound bed prior to cleansing and/or debridement:used today  Wound cleansing:   · Saline or wound cleanser to cleanse wound   Debridement:  · Wounds were mechanically debrided with gauze and wound cleanser by RN  · Dr. Carlson debrided with 5 mm curette  Topical Treatments:  · Calmoseptine to periwound areas  Dressings:   Santly and silicone bordered foam applied  Compression:   · Elevate legs ("recliner position") as able, even when sleeping  Offloading:  · Avoid pressure to area  Activity   · Limit activity: Rest several times per day. - Keep off area as much as possible.   Dietary:   · As tolerated: 3 well-balanced meals  · Increased protein: Boost or Ensure, [place] Instant Breakfast (purchase sugarfree if Diabetic) Increasing your protein intake is very helpful with wound healing; if you are a kidney or dialysis patient please check with your Nephrologist as to how much protein you are allowed to take in with your condition. Taking a daily Multivitamin with Zinc as well as Vitamin C, 1000 mg minimum per day will also help with healing your wound. If you are on Coumadin, please contact the Coumadin Clinic before beginning a Multivitamin. High protein items that can be added to your diet include, extra helpings of meats, fish, beans, also High protein bars that add as little as 12 gms of protein and up to 32 gms of protein per bar.  · Supplement with: Multivitamin with Zinc and Vitamin C 500mg twice a day.  Return Appointment: 3/29/22        Patient Instructions:  Clean wound and change dressing every day. Wash hands before and after wound care. Remove old dressing, clean with soap/water or saline/wound cleanser; pat dry. May clean wound while in the shower with soap/water. Apply Santyl, nickel layer thick, to wound then cover with a bandage of choice daily. Pt should keep wound covered at all times and avoid getting " dressing wet. If dressing is saturated, it will need to be changed. Elevate legs when possible. Incorporate a probiotic into diet, such as yogurt or a supplement. Notify wound care clinic for any concerns or changes in wound. Referral placed for  at [facility]. Home Health on hold at this time.         Sneha was seen today for wound check.    Diagnoses and all orders for this visit:    Open wound of left lower leg, subsequent encounter  -     Aerobic culture  -     Change dressing  -     Debridement    PVD (peripheral vascular disease)  -     Change dressing    Venous insufficiency  -     Change dressing    Cellulitis of left lower extremity  -     Aerobic culture  -     Change dressing

## 2022-03-24 ENCOUNTER — TELEPHONE (OUTPATIENT)
Dept: FAMILY MEDICINE | Facility: CLINIC | Age: 72
End: 2022-03-24
Payer: MEDICARE

## 2022-03-24 NOTE — TELEPHONE ENCOUNTER
----- Message from Inessa Yu sent at 3/24/2022 12:57 PM CDT -----  Contact: [phone]  Pt came in recently and was referred to [facility].  States she went this morning for her appt and is asking for a referral to be faxed to them.  Pls call pt to inform status.

## 2022-03-24 NOTE — TELEPHONE ENCOUNTER
----- Message from Florence Leon sent at 3/24/2022 11:33 AM CDT -----  Contact: Elizabet [facility] PT at [phone]  Type: Needs Medical Advice  Who Called:  Elizabet wolff [facility]   Best Call Back Number: [phone]  Additional Information: [facility] PT is calling the office to have the referral for this pt re-faxed over. The fax number is [phone]. Please call back and advise.

## 2022-03-25 ENCOUNTER — TELEPHONE (OUTPATIENT)
Dept: FAMILY MEDICINE | Facility: CLINIC | Age: 72
End: 2022-03-25
Payer: MEDICARE

## 2022-03-25 ENCOUNTER — TELEPHONE (OUTPATIENT)
Dept: VASCULAR SURGERY | Facility: CLINIC | Age: 72
End: 2022-03-25
Payer: MEDICARE

## 2022-03-25 LAB — BACTERIA SPEC AEROBE CULT: ABNORMAL

## 2022-03-25 NOTE — TELEPHONE ENCOUNTER
----- Message from Alexandro Samuels sent at 3/25/2022 10:52 AM CDT -----  Type: Needs Medical Advice  Who Called:  Patient    Best Call Back Number: [phone]  Additional Information: Patient states that she would like a callback regarding her results.

## 2022-03-25 NOTE — TELEPHONE ENCOUNTER
----- Message from Tiesha Ortega sent at 3/25/2022 11:17 AM CDT -----  Contact: Pt  Type:  Patient Returning Call    Who Called:  Pt  Who Left Message for Patient:  Jovana  Does the patient know what this is regarding?:  no  Best Call Back Number:  [phone]  Additional Information:

## 2022-03-29 ENCOUNTER — LAB VISIT (OUTPATIENT)
Dept: LAB | Facility: HOSPITAL | Age: 72
End: 2022-03-29
Attending: INTERNAL MEDICINE
Payer: MEDICARE

## 2022-03-29 ENCOUNTER — OFFICE VISIT (OUTPATIENT)
Dept: WOUND CARE | Facility: CLINIC | Age: 72
End: 2022-03-29
Payer: MEDICARE

## 2022-03-29 VITALS — DIASTOLIC BLOOD PRESSURE: 84 MMHG | RESPIRATION RATE: 20 BRPM | SYSTOLIC BLOOD PRESSURE: 155 MMHG | HEART RATE: 64 BPM

## 2022-03-29 DIAGNOSIS — A49.8 KLEBSIELLA INFECTION: ICD-10-CM

## 2022-03-29 DIAGNOSIS — I10 ESSENTIAL HYPERTENSION: ICD-10-CM

## 2022-03-29 DIAGNOSIS — I73.9 PVD (PERIPHERAL VASCULAR DISEASE): ICD-10-CM

## 2022-03-29 DIAGNOSIS — S81.802D OPEN WOUND OF LEFT LOWER LEG, SUBSEQUENT ENCOUNTER: Primary | ICD-10-CM

## 2022-03-29 DIAGNOSIS — L03.116 CELLULITIS OF LEFT LOWER EXTREMITY: ICD-10-CM

## 2022-03-29 DIAGNOSIS — I87.2 VENOUS INSUFFICIENCY: ICD-10-CM

## 2022-03-29 LAB
ALBUMIN SERPL BCP-MCNC: 3.7 G/DL (ref 3.5–5.2)
ALP SERPL-CCNC: 90 U/L (ref 55–135)
ALT SERPL W/O P-5'-P-CCNC: 9 U/L (ref 10–44)
ANION GAP SERPL CALC-SCNC: 6 MMOL/L (ref 8–16)
AST SERPL-CCNC: 13 U/L (ref 10–40)
BILIRUB SERPL-MCNC: 0.5 MG/DL (ref 0.1–1)
BUN SERPL-MCNC: 17 MG/DL (ref 8–23)
CALCIUM SERPL-MCNC: 9.8 MG/DL (ref 8.7–10.5)
CHLORIDE SERPL-SCNC: 106 MMOL/L (ref 95–110)
CO2 SERPL-SCNC: 27 MMOL/L (ref 23–29)
CREAT SERPL-MCNC: 0.9 MG/DL (ref 0.5–1.4)
EST. GFR  (AFRICAN AMERICAN): >60 ML/MIN/1.73 M^2
EST. GFR  (NON AFRICAN AMERICAN): >60 ML/MIN/1.73 M^2
GLUCOSE SERPL-MCNC: 147 MG/DL (ref 70–110)
POTASSIUM SERPL-SCNC: 4.4 MMOL/L (ref 3.5–5.1)
PROT SERPL-MCNC: 7.6 G/DL (ref 6–8.4)
SODIUM SERPL-SCNC: 139 MMOL/L (ref 136–145)

## 2022-03-29 PROCEDURE — 99499 UNLISTED E&M SERVICE: CPT | Mod: S$GLB,,, | Performed by: FAMILY MEDICINE

## 2022-03-29 PROCEDURE — 1101F PR PT FALLS ASSESS DOC 0-1 FALLS W/OUT INJ PAST YR: ICD-10-PCS | Mod: CPTII,S$GLB,, | Performed by: FAMILY MEDICINE

## 2022-03-29 PROCEDURE — 3077F SYST BP >= 140 MM HG: CPT | Mod: CPTII,S$GLB,, | Performed by: FAMILY MEDICINE

## 2022-03-29 PROCEDURE — 99213 OFFICE O/P EST LOW 20 MIN: CPT | Mod: S$GLB,,, | Performed by: FAMILY MEDICINE

## 2022-03-29 PROCEDURE — 3079F DIAST BP 80-89 MM HG: CPT | Mod: CPTII,S$GLB,, | Performed by: FAMILY MEDICINE

## 2022-03-29 PROCEDURE — 1159F PR MEDICATION LIST DOCUMENTED IN MEDICAL RECORD: ICD-10-PCS | Mod: CPTII,S$GLB,, | Performed by: FAMILY MEDICINE

## 2022-03-29 PROCEDURE — 1101F PT FALLS ASSESS-DOCD LE1/YR: CPT | Mod: CPTII,S$GLB,, | Performed by: FAMILY MEDICINE

## 2022-03-29 PROCEDURE — 1159F MED LIST DOCD IN RCRD: CPT | Mod: CPTII,S$GLB,, | Performed by: FAMILY MEDICINE

## 2022-03-29 PROCEDURE — 3288F PR FALLS RISK ASSESSMENT DOCUMENTED: ICD-10-PCS | Mod: CPTII,S$GLB,, | Performed by: FAMILY MEDICINE

## 2022-03-29 PROCEDURE — 99213 PR OFFICE/OUTPT VISIT, EST, LEVL III, 20-29 MIN: ICD-10-PCS | Mod: S$GLB,,, | Performed by: FAMILY MEDICINE

## 2022-03-29 PROCEDURE — 3077F PR MOST RECENT SYSTOLIC BLOOD PRESSURE >= 140 MM HG: ICD-10-PCS | Mod: CPTII,S$GLB,, | Performed by: FAMILY MEDICINE

## 2022-03-29 PROCEDURE — 3079F PR MOST RECENT DIASTOLIC BLOOD PRESSURE 80-89 MM HG: ICD-10-PCS | Mod: CPTII,S$GLB,, | Performed by: FAMILY MEDICINE

## 2022-03-29 PROCEDURE — 1125F AMNT PAIN NOTED PAIN PRSNT: CPT | Mod: CPTII,S$GLB,, | Performed by: FAMILY MEDICINE

## 2022-03-29 PROCEDURE — 1160F RVW MEDS BY RX/DR IN RCRD: CPT | Mod: CPTII,S$GLB,, | Performed by: FAMILY MEDICINE

## 2022-03-29 PROCEDURE — 80053 COMPREHEN METABOLIC PANEL: CPT | Performed by: INTERNAL MEDICINE

## 2022-03-29 PROCEDURE — 99999 PR PBB SHADOW E&M-EST. PATIENT-LVL IV: CPT | Mod: PBBFAC,,, | Performed by: FAMILY MEDICINE

## 2022-03-29 PROCEDURE — 3288F FALL RISK ASSESSMENT DOCD: CPT | Mod: CPTII,S$GLB,, | Performed by: FAMILY MEDICINE

## 2022-03-29 PROCEDURE — 99499 RISK ADDL DX/OHS AUDIT: ICD-10-PCS | Mod: S$GLB,,, | Performed by: FAMILY MEDICINE

## 2022-03-29 PROCEDURE — 1125F PR PAIN SEVERITY QUANTIFIED, PAIN PRESENT: ICD-10-PCS | Mod: CPTII,S$GLB,, | Performed by: FAMILY MEDICINE

## 2022-03-29 PROCEDURE — 99999 PR PBB SHADOW E&M-EST. PATIENT-LVL IV: ICD-10-PCS | Mod: PBBFAC,,, | Performed by: FAMILY MEDICINE

## 2022-03-29 PROCEDURE — 36415 COLL VENOUS BLD VENIPUNCTURE: CPT | Mod: PO | Performed by: INTERNAL MEDICINE

## 2022-03-29 PROCEDURE — 1160F PR REVIEW ALL MEDS BY PRESCRIBER/CLIN PHARMACIST DOCUMENTED: ICD-10-PCS | Mod: CPTII,S$GLB,, | Performed by: FAMILY MEDICINE

## 2022-03-29 RX ORDER — AMOXICILLIN AND CLAVULANATE POTASSIUM 875; 125 MG/1; MG/1
1 TABLET, FILM COATED ORAL 2 TIMES DAILY
Qty: 20 TABLET | Refills: 0 | Status: SHIPPED | OUTPATIENT
Start: 2022-03-29 | End: 2022-04-08

## 2022-03-29 NOTE — PATIENT INSTRUCTIONS
"  Topical Treatments:  Moisturizer to periwound areas  Dressings:   Santly and island border dressing applied  Compression:   Elevate legs ("recliner position") as able, even when sleeping  Offloading:  Avoid pressure to area  Activity   Limit activity: Rest several times per day. - Keep off area as much as possible.   Dietary:   As tolerated: 3 well-balanced meals  Increased protein: Boost or Ensure, [place] Instant Breakfast (purchase sugarfree if Diabetic) Increasing your protein intake is very helpful with wound healing; if you are a kidney or dialysis patient please check with your Nephrologist as to how much protein you are allowed to take in with your condition. Taking a daily Multivitamin with Zinc as well as Vitamin C, 1000 mg minimum per day will also help with healing your wound. If you are on Coumadin, please contact the Coumadin Clinic before beginning a Multivitamin. High protein items that can be added to your diet include, extra helpings of meats, fish, beans, also High protein bars that add as little as 12 gms of protein and up to 32 gms of protein per bar.  Supplement with: Multivitamin with Zinc and Vitamin C 500mg twice a day.  Return Appointment: 4/5/22        Patient Instructions:  Clean wound and change dressing every day. Wash hands before and after wound care. Remove old dressing, clean with soap/water or saline/wound cleanser; pat dry. May clean wound while in the shower with soap/water. Apply Santyl, nickel layer thick, to wound then cover with a bandage of choice daily. Pt should keep wound covered at all times and avoid getting dressing wet. If dressing is saturated, it will need to be changed. Apply moisturizer to lower legs. Elevate legs when possible. Start Augmentin as ordered per Dr. Carlson today, 3/29/22. Incorporate a probiotic into diet, such as yogurt or a supplement. Notify wound care clinic for any concerns or changes in wound. Referral placed for  at [facility]. Home Health on hold at this time.   Discharge Instructions.   Return Appointment: Should you experience any significant changes in your wound(s) or have any questions regarding your home care instructions please contact [facility], ask for the wound center- [phone].  If after hours, contact your primary care physician or go to the hospital emergency room.    In a diabetic patient with a wound, keeping your blood glucose levels as close to normal can be a great help towards healing your wound. Wounds are very difficult to heal in a high sugar environment so controlling the glucose well can go a long way towards successful wound healing.     Increasing your protein intake is very helpful with wound healing; if you are a kidney or dialysis patient please check with your Nephrologist as to how much protein you are allowed to take in with your condition. Taking a daily Multivitamin with Zinc as well as Vitamin C, 1000 mg minimum per day will also help with healing your wound.If you are on Coumadin, please contact the Coumadin Clinic before beginning a Multivitamin. High protein items that can be added to your diet include, extra helpings of meats, fish, beans, also High protein bars that add as little as 12 gms of protein and up to 32 gms of protein per bar.     The clinic is open Mon-Fri from 8:00 a.m. until 5:00 p.m. During business hours call [facility], ask for Wound Care @ [phone] for any worsening symptoms; fever >101.0, increased pain, increased drainage, foul odor or problems with the dressing. For after hours problems or emergencies please report to the nearest emergency room.     Patient instructed on proper handwashing technique. Using warm water and soap, apply soap, lather well and vigorously apply friction for a minimum of 20 seconds, rinse well and dry well; wash before and after toileting, before and after woundcare, after sneezing,  coughing etc.     Patient to keep the dressing clean, dry and intact. Call for any worsening symptoms or problems. [phone]. - Use cast protector if showering. Keep wrap dry and intact until next appt.

## 2022-03-29 NOTE — PROGRESS NOTES
[facility]                         Wound Care Clinic                Progress Note    Subjective:       Patient ID: Sneha Mcghee is a 71 y.o. female.    Chief Complaint: Wound Check    HPI     Pt seen in clinic for a FU visit for left leg venous ulcer. Wound is positive for Klebsiella on culture. Pt states it is painful at times but not consistently. Pt has no new complaints today    Initial Consult Date: 1/26/22  Wound onset: Jan 21 or 22, 2022  Referring MD:Dr. Elizabeth Jacobo  Physician List:  Vascular Status/SARITHA:  Had ultrasounds done 1/27/22  Sensilase or Vascular Studies:  Nutrition Risk/Labs: 12/21  Other Labs:  Recent Cultures & Dates:2/8/22  Radiology/Xray:  Diabetes Status/HbA1c: Dec' 2021: 6.6  Offloading/Immobilization:  Edema/Compression: has compression stockings but does not wear  Tobacco Use:  Other:  Recent Antibiotics:Clindamycin 1/2022 2/2022 Amoxicillin   Recent MD visit:3/17/22 Dr. Mcgovern  Upcoming MD Visit:Dr. Jacobo 6/21     3/29/22: Augmentin called in today per Dr. Carlson     Review of Systems   Skin: Positive for wound.        Left leg venous ulcer   All other systems reviewed and are negative.        Objective:        Physical Exam  Vitals and nursing note reviewed.   Constitutional:       General: She is not in acute distress.     Appearance: Normal appearance. She is not ill-appearing, toxic-appearing or diaphoretic.   Skin:     General: Skin is warm and dry.      Coloration: Skin is not jaundiced or pale.      Findings: Erythema and lesion present. No bruising or rash.      Comments: Left leg venous ulcer, + infection, decreased slough, too painful to debride today   Neurological:      General: No focal deficit present.      Mental Status: She is alert and oriented to person, place, and time.   Psychiatric:         Mood and Affect: Mood normal.         Behavior: Behavior normal.         Thought Content: Thought content normal.          Judgment: Judgment normal.         Vitals:    03/29/22 0800   BP: (!) 155/84   Pulse: 64   Resp: 20       Assessment:           ICD-10-CM ICD-9-CM   1. Open wound of left lower leg, subsequent encounter  S81.802D V58.89     891.0   2. PVD (peripheral vascular disease)  I73.9 443.9   3. Venous insufficiency  I87.2 459.81   4. Cellulitis of left lower extremity  L03.116 682.6   5. Klebsiella infection  A49.8 041.85            Wound 01/26/22 1312 Traumatic Left lower;lateral Leg (Active)   01/26/22 1312    Pre-existing:    Primary Wound Type: Traumatic   Side: Left   Orientation: lower;lateral   Location: Leg   Wound Number:    Ankle-Brachial Index:    Pulses:    Removal Indication and Assessment:    Wound Outcome:    (Retired) Wound Type:    (Retired) Wound Length (cm):    (Retired) Wound Width (cm):    (Retired) Depth (cm):    Wound Description (Comments):    Removal Indications:    Wound Image   03/29/22 0800   Dressing Appearance Intact;Moist drainage 03/29/22 0800   Drainage Amount Small 03/29/22 0800   Drainage Characteristics/Odor Yellow;Serous 03/29/22 0800   Appearance Pink;Red;Granulating;Slough;Yellow 03/29/22 0800   Tissue loss description Full thickness 03/29/22 0800   Red (%), Wound Tissue Color 25 % 03/29/22 0800   Yellow (%), Wound Tissue Color 75 % 03/29/22 0800   Periwound Area Intact;Hemosiderin Staining;Pink;Satellite lesion 03/29/22 0800   Wound Edges Irregular 03/29/22 0800   Wound Length (cm) 3 cm 03/29/22 0800   Wound Width (cm) 2.9 cm 03/29/22 0800   Wound Depth (cm) 0.2 cm 03/29/22 0800   Wound Volume (cm^3) 1.74 cm^3 03/29/22 0800   Wound Surface Area (cm^2) 8.7 cm^2 03/29/22 0800   Care Cleansed with:;Wound cleanser 03/29/22 0800   Dressing Applied;Island/border 03/29/22 0800   Dressing Change Due 03/30/22 03/29/22 0800           Plan:            MD Orders:  Obtained wound photos and measurements.  Anesthetic:   · Topical 4% Lidocaine Cream to wound bed prior to cleansing and/or  "debridement:none used today  Wound cleansing:   · Saline or wound cleanser to cleanse wound   Debridement:  · Wounds were mechanically debrided with gauze and wound cleanser by RN  Topical Treatments:  · Moisturizer to periwound areas  Dressings:   Santly and island border dressing applied  Compression:   · Elevate legs ("recliner position") as able, even when sleeping  Offloading:  · Avoid pressure to area  Activity   · Limit activity: Rest several times per day. - Keep off area as much as possible.   Dietary:   · As tolerated: 3 well-balanced meals  · Increased protein: Boost or Ensure, [place] Instant Breakfast (purchase sugarfree if Diabetic) Increasing your protein intake is very helpful with wound healing; if you are a kidney or dialysis patient please check with your Nephrologist as to how much protein you are allowed to take in with your condition. Taking a daily Multivitamin with Zinc as well as Vitamin C, 1000 mg minimum per day will also help with healing your wound. If you are on Coumadin, please contact the Coumadin Clinic before beginning a Multivitamin. High protein items that can be added to your diet include, extra helpings of meats, fish, beans, also High protein bars that add as little as 12 gms of protein and up to 32 gms of protein per bar.  · Supplement with: Multivitamin with Zinc and Vitamin C 500mg twice a day.  Return Appointment: 4/5/22        Patient Instructions:  Clean wound and change dressing every day. Wash hands before and after wound care. Remove old dressing, clean with soap/water or saline/wound cleanser; pat dry. May clean wound while in the shower with soap/water. Apply Santyl, nickel layer thick, to wound then cover with a bandage of choice daily. Pt should keep wound covered at all times and avoid getting dressing wet. If dressing is saturated, it will need to be changed. Elevate legs when possible. Start Augmentin as ordered per Dr. Carlson today, 3/29/22. Incorporate a " probiotic into diet, such as yogurt or a supplement. Notify wound care clinic for any concerns or changes in wound. Referral placed for  at [facility] for Interventional [facility]iology. Home Health on hold at this time.            Sneha was seen today for wound check.    Diagnoses and all orders for this visit:    Open wound of left lower leg, subsequent encounter  -     Change dressing    PVD (peripheral vascular disease)  -     Change dressing    Venous insufficiency  -     Change dressing    Cellulitis of left lower extremity  -     Change dressing    Klebsiella infection    Other orders  -     amoxicillin-clavulanate 875-125mg (AUGMENTIN) 875-125 mg per tablet; Take 1 tablet by mouth 2 (two) times daily. for 10 days

## 2022-03-31 ENCOUNTER — TELEPHONE (OUTPATIENT)
Dept: VASCULAR SURGERY | Facility: CLINIC | Age: 72
End: 2022-03-31
Payer: MEDICARE

## 2022-03-31 NOTE — TELEPHONE ENCOUNTER
----- Message from Shaniqua Baxter sent at 3/31/2022  8:16 AM CDT -----  Contact: self  Patient is requestign a call back from [facility] to see if she should be on the brand name of her antibiotic, amoxicillin-clavulanate 875-125mg (AUGMENTIN) 875-125 mg per tablet. Call back at [phone] (home) and thanks

## 2022-04-04 ENCOUNTER — OFFICE VISIT (OUTPATIENT)
Dept: OPHTHALMOLOGY | Facility: CLINIC | Age: 72
End: 2022-04-04
Payer: MEDICARE

## 2022-04-04 DIAGNOSIS — H40.9 GLAUCOMA OF BOTH EYES, UNSPECIFIED GLAUCOMA TYPE: ICD-10-CM

## 2022-04-04 DIAGNOSIS — H35.3132 INTERMEDIATE STAGE NONEXUDATIVE AGE-RELATED MACULAR DEGENERATION OF BOTH EYES: Primary | ICD-10-CM

## 2022-04-04 DIAGNOSIS — H43.811 POSTERIOR VITREOUS DETACHMENT, RIGHT: ICD-10-CM

## 2022-04-04 PROCEDURE — 1126F AMNT PAIN NOTED NONE PRSNT: CPT | Mod: CPTII,S$GLB,, | Performed by: OPHTHALMOLOGY

## 2022-04-04 PROCEDURE — 1101F PT FALLS ASSESS-DOCD LE1/YR: CPT | Mod: CPTII,S$GLB,, | Performed by: OPHTHALMOLOGY

## 2022-04-04 PROCEDURE — 92134 CPTRZ OPH DX IMG PST SGM RTA: CPT | Mod: S$GLB,,, | Performed by: OPHTHALMOLOGY

## 2022-04-04 PROCEDURE — 92201 OPSCPY EXTND RTA DRAW UNI/BI: CPT | Mod: S$GLB,,, | Performed by: OPHTHALMOLOGY

## 2022-04-04 PROCEDURE — 99999 PR PBB SHADOW E&M-EST. PATIENT-LVL III: CPT | Mod: PBBFAC,,, | Performed by: OPHTHALMOLOGY

## 2022-04-04 PROCEDURE — 92004 PR EYE EXAM, NEW PATIENT,COMPREHESV: ICD-10-PCS | Mod: S$GLB,,, | Performed by: OPHTHALMOLOGY

## 2022-04-04 PROCEDURE — 1159F PR MEDICATION LIST DOCUMENTED IN MEDICAL RECORD: ICD-10-PCS | Mod: CPTII,S$GLB,, | Performed by: OPHTHALMOLOGY

## 2022-04-04 PROCEDURE — 3288F FALL RISK ASSESSMENT DOCD: CPT | Mod: CPTII,S$GLB,, | Performed by: OPHTHALMOLOGY

## 2022-04-04 PROCEDURE — 1160F PR REVIEW ALL MEDS BY PRESCRIBER/CLIN PHARMACIST DOCUMENTED: ICD-10-PCS | Mod: CPTII,S$GLB,, | Performed by: OPHTHALMOLOGY

## 2022-04-04 PROCEDURE — 1159F MED LIST DOCD IN RCRD: CPT | Mod: CPTII,S$GLB,, | Performed by: OPHTHALMOLOGY

## 2022-04-04 PROCEDURE — 1126F PR PAIN SEVERITY QUANTIFIED, NO PAIN PRESENT: ICD-10-PCS | Mod: CPTII,S$GLB,, | Performed by: OPHTHALMOLOGY

## 2022-04-04 PROCEDURE — 92004 COMPRE OPH EXAM NEW PT 1/>: CPT | Mod: S$GLB,,, | Performed by: OPHTHALMOLOGY

## 2022-04-04 PROCEDURE — 92134 POSTERIOR SEGMENT OCT RETINA (OCULAR COHERENCE TOMOGRAPHY)-BOTH EYES: ICD-10-PCS | Mod: S$GLB,,, | Performed by: OPHTHALMOLOGY

## 2022-04-04 PROCEDURE — 99999 PR PBB SHADOW E&M-EST. PATIENT-LVL III: ICD-10-PCS | Mod: PBBFAC,,, | Performed by: OPHTHALMOLOGY

## 2022-04-04 PROCEDURE — 3288F PR FALLS RISK ASSESSMENT DOCUMENTED: ICD-10-PCS | Mod: CPTII,S$GLB,, | Performed by: OPHTHALMOLOGY

## 2022-04-04 PROCEDURE — 92201 PR OPHTHALMOSCOPY, EXT, W/RET DRAW/SCLERAL DEPR, I&R, UNI/BI: ICD-10-PCS | Mod: S$GLB,,, | Performed by: OPHTHALMOLOGY

## 2022-04-04 PROCEDURE — 1160F RVW MEDS BY RX/DR IN RCRD: CPT | Mod: CPTII,S$GLB,, | Performed by: OPHTHALMOLOGY

## 2022-04-04 PROCEDURE — 1101F PR PT FALLS ASSESS DOC 0-1 FALLS W/OUT INJ PAST YR: ICD-10-PCS | Mod: CPTII,S$GLB,, | Performed by: OPHTHALMOLOGY

## 2022-04-04 RX ORDER — COLLAGENASE SANTYL 250 [ARB'U]/G
OINTMENT TOPICAL DAILY
Status: ON HOLD | COMMUNITY
Start: 2022-03-29 | End: 2023-10-17 | Stop reason: HOSPADM

## 2022-04-04 NOTE — PROGRESS NOTES
HPI     New Patient AMD consult- former pt of Dr. Jain    VA stable ou. No pain. No f/f.    Latanoprost ou qhs  Timolol ou bid    Last edited by Tabby Bishop on 4/4/2022  2:33 PM. (History)          OCT - [place] OU  OD PVD  OS VMA at [facility]      A/P    1. Dry AMD OU  AREDS/AG    2. PVD OD    3. HTN Ret OU    4. PCIOL OU      1 year OCT    Letter TO Dr. Tucker

## 2022-04-05 ENCOUNTER — OFFICE VISIT (OUTPATIENT)
Dept: WOUND CARE | Facility: CLINIC | Age: 72
End: 2022-04-05
Payer: MEDICARE

## 2022-04-05 VITALS — SYSTOLIC BLOOD PRESSURE: 153 MMHG | HEART RATE: 66 BPM | DIASTOLIC BLOOD PRESSURE: 84 MMHG | TEMPERATURE: 98 F

## 2022-04-05 DIAGNOSIS — L03.116 CELLULITIS OF LEFT LOWER EXTREMITY: ICD-10-CM

## 2022-04-05 DIAGNOSIS — I87.2 VENOUS INSUFFICIENCY: ICD-10-CM

## 2022-04-05 DIAGNOSIS — I73.9 PVD (PERIPHERAL VASCULAR DISEASE): ICD-10-CM

## 2022-04-05 DIAGNOSIS — S81.802D OPEN WOUND OF LEFT LOWER LEG, SUBSEQUENT ENCOUNTER: Primary | ICD-10-CM

## 2022-04-05 PROCEDURE — 1101F PT FALLS ASSESS-DOCD LE1/YR: CPT | Mod: CPTII,S$GLB,, | Performed by: FAMILY MEDICINE

## 2022-04-05 PROCEDURE — 3288F FALL RISK ASSESSMENT DOCD: CPT | Mod: CPTII,S$GLB,, | Performed by: FAMILY MEDICINE

## 2022-04-05 PROCEDURE — 99999 PR PBB SHADOW E&M-EST. PATIENT-LVL III: CPT | Mod: PBBFAC,,, | Performed by: FAMILY MEDICINE

## 2022-04-05 PROCEDURE — 11042 PR DEBRIDEMENT, SKIN, SUB-Q TISSUE,=<20 SQ CM: ICD-10-PCS | Mod: S$GLB,,, | Performed by: FAMILY MEDICINE

## 2022-04-05 PROCEDURE — 1159F PR MEDICATION LIST DOCUMENTED IN MEDICAL RECORD: ICD-10-PCS | Mod: CPTII,S$GLB,, | Performed by: FAMILY MEDICINE

## 2022-04-05 PROCEDURE — 1160F PR REVIEW ALL MEDS BY PRESCRIBER/CLIN PHARMACIST DOCUMENTED: ICD-10-PCS | Mod: CPTII,S$GLB,, | Performed by: FAMILY MEDICINE

## 2022-04-05 PROCEDURE — 11042 DBRDMT SUBQ TIS 1ST 20SQCM/<: CPT | Mod: S$GLB,,, | Performed by: FAMILY MEDICINE

## 2022-04-05 PROCEDURE — 1125F PR PAIN SEVERITY QUANTIFIED, PAIN PRESENT: ICD-10-PCS | Mod: CPTII,S$GLB,, | Performed by: FAMILY MEDICINE

## 2022-04-05 PROCEDURE — 99499 NO LOS: ICD-10-PCS | Mod: S$GLB,,, | Performed by: FAMILY MEDICINE

## 2022-04-05 PROCEDURE — 99499 UNLISTED E&M SERVICE: CPT | Mod: S$GLB,,, | Performed by: FAMILY MEDICINE

## 2022-04-05 PROCEDURE — 3077F PR MOST RECENT SYSTOLIC BLOOD PRESSURE >= 140 MM HG: ICD-10-PCS | Mod: CPTII,S$GLB,, | Performed by: FAMILY MEDICINE

## 2022-04-05 PROCEDURE — 1159F MED LIST DOCD IN RCRD: CPT | Mod: CPTII,S$GLB,, | Performed by: FAMILY MEDICINE

## 2022-04-05 PROCEDURE — 3079F DIAST BP 80-89 MM HG: CPT | Mod: CPTII,S$GLB,, | Performed by: FAMILY MEDICINE

## 2022-04-05 PROCEDURE — 3079F PR MOST RECENT DIASTOLIC BLOOD PRESSURE 80-89 MM HG: ICD-10-PCS | Mod: CPTII,S$GLB,, | Performed by: FAMILY MEDICINE

## 2022-04-05 PROCEDURE — 99999 PR PBB SHADOW E&M-EST. PATIENT-LVL III: ICD-10-PCS | Mod: PBBFAC,,, | Performed by: FAMILY MEDICINE

## 2022-04-05 PROCEDURE — 1125F AMNT PAIN NOTED PAIN PRSNT: CPT | Mod: CPTII,S$GLB,, | Performed by: FAMILY MEDICINE

## 2022-04-05 PROCEDURE — 1101F PR PT FALLS ASSESS DOC 0-1 FALLS W/OUT INJ PAST YR: ICD-10-PCS | Mod: CPTII,S$GLB,, | Performed by: FAMILY MEDICINE

## 2022-04-05 PROCEDURE — 3288F PR FALLS RISK ASSESSMENT DOCUMENTED: ICD-10-PCS | Mod: CPTII,S$GLB,, | Performed by: FAMILY MEDICINE

## 2022-04-05 PROCEDURE — 1160F RVW MEDS BY RX/DR IN RCRD: CPT | Mod: CPTII,S$GLB,, | Performed by: FAMILY MEDICINE

## 2022-04-05 PROCEDURE — 3077F SYST BP >= 140 MM HG: CPT | Mod: CPTII,S$GLB,, | Performed by: FAMILY MEDICINE

## 2022-04-05 NOTE — PROCEDURES
"Debridement    Date/Time: 4/5/2022 8:00 AM  Performed by: Didier Carlson DO  Authorized by: Didier Carlson DO     Time out: Immediately prior to procedure a "time out" was called to verify the correct patient, procedure, equipment, support staff and site/side marked as required.    Consent Done?:  Yes (Written)  Local anesthesia used?: Yes    Anesthesia:  Local infiltration  Local anesthetic:  Lidocaine 2% topical gel  Anesthetic total (ml):  5    Wound Details:    Location:  Left leg       Length (cm):  2.9       Area (sq cm):  8.12       Width (cm):  2.8       Percent Debrided (%):  100       Depth (cm):  0.2       Total Area Debrided (sq cm):  8.12    Depth of debridement:  Subcutaneous tissue    Tissue debrided:  Dermis, Epidermis and Subcutaneous    Devitalized tissue debrided:  Biofilm, Exudate, Fibrin, Necrotic/Eschar and Slough    Instruments:  Curette    Bleeding:  Minimal  Hemostasis Achieved: Yes    Method Used:  Pressure  Patient tolerance:  Patient tolerated the procedure well with no immediate complications      "

## 2022-04-05 NOTE — PROGRESS NOTES
[facility]                         Wound Care Clinic                Progress Note    Subjective:       Patient ID: Sneha Mcghee is a 71 y.o. female.    Chief Complaint: Wound Check    HPI     Pt seen in clinic for a FU visit for left leg open wound. Wound has decreased erythema, but is slough covered. Pt pain has decreased, and she has no new complaints today    Initial Consult Date: 1/26/22  Wound onset: Jan 21 or 22, 2022  Referring MD:Dr. Elizabeth Jacobo  Physician List:  Vascular Status/SARITHA:  Had ultrasounds done 1/27/22  Sensilase or Vascular Studies:  Nutrition Risk/Labs: 12/21  Other Labs:  Recent Cultures & Dates:2/8/22  Radiology/Xray:  Diabetes Status/HbA1c: Dec' 2021: 6.6  Offloading/Immobilization:  Edema/Compression: has compression stockings but does not wear  Tobacco Use:  Other:  Recent Antibiotics:Clindamycin 1/2022 2/2022 Amoxicillin   Recent MD visit:3/17/22 Dr. Mcgovern  Upcoming MD Visit:Dr. Jacobo 6/21 4/5/22: Remains on Augmentin. States pain has decreased.     Review of Systems   Skin: Positive for wound.        Left leg open wound   All other systems reviewed and are negative.        Objective:        Physical Exam  Vitals and nursing note reviewed.   Constitutional:       General: She is not in acute distress.     Appearance: Normal appearance. She is not ill-appearing, toxic-appearing or diaphoretic.   Skin:     General: Skin is warm and dry.      Coloration: Skin is not jaundiced or pale.      Findings: Lesion present. No bruising, erythema or rash.      Comments: Left leg open wound, venous ulcer, slough covered, decreased erythema   Neurological:      General: No focal deficit present.      Mental Status: She is alert and oriented to person, place, and time.   Psychiatric:         Mood and Affect: Mood normal.         Behavior: Behavior normal.         Thought Content: Thought content normal.         Judgment: Judgment normal.          Vitals:    04/05/22 0807   BP: (!) 153/84   Pulse: 66   Temp: 97.9 °F (36.6 °C)       Assessment:           ICD-10-CM ICD-9-CM   1. Open wound of left lower leg, subsequent encounter  S81.802D V58.89     891.0   2. PVD (peripheral vascular disease)  I73.9 443.9   3. Venous insufficiency  I87.2 459.81   4. Cellulitis of left lower extremity  L03.116 682.6            Wound 01/26/22 1312 Traumatic Left lower;lateral Leg (Active)   01/26/22 1312    Pre-existing:    Primary Wound Type: Traumatic   Side: Left   Orientation: lower;lateral   Location: Leg   Wound Number:    Ankle-Brachial Index:    Pulses:    Removal Indication and Assessment:    Wound Outcome:    (Retired) Wound Type:    (Retired) Wound Length (cm):    (Retired) Wound Width (cm):    (Retired) Depth (cm):    Wound Description (Comments):    Removal Indications:    Wound Image   04/05/22 0842   Dressing Appearance Moist drainage 04/05/22 0800   Drainage Amount Small 04/05/22 0800   Drainage Characteristics/Odor Serous;Yellow 04/05/22 0800   Appearance Pink;Granulating;Epithelialization;Slough;Yellow 04/05/22 0800   Tissue loss description Full thickness 04/05/22 0800   Red (%), Wound Tissue Color 15 % 04/05/22 0800   Yellow (%), Wound Tissue Color 85 % 04/05/22 0800   Periwound Area Intact;Hemosiderin Staining 04/05/22 0800   Wound Edges Irregular 04/05/22 0800   Wound Length (cm) 2.9 cm 04/05/22 0800   Wound Width (cm) 2.8 cm 04/05/22 0800   Wound Depth (cm) 0.2 cm 04/05/22 0800   Wound Volume (cm^3) 1.624 cm^3 04/05/22 0800   Wound Surface Area (cm^2) 8.12 cm^2 04/05/22 0800   Care Cleansed with:;Wound cleanser;Applied: 04/05/22 0800   Dressing Applied;Island/border 04/05/22 0842   Dressing Change Due 04/06/22 04/05/22 0842           Plan:              MD Orders:  Obtained wound photos and measurements.  Anesthetic:   · Topical 4% Lidocaine Cream to wound bed prior to cleansing and/or debridement: used today  Wound cleansing:   · Saline or wound  "cleanser to cleanse wound   Debridement:  · Wounds were mechanically debrided with gauze and wound cleanser by RN  · Debrided per Dr. Carlson with 3 mm curette, tolerated well  Topical Treatments:  · Moisturizer to periwound areas  Dressings:   Santly and island border dressing applied  Compression:   · Elevate legs ("recliner position") as able, even when sleeping  Offloading:  · Avoid pressure to area  Activity   · Limit activity: Rest several times per day. - Keep off area as much as possible.   Dietary:   · As tolerated: 3 well-balanced meals  · Increased protein: Boost or Ensure, [place] Instant Breakfast (purchase sugarfree if Diabetic) Increasing your protein intake is very helpful with wound healing; if you are a kidney or dialysis patient please check with your Nephrologist as to how much protein you are allowed to take in with your condition. Taking a daily Multivitamin with Zinc as well as Vitamin C, 1000 mg minimum per day will also help with healing your wound. If you are on Coumadin, please contact the Coumadin Clinic before beginning a Multivitamin. High protein items that can be added to your diet include, extra helpings of meats, fish, beans, also High protein bars that add as little as 12 gms of protein and up to 32 gms of protein per bar.  · Supplement with: Multivitamin with Zinc and Vitamin C 500mg twice a day.  Return Appointment: 4/19/22        Patient Instructions:  Clean wound and change dressing every day. Wash hands before and after wound care. Remove old dressing, clean with soap/water or saline/wound cleanser; pat dry. May clean wound while in the shower with soap/water. Apply Santyl, nickel layer thick, to wound then cover with a bandage of choice daily. Pt should keep wound covered at all times and avoid getting dressing wet. If dressing is saturated, it will need to be changed. Elevate legs when possible. Start Augmentin as ordered per Dr. Carlson today, 3/29/22. Incorporate a " probiotic into diet, such as yogurt or a supplement. Notify wound care clinic for any concerns or changes in wound. Referral placed for  at [facility] for Interventional Cardiology. Home Health on hold at this time.      Sneha was seen today for wound check.    Diagnoses and all orders for this visit:    Open wound of left lower leg, subsequent encounter  -     Change dressing  -     Debridement    PVD (peripheral vascular disease)  -     Change dressing    Venous insufficiency  -     Change dressing    Cellulitis of left lower extremity  -     Change dressing

## 2022-04-05 NOTE — PATIENT INSTRUCTIONS
"  Dressings:   Santly and island border dressing applied  Compression:   Elevate legs ("recliner position") as able, even when sleeping  Offloading:  Avoid pressure to area  Activity   Limit activity: Rest several times per day. - Keep off area as much as possible.   Dietary:   As tolerated: 3 well-balanced meals  Increased protein: Boost or Ensure, [place] Instant Breakfast (purchase sugarfree if Diabetic) Increasing your protein intake is very helpful with wound healing; if you are a kidney or dialysis patient please check with your Nephrologist as to how much protein you are allowed to take in with your condition. Taking a daily Multivitamin with Zinc as well as Vitamin C, 1000 mg minimum per day will also help with healing your wound. If you are on Coumadin, please contact the Coumadin Clinic before beginning a Multivitamin. High protein items that can be added to your diet include, extra helpings of meats, fish, beans, also High protein bars that add as little as 12 gms of protein and up to 32 gms of protein per bar.  Supplement with: Multivitamin with Zinc and Vitamin C 500mg twice a day.  Return Appointment: 4/19/22        Patient Instructions:  Clean wound and change dressing every day. Wash hands before and after wound care. Remove old dressing, clean with soap/water or saline/wound cleanser; pat dry. May clean wound while in the shower with soap/water. Apply Santyl, nickel layer thick, to wound then cover with a bandage of choice daily. Pt should keep wound covered at all times and avoid getting dressing wet. If dressing is saturated, it will need to be changed. Elevate legs when possible. Start Augmentin as ordered per Dr. Carlson today, 3/29/22. Incorporate a probiotic into diet, such as yogurt or a supplement. Notify wound care clinic for any concerns or changes in wound. Referral placed for  at [facility] for Interventional Cardiology. Home Health on hold at this time.    Discharge " Instructions.   Return Appointment: Should you experience any significant changes in your wound(s) or have any questions regarding your home care instructions please contact [facility], ask for the wound center- [phone].  If after hours, contact your primary care physician or go to the hospital emergency room.    In a diabetic patient with a wound, keeping your blood glucose levels as close to normal can be a great help towards healing your wound. Wounds are very difficult to heal in a high sugar environment so controlling the glucose well can go a long way towards successful wound healing.     Increasing your protein intake is very helpful with wound healing; if you are a kidney or dialysis patient please check with your Nephrologist as to how much protein you are allowed to take in with your condition. Taking a daily Multivitamin with Zinc as well as Vitamin C, 1000 mg minimum per day will also help with healing your wound.If you are on Coumadin, please contact the Coumadin Clinic before beginning a Multivitamin. High protein items that can be added to your diet include, extra helpings of meats, fish, beans, also High protein bars that add as little as 12 gms of protein and up to 32 gms of protein per bar.     The clinic is open Mon-Fri from 8:00 a.m. until 5:00 p.m. During business hours call [facility], ask for Wound Care @ [phone] for any worsening symptoms; fever >101.0, increased pain, increased drainage, foul odor or problems with the dressing. For after hours problems or emergencies please report to the nearest emergency room.     Patient instructed on proper handwashing technique. Using warm water and soap, apply soap, lather well and vigorously apply friction for a minimum of 20 seconds, rinse well and dry well; wash before and after toileting, before and after woundcare, after sneezing, coughing etc.     Patient to keep the dressing clean, dry and intact. Call for any  worsening symptoms or problems. [phone]. - Use cast protector if showering. Keep wrap dry and intact until next appt.

## 2022-04-19 ENCOUNTER — OFFICE VISIT (OUTPATIENT)
Dept: WOUND CARE | Facility: CLINIC | Age: 72
End: 2022-04-19
Payer: MEDICARE

## 2022-04-19 VITALS
RESPIRATION RATE: 20 BRPM | SYSTOLIC BLOOD PRESSURE: 138 MMHG | HEART RATE: 62 BPM | DIASTOLIC BLOOD PRESSURE: 84 MMHG | TEMPERATURE: 98 F

## 2022-04-19 DIAGNOSIS — S81.802D TRAUMATIC OPEN WOUND OF LEFT LOWER LEG, SUBSEQUENT ENCOUNTER: ICD-10-CM

## 2022-04-19 DIAGNOSIS — S81.802D OPEN WOUND OF LEFT LOWER LEG, SUBSEQUENT ENCOUNTER: Primary | ICD-10-CM

## 2022-04-19 PROCEDURE — 99213 OFFICE O/P EST LOW 20 MIN: CPT | Mod: S$GLB,,, | Performed by: FAMILY MEDICINE

## 2022-04-19 PROCEDURE — 99999 PR PBB SHADOW E&M-EST. PATIENT-LVL III: CPT | Mod: PBBFAC,,, | Performed by: FAMILY MEDICINE

## 2022-04-19 PROCEDURE — 3079F DIAST BP 80-89 MM HG: CPT | Mod: CPTII,S$GLB,, | Performed by: FAMILY MEDICINE

## 2022-04-19 PROCEDURE — 3075F PR MOST RECENT SYSTOLIC BLOOD PRESS GE 130-139MM HG: ICD-10-PCS | Mod: CPTII,S$GLB,, | Performed by: FAMILY MEDICINE

## 2022-04-19 PROCEDURE — 1160F PR REVIEW ALL MEDS BY PRESCRIBER/CLIN PHARMACIST DOCUMENTED: ICD-10-PCS | Mod: CPTII,S$GLB,, | Performed by: FAMILY MEDICINE

## 2022-04-19 PROCEDURE — 3288F FALL RISK ASSESSMENT DOCD: CPT | Mod: CPTII,S$GLB,, | Performed by: FAMILY MEDICINE

## 2022-04-19 PROCEDURE — 1126F AMNT PAIN NOTED NONE PRSNT: CPT | Mod: CPTII,S$GLB,, | Performed by: FAMILY MEDICINE

## 2022-04-19 PROCEDURE — 1160F RVW MEDS BY RX/DR IN RCRD: CPT | Mod: CPTII,S$GLB,, | Performed by: FAMILY MEDICINE

## 2022-04-19 PROCEDURE — 99999 PR PBB SHADOW E&M-EST. PATIENT-LVL III: ICD-10-PCS | Mod: PBBFAC,,, | Performed by: FAMILY MEDICINE

## 2022-04-19 PROCEDURE — 1159F PR MEDICATION LIST DOCUMENTED IN MEDICAL RECORD: ICD-10-PCS | Mod: CPTII,S$GLB,, | Performed by: FAMILY MEDICINE

## 2022-04-19 PROCEDURE — 1159F MED LIST DOCD IN RCRD: CPT | Mod: CPTII,S$GLB,, | Performed by: FAMILY MEDICINE

## 2022-04-19 PROCEDURE — 1101F PT FALLS ASSESS-DOCD LE1/YR: CPT | Mod: CPTII,S$GLB,, | Performed by: FAMILY MEDICINE

## 2022-04-19 PROCEDURE — 3079F PR MOST RECENT DIASTOLIC BLOOD PRESSURE 80-89 MM HG: ICD-10-PCS | Mod: CPTII,S$GLB,, | Performed by: FAMILY MEDICINE

## 2022-04-19 PROCEDURE — 1101F PR PT FALLS ASSESS DOC 0-1 FALLS W/OUT INJ PAST YR: ICD-10-PCS | Mod: CPTII,S$GLB,, | Performed by: FAMILY MEDICINE

## 2022-04-19 PROCEDURE — 1126F PR PAIN SEVERITY QUANTIFIED, NO PAIN PRESENT: ICD-10-PCS | Mod: CPTII,S$GLB,, | Performed by: FAMILY MEDICINE

## 2022-04-19 PROCEDURE — 3288F PR FALLS RISK ASSESSMENT DOCUMENTED: ICD-10-PCS | Mod: CPTII,S$GLB,, | Performed by: FAMILY MEDICINE

## 2022-04-19 PROCEDURE — 99213 PR OFFICE/OUTPT VISIT, EST, LEVL III, 20-29 MIN: ICD-10-PCS | Mod: S$GLB,,, | Performed by: FAMILY MEDICINE

## 2022-04-19 PROCEDURE — 3075F SYST BP GE 130 - 139MM HG: CPT | Mod: CPTII,S$GLB,, | Performed by: FAMILY MEDICINE

## 2022-04-19 NOTE — PROGRESS NOTES
[facility]                         Wound Care Clinic                Progress Note    Subjective:       Patient ID: Sneha Mcghee is a 71 y.o. female.    Chief Complaint: Wound Check    HPI     [facility] for a FU visit for an open wound of the left leg. Wound is significantly improved from last week. She has no new complaints today.    Initial Consult Date: 1/26/22  Wound onset: Jan 21 or 22, 2022  Referring MD:Dr. Elizabeth Jacobo  Physician List:  Vascular Status/SARITHA:  Had ultrasounds done 1/27/22  Sensilase or Vascular Studies:  Nutrition Risk/Labs: 12/21  Other Labs:  Recent Cultures & Dates:2/8/22  Radiology/Xray:  Diabetes Status/HbA1c: Dec' 2021: 6.6  Offloading/Immobilization:  Edema/Compression: has compression stockings but does not wear  Tobacco Use:  Other:  Recent Antibiotics:Clindamycin 1/2022 2/2022 Amoxicillin   Recent MD visit:3/17/22 Dr. Mcgovern  Upcoming MD Visit:Dr. Jacobo 6/21 4/19/22: States completed antibiotics    Review of Systems   Skin: Positive for wound.        Left leg open wound   All other systems reviewed and are negative.        Objective:        Physical Exam  Vitals and nursing note reviewed.   Constitutional:       General: She is not in acute distress.     Appearance: Normal appearance. She is not ill-appearing, toxic-appearing or diaphoretic.   Skin:     General: Skin is warm and dry.      Coloration: Skin is not jaundiced or pale.      Findings: Lesion present. No bruising, erythema or rash.      Comments: Left leg open wound, decreased slough, granulation developing, decreased tenderness to touch   Neurological:      General: No focal deficit present.      Mental Status: She is alert and oriented to person, place, and time.   Psychiatric:         Mood and Affect: Mood normal.         Behavior: Behavior normal.         Thought Content: Thought content normal.         Judgment: Judgment normal.         Vitals:     04/19/22 0858   BP: 138/84   Pulse: 62   Resp: 20   Temp: 97.9 °F (36.6 °C)       Assessment:           ICD-10-CM ICD-9-CM   1. Open wound of left lower leg, subsequent encounter  S81.802D V58.89     891.0   2. Traumatic open wound of left lower leg, subsequent encounter  S81.802D V58.89     891.0            Wound 01/26/22 1312 Traumatic Left lower;lateral Leg (Active)   01/26/22 1312    Pre-existing:    Primary Wound Type: Traumatic   Side: Left   Orientation: lower;lateral   Location: Leg   Wound Number:    Ankle-Brachial Index:    Pulses:    Removal Indication and Assessment:    Wound Outcome:    (Retired) Wound Type:    (Retired) Wound Length (cm):    (Retired) Wound Width (cm):    (Retired) Depth (cm):    Wound Description (Comments):    Removal Indications:    Wound Image   04/19/22 0900   Dressing Appearance Moist drainage;Intact 04/19/22 0900   Drainage Amount Small 04/19/22 0900   Drainage Characteristics/Odor Serous;Yellow 04/19/22 0900   Appearance Pink;Red;Yellow;Slough;Epithelialization;Granulating 04/19/22 0900   Red (%), Wound Tissue Color 80 % 04/19/22 0900   Yellow (%), Wound Tissue Color 20 % 04/19/22 0900   Periwound Area Swelling;[place] 04/19/22 0900   Wound Length (cm) 2.7 cm 04/19/22 0900   Wound Width (cm) 2.8 cm 04/19/22 0900   Wound Depth (cm) 0.2 cm 04/19/22 0900   Wound Volume (cm^3) 1.512 cm^3 04/19/22 0900   Wound Surface Area (cm^2) 7.56 cm^2 04/19/22 0900   Care Cleansed with:;Wound cleanser 04/19/22 0900   Dressing Applied;Silicone 04/19/22 0900   Dressing Change Due 04/20/22 04/19/22 0900           Plan:            MD Orders:  Obtained wound photos and measurements.  Anesthetic:   · Topical 4% Lidocaine Cream to wound bed prior to cleansing and/or debridement: used today  Wound cleansing:   · Saline or wound cleanser to cleanse wound   Debridement:  · Wounds were mechanically debrided with gauze and wound cleanser by RN  ·   Topical Treatments:  · Moisturizer to periwound  "areas  Dressings:   Silicone border dressing applied  Compression:   · Elevate legs ("recliner position") as able, even when sleeping  Offloading:  · Avoid pressure to area  Activity   · Limit activity: Rest several times per day. - Keep off area as much as possible.   Dietary:   · As tolerated: 3 well-balanced meals  · Increased protein: Boost or Ensure, [place] Instant Breakfast (purchase sugarfree if Diabetic) Increasing your protein intake is very helpful with wound healing; if you are a kidney or dialysis patient please check with your Nephrologist as to how much protein you are allowed to take in with your condition. Taking a daily Multivitamin with Zinc as well as Vitamin C, 1000 mg minimum per day will also help with healing your wound. If you are on Coumadin, please contact the Coumadin Clinic before beginning a Multivitamin. High protein items that can be added to your diet include, extra helpings of meats, fish, beans, also High protein bars that add as little as 12 gms of protein and up to 32 gms of protein per bar.  · Supplement with: Multivitamin with Zinc and Vitamin C 500mg twice a day.  Return Appointment: 4/26/22        Patient Instructions:  Clean wound and change dressing every day. Wash hands before and after wound care. Remove old dressing, clean with soap/water or saline/wound cleanser; pat dry. May clean wound while in the shower with soap/water. Apply Santyl, nickel layer thick, to wound then cover with a bandage of choice daily. Pt should keep wound covered at all times and avoid getting dressing wet. If dressing is saturated, it will need to be changed. Elevate legs when possible. Continue probiotic in regimen. Notify wound care clinic for any concerns or changes in wound.          Sneha was seen today for wound check.    Diagnoses and all orders for this visit:    Open wound of left lower leg, subsequent encounter  -     Change dressing    Traumatic open wound of left lower leg, " subsequent encounter

## 2022-04-19 NOTE — PATIENT INSTRUCTIONS
"  Dressings:   Silicone border dressing applied  Compression:   Elevate legs ("recliner position") as able, even when sleeping  Offloading:  Avoid pressure to area  Activity   Limit activity: Rest several times per day. - Keep off area as much as possible.   Dietary:   As tolerated: 3 well-balanced meals  Increased protein: Boost or Ensure, [place] Instant Breakfast (purchase sugarfree if Diabetic) Increasing your protein intake is very helpful with wound healing; if you are a kidney or dialysis patient please check with your Nephrologist as to how much protein you are allowed to take in with your condition. Taking a daily Multivitamin with Zinc as well as Vitamin C, 1000 mg minimum per day will also help with healing your wound. If you are on Coumadin, please contact the Coumadin Clinic before beginning a Multivitamin. High protein items that can be added to your diet include, extra helpings of meats, fish, beans, also High protein bars that add as little as 12 gms of protein and up to 32 gms of protein per bar.  Supplement with: Multivitamin with Zinc and Vitamin C 500mg twice a day.  Return Appointment: 4/26/22        Patient Instructions:  Clean wound and change dressing every day. Wash hands before and after wound care. Remove old dressing, clean with soap/water or saline/wound cleanser; pat dry. May clean wound while in the shower with soap/water. Apply Santyl, nickel layer thick, to wound then cover with a bandage of choice daily. Pt should keep wound covered at all times and avoid getting dressing wet. If dressing is saturated, it will need to be changed. Elevate legs when possible. Continue probiotic in regimen. Notify wound care clinic for any concerns or changes in wound.     Discharge Instructions.   Return Appointment: Should you experience any significant changes in your wound(s) or have any questions regarding your home care instructions please contact [facility], ask for the wound " [place]- [phone].  If after hours, contact your primary care physician or go to the hospital emergency room.    In a diabetic patient with a wound, keeping your blood glucose levels as close to normal can be a great help towards healing your wound. Wounds are very difficult to heal in a high sugar environment so controlling the glucose well can go a long way towards successful wound healing.     Increasing your protein intake is very helpful with wound healing; if you are a kidney or dialysis patient please check with your Nephrologist as to how much protein you are allowed to take in with your condition. Taking a daily Multivitamin with Zinc as well as Vitamin C, 1000 mg minimum per day will also help with healing your wound.If you are on Coumadin, please contact the Coumadin Clinic before beginning a Multivitamin. High protein items that can be added to your diet include, extra helpings of meats, fish, beans, also High protein bars that add as little as 12 gms of protein and up to 32 gms of protein per bar.     The clinic is open Mon-Fri from 8:00 a.m. until 5:00 p.m. During business hours call [facility], ask for Wound Care @ [phone] for any worsening symptoms; fever >101.0, increased pain, increased drainage, foul odor or problems with the dressing. For after hours problems or emergencies please report to the nearest emergency room.     Patient instructed on proper handwashing technique. Using warm water and soap, apply soap, lather well and vigorously apply friction for a minimum of 20 seconds, rinse well and dry well; wash before and after toileting, before and after woundcare, after sneezing, coughing etc.     Patient to keep the dressing clean, dry and intact. Call for any worsening symptoms or problems. [phone]. - Use cast protector if showering. Keep wrap dry and intact until next appt.

## 2022-04-20 ENCOUNTER — TELEPHONE (OUTPATIENT)
Dept: CARDIOLOGY | Facility: HOSPITAL | Age: 72
End: 2022-04-20
Payer: MEDICARE

## 2022-04-20 NOTE — TELEPHONE ENCOUNTER
[phone]  not set up yet,  [phone] re: [place] has been trying to reach pt for introduction and initial DL needs to be sent from .  [phone] Not a working number

## 2022-04-26 ENCOUNTER — OFFICE VISIT (OUTPATIENT)
Dept: WOUND CARE | Facility: CLINIC | Age: 72
End: 2022-04-26
Payer: MEDICARE

## 2022-04-26 VITALS
HEART RATE: 69 BPM | SYSTOLIC BLOOD PRESSURE: 122 MMHG | RESPIRATION RATE: 20 BRPM | DIASTOLIC BLOOD PRESSURE: 77 MMHG | TEMPERATURE: 98 F

## 2022-04-26 DIAGNOSIS — I87.2 VENOUS INSUFFICIENCY: ICD-10-CM

## 2022-04-26 DIAGNOSIS — S81.802D OPEN WOUND OF LEFT LOWER LEG, SUBSEQUENT ENCOUNTER: Primary | ICD-10-CM

## 2022-04-26 PROCEDURE — 1126F PR PAIN SEVERITY QUANTIFIED, NO PAIN PRESENT: ICD-10-PCS | Mod: CPTII,S$GLB,, | Performed by: FAMILY MEDICINE

## 2022-04-26 PROCEDURE — 99213 PR OFFICE/OUTPT VISIT, EST, LEVL III, 20-29 MIN: ICD-10-PCS | Mod: S$GLB,,, | Performed by: FAMILY MEDICINE

## 2022-04-26 PROCEDURE — 3078F PR MOST RECENT DIASTOLIC BLOOD PRESSURE < 80 MM HG: ICD-10-PCS | Mod: CPTII,S$GLB,, | Performed by: FAMILY MEDICINE

## 2022-04-26 PROCEDURE — 1101F PR PT FALLS ASSESS DOC 0-1 FALLS W/OUT INJ PAST YR: ICD-10-PCS | Mod: CPTII,S$GLB,, | Performed by: FAMILY MEDICINE

## 2022-04-26 PROCEDURE — 1101F PT FALLS ASSESS-DOCD LE1/YR: CPT | Mod: CPTII,S$GLB,, | Performed by: FAMILY MEDICINE

## 2022-04-26 PROCEDURE — 3288F PR FALLS RISK ASSESSMENT DOCUMENTED: ICD-10-PCS | Mod: CPTII,S$GLB,, | Performed by: FAMILY MEDICINE

## 2022-04-26 PROCEDURE — 1160F PR REVIEW ALL MEDS BY PRESCRIBER/CLIN PHARMACIST DOCUMENTED: ICD-10-PCS | Mod: CPTII,S$GLB,, | Performed by: FAMILY MEDICINE

## 2022-04-26 PROCEDURE — 3074F SYST BP LT 130 MM HG: CPT | Mod: CPTII,S$GLB,, | Performed by: FAMILY MEDICINE

## 2022-04-26 PROCEDURE — 1126F AMNT PAIN NOTED NONE PRSNT: CPT | Mod: CPTII,S$GLB,, | Performed by: FAMILY MEDICINE

## 2022-04-26 PROCEDURE — 99999 PR PBB SHADOW E&M-EST. PATIENT-LVL III: ICD-10-PCS | Mod: PBBFAC,,, | Performed by: FAMILY MEDICINE

## 2022-04-26 PROCEDURE — 1159F PR MEDICATION LIST DOCUMENTED IN MEDICAL RECORD: ICD-10-PCS | Mod: CPTII,S$GLB,, | Performed by: FAMILY MEDICINE

## 2022-04-26 PROCEDURE — 1160F RVW MEDS BY RX/DR IN RCRD: CPT | Mod: CPTII,S$GLB,, | Performed by: FAMILY MEDICINE

## 2022-04-26 PROCEDURE — 3078F DIAST BP <80 MM HG: CPT | Mod: CPTII,S$GLB,, | Performed by: FAMILY MEDICINE

## 2022-04-26 PROCEDURE — 99213 OFFICE O/P EST LOW 20 MIN: CPT | Mod: S$GLB,,, | Performed by: FAMILY MEDICINE

## 2022-04-26 PROCEDURE — 1159F MED LIST DOCD IN RCRD: CPT | Mod: CPTII,S$GLB,, | Performed by: FAMILY MEDICINE

## 2022-04-26 PROCEDURE — 3074F PR MOST RECENT SYSTOLIC BLOOD PRESSURE < 130 MM HG: ICD-10-PCS | Mod: CPTII,S$GLB,, | Performed by: FAMILY MEDICINE

## 2022-04-26 PROCEDURE — 3288F FALL RISK ASSESSMENT DOCD: CPT | Mod: CPTII,S$GLB,, | Performed by: FAMILY MEDICINE

## 2022-04-26 PROCEDURE — 99999 PR PBB SHADOW E&M-EST. PATIENT-LVL III: CPT | Mod: PBBFAC,,, | Performed by: FAMILY MEDICINE

## 2022-04-26 NOTE — PROGRESS NOTES
[facility]                         Wound Care Clinic                Progress Note    Subjective:       Patient ID: Sneha Mcghee is a 71 y.o. female.    Chief Complaint: Wound Check    HPI     Pt seen in clinic for a FU visit for left leg open wound. Wound has improved considerably since last visit. Pt has no new complaints today    Initial Consult Date: 1/26/22  Wound onset: Jan 21 or 22, 2022  Referring MD:Dr. Elizabeth Jacobo  Physician List:  Vascular Status/SARITHA:  Had ultrasounds done 1/27/22  Sensilase or Vascular Studies:  Nutrition Risk/Labs: 12/21  Other Labs:  Recent Cultures & Dates:2/8/22  Radiology/Xray:  Diabetes Status/HbA1c: Dec' 2021: 6.6  Offloading/Immobilization:  Edema/Compression: has compression stockings but does not wear  Tobacco Use:  Other:  Recent Antibiotics:Clindamycin 1/2022 2/2022 Amoxicillin   Recent MD visit:3/17/22 Dr. Mcgovern  Upcoming MD Visit:Dr. Jacobo 6/21 4/26/22: Redness noted to db wound where adhesive was. Patient states using a new bandaid.  Review of Systems   Skin: Positive for wound.        Left leg open wound   All other systems reviewed and are negative.        Objective:        Physical Exam  Vitals and nursing note reviewed.   Constitutional:       General: She is not in acute distress.     Appearance: Normal appearance. She is not ill-appearing, toxic-appearing or diaphoretic.   Skin:     General: Skin is dry.      Coloration: Skin is not jaundiced or pale.      Findings: Lesion present. No bruising, erythema or rash.      Comments: Left leg open wound, improved, granulating   Neurological:      General: No focal deficit present.      Mental Status: She is alert and oriented to person, place, and time.   Psychiatric:         Mood and Affect: Mood normal.         Behavior: Behavior normal.         Thought Content: Thought content normal.         Judgment: Judgment normal.         Vitals:    04/26/22 0856    BP: 122/77   Pulse: 69   Resp: 20   Temp: 98.2 °F (36.8 °C)       Assessment:           ICD-10-CM ICD-9-CM   1. Open wound of left lower leg, subsequent encounter  S81.802D V58.89     891.0   2. Venous insufficiency  I87.2 459.81            Wound 01/26/22 1312 Traumatic Left lower;lateral Leg (Active)   01/26/22 1312    Pre-existing:    Primary Wound Type: Traumatic   Side: Left   Orientation: lower;lateral   Location: Leg   Wound Number:    Ankle-Brachial Index:    Pulses:    Removal Indication and Assessment:    Wound Outcome:    (Retired) Wound Type:    (Retired) Wound Length (cm):    (Retired) Wound Width (cm):    (Retired) Depth (cm):    Wound Description (Comments):    Removal Indications:    Wound Image   04/26/22 0900   Dressing Appearance Intact;Moist drainage 04/26/22 0900   Drainage Amount Small 04/26/22 0900   Drainage Characteristics/Odor Serous;Yellow 04/26/22 0900   Appearance Pink;Red;Epithelialization;Granulating;Slough 04/26/22 0900   Red (%), Wound Tissue Color 75 % 04/26/22 0900   Yellow (%), Wound Tissue Color 25 % 04/26/22 0900   Periwound Area Satellite lesion;Redness 04/26/22 0900   Wound Edges Irregular 04/26/22 0900   Wound Length (cm) 2.5 cm 04/26/22 0900   Wound Width (cm) 2.6 cm 04/26/22 0900   Wound Depth (cm) 0.2 cm 04/26/22 0900   Wound Volume (cm^3) 1.3 cm^3 04/26/22 0900   Wound Surface Area (cm^2) 6.5 cm^2 04/26/22 0900   Care Cleansed with:;Wound cleanser 04/26/22 0900   Dressing Applied 04/26/22 0900   Dressing Change Due 04/27/22 04/26/22 0900           Plan:               MD Orders:  Obtained wound photos and measurements.  Anesthetic:   · Topical 4% Lidocaine Cream to wound bed prior to cleansing and/or debridement: none used today  Wound cleansing:   · Saline or wound cleanser to cleanse wound   Debridement:  · Wounds were mechanically debrided with gauze and wound cleanser by RN  ·    Topical Treatments:  · Calmoseptine to periwound areas  Dressings:   Telfa, roll gauze  "and surginet applied  Compression:   · Elevate legs ("recliner position") as able, even when sleeping  Offloading:  · Avoid pressure to area  Activity   · Limit activity: Rest several times per day. - Keep off area as much as possible.   Dietary:   · As tolerated: 3 well-balanced meals  · Increased protein: Boost or Ensure, [place] Instant Breakfast (purchase sugarfree if Diabetic) Increasing your protein intake is very helpful with wound healing; if you are a kidney or dialysis patient please check with your Nephrologist as to how much protein you are allowed to take in with your condition. Taking a daily Multivitamin with Zinc as well as Vitamin C, 1000 mg minimum per day will also help with healing your wound. If you are on Coumadin, please contact the Coumadin Clinic before beginning a Multivitamin. High protein items that can be added to your diet include, extra helpings of meats, fish, beans, also High protein bars that add as little as 12 gms of protein and up to 32 gms of protein per bar.  · Supplement with: Multivitamin with Zinc and Vitamin C 500mg twice a day.  Return Appointment: 5/10/22        Patient Instructions:  Clean wound and change dressing every day. Wash hands before and after wound care. Remove old dressing, clean with soap/water or saline/wound cleanser; pat dry. May clean wound while in the shower with soap/water. Apply Santyl, nickel layer thick, to wound then cover with Telfa, roll gauze and surginet. Use moisturizer barrier on db wound. Pt should keep wound covered at all times and avoid getting dressing wet. If dressing is saturated, it will need to be changed. Elevate legs when possible. Continue probiotic in regimen. Notify wound care clinic for any concerns or changes in wound.        Sneha was seen today for wound check.    Diagnoses and all orders for this visit:    Open wound of left lower leg, subsequent encounter  -     Change dressing    Venous insufficiency  -     Change " dressing

## 2022-04-26 NOTE — PATIENT INSTRUCTIONS
"  Topical Treatments:  Calmoseptine to periwound areas  Dressings:   Telfa, roll gauze and surginet applied  Compression:   Elevate legs ("recliner position") as able, even when sleeping  Offloading:  Avoid pressure to area  Activity   Limit activity: Rest several times per day. - Keep off area as much as possible.   Dietary:   As tolerated: 3 well-balanced meals  Increased protein: Boost or Ensure, [place] Instant Breakfast (purchase sugarfree if Diabetic) Increasing your protein intake is very helpful with wound healing; if you are a kidney or dialysis patient please check with your Nephrologist as to how much protein you are allowed to take in with your condition. Taking a daily Multivitamin with Zinc as well as Vitamin C, 1000 mg minimum per day will also help with healing your wound. If you are on Coumadin, please contact the Coumadin Clinic before beginning a Multivitamin. High protein items that can be added to your diet include, extra helpings of meats, fish, beans, also High protein bars that add as little as 12 gms of protein and up to 32 gms of protein per bar.  Supplement with: Multivitamin with Zinc and Vitamin C 500mg twice a day.  Return Appointment: 5/10/22    Discharge Instructions.   Return Appointment: Should you experience any significant changes in your wound(s) or have any questions regarding your home care instructions please contact [facility], ask for the wound center- [phone].  If after hours, contact your primary care physician or go to the hospital emergency room.    In a diabetic patient with a wound, keeping your blood glucose levels as close to normal can be a great help towards healing your wound. Wounds are very difficult to heal in a high sugar environment so controlling the glucose well can go a long way towards successful wound healing.     Increasing your protein intake is very helpful with wound healing; if you are a kidney or dialysis patient please check with " your Nephrologist as to how much protein you are allowed to take in with your condition. Taking a daily Multivitamin with Zinc as well as Vitamin C, 1000 mg minimum per day will also help with healing your wound.If you are on Coumadin, please contact the Coumadin Clinic before beginning a Multivitamin. High protein items that can be added to your diet include, extra helpings of meats, fish, beans, also High protein bars that add as little as 12 gms of protein and up to 32 gms of protein per bar.     The clinic is open Mon-Fri from 8:00 a.m. until 5:00 p.m. During business hours call [facility], ask for Wound Care @ [phone] for any worsening symptoms; fever >101.0, increased pain, increased drainage, foul odor or problems with the dressing. For after hours problems or emergencies please report to the nearest emergency room.     Patient instructed on proper handwashing technique. Using warm water and soap, apply soap, lather well and vigorously apply friction for a minimum of 20 seconds, rinse well and dry well; wash before and after toileting, before and after woundcare, after sneezing, coughing etc.     Patient to keep the dressing clean, dry and intact. Call for any worsening symptoms or problems. [phone]. - Use cast protector if showering. Keep wrap dry and intact until next appt.           Patient Instructions:  Clean wound and change dressing every day. Wash hands before and after wound care. Remove old dressing, clean with soap/water or saline/wound cleanser; pat dry. May clean wound while in the shower with soap/water. Apply Santyl, nickel layer thick, to wound then cover with Telfa, roll gauze and surginet. Use moisturizer barrier on db wound. Pt should keep wound covered at all times and avoid getting dressing wet. If dressing is saturated, it will need to be changed. Elevate legs when possible. Continue probiotic in regimen. Notify wound care clinic for any concerns or changes in  wound.

## 2022-05-02 ENCOUNTER — TELEPHONE (OUTPATIENT)
Dept: VASCULAR SURGERY | Facility: CLINIC | Age: 72
End: 2022-05-02
Payer: MEDICARE

## 2022-05-02 NOTE — TELEPHONE ENCOUNTER
----- Message from Lashay Colvin sent at 5/2/2022 11:07 AM CDT -----  Contact: pt  Type: Needs Medical Advice    Who: Called: pt     Best Call Back Number: [phone]    Inquiry/Question: pt needs to speak to the nurse she thinks they ordered the wrong bandage for her wound and needs to speak to the nurse please advise  Thank you~

## 2022-05-05 ENCOUNTER — TELEPHONE (OUTPATIENT)
Dept: VASCULAR SURGERY | Facility: CLINIC | Age: 72
End: 2022-05-05
Payer: MEDICARE

## 2022-05-05 NOTE — TELEPHONE ENCOUNTER
----- Message from Mei Manley sent at 5/5/2022  9:31 AM CDT -----  Contact: Catalina rodriguez/ [facility]  Type: Needs Medical Advice    Who Called:  Catalina Knapp Call Back Number: [phone]    Additional Information: Checking on status of faxed wound care order, needs it signed by doctor & returned to them.  She's going to re-fax now.  Please call back.  Thanks.

## 2022-05-10 ENCOUNTER — OFFICE VISIT (OUTPATIENT)
Dept: WOUND CARE | Facility: CLINIC | Age: 72
End: 2022-05-10
Payer: MEDICARE

## 2022-05-10 VITALS
SYSTOLIC BLOOD PRESSURE: 147 MMHG | RESPIRATION RATE: 20 BRPM | HEART RATE: 68 BPM | TEMPERATURE: 98 F | DIASTOLIC BLOOD PRESSURE: 84 MMHG

## 2022-05-10 DIAGNOSIS — S81.802D OPEN WOUND OF LEFT LOWER LEG, SUBSEQUENT ENCOUNTER: Primary | ICD-10-CM

## 2022-05-10 DIAGNOSIS — I87.2 VENOUS INSUFFICIENCY: ICD-10-CM

## 2022-05-10 PROCEDURE — 3288F PR FALLS RISK ASSESSMENT DOCUMENTED: ICD-10-PCS | Mod: CPTII,S$GLB,, | Performed by: FAMILY MEDICINE

## 2022-05-10 PROCEDURE — 1159F PR MEDICATION LIST DOCUMENTED IN MEDICAL RECORD: ICD-10-PCS | Mod: CPTII,S$GLB,, | Performed by: FAMILY MEDICINE

## 2022-05-10 PROCEDURE — 1160F RVW MEDS BY RX/DR IN RCRD: CPT | Mod: CPTII,S$GLB,, | Performed by: FAMILY MEDICINE

## 2022-05-10 PROCEDURE — 3079F DIAST BP 80-89 MM HG: CPT | Mod: CPTII,S$GLB,, | Performed by: FAMILY MEDICINE

## 2022-05-10 PROCEDURE — 3288F FALL RISK ASSESSMENT DOCD: CPT | Mod: CPTII,S$GLB,, | Performed by: FAMILY MEDICINE

## 2022-05-10 PROCEDURE — 1159F MED LIST DOCD IN RCRD: CPT | Mod: CPTII,S$GLB,, | Performed by: FAMILY MEDICINE

## 2022-05-10 PROCEDURE — 1101F PT FALLS ASSESS-DOCD LE1/YR: CPT | Mod: CPTII,S$GLB,, | Performed by: FAMILY MEDICINE

## 2022-05-10 PROCEDURE — 1160F PR REVIEW ALL MEDS BY PRESCRIBER/CLIN PHARMACIST DOCUMENTED: ICD-10-PCS | Mod: CPTII,S$GLB,, | Performed by: FAMILY MEDICINE

## 2022-05-10 PROCEDURE — 1126F PR PAIN SEVERITY QUANTIFIED, NO PAIN PRESENT: ICD-10-PCS | Mod: CPTII,S$GLB,, | Performed by: FAMILY MEDICINE

## 2022-05-10 PROCEDURE — 1126F AMNT PAIN NOTED NONE PRSNT: CPT | Mod: CPTII,S$GLB,, | Performed by: FAMILY MEDICINE

## 2022-05-10 PROCEDURE — 99213 OFFICE O/P EST LOW 20 MIN: CPT | Mod: S$GLB,,, | Performed by: FAMILY MEDICINE

## 2022-05-10 PROCEDURE — 3077F SYST BP >= 140 MM HG: CPT | Mod: CPTII,S$GLB,, | Performed by: FAMILY MEDICINE

## 2022-05-10 PROCEDURE — 99999 PR PBB SHADOW E&M-EST. PATIENT-LVL III: CPT | Mod: PBBFAC,,, | Performed by: FAMILY MEDICINE

## 2022-05-10 PROCEDURE — 3079F PR MOST RECENT DIASTOLIC BLOOD PRESSURE 80-89 MM HG: ICD-10-PCS | Mod: CPTII,S$GLB,, | Performed by: FAMILY MEDICINE

## 2022-05-10 PROCEDURE — 1101F PR PT FALLS ASSESS DOC 0-1 FALLS W/OUT INJ PAST YR: ICD-10-PCS | Mod: CPTII,S$GLB,, | Performed by: FAMILY MEDICINE

## 2022-05-10 PROCEDURE — 3077F PR MOST RECENT SYSTOLIC BLOOD PRESSURE >= 140 MM HG: ICD-10-PCS | Mod: CPTII,S$GLB,, | Performed by: FAMILY MEDICINE

## 2022-05-10 PROCEDURE — 99213 PR OFFICE/OUTPT VISIT, EST, LEVL III, 20-29 MIN: ICD-10-PCS | Mod: S$GLB,,, | Performed by: FAMILY MEDICINE

## 2022-05-10 PROCEDURE — 99999 PR PBB SHADOW E&M-EST. PATIENT-LVL III: ICD-10-PCS | Mod: PBBFAC,,, | Performed by: FAMILY MEDICINE

## 2022-05-10 NOTE — PROGRESS NOTES
[facility]                         Wound Care Clinic                Progress Note    Subjective:       Patient ID: Sneha Mcghee is a 71 y.o. female.    Chief Complaint: Wound Check    HPI     Pt seen in clinic for left leg pen wound. Wound is continuing to improve, she has no new complaints today.    Initial Consult Date: 1/26/22  Wound onset: Jan 21 or 22, 2022  Referring MD:Dr. Elizabeth Jacobo  Physician List:  Vascular Status/SARITHA:  Had ultrasounds done 1/27/22  Sensilase or Vascular Studies:  Nutrition Risk/Labs: 12/21  Other Labs:  Recent Cultures & Dates:2/8/22  Radiology/Xray:  Diabetes Status/HbA1c: Dec' 2021: 6.6  Offloading/Immobilization:  Edema/Compression: has compression stockings but does not wear  Tobacco Use:  Other:  Recent Antibiotics:Clindamycin 1/2022 2/2022 Amoxicillin   Recent MD visit:3/17/22 Dr. Mcgovern  Upcoming MD Visit:Dr. Jacobo 6/21  Review of Systems   Skin: Positive for wound.        Left leg open wound, increased granulation, improving   All other systems reviewed and are negative.        Objective:        Physical Exam    Vitals:    05/10/22 0927   BP: (!) 147/84   Pulse: 68   Resp: 20   Temp: 97.9 °F (36.6 °C)       Assessment:           ICD-10-CM ICD-9-CM   1. Open wound of left lower leg, subsequent encounter  S81.802D V58.89     891.0   2. Venous insufficiency  I87.2 459.81            Wound 01/26/22 1312 Traumatic Left lower;lateral Leg (Active)   01/26/22 1312    Pre-existing:    Primary Wound Type: Traumatic   Side: Left   Orientation: lower;lateral   Location: Leg   Wound Number:    Ankle-Brachial Index:    Pulses:    Removal Indication and Assessment:    Wound Outcome:    (Retired) Wound Type:    (Retired) Wound Length (cm):    (Retired) Wound Width (cm):    (Retired) Depth (cm):    Wound Description (Comments):    Removal Indications:    Wound Image   05/10/22 0900   Dressing Appearance Intact;Moist drainage 05/10/22  "0900   Drainage Amount Small 05/10/22 0900   Drainage Characteristics/Odor Serosanguineous 05/10/22 0900   Appearance Pink;Red;Yellow;Granulating 05/10/22 0900   Tissue loss description Full thickness 05/10/22 0900   Red (%), Wound Tissue Color 85 % 05/10/22 0900   Yellow (%), Wound Tissue Color 15 % 05/10/22 0900   Periwound Area Intact;[place] 05/10/22 0900   Wound Edges Irregular 05/10/22 0900   Wound Length (cm) 2.4 cm 05/10/22 0900   Wound Width (cm) 205 cm 05/10/22 0900   Wound Depth (cm) 0.1 cm 05/10/22 0900   Wound Volume (cm^3) 49.2 cm^3 05/10/22 0900   Wound Surface Area (cm^2) 492 cm^2 05/10/22 0900   Care Cleansed with:;Wound cleanser 05/10/22 0900   Dressing Applied;Hydrogel;Island/border 05/10/22 0900   Dressing Change Due 05/11/22 05/10/22 0900           Plan:            MD Orders:  Obtained wound photos and measurements.  Anesthetic:   · Topical 4% Lidocaine Cream to wound bed prior to cleansing and/or debridement: none used today  Wound cleansing:   · Saline or wound cleanser to cleanse wound   Debridement:  · Wounds were mechanically debrided with gauze and wound cleanser by RN  ·    Topical Treatments:  ·   Dressings:   Wound gel and mepore bordered gauze  Compression:   · Elevate legs ("recliner position") as able, even when sleeping  Offloading:  · Avoid pressure to area  Activity   · Limit activity: Rest several times per day. - Keep off area as much as possible.   Dietary:   · As tolerated: 3 well-balanced meals  · Increased protein: Boost or Ensure, [place] Instant Breakfast (purchase sugarfree if Diabetic) Increasing your protein intake is very helpful with wound healing; if you are a kidney or dialysis patient please check with your Nephrologist as to how much protein you are allowed to take in with your condition. Taking a daily Multivitamin with Zinc as well as Vitamin C, 1000 mg minimum per day will also help with healing your wound. If you are on Coumadin, please contact the Coumadin " Clinic before beginning a Multivitamin. High protein items that can be added to your diet include, extra helpings of meats, fish, beans, also High protein bars that add as little as 12 gms of protein and up to 32 gms of protein per bar.  · Supplement with: Multivitamin with Zinc and Vitamin C 500mg twice a day.  Return Appointment: 5/17/22        Patient Instructions:  Clean wound and change dressing every day. Wash hands before and after wound care. Remove old dressing, clean with soap/water or saline/wound cleanser; pat dry. May clean wound while in the shower with soap/water. Apply Santyl, nickel layer thick, to wound then cover with Telfa, roll gauze and surginet or bandage of choice. Use moisturizer barrier on db wound. Pt should keep wound covered at all times and avoid getting dressing wet. If dressing is saturated, it will need to be changed. Elevate legs when possible. Continue probiotic in regimen. Notify wound care clinic for any concerns or changes in wound.       Sneha was seen today for wound check.    Diagnoses and all orders for this visit:    Open wound of left lower leg, subsequent encounter  -     Change dressing    Venous insufficiency  -     Change dressing

## 2022-05-10 NOTE — PATIENT INSTRUCTIONS
"  Dressings:   Wound gel and mepore bordered gauze  Compression:   Elevate legs ("recliner position") as able, even when sleeping  Offloading:  Avoid pressure to area  Activity   Limit activity: Rest several times per day. - Keep off area as much as possible.   Dietary:   As tolerated: 3 well-balanced meals  Increased protein: Boost or Ensure, [place] Instant Breakfast (purchase sugarfree if Diabetic) Increasing your protein intake is very helpful with wound healing; if you are a kidney or dialysis patient please check with your Nephrologist as to how much protein you are allowed to take in with your condition. Taking a daily Multivitamin with Zinc as well as Vitamin C, 1000 mg minimum per day will also help with healing your wound. If you are on Coumadin, please contact the Coumadin Clinic before beginning a Multivitamin. High protein items that can be added to your diet include, extra helpings of meats, fish, beans, also High protein bars that add as little as 12 gms of protein and up to 32 gms of protein per bar.  Supplement with: Multivitamin with Zinc and Vitamin C 500mg twice a day.  Return Appointment: 5/17/22        Patient Instructions:  Clean wound and change dressing every day. Wash hands before and after wound care. Remove old dressing, clean with soap/water or saline/wound cleanser; pat dry. May clean wound while in the shower with soap/water. Apply Santyl, nickel layer thick, to wound then cover with Telfa, roll gauze and surginet or bandage of choice. Use moisturizer barrier on db wound. Pt should keep wound covered at all times and avoid getting dressing wet. If dressing is saturated, it will need to be changed. Elevate legs when possible. Continue probiotic in regimen. Notify wound care clinic for any concerns or changes in wound.     Discharge Instructions:     The clinic is open Mon-Fri from 8:00 a.m. until 5:00 p.m. During business hours call the [facility] at [phone]  and " ask for Wound Care Department for any worsening symptoms; fever >101.0, increased pain, increased drainage, foul odor or problems with the dressing. For after hours problems or emergencies please report to the nearest emergency room.      Patient instructed on proper handwashing technique. Using warm water and soap, apply soap, lather well and vigorously apply friction for a minimum of 20 seconds, rinse well and dry well; wash before and after toileting, before and after woundcare, after sneezing, coughing etc.     Routine Dressing instructions: Patient to keep the dressing clean, dry and intact. Call for any worsening symptoms or problems. [phone].

## 2022-05-17 ENCOUNTER — OFFICE VISIT (OUTPATIENT)
Dept: WOUND CARE | Facility: CLINIC | Age: 72
End: 2022-05-17
Payer: MEDICARE

## 2022-05-17 VITALS
DIASTOLIC BLOOD PRESSURE: 77 MMHG | SYSTOLIC BLOOD PRESSURE: 149 MMHG | HEART RATE: 73 BPM | RESPIRATION RATE: 20 BRPM | TEMPERATURE: 99 F

## 2022-05-17 DIAGNOSIS — S81.802D OPEN WOUND OF LEFT LOWER LEG, SUBSEQUENT ENCOUNTER: Primary | ICD-10-CM

## 2022-05-17 DIAGNOSIS — I87.2 VENOUS INSUFFICIENCY: ICD-10-CM

## 2022-05-17 PROCEDURE — 1101F PR PT FALLS ASSESS DOC 0-1 FALLS W/OUT INJ PAST YR: ICD-10-PCS | Mod: CPTII,S$GLB,, | Performed by: FAMILY MEDICINE

## 2022-05-17 PROCEDURE — 3288F PR FALLS RISK ASSESSMENT DOCUMENTED: ICD-10-PCS | Mod: CPTII,S$GLB,, | Performed by: FAMILY MEDICINE

## 2022-05-17 PROCEDURE — 1159F MED LIST DOCD IN RCRD: CPT | Mod: CPTII,S$GLB,, | Performed by: FAMILY MEDICINE

## 2022-05-17 PROCEDURE — 99499 RISK ADDL DX/OHS AUDIT: ICD-10-PCS | Mod: S$GLB,,, | Performed by: FAMILY MEDICINE

## 2022-05-17 PROCEDURE — 99499 UNLISTED E&M SERVICE: CPT | Mod: S$GLB,,, | Performed by: FAMILY MEDICINE

## 2022-05-17 PROCEDURE — 99213 OFFICE O/P EST LOW 20 MIN: CPT | Mod: S$GLB,,, | Performed by: FAMILY MEDICINE

## 2022-05-17 PROCEDURE — 1159F PR MEDICATION LIST DOCUMENTED IN MEDICAL RECORD: ICD-10-PCS | Mod: CPTII,S$GLB,, | Performed by: FAMILY MEDICINE

## 2022-05-17 PROCEDURE — 1125F AMNT PAIN NOTED PAIN PRSNT: CPT | Mod: CPTII,S$GLB,, | Performed by: FAMILY MEDICINE

## 2022-05-17 PROCEDURE — 3078F DIAST BP <80 MM HG: CPT | Mod: CPTII,S$GLB,, | Performed by: FAMILY MEDICINE

## 2022-05-17 PROCEDURE — 99999 PR PBB SHADOW E&M-EST. PATIENT-LVL III: ICD-10-PCS | Mod: PBBFAC,,, | Performed by: FAMILY MEDICINE

## 2022-05-17 PROCEDURE — 3288F FALL RISK ASSESSMENT DOCD: CPT | Mod: CPTII,S$GLB,, | Performed by: FAMILY MEDICINE

## 2022-05-17 PROCEDURE — 1101F PT FALLS ASSESS-DOCD LE1/YR: CPT | Mod: CPTII,S$GLB,, | Performed by: FAMILY MEDICINE

## 2022-05-17 PROCEDURE — 1160F RVW MEDS BY RX/DR IN RCRD: CPT | Mod: CPTII,S$GLB,, | Performed by: FAMILY MEDICINE

## 2022-05-17 PROCEDURE — 99213 PR OFFICE/OUTPT VISIT, EST, LEVL III, 20-29 MIN: ICD-10-PCS | Mod: S$GLB,,, | Performed by: FAMILY MEDICINE

## 2022-05-17 PROCEDURE — 3077F PR MOST RECENT SYSTOLIC BLOOD PRESSURE >= 140 MM HG: ICD-10-PCS | Mod: CPTII,S$GLB,, | Performed by: FAMILY MEDICINE

## 2022-05-17 PROCEDURE — 99999 PR PBB SHADOW E&M-EST. PATIENT-LVL III: CPT | Mod: PBBFAC,,, | Performed by: FAMILY MEDICINE

## 2022-05-17 PROCEDURE — 3078F PR MOST RECENT DIASTOLIC BLOOD PRESSURE < 80 MM HG: ICD-10-PCS | Mod: CPTII,S$GLB,, | Performed by: FAMILY MEDICINE

## 2022-05-17 PROCEDURE — 3077F SYST BP >= 140 MM HG: CPT | Mod: CPTII,S$GLB,, | Performed by: FAMILY MEDICINE

## 2022-05-17 PROCEDURE — 1125F PR PAIN SEVERITY QUANTIFIED, PAIN PRESENT: ICD-10-PCS | Mod: CPTII,S$GLB,, | Performed by: FAMILY MEDICINE

## 2022-05-17 PROCEDURE — 1160F PR REVIEW ALL MEDS BY PRESCRIBER/CLIN PHARMACIST DOCUMENTED: ICD-10-PCS | Mod: CPTII,S$GLB,, | Performed by: FAMILY MEDICINE

## 2022-05-17 NOTE — PROGRESS NOTES
[facility]                         Wound Care Clinic                Progress Note    Subjective:       Patient ID: Sneha Mcghee is a 71 y.o. female.    Chief Complaint: Wound Check    HPI     Pt seen in clinic for a FU visit for left leg venous ulcer. Wound is continuing to progress and doing well. Pt has no new complaintsw at this visit.    Initial Consult Date: 1/26/22  Wound onset: Jan 21 or 22, 2022  Referring MD:Dr. Elizabeth Jacobo  Physician List:  Vascular Status/SARITHA:  Had ultrasounds done 1/27/22  Sensilase or Vascular Studies:  Nutrition Risk/Labs: 12/21  Other Labs:  Recent Cultures & Dates:2/8/22  Radiology/Xray:  Diabetes Status/HbA1c: Dec' 2021: 6.6  Offloading/Immobilization:  Edema/Compression: has compression stockings but does not wear  Tobacco Use:  Other:  Recent Antibiotics:Clindamycin 1/2022 2/2022 Amoxicillin   Recent MD visit:3/17/22 Dr. Mcgovern  Review of Systems   Skin: Positive for wound.        Left leg open wound   All other systems reviewed and are negative.        Objective:        Physical Exam  Vitals and nursing note reviewed.   Constitutional:       General: She is not in acute distress.     Appearance: Normal appearance. She is not ill-appearing, toxic-appearing or diaphoretic.   Skin:     General: Skin is warm and dry.      Coloration: Skin is not jaundiced or pale.      Findings: Lesion present. No bruising, erythema or rash.      Comments: Left leg open wound, improving   Neurological:      General: No focal deficit present.      Mental Status: She is alert and oriented to person, place, and time.   Psychiatric:         Mood and Affect: Mood normal.         Behavior: Behavior normal.         Thought Content: Thought content normal.         Judgment: Judgment normal.         Vitals:    05/17/22 0822   BP: (!) 149/77   Pulse: 73   Resp: 20   Temp: 98.5 °F (36.9 °C)       Assessment:           ICD-10-CM ICD-9-CM   1. Open  "wound of left lower leg, subsequent encounter  S81.802D V58.89     891.0   2. Venous insufficiency  I87.2 459.81            Wound 01/26/22 1312 Traumatic Left lower;lateral Leg (Active)   01/26/22 1312    Pre-existing:    Primary Wound Type: Traumatic   Side: Left   Orientation: lower;lateral   Location: Leg   Wound Number:    Ankle-Brachial Index:    Pulses:    Removal Indication and Assessment:    Wound Outcome:    (Retired) Wound Type:    (Retired) Wound Length (cm):    (Retired) Wound Width (cm):    (Retired) Depth (cm):    Wound Description (Comments):    Removal Indications:    Wound Image   05/17/22 0800   Dressing Appearance Intact;Moist drainage 05/17/22 0800   Drainage Amount Small 05/17/22 0800   Drainage Characteristics/Odor Serosanguineous 05/17/22 0800   Appearance Pink;Red;Yellow;Epithelialization;Granulating 05/17/22 0800   Tissue loss description Full thickness 05/17/22 0800   Red (%), Wound Tissue Color 85 % 05/17/22 0800   Yellow (%), Wound Tissue Color 15 % 05/17/22 0800   Periwound Area Intact;[place] 05/17/22 0800   Wound Edges Irregular 05/17/22 0800   Wound Length (cm) 2.4 cm 05/17/22 0800   Wound Width (cm) 2.5 cm 05/17/22 0800   Wound Depth (cm) 0.1 cm 05/17/22 0800   Wound Volume (cm^3) 0.6 cm^3 05/17/22 0800   Wound Surface Area (cm^2) 6 cm^2 05/17/22 0800   Care Cleansed with:;Wound cleanser 05/17/22 0800   Dressing Applied;Hydrogel;Foam 05/17/22 0800   Dressing Change Due 05/18/22 05/17/22 0800           Plan:            MD Orders:  Obtained wound photos and measurements.  Anesthetic:   · Topical 4% Lidocaine Cream to wound bed prior to cleansing and/or debridement: none used today  Wound cleansing:   · Saline or wound cleanser to cleanse wound   Debridement:  · Wounds were mechanically debrided with gauze and wound cleanser by RN  ·    Topical Treatments:  ·    Dressings:   Wound gel and silicone bordered foam  Compression:   · Elevate legs ("recliner position") as able, even when " sleeping  Offloading:  · Avoid pressure to area  Activity   · Limit activity: Rest several times per day. - Keep off area as much as possible.   Dietary:   · As tolerated: 3 well-balanced meals  · Increased protein: Boost or Ensure, [place] Instant Breakfast (purchase sugarfree if Diabetic) Increasing your protein intake is very helpful with wound healing; if you are a kidney or dialysis patient please check with your Nephrologist as to how much protein you are allowed to take in with your condition. Taking a daily Multivitamin with Zinc as well as Vitamin C, 1000 mg minimum per day will also help with healing your wound. If you are on Coumadin, please contact the Coumadin Clinic before beginning a Multivitamin. High protein items that can be added to your diet include, extra helpings of meats, fish, beans, also High protein bars that add as little as 12 gms of protein and up to 32 gms of protein per bar.  · Supplement with: Multivitamin with Zinc and Vitamin C 500mg twice a day.  Return Appointment: 6/7/22        Patient Instructions:  Clean wound and change dressing every day. Wash hands before and after wound care. Remove old dressing, clean with soap/water or saline/wound cleanser; pat dry. May clean wound while in the shower with soap/water. Apply Santyl, nickel layer thick, to wound then cover with Telfa, roll gauze and surginet or bandage of choice. Use moisturizer barrier on db wound. Pt should keep wound covered at all times and avoid getting dressing wet. If dressing is saturated, it will need to be changed. Elevate legs when possible. Continue probiotic in regimen. Notify wound care clinic for any concerns or changes in wound.         Sneha was seen today for wound check.    Diagnoses and all orders for this visit:    Open wound of left lower leg, subsequent encounter  -     Change dressing    Venous insufficiency  -     Change dressing

## 2022-05-17 NOTE — PATIENT INSTRUCTIONS
"  Dressings:   Wound gel and silicone bordered foam  Compression:   Elevate legs ("recliner position") as able, even when sleeping  Offloading:  Avoid pressure to area  Activity   Limit activity: Rest several times per day. - Keep off area as much as possible.   Dietary:   As tolerated: 3 well-balanced meals  Increased protein: Boost or Ensure, [place] Instant Breakfast (purchase sugarfree if Diabetic) Increasing your protein intake is very helpful with wound healing; if you are a kidney or dialysis patient please check with your Nephrologist as to how much protein you are allowed to take in with your condition. Taking a daily Multivitamin with Zinc as well as Vitamin C, 1000 mg minimum per day will also help with healing your wound. If you are on Coumadin, please contact the Coumadin Clinic before beginning a Multivitamin. High protein items that can be added to your diet include, extra helpings of meats, fish, beans, also High protein bars that add as little as 12 gms of protein and up to 32 gms of protein per bar.  Supplement with: Multivitamin with Zinc and Vitamin C 500mg twice a day.  Return Appointment: 6/7/22        Patient Instructions:  Clean wound and change dressing every day. Wash hands before and after wound care. Remove old dressing, clean with soap/water or saline/wound cleanser; pat dry. May clean wound while in the shower with soap/water. Apply Santyl, nickel layer thick, to wound then cover with Telfa, roll gauze and surginet or bandage of choice. Use moisturizer barrier on db wound. Pt should keep wound covered at all times and avoid getting dressing wet. If dressing is saturated, it will need to be changed. Elevate legs when possible. Continue probiotic in regimen. Notify wound care clinic for any concerns or changes in wound.     Discharge Instructions.   Return Appointment: Should you experience any significant changes in your wound(s) or have any questions regarding your home care " instructions please contact [facility], ask for the wound center- [phone].  If after hours, contact your primary care physician or go to the hospital emergency room.    In a diabetic patient with a wound, keeping your blood glucose levels as close to normal can be a great help towards healing your wound. Wounds are very difficult to heal in a high sugar environment so controlling the glucose well can go a long way towards successful wound healing.     Increasing your protein intake is very helpful with wound healing; if you are a kidney or dialysis patient please check with your Nephrologist as to how much protein you are allowed to take in with your condition. Taking a daily Multivitamin with Zinc as well as Vitamin C, 1000 mg minimum per day will also help with healing your wound.If you are on Coumadin, please contact the Coumadin Clinic before beginning a Multivitamin. High protein items that can be added to your diet include, extra helpings of meats, fish, beans, also High protein bars that add as little as 12 gms of protein and up to 32 gms of protein per bar.     The clinic is open Mon-Fri from 8:00 a.m. until 5:00 p.m. During business hours call [facility], ask for Wound Care @ [phone] for any worsening symptoms; fever >101.0, increased pain, increased drainage, foul odor or problems with the dressing. For after hours problems or emergencies please report to the nearest emergency room.     Patient instructed on proper handwashing technique. Using warm water and soap, apply soap, lather well and vigorously apply friction for a minimum of 20 seconds, rinse well and dry well; wash before and after toileting, before and after woundcare, after sneezing, coughing etc.     Patient to keep the dressing clean, dry and intact. Call for any worsening symptoms or problems. [phone]. - Use cast protector if showering. Keep wrap dry and intact until next appt.

## 2022-05-26 ENCOUNTER — OFFICE VISIT (OUTPATIENT)
Dept: FAMILY MEDICINE | Facility: CLINIC | Age: 72
End: 2022-05-26
Payer: MEDICARE

## 2022-05-26 VITALS
TEMPERATURE: 98 F | HEIGHT: 65 IN | RESPIRATION RATE: 20 BRPM | DIASTOLIC BLOOD PRESSURE: 88 MMHG | OXYGEN SATURATION: 96 % | WEIGHT: 252.56 LBS | SYSTOLIC BLOOD PRESSURE: 121 MMHG | HEART RATE: 62 BPM | BODY MASS INDEX: 42.08 KG/M2
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
## 2022-05-26 PROCEDURE — 1126F PR PAIN SEVERITY QUANTIFIED, NO PAIN PRESENT: ICD-10-PCS | Mod: CPTII,S$GLB,, | Performed by: INTERNAL MEDICINE

## 2022-05-26 PROCEDURE — 1159F PR MEDICATION LIST DOCUMENTED IN MEDICAL RECORD: ICD-10-PCS | Mod: CPTII,S$GLB,, | Performed by: INTERNAL MEDICINE

## 2022-05-26 PROCEDURE — 99213 PR OFFICE/OUTPT VISIT, EST, LEVL III, 20-29 MIN: ICD-10-PCS | Mod: S$GLB,,, | Performed by: INTERNAL MEDICINE

## 2022-05-26 PROCEDURE — 3079F PR MOST RECENT DIASTOLIC BLOOD PRESSURE 80-89 MM HG: ICD-10-PCS | Mod: CPTII,S$GLB,, | Performed by: INTERNAL MEDICINE

## 2022-05-26 PROCEDURE — 3074F SYST BP LT 130 MM HG: CPT | Mod: CPTII,S$GLB,, | Performed by: INTERNAL MEDICINE

## 2022-05-26 PROCEDURE — 1160F RVW MEDS BY RX/DR IN RCRD: CPT | Mod: CPTII,S$GLB,, | Performed by: INTERNAL MEDICINE

## 2022-05-26 PROCEDURE — 3008F PR BODY MASS INDEX (BMI) DOCUMENTED: ICD-10-PCS | Mod: CPTII,S$GLB,, | Performed by: INTERNAL MEDICINE

## 2022-05-26 PROCEDURE — 3079F DIAST BP 80-89 MM HG: CPT | Mod: CPTII,S$GLB,, | Performed by: INTERNAL MEDICINE

## 2022-05-26 PROCEDURE — 3008F BODY MASS INDEX DOCD: CPT | Mod: CPTII,S$GLB,, | Performed by: INTERNAL MEDICINE

## 2022-05-26 PROCEDURE — 1159F MED LIST DOCD IN RCRD: CPT | Mod: CPTII,S$GLB,, | Performed by: INTERNAL MEDICINE

## 2022-05-26 PROCEDURE — 3288F FALL RISK ASSESSMENT DOCD: CPT | Mod: CPTII,S$GLB,, | Performed by: INTERNAL MEDICINE

## 2022-05-26 PROCEDURE — 3074F PR MOST RECENT SYSTOLIC BLOOD PRESSURE < 130 MM HG: ICD-10-PCS | Mod: CPTII,S$GLB,, | Performed by: INTERNAL MEDICINE

## 2022-05-26 PROCEDURE — 3288F PR FALLS RISK ASSESSMENT DOCUMENTED: ICD-10-PCS | Mod: CPTII,S$GLB,, | Performed by: INTERNAL MEDICINE

## 2022-05-26 PROCEDURE — 1101F PR PT FALLS ASSESS DOC 0-1 FALLS W/OUT INJ PAST YR: ICD-10-PCS | Mod: CPTII,S$GLB,, | Performed by: INTERNAL MEDICINE

## 2022-05-26 PROCEDURE — 99213 OFFICE O/P EST LOW 20 MIN: CPT | Mod: S$GLB,,, | Performed by: INTERNAL MEDICINE

## 2022-05-26 PROCEDURE — 1101F PT FALLS ASSESS-DOCD LE1/YR: CPT | Mod: CPTII,S$GLB,, | Performed by: INTERNAL MEDICINE

## 2022-05-26 PROCEDURE — 1160F PR REVIEW ALL MEDS BY PRESCRIBER/CLIN PHARMACIST DOCUMENTED: ICD-10-PCS | Mod: CPTII,S$GLB,, | Performed by: INTERNAL MEDICINE

## 2022-05-26 PROCEDURE — 1126F AMNT PAIN NOTED NONE PRSNT: CPT | Mod: CPTII,S$GLB,, | Performed by: INTERNAL MEDICINE

## 2022-05-26 RX ORDER — MULTIVIT WITH IRON,MINERALS
TABLET ORAL
COMMUNITY
End: 2022-06-21

## 2022-05-26 NOTE — PROGRESS NOTES
Subjective:       Patient ID: Sneha Mcghee is a 71 y.o. female.    Medication List with Changes/Refills   Current Medications    ALBUTEROL (PROVENTIL) 2.5 MG /3 ML (0.083 %) NEBULIZER SOLUTION    Take 3 mLs (2.5 mg total) by nebulization every 6 (six) hours as needed for Wheezing. Rescue. Dispense one box    ALBUTEROL (PROVENTIL/VENTOLIN HFA) 90 MCG/ACTUATION INHALER    Inhale 2 puffs into the lungs every 6 (six) hours as needed for Wheezing. Rescue    AMLODIPINE (NORVASC) 5 MG TABLET    Take 1 tablet (5 mg total) by mouth once daily.    ASCORBIC ACID, VITAMIN C, (VITAMIN C) 500 MG TABLET    Take 500 mg by mouth once daily.    ASPIRIN (ECOTRIN) 325 MG EC TABLET    aspirin 325 mg tablet,delayed release   Take 1 tablet every day by oral route.    ATORVASTATIN (LIPITOR) 40 MG TABLET    Take 1 tablet (40 mg total) by mouth once daily.    CALCIUM/MAGNESIUM (CALCIUM AND MAGNESIUM ORAL)    Take by mouth.    CHOLECALCIFEROL, VITAMIN D3, 125 MCG (5,000 UNIT) TAB    cholecalciferol (vitamin D3) 125 mcg (5,000 unit) tablet   Take by oral route.    FLUTICASONE PROPIONATE (FLONASE) 50 MCG/ACTUATION NASAL SPRAY    2 sprays (100 mcg total) by Each Nostril route once daily.    FUROSEMIDE (LASIX) 20 MG TABLET    TAKE 1 TABLET BY MOUTH EVERY DAY.    IPRATROPIUM (ATROVENT HFA) 17 MCG/ACTUATION INHALER    Inhale 2 puffs into the lungs every 6 (six) hours. Rescue    LACTOBACILLUS RHAMNOSUS GG (CULTURELLE) 10 BILLION CELL CAPSULE    Take 1 capsule by mouth once daily.    LATANOPROST 0.005 % OPHTHALMIC SOLUTION    latanoprost 0.005 % eye drops   Instill 1 drop every day by ophthalmic route for 90 days.    LEVOTHYROXINE (SYNTHROID) 88 MCG TABLET    TAKE 1 TABLET(88 MCG) BY MOUTH BEFORE BREAKFAST    METOPROLOL SUCCINATE (TOPROL-XL) 100 MG 24 HR TABLET    Take 1 tablet (100 mg total) by mouth once daily.    MOMETASONE 0.1% (ELOCON) 0.1 % CREAM    Apply topically once daily.    NYSTATIN (MYCOSTATIN) POWDER    Apply topically 3 (three) times  daily.    NYSTATIN-TRIAMCINOLONE (MYCOLOG II) CREAM    PRN    OMEGA-3 FATTY ACIDS/FISH OIL (FISH OIL-OMEGA-3 FATTY ACIDS) 300-1,000 MG CAPSULE    Take 1 capsule by mouth once daily.    POTASSIUM CHLORIDE (KLOR-CON) 10 MEQ TBSR    Take 10 mEq by mouth once daily.    POTASSIUM GLUCONATE 2.5 MEQ TAB    potassium 99 mg tablet   Take by oral route.    PROPAFENONE (RHTHYMOL) 150 MG TAB    TAKE 1 TABLET(150 MG) BY MOUTH EVERY 8 HOURS    SANTYL OINTMENT    Apply topically once daily.    SERTRALINE (ZOLOFT) 100 MG TABLET    Take 1 tablet (100 mg total) by mouth once daily.    SITAGLIPTIN (JANUVIA) 100 MG TAB    Take 1 tablet (100 mg total) by mouth once daily.    SUVOREXANT (BELSOMRA) 15 MG TAB    Take 1 tablet by mouth nightly as needed.    TIMOLOL MALEATE 0.5% (TIMOPTIC) 0.5 % DROP    Place 1 drop into both eyes 2 (two) times daily.    VIT C/E/ZN/COPPR/LUTEIN/ZEAXAN (PRESERVISION AREDS-2 ORAL)    Take by mouth.    VITAMIN E, DL,TOCOPHERYL ACET, (VITAMIN E, DL, ACETATE,) 180 MG (400 UNIT) CAP    Take 400 Units by mouth once daily.       Chief Complaint: Insect Bite  She has flea bites on her feet and wants to see if they are infected. She reports they are red but not swollen or painful. They do itch. No drainage. No fevers. She thinks they are coming from fleas due to raccoons in her house.     Review of Systems   Constitutional: Negative for activity change, appetite change, chills, fatigue and fever.   HENT: Negative for congestion, ear discharge, ear pain, mouth sores, postnasal drip, rhinorrhea, sinus pressure and sore throat.    Eyes: Negative for pain, discharge and redness.   Respiratory: Negative for cough, chest tightness, shortness of breath and wheezing.    Gastrointestinal: Negative for abdominal pain, constipation, diarrhea, nausea and vomiting.   Genitourinary: Negative for dysuria.   Musculoskeletal: Negative for arthralgias and neck stiffness.   Skin: Positive for rash.   Neurological: Negative for  "headaches.   Hematological: Negative for adenopathy.       Objective:      Vitals:    05/26/22 0924   BP: 121/88   Pulse: 62   Resp: 20   Temp: 98.2 °F (36.8 °C)   SpO2: 96%   Weight: 114.5 kg (252 lb 8.6 oz)   Height: 5' 5" (1.651 m)     Body mass index is 42.02 kg/m².  Physical Exam    General appearance: alert, no acute distress  Neck: supple, FROM, no masses, no tenderness  Lymph: no posterior or cervical adenopathy  Lungs: no distress, no retractions, clear to ascultation bilaterally, no wheezing, no rales, no rhonchi  Heart:: Regular rate and rhythm, no murmur  Abdomen: soft, non-tender, no guarding, no rebound, no peritoneal signs, bowel sounds normal, no hepatosplenomegaly, no masses  Skin: bilateral anterior feel with small papilla without surrounding erythema or swelling   Perfusion: good capillary refill, normal pulses      Assessment:       1. Flea bite, initial encounter        Plan:       Flea bite, initial encounter  Reassurance given. No evidence of infection.  Okay to use hydrocortisone if needed on the itchy lesions.     Follow up for already scheduled.        "

## 2022-06-02 ENCOUNTER — TELEPHONE (OUTPATIENT)
Dept: OPHTHALMOLOGY | Facility: CLINIC | Age: 72
End: 2022-06-02
Payer: MEDICARE

## 2022-06-02 NOTE — TELEPHONE ENCOUNTER
----- Message from Tammie Osborn sent at 6/2/2022  3:07 PM CDT -----  Type:  Sooner Appointment Request    Caller is requesting a sooner appointment.  Caller declined first available appointment listed below.  Caller will not accept being placed on the waitlist and is requesting a message be sent to doctor.    Name of Caller:  patient   When is the first available appointment?  Unavailable   Symptoms:  follow up from appointment from April of this year  Best Call Back Number:  [phone]   Additional Information:  please advise-thank you

## 2022-06-05 ENCOUNTER — NURSE TRIAGE (OUTPATIENT)
Dept: ADMINISTRATIVE | Facility: CLINIC | Age: 72
End: 2022-06-05
Payer: MEDICARE

## 2022-06-05 NOTE — TELEPHONE ENCOUNTER
Pt calling stating that she has an area to her chronic wound that is new and red about 5 inches and is draining purulent yellow drainage. Per protocol advised to be seen within 4 hours. verbalized understanding. Advised pt to call back with any other concerns or worsening symptoms. Verbalized understanding and will route message to provider.       Reason for Disposition   [1] Looks infected AND [2] large red area (> 2 in. or 5 cm)    Additional Information   Negative: Major bleeding into NPWT device (e.g., cannister filling with blood, sudden gush of blood)   Negative: Major bleeding from wound (e.g., actively dripping or spurting)   Negative: Sounds like a life-threatening emergency to the triager   Negative: SEVERE pain (e.g. excruciating)   Negative: [1] Total Contact Cast feels too tight (e.g., causing pain, numbness or toes tingling) AND [2] not improved with leg elevation   Negative: [1] NPWT AND [2] new bright red blood in canister or tubing   Negative: Patient sounds very sick or weak to the triager   Negative: [1] Looks infected (e.g., spreading redness, red streak, pus) AND [2] fever    Protocols used: CHRONIC WOUNDS AND NEGATIVE PRESSURE WOUND THERAPY-A-AH

## 2022-06-06 NOTE — TELEPHONE ENCOUNTER
"Pt calling back stating she isnt going to go to the ER or to be seen despite advice because " ill have to pack something incase they keep me over night and buy the time I get there itll be 9p and I dont drive at night and my kids have the COVID and im 71, ill just wait till the morning when my PCP opens." reinforced disposition and urgency of symptoms. verbalized understanding but still states she is not going to be seen tonight. Will route message to provider.  "

## 2022-06-06 NOTE — TELEPHONE ENCOUNTER
Spoke to pt and she did go to the ER and got home around 2 am this morning. . See ER note. She is taking an antibiotic and has an appointment tomorrow with Dr. Carlson at 8:30am. Not other concerns at this time.

## 2022-06-07 ENCOUNTER — TELEPHONE (OUTPATIENT)
Dept: FAMILY MEDICINE | Facility: CLINIC | Age: 72
End: 2022-06-07
Payer: MEDICARE

## 2022-06-07 ENCOUNTER — OFFICE VISIT (OUTPATIENT)
Dept: WOUND CARE | Facility: CLINIC | Age: 72
End: 2022-06-07
Payer: MEDICARE

## 2022-06-07 VITALS
SYSTOLIC BLOOD PRESSURE: 156 MMHG | HEIGHT: 65 IN | BODY MASS INDEX: 42.54 KG/M2 | TEMPERATURE: 99 F | WEIGHT: 255.31 LBS | DIASTOLIC BLOOD PRESSURE: 84 MMHG | HEART RATE: 72 BPM

## 2022-06-07 DIAGNOSIS — L03.116 CELLULITIS OF LEFT LOWER EXTREMITY: ICD-10-CM

## 2022-06-07 DIAGNOSIS — S81.802D TRAUMATIC OPEN WOUND OF LEFT LOWER LEG, SUBSEQUENT ENCOUNTER: ICD-10-CM

## 2022-06-07 DIAGNOSIS — I73.9 PVD (PERIPHERAL VASCULAR DISEASE): ICD-10-CM

## 2022-06-07 DIAGNOSIS — S81.802D OPEN WOUND OF LEFT LOWER LEG, SUBSEQUENT ENCOUNTER: Primary | ICD-10-CM

## 2022-06-07 DIAGNOSIS — N18.32 TYPE 2 DIABETES MELLITUS WITH STAGE 3B CHRONIC KIDNEY DISEASE, WITHOUT LONG-TERM CURRENT USE OF INSULIN: Primary | ICD-10-CM

## 2022-06-07 DIAGNOSIS — E11.22 TYPE 2 DIABETES MELLITUS WITH STAGE 3B CHRONIC KIDNEY DISEASE, WITHOUT LONG-TERM CURRENT USE OF INSULIN: Primary | ICD-10-CM

## 2022-06-07 DIAGNOSIS — I87.2 VENOUS INSUFFICIENCY: ICD-10-CM

## 2022-06-07 PROCEDURE — 3077F PR MOST RECENT SYSTOLIC BLOOD PRESSURE >= 140 MM HG: ICD-10-PCS | Mod: CPTII,S$GLB,, | Performed by: FAMILY MEDICINE

## 2022-06-07 PROCEDURE — 1125F PR PAIN SEVERITY QUANTIFIED, PAIN PRESENT: ICD-10-PCS | Mod: CPTII,S$GLB,, | Performed by: FAMILY MEDICINE

## 2022-06-07 PROCEDURE — 1101F PT FALLS ASSESS-DOCD LE1/YR: CPT | Mod: CPTII,S$GLB,, | Performed by: FAMILY MEDICINE

## 2022-06-07 PROCEDURE — 3079F DIAST BP 80-89 MM HG: CPT | Mod: CPTII,S$GLB,, | Performed by: FAMILY MEDICINE

## 2022-06-07 PROCEDURE — 1159F MED LIST DOCD IN RCRD: CPT | Mod: CPTII,S$GLB,, | Performed by: FAMILY MEDICINE

## 2022-06-07 PROCEDURE — 99999 PR PBB SHADOW E&M-EST. PATIENT-LVL V: ICD-10-PCS | Mod: PBBFAC,,, | Performed by: FAMILY MEDICINE

## 2022-06-07 PROCEDURE — 3008F BODY MASS INDEX DOCD: CPT | Mod: CPTII,S$GLB,, | Performed by: FAMILY MEDICINE

## 2022-06-07 PROCEDURE — 3008F PR BODY MASS INDEX (BMI) DOCUMENTED: ICD-10-PCS | Mod: CPTII,S$GLB,, | Performed by: FAMILY MEDICINE

## 2022-06-07 PROCEDURE — 1159F PR MEDICATION LIST DOCUMENTED IN MEDICAL RECORD: ICD-10-PCS | Mod: CPTII,S$GLB,, | Performed by: FAMILY MEDICINE

## 2022-06-07 PROCEDURE — 99213 OFFICE O/P EST LOW 20 MIN: CPT | Mod: S$GLB,,, | Performed by: FAMILY MEDICINE

## 2022-06-07 PROCEDURE — 3077F SYST BP >= 140 MM HG: CPT | Mod: CPTII,S$GLB,, | Performed by: FAMILY MEDICINE

## 2022-06-07 PROCEDURE — 1160F RVW MEDS BY RX/DR IN RCRD: CPT | Mod: CPTII,S$GLB,, | Performed by: FAMILY MEDICINE

## 2022-06-07 PROCEDURE — 1125F AMNT PAIN NOTED PAIN PRSNT: CPT | Mod: CPTII,S$GLB,, | Performed by: FAMILY MEDICINE

## 2022-06-07 PROCEDURE — 99213 PR OFFICE/OUTPT VISIT, EST, LEVL III, 20-29 MIN: ICD-10-PCS | Mod: S$GLB,,, | Performed by: FAMILY MEDICINE

## 2022-06-07 PROCEDURE — 1160F PR REVIEW ALL MEDS BY PRESCRIBER/CLIN PHARMACIST DOCUMENTED: ICD-10-PCS | Mod: CPTII,S$GLB,, | Performed by: FAMILY MEDICINE

## 2022-06-07 PROCEDURE — 99999 PR PBB SHADOW E&M-EST. PATIENT-LVL V: CPT | Mod: PBBFAC,,, | Performed by: FAMILY MEDICINE

## 2022-06-07 PROCEDURE — 3288F PR FALLS RISK ASSESSMENT DOCUMENTED: ICD-10-PCS | Mod: CPTII,S$GLB,, | Performed by: FAMILY MEDICINE

## 2022-06-07 PROCEDURE — 1101F PR PT FALLS ASSESS DOC 0-1 FALLS W/OUT INJ PAST YR: ICD-10-PCS | Mod: CPTII,S$GLB,, | Performed by: FAMILY MEDICINE

## 2022-06-07 PROCEDURE — 3288F FALL RISK ASSESSMENT DOCD: CPT | Mod: CPTII,S$GLB,, | Performed by: FAMILY MEDICINE

## 2022-06-07 PROCEDURE — 3079F PR MOST RECENT DIASTOLIC BLOOD PRESSURE 80-89 MM HG: ICD-10-PCS | Mod: CPTII,S$GLB,, | Performed by: FAMILY MEDICINE

## 2022-06-07 RX ORDER — AMOXICILLIN AND CLAVULANATE POTASSIUM 875; 125 MG/1; MG/1
1 TABLET, FILM COATED ORAL 2 TIMES DAILY
Qty: 20 TABLET | Refills: 0 | Status: SHIPPED | OUTPATIENT
Start: 2022-06-07 | End: 2022-06-21 | Stop reason: ALTCHOICE

## 2022-06-07 NOTE — TELEPHONE ENCOUNTER
----- Message from Florence Leon sent at 6/7/2022  1:53 PM CDT -----  Contact: Ayana from [employer] at [phone]  Type: Needs Medical Advice  Who Called: Ayana from [employer] [facility]  Best Call Back Number: [phone]  Additional Information: Ayana from [employer] is calling to get a signed order for a diabetic meter and supplies as well as clinical notes faxed to [phone]. Please call back and advise.

## 2022-06-07 NOTE — PATIENT INSTRUCTIONS
Topical Treatments:   Calmoseptine applied to periwound  Dressings:   Silver tim, ABD pad and Tubigrip Size F   Compression:   Tubigrip size F  Offloading:  Avoid pressure to area  Activity   Limit activity: Rest several times per day. - Keep off area as much as possible.   Dietary:   As tolerated: 3 well-balanced meals  Increased protein: Boost or Ensure, [place] Instant Breakfast (purchase sugarfree if Diabetic) Increasing your protein intake is very helpful with wound healing; if you are a kidney or dialysis patient please check with your Nephrologist as to how much protein you are allowed to take in with your condition. Taking a daily Multivitamin with Zinc as well as Vitamin C, 1000 mg minimum per day will also help with healing your wound. If you are on Coumadin, please contact the Coumadin Clinic before beginning a Multivitamin. High protein items that can be added to your diet include, extra helpings of meats, fish, beans, also High protein bars that add as little as 12 gms of protein and up to 32 gms of protein per bar.  Supplement with: Multivitamin with Zinc and Vitamin C 500mg twice a day.  Return Appointment: 6/21/22        Patient Instructions:  Clean wound and change dressing every day. Wash hands before and after wound care. Remove old dressing, clean with soap/water or saline/wound cleanser; pat dry. May clean wound while in the shower with soap/water. Apply Silvercel, ABD pad and Tubigrip F. Use Calmoseptine to perwound.. Pt should keep wound covered at all times and avoid getting dressing wet. If dressing is saturated, it will need to be changed. Elevate legs when possible. Continue probiotic in regimen. Notify wound care clinic for any concerns or changes in wound. Continue Keflex if redness does not improve may start Augmentin as prescribed per Dr. Carlson.    Discharge Instructions.   Return Appointment: Should you experience any significant changes in your wound(s) or have any questions  regarding your home care instructions please contact [facility], ask for the wound center- [phone].  If after hours, contact your primary care physician or go to the hospital emergency room.    In a diabetic patient with a wound, keeping your blood glucose levels as close to normal can be a great help towards healing your wound. Wounds are very difficult to heal in a high sugar environment so controlling the glucose well can go a long way towards successful wound healing.     Increasing your protein intake is very helpful with wound healing; if you are a kidney or dialysis patient please check with your Nephrologist as to how much protein you are allowed to take in with your condition. Taking a daily Multivitamin with Zinc as well as Vitamin C, 1000 mg minimum per day will also help with healing your wound.If you are on Coumadin, please contact the Coumadin Clinic before beginning a Multivitamin. High protein items that can be added to your diet include, extra helpings of meats, fish, beans, also High protein bars that add as little as 12 gms of protein and up to 32 gms of protein per bar.     The clinic is open Mon-Fri from 8:00 a.m. until 5:00 p.m. During business hours call [facility], ask for Wound Care @ [phone] for any worsening symptoms; fever >101.0, increased pain, increased drainage, foul odor or problems with the dressing. For after hours problems or emergencies please report to the nearest emergency room.     Patient instructed on proper handwashing technique. Using warm water and soap, apply soap, lather well and vigorously apply friction for a minimum of 20 seconds, rinse well and dry well; wash before and after toileting, before and after woundcare, after sneezing, coughing etc.     Patient to keep the dressing clean, dry and intact. Call for any worsening symptoms or problems. [phone]. - Use cast protector if showering. Keep wrap dry and intact until next appt.

## 2022-06-07 NOTE — PROGRESS NOTES
[facility]                         Wound Care Clinic                Progress Note    Subjective:       Patient ID: Sneha Mcghee is a 71 y.o. female.    Chief Complaint: Wound Check (Lower left leg/)    HPI     Pt seen in clinic for a FU visit for left leg open wound. Pt leg developed redness over the weekend and she went to the ED and was gived keflex. Pt leg has not improved at this point. The wound itself appears to have improved from last visit. She has no other complaints today.    Initial Consult Date: 1/26/22  Wound onset: Jan 21 or 22, 2022  Referring MD:Dr. Elizabeth Jacobo  Physician List:  Vascular Status/SARITHA:  Had venous ultrasound done 6/5/22 at [facility]  Sensilase or Vascular Studies:  Nutrition Risk/Labs: 12/21  Other Labs:  Recent Cultures & Dates:2/8/22  Radiology/Xray:  Diabetes Status/HbA1c: Dec' 2021: 6.6  Offloading/Immobilization:  Edema/Compression: Tubigrip Size F placed 6/7/22  Tobacco Use:  Other:  Recent Antibiotics:Keflex started 6/5/22  Recent MD visit:3/17/22 Dr. Mcgovern [facility] on 6/5/22 6/7/2022: Patient reports having gone to [facility] ED on 6/5/22 for redness and weeping from around wound. US done, no DVT found. Started on Keflex. Patient did report she has has a flea problem at home and is presently treating them.    Review of Systems   Skin: Positive for wound.        Open wound left leg   All other systems reviewed and are negative.        Objective:        Physical Exam  Vitals and nursing note reviewed. Exam conducted with a chaperone present.   Constitutional:       General: She is not in acute distress.     Appearance: Normal appearance. She is not ill-appearing, toxic-appearing or diaphoretic.   Skin:     General: Skin is warm and dry.      Coloration: Skin is not jaundiced or pale.      Findings: Erythema and lesion present. No bruising or rash.      Comments: Left leg open wound, + cellulitis with small blisters  surrounding the wound   Neurological:      General: No focal deficit present.      Mental Status: She is alert and oriented to person, place, and time.   Psychiatric:         Mood and Affect: Mood normal.         Behavior: Behavior normal.         Thought Content: Thought content normal.         Judgment: Judgment normal.         Vitals:    06/07/22 0807   BP: (!) 156/84   Pulse: 72   Temp: 98.5 °F (36.9 °C)       Assessment:           ICD-10-CM ICD-9-CM   1. Open wound of left lower leg, subsequent encounter  S81.802D V58.89     891.0   2. Venous insufficiency  I87.2 459.81   3. Traumatic open wound of left lower leg, subsequent encounter  S81.802D V58.89     891.0   4. PVD (peripheral vascular disease)  I73.9 443.9   5. Cellulitis of left lower extremity  L03.116 682.6            Wound 01/26/22 1312 Traumatic Left lower;lateral Leg (Active)   01/26/22 1312    Pre-existing:    Primary Wound Type: Traumatic   Side: Left   Orientation: lower;lateral   Location: Leg   Wound Number:    Ankle-Brachial Index:    Pulses:    Removal Indication and Assessment:    Wound Outcome:    (Retired) Wound Type:    (Retired) Wound Length (cm):    (Retired) Wound Width (cm):    (Retired) Depth (cm):    Wound Description (Comments):    Removal Indications:    Wound Image   06/07/22 0800   Dressing Appearance Intact;Moist drainage 06/07/22 0800   Drainage Amount Moderate 06/07/22 0800   Drainage Characteristics/Odor Serous;Yellow 06/07/22 0800   Appearance Pink;Maroon;Yellow;Blistered;Epithelialization 06/07/22 0800   Tissue loss description Full thickness 06/07/22 0800   Red (%), Wound Tissue Color 75 % 06/07/22 0800   Yellow (%), Wound Tissue Color 25 % 06/07/22 0800   Periwound Area Blistered;Edematous;Redness;Swelling 06/07/22 0800   Wound Edges Irregular 06/07/22 0800   Wound Length (cm) 2.8 cm 06/07/22 0800   Wound Width (cm) 3 cm 06/07/22 0800   Wound Depth (cm) 0.2 cm 06/07/22 0800   Wound Volume (cm^3) 1.68 cm^3 06/07/22 0800    Wound Surface Area (cm^2) 8.4 cm^2 06/07/22 0800   Care Cleansed with:;Wound cleanser 06/07/22 0800   Dressing Applied;Silver;Absorptive Pad;Tubular bandage 06/07/22 0800   Periwound Care Moisture barrier applied 06/07/22 0800   Dressing Change Due 06/08/22 06/07/22 0800           Plan:          MD Orders:  Obtained wound photos and measurements.  Anesthetic:   · Topical 4% Lidocaine Cream to wound bed prior to cleansing and/or debridement: none used today  Wound cleansing:   · Saline or wound cleanser to cleanse wound   Debridement:  · Wounds were mechanically debrided with gauze and wound cleanser by RN  Topical Treatments:  ·  Calmoseptine applied to periwound  Dressings:   Silver tim, ABD pad and Tubigrip Size F   Compression:   · Tubigrip size F  Offloading:  · Avoid pressure to area  Activity   · Limit activity: Rest several times per day. - Keep off area as much as possible.   Dietary:   · As tolerated: 3 well-balanced meals  · Increased protein: Boost or Ensure, [place] Instant Breakfast (purchase sugarfree if Diabetic) Increasing your protein intake is very helpful with wound healing; if you are a kidney or dialysis patient please check with your Nephrologist as to how much protein you are allowed to take in with your condition. Taking a daily Multivitamin with Zinc as well as Vitamin C, 1000 mg minimum per day will also help with healing your wound. If you are on Coumadin, please contact the Coumadin Clinic before beginning a Multivitamin. High protein items that can be added to your diet include, extra helpings of meats, fish, beans, also High protein bars that add as little as 12 gms of protein and up to 32 gms of protein per bar.  · Supplement with: Multivitamin with Zinc and Vitamin C 500mg twice a day.  Return Appointment: 6/21/22        Patient Instructions:  Clean wound and change dressing every day. Wash hands before and after wound care. Remove old dressing, clean with soap/water or  saline/wound cleanser; pat dry. May clean wound while in the shower with soap/water. Apply Silvercel, ABD pad and Tubigrip F. Use Calmoseptine to perwound.. Pt should keep wound covered at all times and avoid getting dressing wet. If dressing is saturated, it will need to be changed. Elevate legs when possible. Continue probiotic in regimen. Notify wound care clinic for any concerns or changes in wound. Continue Keflex if redness does not improve may start Augmentin as prescribed per Dr. Carlson.        Sneha was seen today for wound check.    Diagnoses and all orders for this visit:    Open wound of left lower leg, subsequent encounter  -     Change dressing  -     Change dressing    Venous insufficiency  -     Change dressing  -     Change dressing    Traumatic open wound of left lower leg, subsequent encounter  -     Change dressing  -     Change dressing    PVD (peripheral vascular disease)  -     Change dressing  -     Change dressing    Cellulitis of left lower extremity  -     Change dressing  -     Change dressing    Other orders  -     amoxicillin-clavulanate 875-125mg (AUGMENTIN) 875-125 mg per tablet; Take 1 tablet by mouth 2 (two) times daily.

## 2022-06-09 RX ORDER — LANCETS
EACH MISCELLANEOUS
Qty: 100 EACH | Refills: 3 | Status: SHIPPED | OUTPATIENT
Start: 2022-06-09 | End: 2022-06-09 | Stop reason: SDUPTHER

## 2022-06-09 RX ORDER — INSULIN PUMP SYRINGE, 3 ML
EACH MISCELLANEOUS
Qty: 1 EACH | Refills: 0 | Status: SHIPPED | OUTPATIENT
Start: 2022-06-09 | End: 2024-03-19

## 2022-06-09 RX ORDER — INSULIN PUMP SYRINGE, 3 ML
EACH MISCELLANEOUS
Qty: 1 EACH | Refills: 0 | Status: SHIPPED | OUTPATIENT
Start: 2022-06-09 | End: 2022-06-09 | Stop reason: SDUPTHER

## 2022-06-09 RX ORDER — LANCETS
EACH MISCELLANEOUS
Qty: 100 EACH | Refills: 3 | Status: SHIPPED | OUTPATIENT
Start: 2022-06-09 | End: 2023-10-04 | Stop reason: SDUPTHER

## 2022-06-14 ENCOUNTER — TELEPHONE (OUTPATIENT)
Dept: WOUND CARE | Facility: CLINIC | Age: 72
End: 2022-06-14
Payer: MEDICARE

## 2022-06-14 NOTE — TELEPHONE ENCOUNTER
----- Message from Noa Zavaleta, Patient Care Assistant sent at 6/14/2022  9:21 AM CDT -----  Regarding: advice  Contact: pt  Type: Needs Medical Advice  Who Called:  pt   Best Call Back Number: [phone]    Additional Information: pt states she would like a callback regarding her bandage sticking to wound. Please call pt to advise. Thanks!

## 2022-06-16 ENCOUNTER — LAB VISIT (OUTPATIENT)
Dept: LAB | Facility: HOSPITAL | Age: 72
End: 2022-06-16
Attending: INTERNAL MEDICINE
Payer: MEDICARE

## 2022-06-16 DIAGNOSIS — N18.32 TYPE 2 DIABETES MELLITUS WITH STAGE 3B CHRONIC KIDNEY DISEASE, WITHOUT LONG-TERM CURRENT USE OF INSULIN: ICD-10-CM

## 2022-06-16 DIAGNOSIS — E11.22 TYPE 2 DIABETES MELLITUS WITH STAGE 3B CHRONIC KIDNEY DISEASE, WITHOUT LONG-TERM CURRENT USE OF INSULIN: ICD-10-CM

## 2022-06-16 DIAGNOSIS — I10 ESSENTIAL HYPERTENSION: ICD-10-CM

## 2022-06-16 LAB
ALBUMIN SERPL BCP-MCNC: 3.7 G/DL (ref 3.5–5.2)
ALP SERPL-CCNC: 89 U/L (ref 55–135)
ALT SERPL W/O P-5'-P-CCNC: 10 U/L (ref 10–44)
ANION GAP SERPL CALC-SCNC: 9 MMOL/L (ref 8–16)
AST SERPL-CCNC: 14 U/L (ref 10–40)
BASOPHILS # BLD AUTO: 0.1 K/UL (ref 0–0.2)
BASOPHILS NFR BLD: 1 % (ref 0–1.9)
BILIRUB SERPL-MCNC: 0.7 MG/DL (ref 0.1–1)
BUN SERPL-MCNC: 15 MG/DL (ref 8–23)
CALCIUM SERPL-MCNC: 9.3 MG/DL (ref 8.7–10.5)
CHLORIDE SERPL-SCNC: 105 MMOL/L (ref 95–110)
CHOLEST SERPL-MCNC: 147 MG/DL (ref 120–199)
CHOLEST/HDLC SERPL: 4.2 {RATIO} (ref 2–5)
CO2 SERPL-SCNC: 25 MMOL/L (ref 23–29)
CREAT SERPL-MCNC: 0.9 MG/DL (ref 0.5–1.4)
DIFFERENTIAL METHOD: ABNORMAL
EOSINOPHIL # BLD AUTO: 0.8 K/UL (ref 0–0.5)
EOSINOPHIL NFR BLD: 8 % (ref 0–8)
ERYTHROCYTE [DISTWIDTH] IN BLOOD BY AUTOMATED COUNT: 14.8 % (ref 11.5–14.5)
EST. GFR  (AFRICAN AMERICAN): >60 ML/MIN/1.73 M^2
EST. GFR  (NON AFRICAN AMERICAN): >60 ML/MIN/1.73 M^2
ESTIMATED AVG GLUCOSE: 143 MG/DL (ref 68–131)
GLUCOSE SERPL-MCNC: 147 MG/DL (ref 70–110)
HBA1C MFR BLD: 6.6 % (ref 4–5.6)
HCT VFR BLD AUTO: 42.5 % (ref 37–48.5)
HDLC SERPL-MCNC: 35 MG/DL (ref 40–75)
HDLC SERPL: 23.8 % (ref 20–50)
HGB BLD-MCNC: 13.3 G/DL (ref 12–16)
IMM GRANULOCYTES # BLD AUTO: 0.02 K/UL (ref 0–0.04)
IMM GRANULOCYTES NFR BLD AUTO: 0.2 % (ref 0–0.5)
LDLC SERPL CALC-MCNC: 77.6 MG/DL (ref 63–159)
LYMPHOCYTES # BLD AUTO: 1.8 K/UL (ref 1–4.8)
LYMPHOCYTES NFR BLD: 17.5 % (ref 18–48)
MCH RBC QN AUTO: 26.7 PG (ref 27–31)
MCHC RBC AUTO-ENTMCNC: 31.3 G/DL (ref 32–36)
MCV RBC AUTO: 85 FL (ref 82–98)
MONOCYTES # BLD AUTO: 0.9 K/UL (ref 0.3–1)
MONOCYTES NFR BLD: 9.2 % (ref 4–15)
NEUTROPHILS # BLD AUTO: 6.5 K/UL (ref 1.8–7.7)
NEUTROPHILS NFR BLD: 64.1 % (ref 38–73)
NONHDLC SERPL-MCNC: 112 MG/DL
NRBC BLD-RTO: 0 /100 WBC
PLATELET # BLD AUTO: 245 K/UL (ref 150–450)
PMV BLD AUTO: 10.7 FL (ref 9.2–12.9)
POTASSIUM SERPL-SCNC: 5.3 MMOL/L (ref 3.5–5.1)
PROT SERPL-MCNC: 6.9 G/DL (ref 6–8.4)
RBC # BLD AUTO: 4.99 M/UL (ref 4–5.4)
SODIUM SERPL-SCNC: 139 MMOL/L (ref 136–145)
TRIGL SERPL-MCNC: 172 MG/DL (ref 30–150)
WBC # BLD AUTO: 10.12 K/UL (ref 3.9–12.7)

## 2022-06-16 PROCEDURE — 36415 COLL VENOUS BLD VENIPUNCTURE: CPT | Mod: PO | Performed by: INTERNAL MEDICINE

## 2022-06-16 PROCEDURE — 83036 HEMOGLOBIN GLYCOSYLATED A1C: CPT | Performed by: INTERNAL MEDICINE

## 2022-06-16 PROCEDURE — 80061 LIPID PANEL: CPT | Performed by: INTERNAL MEDICINE

## 2022-06-16 PROCEDURE — 85025 COMPLETE CBC W/AUTO DIFF WBC: CPT | Performed by: INTERNAL MEDICINE

## 2022-06-16 PROCEDURE — 80053 COMPREHEN METABOLIC PANEL: CPT | Performed by: INTERNAL MEDICINE

## 2022-06-20 DIAGNOSIS — E11.22 TYPE 2 DIABETES MELLITUS WITH STAGE 3B CHRONIC KIDNEY DISEASE, WITHOUT LONG-TERM CURRENT USE OF INSULIN: ICD-10-CM

## 2022-06-20 DIAGNOSIS — N18.32 TYPE 2 DIABETES MELLITUS WITH STAGE 3B CHRONIC KIDNEY DISEASE, WITHOUT LONG-TERM CURRENT USE OF INSULIN: ICD-10-CM

## 2022-06-20 NOTE — TELEPHONE ENCOUNTER
Refill Routing Note   Medication(s) are not appropriate for processing by [facility] for the following reason(s):      - Patient has been seen in the ED/Hospital since the last PCP visit    [facility] action(s):  Defer          Medication reconciliation completed: No     Appointments  past 12m or future 3m with PCP    Date Provider   Last Visit   5/26/2022 Elizabeth Jacobo, DO   Next Visit   6/21/2022 Elizabeth Jacobo, DO   ED visits in past 90 days: 1        Note composed:4:36 PM 06/20/2022

## 2022-06-20 NOTE — TELEPHONE ENCOUNTER
No new care gaps identified.  [facility] Embedded Care Gaps. Reference number: [id number]. 6/20/2022   3:58:58 PM CDT

## 2022-06-21 ENCOUNTER — OFFICE VISIT (OUTPATIENT)
Dept: WOUND CARE | Facility: CLINIC | Age: 72
End: 2022-06-21
Payer: MEDICARE

## 2022-06-21 ENCOUNTER — OFFICE VISIT (OUTPATIENT)
Dept: FAMILY MEDICINE | Facility: CLINIC | Age: 72
End: 2022-06-21
Payer: MEDICARE

## 2022-06-21 VITALS
RESPIRATION RATE: 20 BRPM | TEMPERATURE: 98 F | HEART RATE: 69 BPM | DIASTOLIC BLOOD PRESSURE: 82 MMHG | SYSTOLIC BLOOD PRESSURE: 136 MMHG

## 2022-06-21 VITALS
HEART RATE: 61 BPM | WEIGHT: 253.75 LBS | RESPIRATION RATE: 16 BRPM | HEIGHT: 65 IN | BODY MASS INDEX: 42.28 KG/M2 | TEMPERATURE: 98 F | OXYGEN SATURATION: 96 % | DIASTOLIC BLOOD PRESSURE: 82 MMHG | SYSTOLIC BLOOD PRESSURE: 134 MMHG

## 2022-06-21 DIAGNOSIS — I70.248 ATHEROSCLEROSIS OF NATIVE ARTERY OF LEFT LOWER EXTREMITY WITH ULCERATION OF OTHER PART OF LOWER LEG: ICD-10-CM

## 2022-06-21 DIAGNOSIS — F33.0 MAJOR DEPRESSIVE DISORDER, RECURRENT EPISODE, MILD: ICD-10-CM

## 2022-06-21 DIAGNOSIS — G47.33 OSA (OBSTRUCTIVE SLEEP APNEA): ICD-10-CM

## 2022-06-21 DIAGNOSIS — Z12.11 SCREEN FOR COLON CANCER: ICD-10-CM

## 2022-06-21 DIAGNOSIS — I50.9 CHRONIC HEART FAILURE, UNSPECIFIED HEART FAILURE TYPE: ICD-10-CM

## 2022-06-21 DIAGNOSIS — E78.5 HYPERLIPIDEMIA, UNSPECIFIED HYPERLIPIDEMIA TYPE: ICD-10-CM

## 2022-06-21 DIAGNOSIS — N18.32 TYPE 2 DIABETES MELLITUS WITH STAGE 3B CHRONIC KIDNEY DISEASE, WITHOUT LONG-TERM CURRENT USE OF INSULIN: ICD-10-CM

## 2022-06-21 DIAGNOSIS — I10 ESSENTIAL HYPERTENSION: ICD-10-CM

## 2022-06-21 DIAGNOSIS — J45.20 MILD INTERMITTENT ASTHMA WITHOUT COMPLICATION: ICD-10-CM

## 2022-06-21 DIAGNOSIS — E66.01 MORBID OBESITY WITH BMI OF 40.0-44.9, ADULT: ICD-10-CM

## 2022-06-21 DIAGNOSIS — I25.10 CORONARY ARTERY DISEASE WITHOUT ANGINA PECTORIS, UNSPECIFIED VESSEL OR LESION TYPE, UNSPECIFIED WHETHER NATIVE OR TRANSPLANTED HEART: Primary | ICD-10-CM

## 2022-06-21 DIAGNOSIS — N32.81 OAB (OVERACTIVE BLADDER): ICD-10-CM

## 2022-06-21 DIAGNOSIS — Z12.31 ENCOUNTER FOR SCREENING MAMMOGRAM FOR MALIGNANT NEOPLASM OF BREAST: ICD-10-CM

## 2022-06-21 DIAGNOSIS — I35.0 NONRHEUMATIC AORTIC VALVE STENOSIS: ICD-10-CM

## 2022-06-21 DIAGNOSIS — I87.2 CHRONIC STASIS DERMATITIS: ICD-10-CM

## 2022-06-21 DIAGNOSIS — F51.01 PRIMARY INSOMNIA: ICD-10-CM

## 2022-06-21 DIAGNOSIS — E11.22 TYPE 2 DIABETES MELLITUS WITH STAGE 3B CHRONIC KIDNEY DISEASE, WITHOUT LONG-TERM CURRENT USE OF INSULIN: ICD-10-CM

## 2022-06-21 DIAGNOSIS — E03.9 HYPOTHYROIDISM, UNSPECIFIED TYPE: ICD-10-CM

## 2022-06-21 DIAGNOSIS — N18.30 STAGE 3 CHRONIC KIDNEY DISEASE, UNSPECIFIED WHETHER STAGE 3A OR 3B CKD: ICD-10-CM

## 2022-06-21 DIAGNOSIS — S81.802D OPEN WOUND OF LEFT LOWER LEG, SUBSEQUENT ENCOUNTER: Primary | ICD-10-CM

## 2022-06-21 DIAGNOSIS — I87.2 VENOUS INSUFFICIENCY: ICD-10-CM

## 2022-06-21 DIAGNOSIS — L40.0 PLAQUE PSORIASIS: ICD-10-CM

## 2022-06-21 DIAGNOSIS — S81.802D TRAUMATIC OPEN WOUND OF LEFT LOWER LEG, SUBSEQUENT ENCOUNTER: ICD-10-CM

## 2022-06-21 DIAGNOSIS — M17.12 PRIMARY OSTEOARTHRITIS OF LEFT KNEE: ICD-10-CM

## 2022-06-21 DIAGNOSIS — I65.22 STENOSIS OF LEFT CAROTID ARTERY: ICD-10-CM

## 2022-06-21 DIAGNOSIS — I48.0 PAROXYSMAL ATRIAL FIBRILLATION: ICD-10-CM

## 2022-06-21 DIAGNOSIS — K76.0 FATTY LIVER: ICD-10-CM

## 2022-06-21 PROCEDURE — 3044F HG A1C LEVEL LT 7.0%: CPT | Mod: CPTII,S$GLB,, | Performed by: INTERNAL MEDICINE

## 2022-06-21 PROCEDURE — 3061F NEG MICROALBUMINURIA REV: CPT | Mod: CPTII,S$GLB,, | Performed by: FAMILY MEDICINE

## 2022-06-21 PROCEDURE — 3075F SYST BP GE 130 - 139MM HG: CPT | Mod: CPTII,S$GLB,, | Performed by: INTERNAL MEDICINE

## 2022-06-21 PROCEDURE — 3044F HG A1C LEVEL LT 7.0%: CPT | Mod: CPTII,S$GLB,, | Performed by: FAMILY MEDICINE

## 2022-06-21 PROCEDURE — 3079F DIAST BP 80-89 MM HG: CPT | Mod: CPTII,S$GLB,, | Performed by: FAMILY MEDICINE

## 2022-06-21 PROCEDURE — 1101F PR PT FALLS ASSESS DOC 0-1 FALLS W/OUT INJ PAST YR: ICD-10-PCS | Mod: CPTII,S$GLB,, | Performed by: INTERNAL MEDICINE

## 2022-06-21 PROCEDURE — 3066F PR DOCUMENTATION OF TREATMENT FOR NEPHROPATHY: ICD-10-PCS | Mod: CPTII,S$GLB,, | Performed by: INTERNAL MEDICINE

## 2022-06-21 PROCEDURE — 1126F PR PAIN SEVERITY QUANTIFIED, NO PAIN PRESENT: ICD-10-PCS | Mod: CPTII,S$GLB,, | Performed by: INTERNAL MEDICINE

## 2022-06-21 PROCEDURE — 3288F FALL RISK ASSESSMENT DOCD: CPT | Mod: CPTII,S$GLB,, | Performed by: FAMILY MEDICINE

## 2022-06-21 PROCEDURE — 1126F PR PAIN SEVERITY QUANTIFIED, NO PAIN PRESENT: ICD-10-PCS | Mod: CPTII,S$GLB,, | Performed by: FAMILY MEDICINE

## 2022-06-21 PROCEDURE — 3288F PR FALLS RISK ASSESSMENT DOCUMENTED: ICD-10-PCS | Mod: CPTII,S$GLB,, | Performed by: INTERNAL MEDICINE

## 2022-06-21 PROCEDURE — 99999 PR PBB SHADOW E&M-EST. PATIENT-LVL V: CPT | Mod: PBBFAC,,, | Performed by: FAMILY MEDICINE

## 2022-06-21 PROCEDURE — 99214 OFFICE O/P EST MOD 30 MIN: CPT | Mod: S$GLB,,, | Performed by: INTERNAL MEDICINE

## 2022-06-21 PROCEDURE — 1159F PR MEDICATION LIST DOCUMENTED IN MEDICAL RECORD: ICD-10-PCS | Mod: CPTII,S$GLB,, | Performed by: FAMILY MEDICINE

## 2022-06-21 PROCEDURE — 99499 UNLISTED E&M SERVICE: CPT | Mod: S$GLB,,, | Performed by: INTERNAL MEDICINE

## 2022-06-21 PROCEDURE — 99999 PR PBB SHADOW E&M-EST. PATIENT-LVL V: ICD-10-PCS | Mod: PBBFAC,,, | Performed by: FAMILY MEDICINE

## 2022-06-21 PROCEDURE — 3061F PR NEG MICROALBUMINURIA RESULT DOCUMENTED/REVIEW: ICD-10-PCS | Mod: CPTII,S$GLB,, | Performed by: INTERNAL MEDICINE

## 2022-06-21 PROCEDURE — 1160F RVW MEDS BY RX/DR IN RCRD: CPT | Mod: CPTII,S$GLB,, | Performed by: FAMILY MEDICINE

## 2022-06-21 PROCEDURE — 1101F PR PT FALLS ASSESS DOC 0-1 FALLS W/OUT INJ PAST YR: ICD-10-PCS | Mod: CPTII,S$GLB,, | Performed by: FAMILY MEDICINE

## 2022-06-21 PROCEDURE — 3079F PR MOST RECENT DIASTOLIC BLOOD PRESSURE 80-89 MM HG: ICD-10-PCS | Mod: CPTII,S$GLB,, | Performed by: FAMILY MEDICINE

## 2022-06-21 PROCEDURE — 1160F RVW MEDS BY RX/DR IN RCRD: CPT | Mod: CPTII,S$GLB,, | Performed by: INTERNAL MEDICINE

## 2022-06-21 PROCEDURE — 1160F PR REVIEW ALL MEDS BY PRESCRIBER/CLIN PHARMACIST DOCUMENTED: ICD-10-PCS | Mod: CPTII,S$GLB,, | Performed by: FAMILY MEDICINE

## 2022-06-21 PROCEDURE — 99213 PR OFFICE/OUTPT VISIT, EST, LEVL III, 20-29 MIN: ICD-10-PCS | Mod: S$GLB,,, | Performed by: FAMILY MEDICINE

## 2022-06-21 PROCEDURE — 3288F PR FALLS RISK ASSESSMENT DOCUMENTED: ICD-10-PCS | Mod: CPTII,S$GLB,, | Performed by: FAMILY MEDICINE

## 2022-06-21 PROCEDURE — 99214 PR OFFICE/OUTPT VISIT, EST, LEVL IV, 30-39 MIN: ICD-10-PCS | Mod: S$GLB,,, | Performed by: INTERNAL MEDICINE

## 2022-06-21 PROCEDURE — 3061F PR NEG MICROALBUMINURIA RESULT DOCUMENTED/REVIEW: ICD-10-PCS | Mod: CPTII,S$GLB,, | Performed by: FAMILY MEDICINE

## 2022-06-21 PROCEDURE — 1160F PR REVIEW ALL MEDS BY PRESCRIBER/CLIN PHARMACIST DOCUMENTED: ICD-10-PCS | Mod: CPTII,S$GLB,, | Performed by: INTERNAL MEDICINE

## 2022-06-21 PROCEDURE — 1126F AMNT PAIN NOTED NONE PRSNT: CPT | Mod: CPTII,S$GLB,, | Performed by: INTERNAL MEDICINE

## 2022-06-21 PROCEDURE — 3008F PR BODY MASS INDEX (BMI) DOCUMENTED: ICD-10-PCS | Mod: CPTII,S$GLB,, | Performed by: INTERNAL MEDICINE

## 2022-06-21 PROCEDURE — 3066F NEPHROPATHY DOC TX: CPT | Mod: CPTII,S$GLB,, | Performed by: INTERNAL MEDICINE

## 2022-06-21 PROCEDURE — 3079F DIAST BP 80-89 MM HG: CPT | Mod: CPTII,S$GLB,, | Performed by: INTERNAL MEDICINE

## 2022-06-21 PROCEDURE — 3066F PR DOCUMENTATION OF TREATMENT FOR NEPHROPATHY: ICD-10-PCS | Mod: CPTII,S$GLB,, | Performed by: FAMILY MEDICINE

## 2022-06-21 PROCEDURE — 3075F PR MOST RECENT SYSTOLIC BLOOD PRESS GE 130-139MM HG: ICD-10-PCS | Mod: CPTII,S$GLB,, | Performed by: INTERNAL MEDICINE

## 2022-06-21 PROCEDURE — 1101F PT FALLS ASSESS-DOCD LE1/YR: CPT | Mod: CPTII,S$GLB,, | Performed by: FAMILY MEDICINE

## 2022-06-21 PROCEDURE — 3044F PR MOST RECENT HEMOGLOBIN A1C LEVEL <7.0%: ICD-10-PCS | Mod: CPTII,S$GLB,, | Performed by: FAMILY MEDICINE

## 2022-06-21 PROCEDURE — 99499 RISK ADDL DX/OHS AUDIT: ICD-10-PCS | Mod: S$GLB,,, | Performed by: INTERNAL MEDICINE

## 2022-06-21 PROCEDURE — 3008F BODY MASS INDEX DOCD: CPT | Mod: CPTII,S$GLB,, | Performed by: INTERNAL MEDICINE

## 2022-06-21 PROCEDURE — 1159F MED LIST DOCD IN RCRD: CPT | Mod: CPTII,S$GLB,, | Performed by: INTERNAL MEDICINE

## 2022-06-21 PROCEDURE — 3288F FALL RISK ASSESSMENT DOCD: CPT | Mod: CPTII,S$GLB,, | Performed by: INTERNAL MEDICINE

## 2022-06-21 PROCEDURE — 3061F NEG MICROALBUMINURIA REV: CPT | Mod: CPTII,S$GLB,, | Performed by: INTERNAL MEDICINE

## 2022-06-21 PROCEDURE — 3066F NEPHROPATHY DOC TX: CPT | Mod: CPTII,S$GLB,, | Performed by: FAMILY MEDICINE

## 2022-06-21 PROCEDURE — 3044F PR MOST RECENT HEMOGLOBIN A1C LEVEL <7.0%: ICD-10-PCS | Mod: CPTII,S$GLB,, | Performed by: INTERNAL MEDICINE

## 2022-06-21 PROCEDURE — 1126F AMNT PAIN NOTED NONE PRSNT: CPT | Mod: CPTII,S$GLB,, | Performed by: FAMILY MEDICINE

## 2022-06-21 PROCEDURE — 99213 OFFICE O/P EST LOW 20 MIN: CPT | Mod: S$GLB,,, | Performed by: FAMILY MEDICINE

## 2022-06-21 PROCEDURE — 3075F PR MOST RECENT SYSTOLIC BLOOD PRESS GE 130-139MM HG: ICD-10-PCS | Mod: CPTII,S$GLB,, | Performed by: FAMILY MEDICINE

## 2022-06-21 PROCEDURE — 1101F PT FALLS ASSESS-DOCD LE1/YR: CPT | Mod: CPTII,S$GLB,, | Performed by: INTERNAL MEDICINE

## 2022-06-21 PROCEDURE — 3075F SYST BP GE 130 - 139MM HG: CPT | Mod: CPTII,S$GLB,, | Performed by: FAMILY MEDICINE

## 2022-06-21 PROCEDURE — 1159F MED LIST DOCD IN RCRD: CPT | Mod: CPTII,S$GLB,, | Performed by: FAMILY MEDICINE

## 2022-06-21 PROCEDURE — 1159F PR MEDICATION LIST DOCUMENTED IN MEDICAL RECORD: ICD-10-PCS | Mod: CPTII,S$GLB,, | Performed by: INTERNAL MEDICINE

## 2022-06-21 PROCEDURE — 3079F PR MOST RECENT DIASTOLIC BLOOD PRESSURE 80-89 MM HG: ICD-10-PCS | Mod: CPTII,S$GLB,, | Performed by: INTERNAL MEDICINE

## 2022-06-21 RX ORDER — DULAGLUTIDE 0.75 MG/.5ML
0.75 INJECTION, SOLUTION SUBCUTANEOUS
Qty: 4 PEN | Refills: 11 | Status: SHIPPED | OUTPATIENT
Start: 2022-06-21 | End: 2022-08-23

## 2022-06-21 RX ORDER — SERTRALINE HYDROCHLORIDE 100 MG/1
100 TABLET, FILM COATED ORAL DAILY PRN
COMMUNITY
End: 2022-11-17

## 2022-06-21 RX ORDER — SIMVASTATIN 20 MG/1
TABLET, FILM COATED ORAL
Qty: 90 TABLET | Refills: 3 | Status: SHIPPED | OUTPATIENT
Start: 2022-06-21 | End: 2022-12-21

## 2022-06-21 RX ORDER — SITAGLIPTIN 100 MG/1
TABLET, FILM COATED ORAL
Qty: 90 TABLET | Refills: 3 | Status: SHIPPED | OUTPATIENT
Start: 2022-06-21 | End: 2022-08-23

## 2022-06-21 NOTE — PATIENT INSTRUCTIONS
MD Orders:  Obtained wound photos and measurements.  Anesthetic:   Topical 4% Lidocaine Cream to wound bed prior to cleansing and/or debridement: none used today  Wound cleansing:   Saline or wound cleanser to cleanse wound   Debridement:  Wounds were mechanically debrided with gauze and wound cleanser by RN  Topical Treatments:   Triad applied to periwound  Dressings:   Silver tim, Telfa and Surginet  Compression:   Tubigrip size F  Offloading:  Avoid pressure to area  Activity   Limit activity: Rest several times per day. - Keep off area as much as possible.   Dietary:   As tolerated: 3 well-balanced meals  Increased protein: Boost or Ensure, [place] Instant Breakfast (purchase sugarfree if Diabetic) Increasing your protein intake is very helpful with wound healing; if you are a kidney or dialysis patient please check with your Nephrologist as to how much protein you are allowed to take in with your condition. Taking a daily Multivitamin with Zinc as well as Vitamin C, 1000 mg minimum per day will also help with healing your wound. If you are on Coumadin, please contact the Coumadin Clinic before beginning a Multivitamin. High protein items that can be added to your diet include, extra helpings of meats, fish, beans, also High protein bars that add as little as 12 gms of protein and up to 32 gms of protein per bar.  Supplement with: Multivitamin with Zinc and Vitamin C 500mg twice a day.  Return Appointment: 7/12/22        Patient Instructions:  Clean wound and change dressing every day. Wash hands before and after wound care. Remove old dressing, clean with soap/water or saline/wound cleanser; pat dry. May clean wound while in the shower with soap/water. Apply Silvercel, Telfa and Surginet. Use Triad to perwound.. Pt should keep wound covered at all times and avoid getting dressing wet. If dressing is saturated, it will need to be changed. Elevate legs when possible. Continue probiotic in regimen. Notify wound  care clinic for any concerns or changes in wound. Report signs and symptoms of infection to wound bed.

## 2022-06-21 NOTE — PROGRESS NOTES
Subjective:       Patient ID: Sneha Mcghee is a 71 y.o. female.    Medication List with Changes/Refills   New Medications    DULAGLUTIDE (TRULICITY) 0.75 MG/0.5 ML PEN INJECTOR    Inject 0.75 mg into the skin every 7 days.    SIMVASTATIN (ZOCOR) 20 MG TABLET    TAKE ONE TABLET BY MOUTH EVERY EVENING   Current Medications    ALBUTEROL (PROVENTIL) 2.5 MG /3 ML (0.083 %) NEBULIZER SOLUTION    Take 3 mLs (2.5 mg total) by nebulization every 6 (six) hours as needed for Wheezing. Rescue. Dispense one box    ALBUTEROL (PROVENTIL/VENTOLIN HFA) 90 MCG/ACTUATION INHALER    Inhale 2 puffs into the lungs every 6 (six) hours as needed for Wheezing. Rescue    AMLODIPINE (NORVASC) 5 MG TABLET    Take 1 tablet (5 mg total) by mouth once daily.    ASCORBIC ACID, VITAMIN C, (VITAMIN C) 500 MG TABLET    Take 500 mg by mouth once daily.    ASPIRIN (ECOTRIN) 325 MG EC TABLET    aspirin 325 mg tablet,delayed release   Take 1 tablet every day by oral route.    ATORVASTATIN (LIPITOR) 40 MG TABLET    Take 1 tablet (40 mg total) by mouth once daily.    BLOOD SUGAR DIAGNOSTIC STRP    To check BG once a day and PRN low sugars, to use with insurance preferred meter    BLOOD-GLUCOSE METER KIT    To check BG qam and PRN lows, to use with insurance preferred meter    CALCIUM/MAGNESIUM (CALCIUM AND MAGNESIUM ORAL)    Take by mouth Daily.    CHOLECALCIFEROL, VITAMIN D3, 125 MCG (5,000 UNIT) TAB    cholecalciferol (vitamin D3) 125 mcg (5,000 unit) tablet   Take by oral route.    FLUTICASONE PROPIONATE (FLONASE) 50 MCG/ACTUATION NASAL SPRAY    2 sprays (100 mcg total) by Each Nostril route once daily.    FUROSEMIDE (LASIX) 20 MG TABLET    TAKE 1 TABLET BY MOUTH EVERY DAY.    IPRATROPIUM (ATROVENT HFA) 17 MCG/ACTUATION INHALER    Inhale 2 puffs into the lungs every 6 (six) hours. Rescue    LACTOBACILLUS RHAMNOSUS GG (CULTURELLE) 10 BILLION CELL CAPSULE    Take 1 capsule by mouth once daily.    LANCETS MISC    To check BG once a day and PRN lows, to  use with insurance preferred meter    LATANOPROST 0.005 % OPHTHALMIC SOLUTION    latanoprost 0.005 % eye drops   Instill 1 drop every day by ophthalmic route for 90 days.    LEVOTHYROXINE (SYNTHROID) 88 MCG TABLET    TAKE 1 TABLET(88 MCG) BY MOUTH BEFORE BREAKFAST    METOPROLOL SUCCINATE (TOPROL-XL) 100 MG 24 HR TABLET    Take 1 tablet (100 mg total) by mouth once daily.    MOMETASONE 0.1% (ELOCON) 0.1 % CREAM    Apply topically once daily.    NYSTATIN (MYCOSTATIN) POWDER    Apply topically 3 (three) times daily.    NYSTATIN-TRIAMCINOLONE (MYCOLOG II) CREAM    PRN    OMEGA-3 FATTY ACIDS/FISH OIL (FISH OIL-OMEGA-3 FATTY ACIDS) 300-1,000 MG CAPSULE    Take 1 capsule by mouth once daily.    POTASSIUM CHLORIDE (KLOR-CON) 10 MEQ TBSR    Take 10 mEq by mouth once daily.    PROPAFENONE (RHTHYMOL) 150 MG TAB    TAKE 1 TABLET(150 MG) BY MOUTH EVERY 8 HOURS    SANTYL OINTMENT    Apply topically once daily.    SERTRALINE (ZOLOFT) 100 MG TABLET    Take 100 mg by mouth daily as needed.    SUVOREXANT (BELSOMRA) 15 MG TAB    Take 1 tablet by mouth nightly as needed.    TIMOLOL MALEATE 0.5% (TIMOPTIC) 0.5 % DROP    Place 1 drop into both eyes 2 (two) times daily.    VIT C/E/ZN/COPPR/LUTEIN/ZEAXAN (PRESERVISION AREDS-2 ORAL)    Take by mouth once daily at 6am.    VITAMIN E, DL,TOCOPHERYL ACET, (VITAMIN E, DL, ACETATE,) 180 MG (400 UNIT) CAP    Take 400 Units by mouth once daily.   Changed and/or Refilled Medications    Modified Medication Previous Medication    JANUVIA 100 MG TAB SITagliptin (JANUVIA) 100 MG Tab       TAKE ONE TABLET BY MOUTH EVERY DAY    Take 1 tablet (100 mg total) by mouth once daily.   Discontinued Medications    AMOXICILLIN-CLAVULANATE 875-125MG (AUGMENTIN) 875-125 MG PER TABLET    Take 1 tablet by mouth 2 (two) times daily.    POTASSIUM GLUCONATE 2.5 MEQ TAB    potassium 99 mg tablet   Take by oral route.    SERTRALINE (ZOLOFT) 100 MG TABLET    Take 1 tablet (100 mg total) by mouth once daily.       Chief  Complaint: Follow-up  She is here today to f/u on chronic medical issues.      She has CAD s/p CABG x 4 vessels in 2012 with 8 stents.  She reports a cardiomyopathy with chronic heart failure.  She has paroxysmal Afib for many years. Echo on 1/2022 showed EF 60%, positive diastolic dysfunction, cLVH, severe LAE, moderate AS (HAMILTON 1.19, gradient 25), mild TR, PAP of 39.  She continues on rhythmol 150 mg tid, metoprolol xl 100 mg qday, lasix 20 mg daily with KCL and aspirin 325 mg daily. [id number] Score: 6 (stoke risk of 9.7%/year). She was seen by cardiology on 3/2022 who felt that she is okay on aspirin daily while she is monitored regularly in pacer clinic. If she develops AFib then to start eliquis. Advised to f/u with cardiology in 6 months with a repeat echo and carotid ultrasound. She denies chest pain or shortness of breath or palpitations.      She has hypertension and is taking toprol xl 100 mg qday and amlodipine 5 mg daily. She developed a cough on ACEI.       She has hyperlipidemia and is taking atorvastatin 40 mg daily.  Lipids on 6/2022 were 147/172/35/77. She is on aspirin 325 mg daily.      She has carotid artery stenosis and is s/p left carotid stent.  She is scheduled for carotid ultrasound.      She has PVD but has not had stenting of lower legs. She has chronic venous insufficiency and is s/p multiple ELVT.  She has chronic stasis dermatitis left > right with a current non-healing vascular ulcer of left lower leg.  Venous u/s on 1/2022 showed venous reflux.  Arterial ultrasound on 1/2022 showed 50-75% stenosis of proximal left anterior tibial artery, SARITHA right 1.01 and left 0.97. She has not had a CT angiogram of lower legs. She continues to follow in wound care every 2 weeks. She continues on lasix 20 mg daily.      She has diabetes and is taking januvia 100 mg daily and stopped metformin due to intolerance. Her HbA1c was 6.6 on 6/2022. She is UTD on her eye exam and her foot exam. Her  microalbumin is negative on 6/2022. She does not check her glucose at home.      She does report CKD with improved creatinine on labs on 6/2023 showing creatinine of 0.9 and GFR > 60.        She has asthma since 2012 that started after her heart surgery. She has symptoms about once every 6 months. Her symptoms are chest tightness and wheezing.  She denies any known triggers. She uses albuterol for her increased symptoms with good relief. Today she denies any active symptoms.      She has hypothyroid and is taking levothyroxine 88 mcg daily. Her TSH on 12/2021 was normal.      She has glaucoma and is using timolol and xalatan drops. She is followed by ophthalmology and reports her pressures have been stable.      She has incontinence of urine and uses pads. She was given oxybutynin xl 5 mg daily with good response but stopped taking. She denies dysuria or hematuria.      She has fatty liver per patient.      She has depression and is taking zoloft 100 mg qday. She feels that this controls her symptoms. No suicidal ideations.  She does occasionally get severe anxiety with panic attacks. She continues to complain of sleep issues and will occasionally use belsomra 15 mg qhs PRN. She reports she uses very rarely and less than every 2 weeks.       She has VALENTE and refuses to wear CPAP.      She has left knee osteoarthritis and is followed by orthopedics. She was told she needs a TKR but does not want done at this time. She is currently in PT but continues to have pain.      She lives alone and feels safe at home. She does not exercise and is very sedentary. She does eat heathy.  She has poor balance but denies any recent falls.      Colonoscopy---4/2016 repeat in 5 years ([facility])  Mammogram----6/2021 neg   DEXA-----2/2021 normal.   Tdap---more than 10 years   Influenza vaccine---10/2021  Pneumovax 23----11/2016  Prevnar 13----10/2017  Shingles vaccine-----none  Covid vaccine----UTD x 2 doses      Review of Systems  "  Constitutional: Negative for appetite change, fatigue, fever and unexpected weight change.   HENT: Negative for congestion, ear pain, hearing loss, sore throat and trouble swallowing.    Eyes: Negative for pain and visual disturbance.   Respiratory: Negative for cough, chest tightness, shortness of breath and wheezing.    Cardiovascular: Positive for leg swelling. Negative for chest pain and palpitations.   Gastrointestinal: Negative for abdominal pain, blood in stool, constipation, diarrhea, nausea and vomiting.   Endocrine: Negative for polyuria.   Genitourinary: Negative for dysuria and hematuria.   Musculoskeletal: Positive for arthralgias. Negative for back pain and myalgias.   Skin: Positive for wound. Negative for rash.   Neurological: Negative for dizziness, weakness, numbness and headaches.   Hematological: Does not bruise/bleed easily.   Psychiatric/Behavioral: Positive for sleep disturbance. Negative for dysphoric mood and suicidal ideas. The patient is not nervous/anxious.        Objective:      Vitals:    06/21/22 1012   BP: 134/82   Pulse: 61   Resp: 16   Temp: 97.5 °F (36.4 °C)   TempSrc: Temporal   SpO2: 96%   Weight: 115.1 kg (253 lb 12 oz)   Height: 5' 5" (1.651 m)     Body mass index is 42.23 kg/m².  Physical Exam    General appearance: No acute distress, cooperative  Eyes: PERRL, EOMI, conjunctiva clear  Ears: normal external ear and pinna, tm clear without drainage, canals clear  Nose: Normal mucosa without drainage  Throat: no exudates or erythema, tonsils not enlarged  Mouth: no sores or lesions, moist mucous membranes  Neck: FROM, soft, supple, no thyromegaly, no bruits  Lymph: no anterior or posterior cervical adenopathy  Heart::  Regular rate and rhythm, 3/6 systolic murmur  Lung: Clear to ascultation bilaterally, no wheezing, no rales, no rhonchi, no distress  Abdomen: Soft, nontender, no distention, no hepatosplenomegaly, bowel sounds normal, no guarding, no rebound, no peritoneal " signs  Skin: no rashes, no lesions  Extremities: 1+ pitting edema left> right   Neuro: CN 2-12 intact, 5/5 muscle strength upper and lower extremity bilaterally, 2+ DTRs UE and LE bilaterally, normal gait  Peripheral pulses: diminished pedal pulses bilaterally, left lower leg bandaged by wound care, erythematous   Musculoskeletal: FROM, good strenth, no tenderness  Joint: normal appearance, no swelling, no warmth, no deformity in all joints    Assessment:       1. Coronary artery disease without angina pectoris, unspecified vessel or lesion type, unspecified whether native or transplanted heart    2. Paroxysmal atrial fibrillation    3. Chronic heart failure, unspecified heart failure type    4. Nonrheumatic aortic valve stenosis    5. Essential hypertension    6. Hyperlipidemia, unspecified hyperlipidemia type    7. Stenosis of left carotid artery    8. Atherosclerosis of native artery of left lower extremity with ulceration of other part of lower leg    9. Chronic stasis dermatitis    10. Type 2 diabetes mellitus with stage 3b chronic kidney disease, without long-term current use of insulin    11. Hypothyroidism, unspecified type    12. Mild intermittent asthma without complication    13. Stage 3 chronic kidney disease, unspecified whether stage 3a or 3b CKD    14. Fatty liver    15. OAB (overactive bladder)    16. Major depressive disorder, recurrent episode, mild    17. Primary insomnia    18. Plaque psoriasis    19. VALENTE (obstructive sleep apnea)    20. Morbid obesity with BMI of 40.0-44.9, adult    21. Primary osteoarthritis of left knee    22. Encounter for screening mammogram for malignant neoplasm of breast    23. Screen for colon cancer        Plan:       Coronary artery disease without angina pectoris, unspecified vessel or lesion type, unspecified whether native or transplanted heart  Stable and no active symptoms.    Paroxysmal atrial fibrillation  NSR today on exam. Continue rhythmol and aspirin.      Chronic heart failure, unspecified heart failure type  Well compensated and continue lasix daily.    Nonrheumatic aortic valve stenosis  Asymptomatic and due to recheck echo in 2 months.    Essential hypertension  Well controlled and continue current regimen.   -     Basic Metabolic Panel; Future; Expected date: 06/21/2022  -     TSH; Future; Expected date: 06/21/2022    Hyperlipidemia, unspecified hyperlipidemia type  Good control on atorvastatin. Continue aspirin daily.    Stenosis of left carotid artery  Continue statin and aspirin.  To get carotid u/s.     Atherosclerosis of native artery of left lower extremity with ulceration of other part of lower leg  She has a non-healing ulcer and PVD on left. To discuss with cardiology if CTA is needed and then possible stent if appropriate.     Chronic stasis dermatitis  Continue lasix.     Type 2 diabetes mellitus with stage 3b chronic kidney disease, without long-term current use of insulin  Good control. Will try to add trulicity and increase the dose to help with weight loss. As she is able to increase her dose of trulicity then can decrease januvia. Foot and ey exam UTD.  -     Ambulatory referral/consult to Nutrition Services; Future; Expected date: 06/28/2022  -     Hemoglobin A1C; Future; Expected date: 06/21/2022  -     dulaglutide (TRULICITY) 0.75 mg/0.5 mL pen injector; Inject 0.75 mg into the skin every 7 days.  Dispense: 4 pen; Refill: 11    Hypothyroidism, unspecified type  Good control on this dose of levothyroxine.    Mild intermittent asthma without complication  STable and no active symptoms.    Stage 3 chronic kidney disease, unspecified whether stage 3a or 3b CKD  Mild and improved. Continue to monitor.    Fatty liver  Encouraged weight loss    OAB (overactive bladder)  Uncontrolled but off treatment. Discuss at her next appt.    Major depressive disorder, recurrent episode, mild  Well controlled and continue current regimen.     Primary  insomnia  Controlled on belsomra PRN    Plaque psoriasis  Stable    VALENTE (obstructive sleep apnea)  She continues to refuse cpap    Morbid obesity with BMI of 40.0-44.9, adult  Long discussion on the benefits of healthy eating and regular exercise to help lose weight and help control diabetes, hypertension and HLD.     Primary osteoarthritis of left knee  Continue to follow with orthopedics.     Encounter for screening mammogram for malignant neoplasm of breast  -     Mammo Digital Screening Bilat w/ Tu; Future; Expected date: 06/21/2022    Screen for colon cancer  -     Fecal Immunochemical Test (iFOBT); Future; Expected date: 06/21/2022    Follow up in about 4 months (around 10/21/2022) for chronic medical issues.

## 2022-06-21 NOTE — PATIENT INSTRUCTIONS
Start trulicity at 0.75 mg once a week. AFter one month if tolerating well then let me know and I will increase your dose to 1.5 mg weekly    Continue januvia 100 mg daily but if able to tolerate the higher doses of trulicity then we may be able to get you off januvia in the future.        Khadra Jaramillo MS RD LDN    Outpatient Clinical Dietitian/[employer]   [facility]  [address]    [place] [address]  [phone] phone  [phone] fax

## 2022-06-21 NOTE — PROGRESS NOTES
[facility]                         Wound Care Clinic                Progress Note    Subjective:       Patient ID: Sneha Mcghee is a 71 y.o. female.    Chief Complaint: Wound Check    HPI     Pt seen in clinic for a FU visit for traumatic left leg open wound. Pt wound is improving, nearly 100% granulated but she is having a reaction to the adhesive in the tape used to secure the dressing. Pt has no other complaints today.    Initial Consult Date: 1/26/22  Wound onset: Jan 21 or 22, 2022  Referring MD:Dr. Elizabeth Jacobo  Physician List:  Vascular Status/SARITHA:  Had venous ultrasound done 6/5/22 at [facility] ED  Sensilase or Vascular Studies:  Nutrition Risk/Labs: 12/21  Other Labs:  Recent Cultures & Dates:2/8/22  Radiology/Xray:  Diabetes Status/HbA1c: Dec' 2021: 6.6  Offloading/Immobilization:  Edema/Compression:   Tobacco Use:  Other:  Recent Antibiotics:Keflex started 6/5/22 Completed Augmentin regimen as well  Recent MD visit:3/17/22 Dr. Mcgovern [facility] ED on 6/5/22 6/21/22: Patient did start Augmentin and completed course     Review of Systems   Skin: Positive for wound.        Open wound left leg   All other systems reviewed and are negative.        Objective:        Physical Exam  Vitals and nursing note reviewed.   Constitutional:       General: She is not in acute distress.     Appearance: Normal appearance. She is not ill-appearing, toxic-appearing or diaphoretic.   Skin:     General: Skin is warm and dry.      Coloration: Skin is not jaundiced or pale.      Findings: Lesion present. No bruising, erythema or rash.      Comments: Left leg open wound, improved, some redness around the wound in shape of tape   Neurological:      General: No focal deficit present.      Mental Status: She is alert and oriented to person, place, and time.   Psychiatric:         Mood and Affect: Mood normal.         Behavior: Behavior normal.         Thought Content: Thought  content normal.         Judgment: Judgment normal.         Vitals:    06/21/22 0800   BP: 136/82   Pulse: 69   Resp: 20   Temp: 97.9 °F (36.6 °C)       Assessment:           ICD-10-CM ICD-9-CM   1. Open wound of left lower leg, subsequent encounter  S81.802D V58.89     891.0   2. Venous insufficiency  I87.2 459.81   3. Traumatic open wound of left lower leg, subsequent encounter  S81.802D V58.89     891.0            Wound 01/26/22 1312 Traumatic Left lower;lateral Leg (Active)   01/26/22 1312    Pre-existing:    Primary Wound Type: Traumatic   Side: Left   Orientation: lower;lateral   Location: Leg   Wound Number:    Ankle-Brachial Index:    Pulses:    Removal Indication and Assessment:    Wound Outcome:    (Retired) Wound Type:    (Retired) Wound Length (cm):    (Retired) Wound Width (cm):    (Retired) Depth (cm):    Wound Description (Comments):    Removal Indications:    Wound Image   06/21/22 0800   Dressing Appearance Moist drainage 06/21/22 0800   Drainage Amount Small 06/21/22 0800   Drainage Characteristics/Odor Serosanguineous 06/21/22 0800   Appearance Pink;Red;Epithelialization;Granulating;Slough 06/21/22 0800   Periwound Area Intact;Satellite lesion;Moist;Redness 06/21/22 0800   Wound Edges Irregular 06/21/22 0800   Wound Length (cm) 2.4 cm 06/21/22 0800   Wound Width (cm) 1.8 cm 06/21/22 0800   Wound Depth (cm) 0.1 cm 06/21/22 0800   Wound Volume (cm^3) 0.432 cm^3 06/21/22 0800   Wound Surface Area (cm^2) 4.32 cm^2 06/21/22 0800   Care Cleansed with:;Wound cleanser 06/21/22 0800   Dressing Applied;Silver 06/21/22 0800   Periwound Care Topical treatment applied 06/21/22 0800   Dressing Change Due 06/22/22 06/21/22 0800           Plan:            MD Orders:  Obtained wound photos and measurements.  Anesthetic:   · Topical 4% Lidocaine Cream to wound bed prior to cleansing and/or debridement: none used today  Wound cleansing:   · Saline or wound cleanser to cleanse wound   Debridement:  · Wounds were  mechanically debrided with gauze and wound cleanser by RN  Topical Treatments:  ·  Triad applied to periwound  Dressings:   Silver tim, Telfa and Surginet  Compression:   · Tubigrip size F  Offloading:  · Avoid pressure to area  Activity   · Limit activity: Rest several times per day. - Keep off area as much as possible.   Dietary:   · As tolerated: 3 well-balanced meals  · Increased protein: Boost or Ensure, [place] Instant Breakfast (purchase sugarfree if Diabetic) Increasing your protein intake is very helpful with wound healing; if you are a kidney or dialysis patient please check with your Nephrologist as to how much protein you are allowed to take in with your condition. Taking a daily Multivitamin with Zinc as well as Vitamin C, 1000 mg minimum per day will also help with healing your wound. If you are on Coumadin, please contact the Coumadin Clinic before beginning a Multivitamin. High protein items that can be added to your diet include, extra helpings of meats, fish, beans, also High protein bars that add as little as 12 gms of protein and up to 32 gms of protein per bar.  · Supplement with: Multivitamin with Zinc and Vitamin C 500mg twice a day.  Return Appointment: 7/12/22        Patient Instructions:  Clean wound and change dressing every day. Wash hands before and after wound care. Remove old dressing, clean with soap/water or saline/wound cleanser; pat dry. May clean wound while in the shower with soap/water. Apply Silvercel, Telfa and Surginet. Use Triad to perwound.. Pt should keep wound covered at all times and avoid getting dressing wet. If dressing is saturated, it will need to be changed. Elevate legs when possible. Continue probiotic in regimen. Notify wound care clinic for any concerns or changes in wound. Report signs and symptoms of infection to wound bed.         Sneha was seen today for wound check.    Diagnoses and all orders for this visit:    Open wound of left lower leg, subsequent  encounter  -     Change dressing    Venous insufficiency  -     Change dressing    Traumatic open wound of left lower leg, subsequent encounter  -     Change dressing

## 2022-07-12 ENCOUNTER — OFFICE VISIT (OUTPATIENT)
Dept: WOUND CARE | Facility: CLINIC | Age: 72
End: 2022-07-12
Payer: MEDICARE

## 2022-07-12 VITALS
HEIGHT: 65 IN | WEIGHT: 253 LBS | SYSTOLIC BLOOD PRESSURE: 138 MMHG | BODY MASS INDEX: 42.15 KG/M2 | HEART RATE: 62 BPM | DIASTOLIC BLOOD PRESSURE: 73 MMHG

## 2022-07-12 DIAGNOSIS — I73.9 PVD (PERIPHERAL VASCULAR DISEASE): ICD-10-CM

## 2022-07-12 DIAGNOSIS — S81.802D OPEN WOUND OF LEFT LOWER LEG, SUBSEQUENT ENCOUNTER: Primary | ICD-10-CM

## 2022-07-12 DIAGNOSIS — L03.116 CELLULITIS OF LEFT LOWER EXTREMITY: ICD-10-CM

## 2022-07-12 DIAGNOSIS — I87.2 VENOUS INSUFFICIENCY: ICD-10-CM

## 2022-07-12 PROCEDURE — 99999 PR PBB SHADOW E&M-EST. PATIENT-LVL V: CPT | Mod: PBBFAC,,, | Performed by: FAMILY MEDICINE

## 2022-07-12 PROCEDURE — 3075F PR MOST RECENT SYSTOLIC BLOOD PRESS GE 130-139MM HG: ICD-10-PCS | Mod: CPTII,S$GLB,, | Performed by: FAMILY MEDICINE

## 2022-07-12 PROCEDURE — 1160F RVW MEDS BY RX/DR IN RCRD: CPT | Mod: CPTII,S$GLB,, | Performed by: FAMILY MEDICINE

## 2022-07-12 PROCEDURE — 99213 OFFICE O/P EST LOW 20 MIN: CPT | Mod: S$GLB,,, | Performed by: FAMILY MEDICINE

## 2022-07-12 PROCEDURE — 1160F PR REVIEW ALL MEDS BY PRESCRIBER/CLIN PHARMACIST DOCUMENTED: ICD-10-PCS | Mod: CPTII,S$GLB,, | Performed by: FAMILY MEDICINE

## 2022-07-12 PROCEDURE — 3078F PR MOST RECENT DIASTOLIC BLOOD PRESSURE < 80 MM HG: ICD-10-PCS | Mod: CPTII,S$GLB,, | Performed by: FAMILY MEDICINE

## 2022-07-12 PROCEDURE — 3061F NEG MICROALBUMINURIA REV: CPT | Mod: CPTII,S$GLB,, | Performed by: FAMILY MEDICINE

## 2022-07-12 PROCEDURE — 3078F DIAST BP <80 MM HG: CPT | Mod: CPTII,S$GLB,, | Performed by: FAMILY MEDICINE

## 2022-07-12 PROCEDURE — 3066F PR DOCUMENTATION OF TREATMENT FOR NEPHROPATHY: ICD-10-PCS | Mod: CPTII,S$GLB,, | Performed by: FAMILY MEDICINE

## 2022-07-12 PROCEDURE — 1159F MED LIST DOCD IN RCRD: CPT | Mod: CPTII,S$GLB,, | Performed by: FAMILY MEDICINE

## 2022-07-12 PROCEDURE — 3066F NEPHROPATHY DOC TX: CPT | Mod: CPTII,S$GLB,, | Performed by: FAMILY MEDICINE

## 2022-07-12 PROCEDURE — 3044F HG A1C LEVEL LT 7.0%: CPT | Mod: CPTII,S$GLB,, | Performed by: FAMILY MEDICINE

## 2022-07-12 PROCEDURE — 1101F PR PT FALLS ASSESS DOC 0-1 FALLS W/OUT INJ PAST YR: ICD-10-PCS | Mod: CPTII,S$GLB,, | Performed by: FAMILY MEDICINE

## 2022-07-12 PROCEDURE — 1126F AMNT PAIN NOTED NONE PRSNT: CPT | Mod: CPTII,S$GLB,, | Performed by: FAMILY MEDICINE

## 2022-07-12 PROCEDURE — 3288F PR FALLS RISK ASSESSMENT DOCUMENTED: ICD-10-PCS | Mod: CPTII,S$GLB,, | Performed by: FAMILY MEDICINE

## 2022-07-12 PROCEDURE — 3061F PR NEG MICROALBUMINURIA RESULT DOCUMENTED/REVIEW: ICD-10-PCS | Mod: CPTII,S$GLB,, | Performed by: FAMILY MEDICINE

## 2022-07-12 PROCEDURE — 99213 PR OFFICE/OUTPT VISIT, EST, LEVL III, 20-29 MIN: ICD-10-PCS | Mod: S$GLB,,, | Performed by: FAMILY MEDICINE

## 2022-07-12 PROCEDURE — 3008F BODY MASS INDEX DOCD: CPT | Mod: CPTII,S$GLB,, | Performed by: FAMILY MEDICINE

## 2022-07-12 PROCEDURE — 99999 PR PBB SHADOW E&M-EST. PATIENT-LVL V: ICD-10-PCS | Mod: PBBFAC,,, | Performed by: FAMILY MEDICINE

## 2022-07-12 PROCEDURE — 1101F PT FALLS ASSESS-DOCD LE1/YR: CPT | Mod: CPTII,S$GLB,, | Performed by: FAMILY MEDICINE

## 2022-07-12 PROCEDURE — 1159F PR MEDICATION LIST DOCUMENTED IN MEDICAL RECORD: ICD-10-PCS | Mod: CPTII,S$GLB,, | Performed by: FAMILY MEDICINE

## 2022-07-12 PROCEDURE — 3288F FALL RISK ASSESSMENT DOCD: CPT | Mod: CPTII,S$GLB,, | Performed by: FAMILY MEDICINE

## 2022-07-12 PROCEDURE — 1126F PR PAIN SEVERITY QUANTIFIED, NO PAIN PRESENT: ICD-10-PCS | Mod: CPTII,S$GLB,, | Performed by: FAMILY MEDICINE

## 2022-07-12 PROCEDURE — 3008F PR BODY MASS INDEX (BMI) DOCUMENTED: ICD-10-PCS | Mod: CPTII,S$GLB,, | Performed by: FAMILY MEDICINE

## 2022-07-12 PROCEDURE — 3044F PR MOST RECENT HEMOGLOBIN A1C LEVEL <7.0%: ICD-10-PCS | Mod: CPTII,S$GLB,, | Performed by: FAMILY MEDICINE

## 2022-07-12 PROCEDURE — 3075F SYST BP GE 130 - 139MM HG: CPT | Mod: CPTII,S$GLB,, | Performed by: FAMILY MEDICINE

## 2022-07-12 RX ORDER — AMOXICILLIN AND CLAVULANATE POTASSIUM 875; 125 MG/1; MG/1
1 TABLET, FILM COATED ORAL 2 TIMES DAILY
Qty: 20 TABLET | Refills: 0 | Status: SHIPPED | OUTPATIENT
Start: 2022-07-12 | End: 2022-07-22

## 2022-07-12 NOTE — PATIENT INSTRUCTIONS
MD Orders:  Obtained wound photos and measurements.  Anesthetic:   Topical 4% Lidocaine Cream to wound bed prior to cleansing and/or debridement: none used today  Wound cleansing:   Saline or wound cleanser to cleanse wound   Debridement:  Wounds were mechanically debrided with gauze and wound cleanser by RN  Topical Treatments:   Triad applied to periwound  Dressings:   Silver tim, Telfa kerlix and tubigrip  Compression:   Tubigrip size F  Offloading:  Avoid pressure to area  Activity   Limit activity: Rest several times per day. - Keep off area as much as possible.   Dietary:   As tolerated: 3 well-balanced meals  Increased protein: Boost or Ensure, [place] Instant Breakfast (purchase sugarfree if Diabetic) Increasing your protein intake is very helpful with wound healing; if you are a kidney or dialysis patient please check with your Nephrologist as to how much protein you are allowed to take in with your condition. Taking a daily Multivitamin with Zinc as well as Vitamin C, 1000 mg minimum per day will also help with healing your wound. If you are on Coumadin, please contact the Coumadin Clinic before beginning a Multivitamin. High protein items that can be added to your diet include, extra helpings of meats, fish, beans, also High protein bars that add as little as 12 gms of protein and up to 32 gms of protein per bar.  Supplement with: Multivitamin with Zinc and Vitamin C 500mg twice a day.  Return Appointment: 7/19/22        Patient Instructions:  Clean wound and change dressing every day. Wash hands before and after wound care. Remove old dressing, clean with soap/water or saline/wound cleanser; pat dry. May clean wound while in the shower with soap/water. Apply Silvercel, Telfa, Roll gauze and Tubigrip size F. Use Triad to perwound.. Pt should keep wound covered at all times and avoid getting dressing wet. If dressing is saturated, it will need to be changed. Elevate legs when possible. Continue probiotic  in regimen. Notify wound care clinic for any concerns or changes in wound. Report signs and symptoms of infection to wound bed. Start Augmentin and take twice daily.

## 2022-07-12 NOTE — PROGRESS NOTES
[facility]                         Wound Care Clinic                Progress Note    Subjective:       Patient ID: Sneha Mcghee is a 71 y.o. female.    Chief Complaint: Wound Check    HPI     Pt seen in clinic for a FU visit for left leg venous ulcer. Pt has increased redness around the wound. Pt has a hx of chronic cellulitis, and it flares from time to time. Pt has done well with augmentin in the past. No other complaints today    Initial Consult Date: 1/26/22  Wound onset: Jan 21 or 22, 2022  Referring MD:Dr. Elizabeth Jacobo  Physician List:  Vascular Status/SARITHA:  Had venous ultrasound done 6/5/22 at [facility] ED  Sensilase or Vascular Studies:  Nutrition Risk/Labs: 12/21  Other Labs:  Recent Cultures & Dates:2/8/22  Radiology/Xray:  Diabetes Status/HbA1c: Dec' 2021: 6.6  Offloading/Immobilization:  Edema/Compression:   Tobacco Use:  Other:  Recent Antibiotics:Keflex started 6/5/22 Completed Augmentin regimen as well  Recent MD visit:3/17/22 Dr. Mcgovern [facility] ED on 6/5/22 7/12/22: Start Augmentin twice a day  Review of Systems   Skin: Positive for color change and wound.        Left leg venous ulcer   All other systems reviewed and are negative.        Objective:        Physical Exam  Vitals and nursing note reviewed.   Constitutional:       General: She is not in acute distress.     Appearance: Normal appearance. She is not ill-appearing, toxic-appearing or diaphoretic.   Skin:     General: Skin is warm and dry.      Coloration: Skin is not jaundiced or pale.      Findings: Erythema and lesion present. No bruising or rash.      Comments: Left leg venous ulcer, increased db-wound erythema, serous drainage   Neurological:      General: No focal deficit present.      Mental Status: She is alert and oriented to person, place, and time.   Psychiatric:         Mood and Affect: Mood normal.         Behavior: Behavior normal.         Thought Content: Thought  content normal.         Judgment: Judgment normal.         Vitals:    07/12/22 0829   BP: 138/73   Pulse: 62       Assessment:           ICD-10-CM ICD-9-CM   1. Open wound of left lower leg, subsequent encounter  S81.802D V58.89     891.0   2. Venous insufficiency  I87.2 459.81   3. PVD (peripheral vascular disease)  I73.9 443.9   4. Cellulitis of left lower extremity  L03.116 682.6            Wound 01/26/22 1312 Traumatic Left lower;lateral Leg (Active)   01/26/22 1312    Pre-existing:    Primary Wound Type: Traumatic   Side: Left   Orientation: lower;lateral   Location: Leg   Wound Number:    Ankle-Brachial Index:    Pulses:    Removal Indication and Assessment:    Wound Outcome:    (Retired) Wound Type:    (Retired) Wound Length (cm):    (Retired) Wound Width (cm):    (Retired) Depth (cm):    Wound Description (Comments):    Removal Indications:    Wound Image   07/12/22 0900   Dressing Appearance Intact;Moist drainage 07/12/22 0900   Drainage Amount Small 07/12/22 0900   Drainage Characteristics/Odor Serosanguineous 07/12/22 0900   Appearance Pink;Red;Granulating 07/12/22 0900   Red (%), Wound Tissue Color 90 % 07/12/22 0900   Yellow (%), Wound Tissue Color 10 % 07/12/22 0900   Wound Length (cm) 2.2 cm 07/12/22 0900   Wound Width (cm) 2 cm 07/12/22 0900   Wound Depth (cm) 0.1 cm 07/12/22 0900   Wound Volume (cm^3) 0.44 cm^3 07/12/22 0900   Wound Surface Area (cm^2) 4.4 cm^2 07/12/22 0900   Care Cleansed with:;Wound cleanser 07/12/22 0900   Dressing Applied;Silver;Rolled gauze;Tubular bandage 07/12/22 0900   Dressing Change Due 07/13/22 07/12/22 0900           Plan:            MD Orders:  Obtained wound photos and measurements.  Anesthetic:   · Topical 4% Lidocaine Cream to wound bed prior to cleansing and/or debridement: none used today  Wound cleansing:   · Saline or wound cleanser to cleanse wound   Debridement:  · Wounds were mechanically debrided with gauze and wound cleanser by RN  Topical  Treatments:  ·  Triad applied to periwound  Dressings:   Silver tim, Telfa kerlix and tubigrip  Compression:   · Tubigrip size F  Offloading:  · Avoid pressure to area  Activity   · Limit activity: Rest several times per day. - Keep off area as much as possible.   Dietary:   · As tolerated: 3 well-balanced meals  · Increased protein: Boost or Ensure, [place] Instant Breakfast (purchase sugarfree if Diabetic) Increasing your protein intake is very helpful with wound healing; if you are a kidney or dialysis patient please check with your Nephrologist as to how much protein you are allowed to take in with your condition. Taking a daily Multivitamin with Zinc as well as Vitamin C, 1000 mg minimum per day will also help with healing your wound. If you are on Coumadin, please contact the Coumadin Clinic before beginning a Multivitamin. High protein items that can be added to your diet include, extra helpings of meats, fish, beans, also High protein bars that add as little as 12 gms of protein and up to 32 gms of protein per bar.  · Supplement with: Multivitamin with Zinc and Vitamin C 500mg twice a day.  Return Appointment: 7/19/22        Patient Instructions:  Clean wound and change dressing every day. Wash hands before and after wound care. Remove old dressing, clean with soap/water or saline/wound cleanser; pat dry. May clean wound while in the shower with soap/water. Apply Silvercel, Telfa, Roll gauze and Tubigrip size F. Use Triad to perwound.. Pt should keep wound covered at all times and avoid getting dressing wet. If dressing is saturated, it will need to be changed. Elevate legs when possible. Continue probiotic in regimen. Notify wound care clinic for any concerns or changes in wound. Report signs and symptoms of infection to wound bed. Start Augmentin and take twice daily.         Sneha was seen today for wound check.    Diagnoses and all orders for this visit:    Open wound of left lower leg, subsequent  encounter    Venous insufficiency    PVD (peripheral vascular disease)    Cellulitis of left lower extremity    Other orders  -     amoxicillin-clavulanate 875-125mg (AUGMENTIN) 875-125 mg per tablet; Take 1 tablet by mouth 2 (two) times daily. for 10 days

## 2022-07-19 ENCOUNTER — OFFICE VISIT (OUTPATIENT)
Dept: WOUND CARE | Facility: CLINIC | Age: 72
End: 2022-07-19
Payer: MEDICARE

## 2022-07-19 VITALS
HEIGHT: 65 IN | BODY MASS INDEX: 42.15 KG/M2 | HEART RATE: 76 BPM | DIASTOLIC BLOOD PRESSURE: 86 MMHG | SYSTOLIC BLOOD PRESSURE: 147 MMHG | WEIGHT: 253 LBS

## 2022-07-19 DIAGNOSIS — S81.802D OPEN WOUND OF LEFT LOWER LEG, SUBSEQUENT ENCOUNTER: Primary | ICD-10-CM

## 2022-07-19 DIAGNOSIS — I87.2 VENOUS INSUFFICIENCY: ICD-10-CM

## 2022-07-19 PROCEDURE — 3008F PR BODY MASS INDEX (BMI) DOCUMENTED: ICD-10-PCS | Mod: CPTII,S$GLB,, | Performed by: FAMILY MEDICINE

## 2022-07-19 PROCEDURE — 1160F RVW MEDS BY RX/DR IN RCRD: CPT | Mod: CPTII,S$GLB,, | Performed by: FAMILY MEDICINE

## 2022-07-19 PROCEDURE — 1159F PR MEDICATION LIST DOCUMENTED IN MEDICAL RECORD: ICD-10-PCS | Mod: CPTII,S$GLB,, | Performed by: FAMILY MEDICINE

## 2022-07-19 PROCEDURE — 3061F NEG MICROALBUMINURIA REV: CPT | Mod: CPTII,S$GLB,, | Performed by: FAMILY MEDICINE

## 2022-07-19 PROCEDURE — 99213 PR OFFICE/OUTPT VISIT, EST, LEVL III, 20-29 MIN: ICD-10-PCS | Mod: S$GLB,,, | Performed by: FAMILY MEDICINE

## 2022-07-19 PROCEDURE — 3288F FALL RISK ASSESSMENT DOCD: CPT | Mod: CPTII,S$GLB,, | Performed by: FAMILY MEDICINE

## 2022-07-19 PROCEDURE — 1126F PR PAIN SEVERITY QUANTIFIED, NO PAIN PRESENT: ICD-10-PCS | Mod: CPTII,S$GLB,, | Performed by: FAMILY MEDICINE

## 2022-07-19 PROCEDURE — 99499 RISK ADDL DX/OHS AUDIT: ICD-10-PCS | Mod: S$GLB,,, | Performed by: FAMILY MEDICINE

## 2022-07-19 PROCEDURE — 3008F BODY MASS INDEX DOCD: CPT | Mod: CPTII,S$GLB,, | Performed by: FAMILY MEDICINE

## 2022-07-19 PROCEDURE — 3079F DIAST BP 80-89 MM HG: CPT | Mod: CPTII,S$GLB,, | Performed by: FAMILY MEDICINE

## 2022-07-19 PROCEDURE — 3061F PR NEG MICROALBUMINURIA RESULT DOCUMENTED/REVIEW: ICD-10-PCS | Mod: CPTII,S$GLB,, | Performed by: FAMILY MEDICINE

## 2022-07-19 PROCEDURE — 1159F MED LIST DOCD IN RCRD: CPT | Mod: CPTII,S$GLB,, | Performed by: FAMILY MEDICINE

## 2022-07-19 PROCEDURE — 99999 PR PBB SHADOW E&M-EST. PATIENT-LVL V: ICD-10-PCS | Mod: PBBFAC,,, | Performed by: FAMILY MEDICINE

## 2022-07-19 PROCEDURE — 3077F SYST BP >= 140 MM HG: CPT | Mod: CPTII,S$GLB,, | Performed by: FAMILY MEDICINE

## 2022-07-19 PROCEDURE — 1160F PR REVIEW ALL MEDS BY PRESCRIBER/CLIN PHARMACIST DOCUMENTED: ICD-10-PCS | Mod: CPTII,S$GLB,, | Performed by: FAMILY MEDICINE

## 2022-07-19 PROCEDURE — 3044F HG A1C LEVEL LT 7.0%: CPT | Mod: CPTII,S$GLB,, | Performed by: FAMILY MEDICINE

## 2022-07-19 PROCEDURE — 1101F PR PT FALLS ASSESS DOC 0-1 FALLS W/OUT INJ PAST YR: ICD-10-PCS | Mod: CPTII,S$GLB,, | Performed by: FAMILY MEDICINE

## 2022-07-19 PROCEDURE — 99999 PR PBB SHADOW E&M-EST. PATIENT-LVL V: CPT | Mod: PBBFAC,,, | Performed by: FAMILY MEDICINE

## 2022-07-19 PROCEDURE — 3079F PR MOST RECENT DIASTOLIC BLOOD PRESSURE 80-89 MM HG: ICD-10-PCS | Mod: CPTII,S$GLB,, | Performed by: FAMILY MEDICINE

## 2022-07-19 PROCEDURE — 3077F PR MOST RECENT SYSTOLIC BLOOD PRESSURE >= 140 MM HG: ICD-10-PCS | Mod: CPTII,S$GLB,, | Performed by: FAMILY MEDICINE

## 2022-07-19 PROCEDURE — 1126F AMNT PAIN NOTED NONE PRSNT: CPT | Mod: CPTII,S$GLB,, | Performed by: FAMILY MEDICINE

## 2022-07-19 PROCEDURE — 3066F NEPHROPATHY DOC TX: CPT | Mod: CPTII,S$GLB,, | Performed by: FAMILY MEDICINE

## 2022-07-19 PROCEDURE — 99213 OFFICE O/P EST LOW 20 MIN: CPT | Mod: S$GLB,,, | Performed by: FAMILY MEDICINE

## 2022-07-19 PROCEDURE — 99499 UNLISTED E&M SERVICE: CPT | Mod: S$GLB,,, | Performed by: FAMILY MEDICINE

## 2022-07-19 PROCEDURE — 1101F PT FALLS ASSESS-DOCD LE1/YR: CPT | Mod: CPTII,S$GLB,, | Performed by: FAMILY MEDICINE

## 2022-07-19 PROCEDURE — 3066F PR DOCUMENTATION OF TREATMENT FOR NEPHROPATHY: ICD-10-PCS | Mod: CPTII,S$GLB,, | Performed by: FAMILY MEDICINE

## 2022-07-19 PROCEDURE — 3044F PR MOST RECENT HEMOGLOBIN A1C LEVEL <7.0%: ICD-10-PCS | Mod: CPTII,S$GLB,, | Performed by: FAMILY MEDICINE

## 2022-07-19 PROCEDURE — 3288F PR FALLS RISK ASSESSMENT DOCUMENTED: ICD-10-PCS | Mod: CPTII,S$GLB,, | Performed by: FAMILY MEDICINE

## 2022-07-19 NOTE — PROGRESS NOTES
[facility]                         Wound Care Clinic                Progress Note    Subjective:       Patient ID: Sneha Mcghee is a 71 y.o. female.    Chief Complaint: Wound Check    HPI     Pt seen in clinic for a FU visit for left leg venous ulcer. Pt wound is continuing to improve, but she is weeping a good amount of serous fluid. Pt has a Hx of EVLT, and may need to have another procedure done to havee the wound completely heal. No other complaints today.    Initial Consult Date: 1/26/22  Wound onset: Jan 21 or 22, 2022  Referring MD:Dr. Elizabeth Jacobo  Physician List:  Vascular Status/SARITHA:  Had venous ultrasound done 6/5/22 at [facility] ED  Sensilase or Vascular Studies:  Nutrition Risk/Labs: 12/21  Other Labs:  Recent Cultures & Dates:2/8/22  Radiology/Xray:  Diabetes Status/HbA1c: Dec' 2021: 6.6  Offloading/Immobilization:  Edema/Compression:   Tobacco Use:  Other:  Recent Antibiotics:Keflex started 6/5/22 Completed Augmentin regimen as well  Recent MD visit:3/17/22 Dr. Mcgovern [facility] ED on 6/5/22 7/19/22: Has a few days of Augmentin left  Review of Systems   Skin: Positive for wound.        Left leg open wound   All other systems reviewed and are negative.        Objective:        Physical Exam  Vitals and nursing note reviewed. Exam conducted with a chaperone present.   Constitutional:       General: She is not in acute distress.     Appearance: Normal appearance. She is not ill-appearing, toxic-appearing or diaphoretic.   Skin:     General: Skin is warm and dry.      Coloration: Skin is not jaundiced or pale.      Findings: Lesion present. No bruising, erythema or rash.      Comments: Left leg open wound, improved but has serous drainage   Neurological:      General: No focal deficit present.      Mental Status: She is alert and oriented to person, place, and time.   Psychiatric:         Mood and Affect: Mood normal.         Behavior: Behavior  normal.         Thought Content: Thought content normal.         Judgment: Judgment normal.         Vitals:    07/19/22 0833   BP: (!) 147/86   Pulse: 76       Assessment:           ICD-10-CM ICD-9-CM   1. Open wound of left lower leg, subsequent encounter  S81.802D V58.89     891.0   2. Venous insufficiency  I87.2 459.81            Wound 01/26/22 1312 Traumatic Left lower;lateral Leg (Active)   01/26/22 1312    Pre-existing:    Primary Wound Type: Traumatic   Side: Left   Orientation: lower;lateral   Location: Leg   Wound Number:    Ankle-Brachial Index:    Pulses:    Removal Indication and Assessment:    Wound Outcome:    (Retired) Wound Type:    (Retired) Wound Length (cm):    (Retired) Wound Width (cm):    (Retired) Depth (cm):    Wound Description (Comments):    Removal Indications:    Wound Image   07/19/22 0800   Dressing Appearance Intact 07/19/22 0800   Drainage Amount Small 07/19/22 0800   Drainage Characteristics/Odor Serosanguineous 07/19/22 0800   Appearance Pink;Red;Granulating;Epithelialization 07/19/22 0800   Periwound Area Swelling;Pustules 07/19/22 0800   Wound Edges Irregular 07/19/22 0800   Wound Length (cm) 1 cm 07/19/22 0800   Wound Width (cm) 0.7 cm 07/19/22 0800   Wound Depth (cm) 0.1 cm 07/19/22 0800   Wound Volume (cm^3) 0.07 cm^3 07/19/22 0800   Wound Surface Area (cm^2) 0.7 cm^2 07/19/22 0800   Care Cleansed with:;Wound cleanser 07/19/22 0800   Dressing Applied;Silver;Non-adherent 07/19/22 0800   Dressing Change Due 07/21/22 07/19/22 0800           Plan:            MD Orders:  Obtained wound photos and measurements.  Anesthetic:   · Topical 4% Lidocaine Cream to wound bed prior to cleansing and/or debridement: none used today  Wound cleansing:   · Saline or wound cleanser to cleanse wound   Debridement:  · Wounds were mechanically debrided with gauze and wound cleanser by RN  Topical Treatments:  ·    Dressings:   Silver tim, Telfa adhesive  Compression:   ·   Offloading:  · Avoid  pressure to area  Activity   · Limit activity: Rest several times per day. - Keep off area as much as possible.   Dietary:   · As tolerated: 3 well-balanced meals  · Increased protein: Boost or Ensure, [place] Instant Breakfast (purchase sugarfree if Diabetic) Increasing your protein intake is very helpful with wound healing; if you are a kidney or dialysis patient please check with your Nephrologist as to how much protein you are allowed to take in with your condition. Taking a daily Multivitamin with Zinc as well as Vitamin C, 1000 mg minimum per day will also help with healing your wound. If you are on Coumadin, please contact the Coumadin Clinic before beginning a Multivitamin. High protein items that can be added to your diet include, extra helpings of meats, fish, beans, also High protein bars that add as little as 12 gms of protein and up to 32 gms of protein per bar.  · Supplement with: Multivitamin with Zinc and Vitamin C 500mg twice a day.  Return Appointment: 7/26/22        Patient Instructions:  Clean wound and change dressing every day. Wash hands before and after wound care. Remove old dressing, clean with soap/water or saline/wound cleanser; pat dry. May clean wound while in the shower with soap/water. Apply Silvercel, Telfa, Roll gauze and Tubigrip size F. Alternate Triamcinolone and Aquaphor to db wound area. Pt should keep wound covered at all times and avoid getting dressing wet. If dressing is saturated, it will need to be changed. Elevate legs when possible. Continue probiotic in regimen. Notify wound care clinic for any concerns or changes in wound. Report signs and symptoms of infection to wound bed. Complete Augmentin regimen.         Sneha was seen today for wound check.    Diagnoses and all orders for this visit:    Open wound of left lower leg, subsequent encounter  -     Change dressing    Venous insufficiency  -     Change dressing

## 2022-07-19 NOTE — PATIENT INSTRUCTIONS
MD Orders:  Obtained wound photos and measurements.  Anesthetic:   Topical 4% Lidocaine Cream to wound bed prior to cleansing and/or debridement: none used today  Wound cleansing:   Saline or wound cleanser to cleanse wound   Debridement:  Wounds were mechanically debrided with gauze and wound cleanser by RN  Topical Treatments:     Dressings:   Silver tim, Telfa adhesive  Compression:     Offloading:  Avoid pressure to area  Activity   Limit activity: Rest several times per day. - Keep off area as much as possible.   Dietary:   As tolerated: 3 well-balanced meals  Increased protein: Boost or Ensure, [place] Instant Breakfast (purchase sugarfree if Diabetic) Increasing your protein intake is very helpful with wound healing; if you are a kidney or dialysis patient please check with your Nephrologist as to how much protein you are allowed to take in with your condition. Taking a daily Multivitamin with Zinc as well as Vitamin C, 1000 mg minimum per day will also help with healing your wound. If you are on Coumadin, please contact the Coumadin Clinic before beginning a Multivitamin. High protein items that can be added to your diet include, extra helpings of meats, fish, beans, also High protein bars that add as little as 12 gms of protein and up to 32 gms of protein per bar.  Supplement with: Multivitamin with Zinc and Vitamin C 500mg twice a day.  Return Appointment: 7/26/22        Patient Instructions:  Clean wound and change dressing every day. Wash hands before and after wound care. Remove old dressing, clean with soap/water or saline/wound cleanser; pat dry. May clean wound while in the shower with soap/water. Apply Silvercel, Telfa, Roll gauze and Tubigrip size F. Alternate Triamcinolone and Aquaphor to db wound area. Pt should keep wound covered at all times and avoid getting dressing wet. If dressing is saturated, it will need to be changed. Elevate legs when possible. Continue probiotic in regimen. Notify  wound care clinic for any concerns or changes in wound. Report signs and symptoms of infection to wound bed. Complete Augmentin regimen.

## 2022-07-21 ENCOUNTER — TELEPHONE (OUTPATIENT)
Dept: FAMILY MEDICINE | Facility: CLINIC | Age: 72
End: 2022-07-21
Payer: MEDICARE

## 2022-07-21 NOTE — TELEPHONE ENCOUNTER
Called pt and she is asking what to do about her legs.  I advised her that DR. Jacobo is out until 8/4/23 but I can get her in with another provider.  Pt states she is just going to call her wound care doctor.

## 2022-07-21 NOTE — TELEPHONE ENCOUNTER
----- Message from Inessa Yu sent at 7/21/2022  2:30 PM CDT -----  Pt dropped off information regarding referral in a sealed envelope for Dr. Jacobo to review.  In box.

## 2022-07-26 ENCOUNTER — OFFICE VISIT (OUTPATIENT)
Dept: WOUND CARE | Facility: CLINIC | Age: 72
End: 2022-07-26
Payer: MEDICARE

## 2022-07-26 VITALS — DIASTOLIC BLOOD PRESSURE: 78 MMHG | HEART RATE: 65 BPM | RESPIRATION RATE: 20 BRPM | SYSTOLIC BLOOD PRESSURE: 130 MMHG

## 2022-07-26 DIAGNOSIS — I87.2 VENOUS INSUFFICIENCY: ICD-10-CM

## 2022-07-26 DIAGNOSIS — S81.802D OPEN WOUND OF LEFT LOWER LEG, SUBSEQUENT ENCOUNTER: Primary | ICD-10-CM

## 2022-07-26 PROCEDURE — 1160F RVW MEDS BY RX/DR IN RCRD: CPT | Mod: CPTII,S$GLB,, | Performed by: FAMILY MEDICINE

## 2022-07-26 PROCEDURE — 99213 OFFICE O/P EST LOW 20 MIN: CPT | Mod: S$GLB,,, | Performed by: FAMILY MEDICINE

## 2022-07-26 PROCEDURE — 99999 PR PBB SHADOW E&M-EST. PATIENT-LVL V: ICD-10-PCS | Mod: PBBFAC,,, | Performed by: FAMILY MEDICINE

## 2022-07-26 PROCEDURE — 3044F HG A1C LEVEL LT 7.0%: CPT | Mod: CPTII,S$GLB,, | Performed by: FAMILY MEDICINE

## 2022-07-26 PROCEDURE — 3061F PR NEG MICROALBUMINURIA RESULT DOCUMENTED/REVIEW: ICD-10-PCS | Mod: CPTII,S$GLB,, | Performed by: FAMILY MEDICINE

## 2022-07-26 PROCEDURE — 99999 PR PBB SHADOW E&M-EST. PATIENT-LVL V: CPT | Mod: PBBFAC,,, | Performed by: FAMILY MEDICINE

## 2022-07-26 PROCEDURE — 3044F PR MOST RECENT HEMOGLOBIN A1C LEVEL <7.0%: ICD-10-PCS | Mod: CPTII,S$GLB,, | Performed by: FAMILY MEDICINE

## 2022-07-26 PROCEDURE — 3288F PR FALLS RISK ASSESSMENT DOCUMENTED: ICD-10-PCS | Mod: CPTII,S$GLB,, | Performed by: FAMILY MEDICINE

## 2022-07-26 PROCEDURE — 1159F PR MEDICATION LIST DOCUMENTED IN MEDICAL RECORD: ICD-10-PCS | Mod: CPTII,S$GLB,, | Performed by: FAMILY MEDICINE

## 2022-07-26 PROCEDURE — 3066F NEPHROPATHY DOC TX: CPT | Mod: CPTII,S$GLB,, | Performed by: FAMILY MEDICINE

## 2022-07-26 PROCEDURE — 1160F PR REVIEW ALL MEDS BY PRESCRIBER/CLIN PHARMACIST DOCUMENTED: ICD-10-PCS | Mod: CPTII,S$GLB,, | Performed by: FAMILY MEDICINE

## 2022-07-26 PROCEDURE — 3288F FALL RISK ASSESSMENT DOCD: CPT | Mod: CPTII,S$GLB,, | Performed by: FAMILY MEDICINE

## 2022-07-26 PROCEDURE — 1125F AMNT PAIN NOTED PAIN PRSNT: CPT | Mod: CPTII,S$GLB,, | Performed by: FAMILY MEDICINE

## 2022-07-26 PROCEDURE — 3078F DIAST BP <80 MM HG: CPT | Mod: CPTII,S$GLB,, | Performed by: FAMILY MEDICINE

## 2022-07-26 PROCEDURE — 1159F MED LIST DOCD IN RCRD: CPT | Mod: CPTII,S$GLB,, | Performed by: FAMILY MEDICINE

## 2022-07-26 PROCEDURE — 1125F PR PAIN SEVERITY QUANTIFIED, PAIN PRESENT: ICD-10-PCS | Mod: CPTII,S$GLB,, | Performed by: FAMILY MEDICINE

## 2022-07-26 PROCEDURE — 3061F NEG MICROALBUMINURIA REV: CPT | Mod: CPTII,S$GLB,, | Performed by: FAMILY MEDICINE

## 2022-07-26 PROCEDURE — 3075F SYST BP GE 130 - 139MM HG: CPT | Mod: CPTII,S$GLB,, | Performed by: FAMILY MEDICINE

## 2022-07-26 PROCEDURE — 99213 PR OFFICE/OUTPT VISIT, EST, LEVL III, 20-29 MIN: ICD-10-PCS | Mod: S$GLB,,, | Performed by: FAMILY MEDICINE

## 2022-07-26 PROCEDURE — 3075F PR MOST RECENT SYSTOLIC BLOOD PRESS GE 130-139MM HG: ICD-10-PCS | Mod: CPTII,S$GLB,, | Performed by: FAMILY MEDICINE

## 2022-07-26 PROCEDURE — 1101F PT FALLS ASSESS-DOCD LE1/YR: CPT | Mod: CPTII,S$GLB,, | Performed by: FAMILY MEDICINE

## 2022-07-26 PROCEDURE — 1101F PR PT FALLS ASSESS DOC 0-1 FALLS W/OUT INJ PAST YR: ICD-10-PCS | Mod: CPTII,S$GLB,, | Performed by: FAMILY MEDICINE

## 2022-07-26 PROCEDURE — 3066F PR DOCUMENTATION OF TREATMENT FOR NEPHROPATHY: ICD-10-PCS | Mod: CPTII,S$GLB,, | Performed by: FAMILY MEDICINE

## 2022-07-26 PROCEDURE — 3078F PR MOST RECENT DIASTOLIC BLOOD PRESSURE < 80 MM HG: ICD-10-PCS | Mod: CPTII,S$GLB,, | Performed by: FAMILY MEDICINE

## 2022-07-26 NOTE — PROGRESS NOTES
[facility]                         Wound Care Clinic                Progress Note    Subjective:       Patient ID: Sneha Mcghee is a 71 y.o. female.    Chief Complaint: Wound Check    HPI     Pt seen in clinic for a FU visit for left leg venous ulcer. Wound is nearly healed, some excpriation around the wound site. Pt has no new complaints today, but would feel more comfortable returning for a check in 2 weeks.    Initial Consult Date: 1/26/22  Wound onset: Jan 21 or 22, 2022  Referring MD:Dr. Elizabeth Jacobo  Physician List:  Vascular Status/SARITHA:  Had venous ultrasound done 6/5/22 at [facility] ED  Sensilase or Vascular Studies:  Nutrition Risk/Labs: 12/21  Other Labs:  Recent Cultures & Dates:2/8/22  Radiology/Xray:  Diabetes Status/HbA1c: Dec' 2021: 6.6  Offloading/Immobilization:  Edema/Compression:   Tobacco Use:  Other:  Recent Antibiotics:Keflex started 6/5/22 Completed Augmentin regimen as well  Recent MD visit:3/17/22 Dr. Mcgovern [facility] ED on 6/5/22  Review of Systems   Skin: Positive for rash and wound.        Left leg venous ulcer   All other systems reviewed and are negative.        Objective:        Physical Exam  Vitals and nursing note reviewed. Exam conducted with a chaperone present.   Constitutional:       General: She is not in acute distress.     Appearance: Normal appearance. She is not ill-appearing, toxic-appearing or diaphoretic.   Musculoskeletal:      Right lower leg: Edema present.      Left lower leg: Edema present.   Skin:     General: Skin is warm and dry.      Coloration: Skin is not jaundiced or pale.      Findings: Erythema, lesion and rash present. No bruising.      Comments: Left leg venous ulcer, nearly resolved, erythema and excoriation db-wound   Neurological:      General: No focal deficit present.      Mental Status: She is alert and oriented to person, place, and time.   Psychiatric:         Mood and Affect: Mood normal.          Behavior: Behavior normal.         Thought Content: Thought content normal.         Judgment: Judgment normal.         Vitals:    07/26/22 0827   BP: 130/78   Pulse: 65   Resp: 20       Assessment:           ICD-10-CM ICD-9-CM   1. Open wound of left lower leg, subsequent encounter  S81.802D V58.89     891.0   2. Venous insufficiency  I87.2 459.81            Wound 01/26/22 1312 Traumatic Left lower;lateral Leg (Active)   01/26/22 1312    Pre-existing:    Primary Wound Type: Traumatic   Side: Left   Orientation: lower;lateral   Location: Leg   Wound Number:    Ankle-Brachial Index:    Pulses:    Removal Indication and Assessment:    Wound Outcome:    (Retired) Wound Type:    (Retired) Wound Length (cm):    (Retired) Wound Width (cm):    (Retired) Depth (cm):    Wound Description (Comments):    Removal Indications:    Wound Image   07/26/22 0800   Dressing Appearance Intact;Clean 07/26/22 0800   Drainage Amount None 07/26/22 0800   Wound Length (cm) 0 cm 07/26/22 0800   Wound Width (cm) 0 cm 07/26/22 0800   Wound Depth (cm) 0 cm 07/26/22 0800   Wound Volume (cm^3) 0 cm^3 07/26/22 0800   Wound Surface Area (cm^2) 0 cm^2 07/26/22 0800   Care Cleansed with:;Wound cleanser;Applied:;Moisturizing agent 07/26/22 0800   Dressing Absorptive Pad 07/26/22 0800   Dressing Change Due 07/27/22 07/26/22 0800           Plan:            MD Orders:  Obtained wound photos and measurements.  Anesthetic:   · Topical 4% Lidocaine Cream to wound bed prior to cleansing and/or debridement: none used today  Wound cleansing:   · Saline or wound cleanser to cleanse wound   Debridement:  · Wounds were mechanically debrided with gauze and wound cleanser by RN  Topical Treatments:  ·    Dressings:  Moisturizer, ABD and surginet  Compression:   ·    Offloading:  · Avoid pressure to area  Activity   · Limit activity: Rest several times per day. - Keep off area as much as possible.   Dietary:   · As tolerated: 3 well-balanced meals  · Increased  protein: Boost or Ensure, [place] Instant Breakfast (purchase sugarfree if Diabetic) Increasing your protein intake is very helpful with wound healing; if you are a kidney or dialysis patient please check with your Nephrologist as to how much protein you are allowed to take in with your condition. Taking a daily Multivitamin with Zinc as well as Vitamin C, 1000 mg minimum per day will also help with healing your wound. If you are on Coumadin, please contact the Coumadin Clinic before beginning a Multivitamin. High protein items that can be added to your diet include, extra helpings of meats, fish, beans, also High protein bars that add as little as 12 gms of protein and up to 32 gms of protein per bar.  · Supplement with: Multivitamin with Zinc and Vitamin C 500mg twice a day.  Return Appointment: 8/9/2022        Patient Instructions:  Wash leg with soap and water, pat dry. Apply moisturizer and apply diabetic socks. Elevate legs when possible. Continue probiotic in regimen.             Sneha was seen today for wound check.    Diagnoses and all orders for this visit:    Open wound of left lower leg, subsequent encounter  -     Change dressing    Venous insufficiency  -     Change dressing

## 2022-07-26 NOTE — PATIENT INSTRUCTIONS
Dressings:  Moisturizer, ABD and surginet  Compression:      Offloading:  Avoid pressure to area  Activity   Limit activity: Rest several times per day. - Keep off area as much as possible.   Dietary:   As tolerated: 3 well-balanced meals  Increased protein: Boost or Ensure, [place] Instant Breakfast (purchase sugarfree if Diabetic) Increasing your protein intake is very helpful with wound healing; if you are a kidney or dialysis patient please check with your Nephrologist as to how much protein you are allowed to take in with your condition. Taking a daily Multivitamin with Zinc as well as Vitamin C, 1000 mg minimum per day will also help with healing your wound. If you are on Coumadin, please contact the Coumadin Clinic before beginning a Multivitamin. High protein items that can be added to your diet include, extra helpings of meats, fish, beans, also High protein bars that add as little as 12 gms of protein and up to 32 gms of protein per bar.  Supplement with: Multivitamin with Zinc and Vitamin C 500mg twice a day.  Return Appointment: 8/9/2022        Patient Instructions:  Wash leg with soap and water, pat dry. Apply moisturizer and apply diabetic socks. Elevate legs when possible. Continue probiotic in regimen.     Discharge Instructions.   Return Appointment: Should you experience any significant changes in your wound(s) or have any questions regarding your home care instructions please contact [facility], ask for the wound center- [phone].  If after hours, contact your primary care physician or go to the hospital emergency room.    In a diabetic patient with a wound, keeping your blood glucose levels as close to normal can be a great help towards healing your wound. Wounds are very difficult to heal in a high sugar environment so controlling the glucose well can go a long way towards successful wound healing.     Increasing your protein intake is very helpful with wound healing; if you  are a kidney or dialysis patient please check with your Nephrologist as to how much protein you are allowed to take in with your condition. Taking a daily Multivitamin with Zinc as well as Vitamin C, 1000 mg minimum per day will also help with healing your wound.If you are on Coumadin, please contact the Coumadin Clinic before beginning a Multivitamin. High protein items that can be added to your diet include, extra helpings of meats, fish, beans, also High protein bars that add as little as 12 gms of protein and up to 32 gms of protein per bar.     The clinic is open Mon-Fri from 8:00 a.m. until 5:00 p.m. During business hours call [facility], ask for Wound Care @ [phone] for any worsening symptoms; fever >101.0, increased pain, increased drainage, foul odor or problems with the dressing. For after hours problems or emergencies please report to the nearest emergency room.     Patient instructed on proper handwashing technique. Using warm water and soap, apply soap, lather well and vigorously apply friction for a minimum of 20 seconds, rinse well and dry well; wash before and after toileting, before and after woundcare, after sneezing, coughing etc.     Patient to keep the dressing clean, dry and intact. Call for any worsening symptoms or problems. [phone]. - Use cast protector if showering. Keep wrap dry and intact until next appt.

## 2022-08-09 ENCOUNTER — OFFICE VISIT (OUTPATIENT)
Dept: WOUND CARE | Facility: CLINIC | Age: 72
End: 2022-08-09
Payer: MEDICARE

## 2022-08-09 VITALS
SYSTOLIC BLOOD PRESSURE: 178 MMHG | HEIGHT: 65 IN | BODY MASS INDEX: 42.15 KG/M2 | HEART RATE: 60 BPM | DIASTOLIC BLOOD PRESSURE: 81 MMHG | WEIGHT: 253 LBS

## 2022-08-09 DIAGNOSIS — I87.2 VENOUS INSUFFICIENCY: ICD-10-CM

## 2022-08-09 DIAGNOSIS — S81.802D OPEN WOUND OF LEFT LOWER LEG, SUBSEQUENT ENCOUNTER: Primary | ICD-10-CM

## 2022-08-09 PROCEDURE — 1101F PR PT FALLS ASSESS DOC 0-1 FALLS W/OUT INJ PAST YR: ICD-10-PCS | Mod: CPTII,S$GLB,, | Performed by: FAMILY MEDICINE

## 2022-08-09 PROCEDURE — 3077F PR MOST RECENT SYSTOLIC BLOOD PRESSURE >= 140 MM HG: ICD-10-PCS | Mod: CPTII,S$GLB,, | Performed by: FAMILY MEDICINE

## 2022-08-09 PROCEDURE — 3079F PR MOST RECENT DIASTOLIC BLOOD PRESSURE 80-89 MM HG: ICD-10-PCS | Mod: CPTII,S$GLB,, | Performed by: FAMILY MEDICINE

## 2022-08-09 PROCEDURE — 3288F PR FALLS RISK ASSESSMENT DOCUMENTED: ICD-10-PCS | Mod: CPTII,S$GLB,, | Performed by: FAMILY MEDICINE

## 2022-08-09 PROCEDURE — 1126F AMNT PAIN NOTED NONE PRSNT: CPT | Mod: CPTII,S$GLB,, | Performed by: FAMILY MEDICINE

## 2022-08-09 PROCEDURE — 3288F FALL RISK ASSESSMENT DOCD: CPT | Mod: CPTII,S$GLB,, | Performed by: FAMILY MEDICINE

## 2022-08-09 PROCEDURE — 99212 PR OFFICE/OUTPT VISIT, EST, LEVL II, 10-19 MIN: ICD-10-PCS | Mod: S$GLB,,, | Performed by: FAMILY MEDICINE

## 2022-08-09 PROCEDURE — 1101F PT FALLS ASSESS-DOCD LE1/YR: CPT | Mod: CPTII,S$GLB,, | Performed by: FAMILY MEDICINE

## 2022-08-09 PROCEDURE — 99212 OFFICE O/P EST SF 10 MIN: CPT | Mod: S$GLB,,, | Performed by: FAMILY MEDICINE

## 2022-08-09 PROCEDURE — 99999 PR PBB SHADOW E&M-EST. PATIENT-LVL V: CPT | Mod: PBBFAC,,, | Performed by: FAMILY MEDICINE

## 2022-08-09 PROCEDURE — 1160F PR REVIEW ALL MEDS BY PRESCRIBER/CLIN PHARMACIST DOCUMENTED: ICD-10-PCS | Mod: CPTII,S$GLB,, | Performed by: FAMILY MEDICINE

## 2022-08-09 PROCEDURE — 3066F PR DOCUMENTATION OF TREATMENT FOR NEPHROPATHY: ICD-10-PCS | Mod: CPTII,S$GLB,, | Performed by: FAMILY MEDICINE

## 2022-08-09 PROCEDURE — 99999 PR PBB SHADOW E&M-EST. PATIENT-LVL V: ICD-10-PCS | Mod: PBBFAC,,, | Performed by: FAMILY MEDICINE

## 2022-08-09 PROCEDURE — 3061F PR NEG MICROALBUMINURIA RESULT DOCUMENTED/REVIEW: ICD-10-PCS | Mod: CPTII,S$GLB,, | Performed by: FAMILY MEDICINE

## 2022-08-09 PROCEDURE — 3044F HG A1C LEVEL LT 7.0%: CPT | Mod: CPTII,S$GLB,, | Performed by: FAMILY MEDICINE

## 2022-08-09 PROCEDURE — 3077F SYST BP >= 140 MM HG: CPT | Mod: CPTII,S$GLB,, | Performed by: FAMILY MEDICINE

## 2022-08-09 PROCEDURE — 3079F DIAST BP 80-89 MM HG: CPT | Mod: CPTII,S$GLB,, | Performed by: FAMILY MEDICINE

## 2022-08-09 PROCEDURE — 1160F RVW MEDS BY RX/DR IN RCRD: CPT | Mod: CPTII,S$GLB,, | Performed by: FAMILY MEDICINE

## 2022-08-09 PROCEDURE — 3061F NEG MICROALBUMINURIA REV: CPT | Mod: CPTII,S$GLB,, | Performed by: FAMILY MEDICINE

## 2022-08-09 PROCEDURE — 1126F PR PAIN SEVERITY QUANTIFIED, NO PAIN PRESENT: ICD-10-PCS | Mod: CPTII,S$GLB,, | Performed by: FAMILY MEDICINE

## 2022-08-09 PROCEDURE — 3044F PR MOST RECENT HEMOGLOBIN A1C LEVEL <7.0%: ICD-10-PCS | Mod: CPTII,S$GLB,, | Performed by: FAMILY MEDICINE

## 2022-08-09 PROCEDURE — 3008F PR BODY MASS INDEX (BMI) DOCUMENTED: ICD-10-PCS | Mod: CPTII,S$GLB,, | Performed by: FAMILY MEDICINE

## 2022-08-09 PROCEDURE — 1159F MED LIST DOCD IN RCRD: CPT | Mod: CPTII,S$GLB,, | Performed by: FAMILY MEDICINE

## 2022-08-09 PROCEDURE — 3066F NEPHROPATHY DOC TX: CPT | Mod: CPTII,S$GLB,, | Performed by: FAMILY MEDICINE

## 2022-08-09 PROCEDURE — 3008F BODY MASS INDEX DOCD: CPT | Mod: CPTII,S$GLB,, | Performed by: FAMILY MEDICINE

## 2022-08-09 PROCEDURE — 1159F PR MEDICATION LIST DOCUMENTED IN MEDICAL RECORD: ICD-10-PCS | Mod: CPTII,S$GLB,, | Performed by: FAMILY MEDICINE

## 2022-08-09 NOTE — PROGRESS NOTES
[facility]                         Wound Care Clinic                Progress Note    Subjective:       Patient ID: Sneha Mcghee is a 71 y.o. female.    Chief Complaint: Wound Check    HPI     Pt seen in clinic for a FU visit for a left leg venous ulcer. Wound is healed. Pt has no new complaints today.     Initial Consult Date: 1/26/22  Wound onset: Jan 21 or 22, 2022  Referring MD:Dr. Elizabeth Jacobo  Physician List:  Vascular Status/SARITHA:  Had venous ultrasound done 6/5/22 at [facility] ED  Sensilase or Vascular Studies:  Nutrition Risk/Labs: 12/21  Other Labs:  Recent Cultures & Dates:2/8/22  Radiology/Xray:  Diabetes Status/HbA1c: Dec' 2021: 6.6  Offloading/Immobilization:  Edema/Compression:   Tobacco Use:  Other:  Recent Antibiotics:Keflex started 6/5/22 Completed Augmentin regimen as well  Recent MD visit:3/17/22 Dr. Mcgovern [facility] ED on 6/5/22  Review of Systems   Skin: Positive for wound.        Left leg venous ulcer now healed   All other systems reviewed and are negative.        Objective:        Physical Exam  Vitals and nursing note reviewed.   Constitutional:       General: She is not in acute distress.     Appearance: Normal appearance. She is not ill-appearing, toxic-appearing or diaphoretic.   Skin:     General: Skin is warm and dry.      Coloration: Skin is not jaundiced or pale.      Findings: Lesion present. No bruising, erythema or rash.      Comments: Left leg venous ulcer, now healed   Neurological:      General: No focal deficit present.      Mental Status: She is alert and oriented to person, place, and time.   Psychiatric:         Mood and Affect: Mood normal.         Behavior: Behavior normal.         Thought Content: Thought content normal.         Judgment: Judgment normal.         Vitals:    08/09/22 0829   BP: (!) 178/81   Pulse: 60       Assessment:           ICD-10-CM ICD-9-CM   1. Open wound of left lower leg, subsequent encounter   S81.802D V58.89     891.0   2. Venous insufficiency  I87.2 459.81            Wound 01/26/22 1312 Traumatic Left lower;lateral Leg (Active)   01/26/22 1312    Pre-existing:    Primary Wound Type: Traumatic   Side: Left   Orientation: lower;lateral   Location: Leg   Wound Number:    Ankle-Brachial Index:    Pulses:    Removal Indication and Assessment:    Wound Outcome:    (Retired) Wound Type:    (Retired) Wound Length (cm):    (Retired) Wound Width (cm):    (Retired) Depth (cm):    Wound Description (Comments):    Removal Indications:    Wound Image   08/09/22 0800   Dressing Appearance Open to air 08/09/22 0800   Drainage Amount None 08/09/22 0800   Appearance Pink;Closed/resurfaced;Epithelialization 08/09/22 0800   Wound Length (cm) 0 cm 08/09/22 0800   Wound Width (cm) 0 cm 08/09/22 0800   Wound Depth (cm) 0 cm 08/09/22 0800   Wound Volume (cm^3) 0 cm^3 08/09/22 0800   Wound Surface Area (cm^2) 0 cm^2 08/09/22 0800   Care Cleansed with:;Wound cleanser;Applied:;Moisturizing agent 08/09/22 0800           Plan:          MD Orders:  Obtained wound photos and measurements.  Anesthetic:   · Topical 4% Lidocaine Cream to wound bed prior to cleansing and/or debridement: none used today  Wound cleansing:   · Saline or wound cleanser to cleanse wound   Debridement:  · Wounds were mechanically debrided with gauze and wound cleanser by RN  Topical Treatments:  ·  Moisturizer applied   Dressings:  N/A  Compression:   ·    Offloading:  · Avoid pressure to area  Activity   · Limit activity: Rest several times per day. - Keep off area as much as possible.   Dietary:   · As tolerated: 3 well-balanced meals  · Increased protein: Boost or Ensure, [place] Instant Breakfast (purchase sugarfree if Diabetic) Increasing your protein intake is very helpful with wound healing; if you are a kidney or dialysis patient please check with your Nephrologist as to how much protein you are allowed to take in with your condition. Taking a daily  Multivitamin with Zinc as well as Vitamin C, 1000 mg minimum per day will also help with healing your wound. If you are on Coumadin, please contact the Coumadin Clinic before beginning a Multivitamin. High protein items that can be added to your diet include, extra helpings of meats, fish, beans, also High protein bars that add as little as 12 gms of protein and up to 32 gms of protein per bar.  · Supplement with: Multivitamin with Zinc and Vitamin C 500mg twice a day.  Return Appointment: PRN        Patient Instructions:  Wash leg with soap and water, pat dry. Apply moisturizer and apply diabetic socks. Elevate legs when possible. Continue probiotic in regimen.            Sneha was seen today for wound check.    Diagnoses and all orders for this visit:    Open wound of left lower leg, subsequent encounter  -     Change dressing    Venous insufficiency  -     Change dressing

## 2022-08-09 NOTE — PATIENT INSTRUCTIONS
MD Orders:  Obtained wound photos and measurements.  Anesthetic:   Topical 4% Lidocaine Cream to wound bed prior to cleansing and/or debridement: none used today  Wound cleansing:   Saline or wound cleanser to cleanse wound   Debridement:  Wounds were mechanically debrided with gauze and wound cleanser by RN  Topical Treatments:   Moisturizer applied  Dressings:    Compression:      Offloading:  Avoid pressure to area  Activity   Limit activity: Rest several times per day. - Keep off area as much as possible.   Dietary:   As tolerated: 3 well-balanced meals  Increased protein: Boost or Ensure, [place] Instant Breakfast (purchase sugarfree if Diabetic) Increasing your protein intake is very helpful with wound healing; if you are a kidney or dialysis patient please check with your Nephrologist as to how much protein you are allowed to take in with your condition. Taking a daily Multivitamin with Zinc as well as Vitamin C, 1000 mg minimum per day will also help with healing your wound. If you are on Coumadin, please contact the Coumadin Clinic before beginning a Multivitamin. High protein items that can be added to your diet include, extra helpings of meats, fish, beans, also High protein bars that add as little as 12 gms of protein and up to 32 gms of protein per bar.  Supplement with: Multivitamin with Zinc and Vitamin C 500mg twice a day.  Return Appointment: PRN        Patient Instructions:  Wash leg with soap and water, pat dry. Apply moisturizer and apply diabetic socks. Elevate legs when possible. Continue probiotic in regimen.     Discharge Instructions.   Return Appointment: Should you experience any significant changes in your wound(s) or have any questions regarding your home care instructions please contact [facility], ask for the wound center- [phone].  If after hours, contact your primary care physician or go to the hospital emergency room.    In a diabetic patient with a wound, keeping  your blood glucose levels as close to normal can be a great help towards healing your wound. Wounds are very difficult to heal in a high sugar environment so controlling the glucose well can go a long way towards successful wound healing.     Increasing your protein intake is very helpful with wound healing; if you are a kidney or dialysis patient please check with your Nephrologist as to how much protein you are allowed to take in with your condition. Taking a daily Multivitamin with Zinc as well as Vitamin C, 1000 mg minimum per day will also help with healing your wound.If you are on Coumadin, please contact the Coumadin Clinic before beginning a Multivitamin. High protein items that can be added to your diet include, extra helpings of meats, fish, beans, also High protein bars that add as little as 12 gms of protein and up to 32 gms of protein per bar.     The clinic is open Mon-Fri from 8:00 a.m. until 5:00 p.m. During business hours call [facility], ask for Wound Care @ [phone] for any worsening symptoms; fever >101.0, increased pain, increased drainage, foul odor or problems with the dressing. For after hours problems or emergencies please report to the nearest emergency room.     Patient instructed on proper handwashing technique. Using warm water and soap, apply soap, lather well and vigorously apply friction for a minimum of 20 seconds, rinse well and dry well; wash before and after toileting, before and after woundcare, after sneezing, coughing etc.     Patient to keep the dressing clean, dry and intact. Call for any worsening symptoms or problems. [phone]. - Use cast protector if showering. Keep wrap dry and intact until next appt.

## 2022-08-11 ENCOUNTER — TELEPHONE (OUTPATIENT)
Dept: FAMILY MEDICINE | Facility: CLINIC | Age: 72
End: 2022-08-11
Payer: MEDICARE

## 2022-08-11 NOTE — TELEPHONE ENCOUNTER
----- Message from Ji Garcia sent at 8/11/2022  4:06 PM CDT -----  Contact: self  Type: Sooner Appointment Request        Caller is requesting a sooner appointment. Caller declined first available appointment listed below. Caller will not accept being placed on the waitlist and is requesting a message be sent to doctor.        Name of Caller: Patient  When is the first available appointment? 11/18/2022  Symptoms: Going to Dr. Carlson just got relased from wound care/ its still a small sore thats draining.Venous STATIS DERMATITIS UNDRE HER KNEE TO TOP OF FOOT/ REDNESS ICTHING DRAINING. /Hair loss  Best Call Back Number: [phone]  Addition Info: Pt needs to be seen sooner. Has a lot of symptoms. Plz call to get her scheduled. Thanks.

## 2022-08-11 NOTE — TELEPHONE ENCOUNTER
Patient requested Friday 8/19 as she has several other appointments already scheduled for Thursday 8/18. Aware of appt date/time.

## 2022-08-16 ENCOUNTER — TELEPHONE (OUTPATIENT)
Dept: FAMILY MEDICINE | Facility: CLINIC | Age: 72
End: 2022-08-16
Payer: MEDICARE

## 2022-08-16 NOTE — TELEPHONE ENCOUNTER
----- Message from Shaniqua Baxter sent at 8/16/2022  2:45 PM CDT -----  Contact: self  Patient needs to akhil her f/u appt that was just made on 8/31, req a call back at [phone] and thanks

## 2022-08-16 NOTE — TELEPHONE ENCOUNTER
Spoke w/ pt , appt rescheduled . Pt states that she was discharged from wound  care last week but still has wound drainage. Offered an appt to see Lainey this week , pt declined . She will wait to see Dr Jacobo next week . --lp

## 2022-08-18 ENCOUNTER — CLINICAL SUPPORT (OUTPATIENT)
Dept: CARDIOLOGY | Facility: HOSPITAL | Age: 72
End: 2022-08-18
Attending: INTERNAL MEDICINE
Payer: MEDICARE

## 2022-08-18 VITALS
HEIGHT: 65 IN | SYSTOLIC BLOOD PRESSURE: 178 MMHG | BODY MASS INDEX: 42.15 KG/M2 | WEIGHT: 253 LBS | DIASTOLIC BLOOD PRESSURE: 81 MMHG

## 2022-08-18 DIAGNOSIS — I35.0 NONRHEUMATIC AORTIC VALVE STENOSIS: ICD-10-CM

## 2022-08-18 DIAGNOSIS — R09.89 BRUIT: ICD-10-CM

## 2022-08-18 LAB
ASCENDING AORTA: 2.4 CM
AV INDEX (PROSTH): 0.48
AV MEAN GRADIENT: 23 MMHG
AV PEAK GRADIENT: 42 MMHG
AV VALVE AREA: 1.49 CM2
AV VELOCITY RATIO: 0.41
BSA FOR ECHO PROCEDURE: 2.29 M2
CV ECHO LV RWT: 0.56 CM
DOP CALC AO PEAK VEL: 3.23 M/S
DOP CALC AO VTI: 82.6 CM
DOP CALC LVOT AREA: 3.1 CM2
DOP CALC LVOT DIAMETER: 1.98 CM
DOP CALC LVOT PEAK VEL: 1.33 M/S
DOP CALC LVOT STROKE VOLUME: 122.79 CM3
DOP CALCLVOT PEAK VEL VTI: 39.9 CM
E WAVE DECELERATION TIME: 212.77 MSEC
E/A RATIO: 1.48
E/E' RATIO: 27.56 M/S
ECHO LV POSTERIOR WALL: 1.16 CM (ref 0.6–1.1)
EJECTION FRACTION: 65 %
FRACTIONAL SHORTENING: 32 % (ref 28–44)
INTERVENTRICULAR SEPTUM: 1.2 CM (ref 0.6–1.1)
IVRT: 79.92 MSEC
LA MAJOR: 5.77 CM
LA MINOR: 5.51 CM
LA WIDTH: 3.5 CM
LEFT ARM DIASTOLIC BLOOD PRESSURE: 81 MMHG
LEFT ARM SYSTOLIC BLOOD PRESSURE: 178 MMHG
LEFT ATRIUM SIZE: 4.74 CM
LEFT ATRIUM VOLUME INDEX: 36.3 ML/M2
LEFT ATRIUM VOLUME: 79.49 CM3
LEFT CBA DIAS: 18 CM/S
LEFT CBA SYS: 74 CM/S
LEFT CCA DIST DIAS: 19 CM/S
LEFT CCA DIST SYS: 80 CM/S
LEFT CCA MID DIAS: 16 CM/S
LEFT CCA MID SYS: 74 CM/S
LEFT CCA PROX DIAS: 21 CM/S
LEFT CCA PROX SYS: 93 CM/S
LEFT ECA DIAS: 13 CM/S
LEFT ECA SYS: 127 CM/S
LEFT ICA DIST DIAS: 22 CM/S
LEFT ICA DIST SYS: 78 CM/S
LEFT ICA MID DIAS: 22 CM/S
LEFT ICA MID SYS: 85 CM/S
LEFT ICA PROX DIAS: 25 CM/S
LEFT ICA PROX SYS: 84 CM/S
LEFT INTERNAL DIMENSION IN SYSTOLE: 2.82 CM (ref 2.1–4)
LEFT VENTRICLE DIASTOLIC VOLUME INDEX: 35.37 ML/M2
LEFT VENTRICLE DIASTOLIC VOLUME: 77.47 ML
LEFT VENTRICLE MASS INDEX: 79 G/M2
LEFT VENTRICLE SYSTOLIC VOLUME INDEX: 13.8 ML/M2
LEFT VENTRICLE SYSTOLIC VOLUME: 30.16 ML
LEFT VENTRICULAR INTERNAL DIMENSION IN DIASTOLE: 4.17 CM (ref 3.5–6)
LEFT VENTRICULAR MASS: 171.94 G
LEFT VERTEBRAL DIAS: 10 CM/S
LEFT VERTEBRAL SYS: 48 CM/S
LV LATERAL E/E' RATIO: 24.8 M/S
LV SEPTAL E/E' RATIO: 31 M/S
LVOT MG: 4.31 MMHG
LVOT MV: 1 CM/S
MV PEAK A VEL: 0.84 M/S
MV PEAK E VEL: 1.24 M/S
MV STENOSIS PRESSURE HALF TIME: 61.7 MS
MV VALVE AREA P 1/2 METHOD: 3.57 CM2
OHS CV CAROTID RIGHT ICA EDV HIGHEST: 25
OHS CV CAROTID ULTRASOUND LEFT ICA/CCA RATIO: 1.06
OHS CV CAROTID ULTRASOUND RIGHT ICA/CCA RATIO: 1.05
OHS CV PV CAROTID LEFT HIGHEST CCA: 93
OHS CV PV CAROTID LEFT HIGHEST ICA: 85
OHS CV PV CAROTID RIGHT HIGHEST CCA: 93
OHS CV PV CAROTID RIGHT HIGHEST ICA: 78
OHS CV US CAROTID LEFT HIGHEST EDV: 25
PISA TR MAX VEL: 2.54 M/S
PULM VEIN S/D RATIO: 0.74
PV PEAK D VEL: 0.58 M/S
PV PEAK S VEL: 0.43 M/S
RA MAJOR: 4.4 CM
RA PRESSURE: 3 MMHG
RA WIDTH: 3.81 CM
RIGHT ARM DIASTOLIC BLOOD PRESSURE: 81 MMHG
RIGHT ARM SYSTOLIC BLOOD PRESSURE: 178 MMHG
RIGHT CBA DIAS: 18 CM/S
RIGHT CBA SYS: 77 CM/S
RIGHT CCA DIST DIAS: 15 CM/S
RIGHT CCA DIST SYS: 74 CM/S
RIGHT CCA MID DIAS: 18 CM/S
RIGHT CCA MID SYS: 93 CM/S
RIGHT CCA PROX DIAS: 14 CM/S
RIGHT CCA PROX SYS: 89 CM/S
RIGHT ECA DIAS: 9 CM/S
RIGHT ECA SYS: 73 CM/S
RIGHT ICA DIST DIAS: 22 CM/S
RIGHT ICA DIST SYS: 78 CM/S
RIGHT ICA MID DIAS: 25 CM/S
RIGHT ICA MID SYS: 75 CM/S
RIGHT ICA PROX DIAS: 16 CM/S
RIGHT ICA PROX SYS: 66 CM/S
RIGHT VENTRICULAR END-DIASTOLIC DIMENSION: 3.46 CM
RIGHT VENTRICULAR LENGTH IN DIASTOLE (APICAL 4-CHAMBER VIEW): 6.12 CM
RIGHT VERTEBRAL DIAS: 14 CM/S
RIGHT VERTEBRAL SYS: 63 CM/S
RV MID DIAMA: 2.47 CM
RV TISSUE DOPPLER FREE WALL SYSTOLIC VELOCITY 1 (APICAL 4 CHAMBER VIEW): 0.01 CM/S
SINUS: 2.45 CM
STJ: 2.3 CM
TDI LATERAL: 0.05 M/S
TDI SEPTAL: 0.04 M/S
TDI: 0.05 M/S
TR MAX PG: 26 MMHG
TRICUSPID ANNULAR PLANE SYSTOLIC EXCURSION: 1.37 CM
TV REST PULMONARY ARTERY PRESSURE: 29 MMHG

## 2022-08-18 PROCEDURE — 93880 CV US DOPPLER CAROTID (CUPID ONLY): ICD-10-PCS | Mod: 26,,, | Performed by: INTERNAL MEDICINE

## 2022-08-18 PROCEDURE — 93880 EXTRACRANIAL BILAT STUDY: CPT | Mod: PO

## 2022-08-18 PROCEDURE — 93880 EXTRACRANIAL BILAT STUDY: CPT | Mod: 26,,, | Performed by: INTERNAL MEDICINE

## 2022-08-18 PROCEDURE — 93306 TTE W/DOPPLER COMPLETE: CPT | Mod: 26,,, | Performed by: INTERNAL MEDICINE

## 2022-08-18 PROCEDURE — 93306 TTE W/DOPPLER COMPLETE: CPT | Mod: PO

## 2022-08-18 PROCEDURE — 93306 ECHO (CUPID ONLY): ICD-10-PCS | Mod: 26,,, | Performed by: INTERNAL MEDICINE

## 2022-08-23 ENCOUNTER — OFFICE VISIT (OUTPATIENT)
Dept: FAMILY MEDICINE | Facility: CLINIC | Age: 72
End: 2022-08-23
Payer: MEDICARE

## 2022-08-23 VITALS
HEART RATE: 60 BPM | SYSTOLIC BLOOD PRESSURE: 154 MMHG | OXYGEN SATURATION: 97 % | DIASTOLIC BLOOD PRESSURE: 78 MMHG | BODY MASS INDEX: 42.26 KG/M2 | TEMPERATURE: 97 F | RESPIRATION RATE: 16 BRPM | WEIGHT: 253.63 LBS | HEIGHT: 65 IN

## 2022-08-23 DIAGNOSIS — I35.0 NONRHEUMATIC AORTIC VALVE STENOSIS: ICD-10-CM

## 2022-08-23 DIAGNOSIS — I70.248 ATHEROSCLEROSIS OF NATIVE ARTERY OF LEFT LOWER EXTREMITY WITH ULCERATION OF OTHER PART OF LOWER LEG: ICD-10-CM

## 2022-08-23 DIAGNOSIS — I50.9 CHRONIC HEART FAILURE, UNSPECIFIED HEART FAILURE TYPE: ICD-10-CM

## 2022-08-23 DIAGNOSIS — I25.10 CORONARY ARTERY DISEASE WITHOUT ANGINA PECTORIS, UNSPECIFIED VESSEL OR LESION TYPE, UNSPECIFIED WHETHER NATIVE OR TRANSPLANTED HEART: Primary | ICD-10-CM

## 2022-08-23 DIAGNOSIS — E11.22 TYPE 2 DIABETES MELLITUS WITH STAGE 3B CHRONIC KIDNEY DISEASE, WITHOUT LONG-TERM CURRENT USE OF INSULIN: ICD-10-CM

## 2022-08-23 DIAGNOSIS — I48.0 PAROXYSMAL ATRIAL FIBRILLATION: ICD-10-CM

## 2022-08-23 DIAGNOSIS — I10 PRIMARY HYPERTENSION: ICD-10-CM

## 2022-08-23 DIAGNOSIS — N18.32 TYPE 2 DIABETES MELLITUS WITH STAGE 3B CHRONIC KIDNEY DISEASE, WITHOUT LONG-TERM CURRENT USE OF INSULIN: ICD-10-CM

## 2022-08-23 PROCEDURE — 1160F RVW MEDS BY RX/DR IN RCRD: CPT | Mod: CPTII,S$GLB,, | Performed by: INTERNAL MEDICINE

## 2022-08-23 PROCEDURE — 99214 OFFICE O/P EST MOD 30 MIN: CPT | Mod: S$GLB,,, | Performed by: INTERNAL MEDICINE

## 2022-08-23 PROCEDURE — 99214 PR OFFICE/OUTPT VISIT, EST, LEVL IV, 30-39 MIN: ICD-10-PCS | Mod: S$GLB,,, | Performed by: INTERNAL MEDICINE

## 2022-08-23 PROCEDURE — 3077F SYST BP >= 140 MM HG: CPT | Mod: CPTII,S$GLB,, | Performed by: INTERNAL MEDICINE

## 2022-08-23 PROCEDURE — 3077F PR MOST RECENT SYSTOLIC BLOOD PRESSURE >= 140 MM HG: ICD-10-PCS | Mod: CPTII,S$GLB,, | Performed by: INTERNAL MEDICINE

## 2022-08-23 PROCEDURE — 3061F PR NEG MICROALBUMINURIA RESULT DOCUMENTED/REVIEW: ICD-10-PCS | Mod: CPTII,S$GLB,, | Performed by: INTERNAL MEDICINE

## 2022-08-23 PROCEDURE — 3288F PR FALLS RISK ASSESSMENT DOCUMENTED: ICD-10-PCS | Mod: CPTII,S$GLB,, | Performed by: INTERNAL MEDICINE

## 2022-08-23 PROCEDURE — 3078F DIAST BP <80 MM HG: CPT | Mod: CPTII,S$GLB,, | Performed by: INTERNAL MEDICINE

## 2022-08-23 PROCEDURE — 1159F PR MEDICATION LIST DOCUMENTED IN MEDICAL RECORD: ICD-10-PCS | Mod: CPTII,S$GLB,, | Performed by: INTERNAL MEDICINE

## 2022-08-23 PROCEDURE — 4010F ACE/ARB THERAPY RXD/TAKEN: CPT | Mod: CPTII,S$GLB,, | Performed by: INTERNAL MEDICINE

## 2022-08-23 PROCEDURE — 3044F HG A1C LEVEL LT 7.0%: CPT | Mod: CPTII,S$GLB,, | Performed by: INTERNAL MEDICINE

## 2022-08-23 PROCEDURE — 3078F PR MOST RECENT DIASTOLIC BLOOD PRESSURE < 80 MM HG: ICD-10-PCS | Mod: CPTII,S$GLB,, | Performed by: INTERNAL MEDICINE

## 2022-08-23 PROCEDURE — 3008F PR BODY MASS INDEX (BMI) DOCUMENTED: ICD-10-PCS | Mod: CPTII,S$GLB,, | Performed by: INTERNAL MEDICINE

## 2022-08-23 PROCEDURE — 1101F PR PT FALLS ASSESS DOC 0-1 FALLS W/OUT INJ PAST YR: ICD-10-PCS | Mod: CPTII,S$GLB,, | Performed by: INTERNAL MEDICINE

## 2022-08-23 PROCEDURE — 1159F MED LIST DOCD IN RCRD: CPT | Mod: CPTII,S$GLB,, | Performed by: INTERNAL MEDICINE

## 2022-08-23 PROCEDURE — 3066F PR DOCUMENTATION OF TREATMENT FOR NEPHROPATHY: ICD-10-PCS | Mod: CPTII,S$GLB,, | Performed by: INTERNAL MEDICINE

## 2022-08-23 PROCEDURE — 3061F NEG MICROALBUMINURIA REV: CPT | Mod: CPTII,S$GLB,, | Performed by: INTERNAL MEDICINE

## 2022-08-23 PROCEDURE — 3044F PR MOST RECENT HEMOGLOBIN A1C LEVEL <7.0%: ICD-10-PCS | Mod: CPTII,S$GLB,, | Performed by: INTERNAL MEDICINE

## 2022-08-23 PROCEDURE — 3008F BODY MASS INDEX DOCD: CPT | Mod: CPTII,S$GLB,, | Performed by: INTERNAL MEDICINE

## 2022-08-23 PROCEDURE — 4010F PR ACE/ARB THEARPY RXD/TAKEN: ICD-10-PCS | Mod: CPTII,S$GLB,, | Performed by: INTERNAL MEDICINE

## 2022-08-23 PROCEDURE — 1126F AMNT PAIN NOTED NONE PRSNT: CPT | Mod: CPTII,S$GLB,, | Performed by: INTERNAL MEDICINE

## 2022-08-23 PROCEDURE — 1126F PR PAIN SEVERITY QUANTIFIED, NO PAIN PRESENT: ICD-10-PCS | Mod: CPTII,S$GLB,, | Performed by: INTERNAL MEDICINE

## 2022-08-23 PROCEDURE — 3288F FALL RISK ASSESSMENT DOCD: CPT | Mod: CPTII,S$GLB,, | Performed by: INTERNAL MEDICINE

## 2022-08-23 PROCEDURE — 3066F NEPHROPATHY DOC TX: CPT | Mod: CPTII,S$GLB,, | Performed by: INTERNAL MEDICINE

## 2022-08-23 PROCEDURE — 1101F PT FALLS ASSESS-DOCD LE1/YR: CPT | Mod: CPTII,S$GLB,, | Performed by: INTERNAL MEDICINE

## 2022-08-23 PROCEDURE — 1160F PR REVIEW ALL MEDS BY PRESCRIBER/CLIN PHARMACIST DOCUMENTED: ICD-10-PCS | Mod: CPTII,S$GLB,, | Performed by: INTERNAL MEDICINE

## 2022-08-23 RX ORDER — METFORMIN HYDROCHLORIDE 500 MG/1
500 TABLET, EXTENDED RELEASE ORAL 2 TIMES DAILY
COMMUNITY
End: 2022-12-12 | Stop reason: SDUPTHER

## 2022-08-23 RX ORDER — OLMESARTAN MEDOXOMIL 20 MG/1
20 TABLET ORAL DAILY
Qty: 90 TABLET | Refills: 3 | Status: SHIPPED | OUTPATIENT
Start: 2022-08-23 | End: 2023-07-31

## 2022-08-23 RX ORDER — ALBUTEROL SULFATE 90 UG/1
2 AEROSOL, METERED RESPIRATORY (INHALATION) EVERY 6 HOURS PRN
Qty: 18 G | Refills: 2 | Status: SHIPPED | OUTPATIENT
Start: 2022-08-23 | End: 2023-06-03

## 2022-08-23 NOTE — PROGRESS NOTES
Subjective:       Patient ID: Sneha Mcghee is a 71 y.o. female.    Medication List with Changes/Refills   New Medications    OLMESARTAN (BENICAR) 20 MG TABLET    Take 1 tablet (20 mg total) by mouth once daily.   Current Medications    ALBUTEROL (PROVENTIL) 2.5 MG /3 ML (0.083 %) NEBULIZER SOLUTION    Take 3 mLs (2.5 mg total) by nebulization every 6 (six) hours as needed for Wheezing. Rescue. Dispense one box    ASCORBIC ACID, VITAMIN C, (VITAMIN C) 500 MG TABLET    Take 500 mg by mouth once daily.    ASPIRIN (ECOTRIN) 325 MG EC TABLET    aspirin 325 mg tablet,delayed release   Take 1 tablet every day by oral route.    ATORVASTATIN (LIPITOR) 40 MG TABLET    Take 1 tablet (40 mg total) by mouth once daily.    BLOOD SUGAR DIAGNOSTIC STRP    To check BG once a day and PRN low sugars, to use with insurance preferred meter    BLOOD-GLUCOSE METER KIT    To check BG qam and PRN lows, to use with insurance preferred meter    CALCIUM/MAGNESIUM (CALCIUM AND MAGNESIUM ORAL)    Take by mouth Daily.    CHOLECALCIFEROL, VITAMIN D3, 125 MCG (5,000 UNIT) TAB    cholecalciferol (vitamin D3) 125 mcg (5,000 unit) tablet   Take by oral route.    FLUTICASONE PROPIONATE (FLONASE) 50 MCG/ACTUATION NASAL SPRAY    SHAKE LIQUID AND USE 2 SPRAYS(100 MCG) IN EACH NOSTRIL EVERY DAY    FUROSEMIDE (LASIX) 20 MG TABLET    TAKE 1 TABLET BY MOUTH EVERY DAY    IPRATROPIUM (ATROVENT HFA) 17 MCG/ACTUATION INHALER    Inhale 2 puffs into the lungs every 6 (six) hours. Rescue    LANCETS MISC    To check BG once a day and PRN lows, to use with insurance preferred meter    LATANOPROST 0.005 % OPHTHALMIC SOLUTION    latanoprost 0.005 % eye drops   Instill 1 drop every day by ophthalmic route for 90 days.    LEVOTHYROXINE (SYNTHROID) 88 MCG TABLET    TAKE 1 TABLET(88 MCG) BY MOUTH BEFORE BREAKFAST    METFORMIN (GLUCOPHAGE-XR) 500 MG ER 24HR TABLET    Take 500 mg by mouth 2 (two) times a day.    METOPROLOL SUCCINATE (TOPROL-XL) 100 MG 24 HR TABLET    Take 1  tablet (100 mg total) by mouth once daily.    MOMETASONE 0.1% (ELOCON) 0.1 % CREAM    Apply topically once daily.    NYSTATIN (MYCOSTATIN) POWDER    Apply topically 3 (three) times daily.    NYSTATIN-TRIAMCINOLONE (MYCOLOG II) CREAM    PRN    OMEGA-3 FATTY ACIDS/FISH OIL (FISH OIL-OMEGA-3 FATTY ACIDS) 300-1,000 MG CAPSULE    Take 1 capsule by mouth once daily.    POTASSIUM CHLORIDE (KLOR-CON) 10 MEQ TBSR    Take 10 mEq by mouth once daily.    PROPAFENONE (RHTHYMOL) 150 MG TAB    TAKE 1 TABLET(150 MG) BY MOUTH EVERY 8 HOURS    SANTYL OINTMENT    Apply topically once daily.    SERTRALINE (ZOLOFT) 100 MG TABLET    Take 100 mg by mouth daily as needed.    SIMVASTATIN (ZOCOR) 20 MG TABLET    TAKE ONE TABLET BY MOUTH EVERY EVENING    SUVOREXANT (BELSOMRA) 15 MG TAB    Take 1 tablet by mouth nightly as needed.    TIMOLOL MALEATE 0.5% (TIMOPTIC) 0.5 % DROP    Place 1 drop into both eyes 2 (two) times daily.    VIT C/E/ZN/COPPR/LUTEIN/ZEAXAN (PRESERVISION AREDS-2 ORAL)    Take by mouth once daily at 6am.    VITAMIN E, DL,TOCOPHERYL ACET, (VITAMIN E, DL, ACETATE,) 180 MG (400 UNIT) CAP    Take 400 Units by mouth once daily.   Changed and/or Refilled Medications    Modified Medication Previous Medication    ALBUTEROL (PROVENTIL/VENTOLIN HFA) 90 MCG/ACTUATION INHALER albuterol (PROVENTIL/VENTOLIN HFA) 90 mcg/actuation inhaler       Inhale 2 puffs into the lungs every 6 (six) hours as needed for Wheezing. Rescue    Inhale 2 puffs into the lungs every 6 (six) hours as needed for Wheezing. Rescue   Discontinued Medications    AMLODIPINE (NORVASC) 5 MG TABLET    Take 1 tablet (5 mg total) by mouth once daily.    DULAGLUTIDE (TRULICITY) 0.75 MG/0.5 ML PEN INJECTOR    Inject 0.75 mg into the skin every 7 days.    JANUVIA 100 MG TAB    TAKE ONE TABLET BY MOUTH EVERY DAY    LACTOBACILLUS RHAMNOSUS GG (CULTURELLE) 10 BILLION CELL CAPSULE    Take 1 capsule by mouth once daily.       Chief Complaint: No chief complaint on file.  She  presents with multiple issues.     She has hypertension and is taking only metoprolol xl 100 mg daily. She was given amlodipine but has not started. She does not check her BP at home. Noted to be elevated at multiple doctors visits over the last 4 months. She denies chest pain or shortness of breath.     She has chronic stasis changes to her lower legs left > right with nonhealing ulcer of left lower leg.  She has been managed by wound clinic but is now discharged. She had a venous u/s of lower legs on 1/2022 that showed Status post bilateral EVLT of the greater saphenous veins with patent bilateral greater saphenous veins noted bilaterally.  There is also mild hemodynamically significant venous reflux noted within the proximal left GSV at the level of the saphenofemoral junction with reflux times measuring 508 cm/s2. No evidence of acute deep venous thrombosis in the lower extremity veins. She had an arterial u/s on 1/2022 that showed 50-75% luminal stenosis within the proximal left anterior tibial artery as a result of homogeneous plaque deposition.  Ankle brachial indices measure 1.01 on the right and 0.97 on the left.  She has not had a CTA or further evaluation of the left lower leg.     She has aortic stenosis with echo on 8/2022 showing EF 65%, positive diastolic dysfunction, cLVH, moderate AS (HAMILTON 1.49 and gradient of 23), mild TR and PAP of 29.  She is due to f/u with cardiology tomorrow. Carotid u/s on 8/2022 was negative for significant stenosis.     She has diabetes with HbA1c on 6/2022 of 6.6 while on januvia. She since stopped januvia because of concerns about side effects and restarted metformin 500 mg bid. After making that change she has not checked her sugars.       Review of Systems   Constitutional: Positive for fatigue. Negative for appetite change, fever and unexpected weight change.   HENT: Negative for congestion, ear pain, hearing loss, sore throat and trouble swallowing.    Eyes: Negative  "for pain and visual disturbance.   Respiratory: Negative for cough, chest tightness, shortness of breath and wheezing.    Cardiovascular: Negative for chest pain, palpitations and leg swelling.   Gastrointestinal: Negative for abdominal pain, blood in stool, constipation, diarrhea, nausea and vomiting.   Endocrine: Negative for polyuria.   Genitourinary: Negative for dysuria and hematuria.   Musculoskeletal: Negative for arthralgias, back pain and myalgias.   Skin: Negative for rash.   Neurological: Negative for dizziness, weakness, numbness and headaches.   Hematological: Does not bruise/bleed easily.   Psychiatric/Behavioral: Negative for dysphoric mood, sleep disturbance and suicidal ideas. The patient is not nervous/anxious.        Objective:      Vitals:    08/23/22 1343   BP: (!) 154/78   BP Location: Right arm   Patient Position: Sitting   Pulse: 60   Resp: 16   Temp: 97.3 °F (36.3 °C)   TempSrc: Temporal   SpO2: 97%   Weight: 115.1 kg (253 lb 10.2 oz)   Height: 5' 5" (1.651 m)     Body mass index is 42.21 kg/m².  Physical Exam    General appearance: No acute distress, cooperative  Eyes: PERRL, EOMI, conjunctiva clear  Neck: FROM, soft, supple, no thyromegaly, no bruits  Lymph: no anterior or posterior cervical adenopathy  Heart::  Regular rate and rhythm, 3/6 systolic murmur  Lung: Clear to ascultation bilaterally, no wheezing, no rales, no rhonchi, no distress  Abdomen: Soft, nontender, no distention, no hepatosplenomegaly, bowel sounds normal, no guarding, no rebound, no peritoneal signs  Skin: no rashes, eraser sized lesion on left lower outer leg   Extremities: no edema  Neuro: no focal abnormalities, strength 5/5 b/l UE and LE, 2+ DTRs b/l UE and LE, normal gait  Peripheral pulses: unpalpable pedal pulses bilaterally, vascular changes to lower legs left > right with erythema and tense skin changes  Musculoskeletal: FROM, good strenth, no tenderness  Joint: normal appearance, no swelling, no warmth, no " deformity in all joints    Assessment:       1. Coronary artery disease without angina pectoris, unspecified vessel or lesion type, unspecified whether native or transplanted heart    2. Nonrheumatic aortic valve stenosis    3. Paroxysmal atrial fibrillation    4. Atherosclerosis of native artery of left lower extremity with ulceration of other part of lower leg    5. Primary hypertension    6. Chronic heart failure, unspecified heart failure type    7. Type 2 diabetes mellitus with stage 3b chronic kidney disease, without long-term current use of insulin        Plan:       Coronary artery disease without angina pectoris, unspecified vessel or lesion type, unspecified whether native or transplanted heart  Stable and no active symptoms. She continues on her statin.     -     Ambulatory referral/consult to Cardiology; Future; Expected date: 08/30/2022    Nonrheumatic aortic valve stenosis  Mild worsening of her AS. She has no symptoms. Due to f/u with cardiology tomorrow.   -     Ambulatory referral/consult to Cardiology; Future; Expected date: 08/30/2022    Paroxysmal atrial fibrillation  Continue current regimen. I continue to recommend starting anticoagulation but per cardiology continue to monitor.   -     Ambulatory referral/consult to Cardiology; Future; Expected date: 08/30/2022    Atherosclerosis of native artery of left lower extremity with ulceration of other part of lower leg  She needs a CTA of the left lower leg to evaluate vascular flow to her non-healing ulcer. To see cardiology tomorrow.     Primary hypertension  Uncontrolled and she is hesitant to start amlodipine. Will start olmesartan 20 mg daily for both hypertension and renal protection for diabetes. Long discussion on the importance of compliance with her medications to control her BP.   -     olmesartan (BENICAR) 20 MG tablet; Take 1 tablet (20 mg total) by mouth once daily.  Dispense: 90 tablet; Refill: 3    Chronic heart failure, unspecified  heart failure type  Well compensated on lasix.     Type 2 diabetes mellitus with stage 3b chronic kidney disease, without long-term current use of insulin  Uncontrolled and concern that metformin is not enough to control sugars. She made switch without guidance and has not check her sugar. Too soon for HbA1c so advised her to check glucose qam fasting and send readings in 10 days.     Follow up in about 2 months (around 10/23/2022) for chronic medical issues.

## 2022-08-24 ENCOUNTER — PATIENT MESSAGE (OUTPATIENT)
Dept: ADMINISTRATIVE | Facility: HOSPITAL | Age: 72
End: 2022-08-24
Payer: MEDICARE

## 2022-09-23 ENCOUNTER — PES CALL (OUTPATIENT)
Dept: ADMINISTRATIVE | Facility: CLINIC | Age: 72
End: 2022-09-23
Payer: MEDICARE

## 2022-10-10 ENCOUNTER — PATIENT MESSAGE (OUTPATIENT)
Dept: ADMINISTRATIVE | Facility: HOSPITAL | Age: 72
End: 2022-10-10
Payer: MEDICARE

## 2022-10-17 ENCOUNTER — TELEPHONE (OUTPATIENT)
Dept: FAMILY MEDICINE | Facility: CLINIC | Age: 72
End: 2022-10-17
Payer: MEDICARE

## 2022-10-17 NOTE — TELEPHONE ENCOUNTER
----- Message from Mary Steele sent at 10/17/2022 11:50 AM CDT -----  Regarding: advice or appointment  Contact: patient  Type: Needs Medical Advice  Who Called:  patient  Symptoms (please be specific):  upper stomach pain and ocassional diarrehea and gas  How long has patient had these symptoms:  since Thursday  Pharmacy name and phone #:    Best Call Back Number: [phone] (home)   Additional Information: Please call patient to advise. Thanks!

## 2022-10-17 NOTE — TELEPHONE ENCOUNTER
"Patient reports gas, diarrhea, bloating along with abdominal discomfort x 2 episodes. Offered same day appointment but patient declines stating "I'm just gonna wait and see how I feel"  "

## 2022-11-10 ENCOUNTER — TELEPHONE (OUTPATIENT)
Dept: FAMILY MEDICINE | Facility: CLINIC | Age: 72
End: 2022-11-10
Payer: MEDICARE

## 2022-11-10 DIAGNOSIS — R14.0 ABDOMINAL BLOATING: Primary | ICD-10-CM

## 2022-11-10 NOTE — TELEPHONE ENCOUNTER
----- Message from Aquilino Basilio sent at 11/10/2022  1:02 PM CST -----  Contact: pt at [phone]  Type: Needs Medical Advice  Who Called:  pt    Best Call Back Number: [phone]    Additional Information: pt is calling the office stating she being have pain in the center of her breast, bloating, and gas. She is requesting a referral to a gastroenterology DrKatie Jack ph # [phone]. Please advise.

## 2022-11-17 ENCOUNTER — PATIENT OUTREACH (OUTPATIENT)
Dept: ADMINISTRATIVE | Facility: HOSPITAL | Age: 72
End: 2022-11-17
Payer: MEDICARE

## 2022-11-17 ENCOUNTER — PATIENT MESSAGE (OUTPATIENT)
Dept: ADMINISTRATIVE | Facility: HOSPITAL | Age: 72
End: 2022-11-17
Payer: MEDICARE

## 2022-11-17 NOTE — PROGRESS NOTES
Uncontrolled BP REPORT  BP Readings from Last 3 Encounters:   08/23/22 (!) 154/78   08/18/22 (!) 178/81   08/09/22 (!) 178/81       Non-compliant report chart audits for HYPERTENSION MANAGEMENT     Outreach to patient in reference to hypertension management       NEED REMOTE HOME BP READING DOCUMENTED   OR  BP FOLLOW UP WITH NURSE VISIT OR CARE TEAM MEMBER

## 2022-12-01 ENCOUNTER — TELEPHONE (OUTPATIENT)
Dept: FAMILY MEDICINE | Facility: CLINIC | Age: 72
End: 2022-12-01
Payer: MEDICARE

## 2022-12-01 NOTE — TELEPHONE ENCOUNTER
Patient aware labs are unable to be drawn at separate lab visit, and can only be drawn when a patient is seeing a provider

## 2022-12-01 NOTE — TELEPHONE ENCOUNTER
----- Message from Lin Jaime sent at 12/1/2022  8:06 AM CST -----  Contact: patient  Type: Needs Medical Advice  Who Called:  patient  Best Call Back Number: [phone] (home)   Additional Information: patient is wanting to know if she can start getting her labs at the [facility]. Please contact patient to discuss

## 2022-12-08 ENCOUNTER — PATIENT OUTREACH (OUTPATIENT)
Dept: ADMINISTRATIVE | Facility: HOSPITAL | Age: 72
End: 2022-12-08
Payer: MEDICARE

## 2022-12-08 NOTE — PROGRESS NOTES
Uncontrolled BP REPORT  BP Readings from Last 3 Encounters:   22 (!) 154/78   22 (!) 178/81   22 (!) 178/81   2022 Care Everywhere updates requested and reviewed.  Immunizations reconciled. Media reports reviewed.  Duplicate HM overrides and  orders removed.  Overdue HM topic chart audit and/or requested.  Overdue lab testing linked to upcoming lab appointments if applies.    Health Maintenance Due   Topic Date Due    TETANUS VACCINE  Never done    Shingles Vaccine (2 of 3) 2016    COVID-19 Vaccine (3 - Booster for Moderna series) 2021    Mammogram  2022    Influenza Vaccine (1) 2022    Foot Exam  12/10/2022

## 2022-12-12 RX ORDER — METFORMIN HYDROCHLORIDE 500 MG/1
500 TABLET, EXTENDED RELEASE ORAL 2 TIMES DAILY
Qty: 180 TABLET | Refills: 3 | Status: SHIPPED | OUTPATIENT
Start: 2022-12-12 | End: 2023-11-27

## 2022-12-12 NOTE — TELEPHONE ENCOUNTER
----- Message from Shyam Chase sent at 12/10/2022  8:31 AM CST -----  Type:  RX Refill Request    Who Called:  Pt  Refill or New Rx:  refill  RX Name and Strength:  metFORMIN (GLUCOPHAGE-XR) 500 MG ER 24hr tablet    How is the patient currently taking it? (ex. 1XDay):  as directed twice a day  Is this a 30 day or 90 day RX:  30    Preferred Pharmacy with phone number:    [id number] - [facility] [address] [address] AT [facility] [address]  [address] [place] [address]  Phone: [phone] Fax: [phone]      Local or Mail Order:  Local  Ordering Provider:  Donnell Knapp Call Back Number:  [phone]     Additional Information: Sts she stopped taking the JANUVIA and would like to get back on the metFORMIN.   Please advise -- Thank you

## 2022-12-12 NOTE — TELEPHONE ENCOUNTER
No new care gaps identified.  [facility] Embedded Care Gaps. Reference number: [id number]. 12/12/2022   11:18:05 AM CST

## 2022-12-15 ENCOUNTER — LAB VISIT (OUTPATIENT)
Dept: LAB | Facility: HOSPITAL | Age: 72
End: 2022-12-15
Payer: MEDICARE

## 2022-12-15 DIAGNOSIS — R10.13 ABDOMINAL PAIN, EPIGASTRIC: ICD-10-CM

## 2022-12-15 DIAGNOSIS — K21.9 GERD (GASTROESOPHAGEAL REFLUX DISEASE): Primary | ICD-10-CM

## 2022-12-15 DIAGNOSIS — R11.0 NAUSEA: ICD-10-CM

## 2022-12-15 LAB
ALBUMIN SERPL BCP-MCNC: 3.7 G/DL (ref 3.5–5.2)
ALP SERPL-CCNC: 122 U/L (ref 55–135)
ALT SERPL W/O P-5'-P-CCNC: 10 U/L (ref 10–44)
ANION GAP SERPL CALC-SCNC: 7 MMOL/L (ref 8–16)
AST SERPL-CCNC: 11 U/L (ref 10–40)
BASOPHILS # BLD AUTO: 0.05 K/UL (ref 0–0.2)
BASOPHILS NFR BLD: 0.5 % (ref 0–1.9)
BILIRUB SERPL-MCNC: 0.6 MG/DL (ref 0.1–1)
BUN SERPL-MCNC: 14 MG/DL (ref 8–23)
CALCIUM SERPL-MCNC: 9.5 MG/DL (ref 8.7–10.5)
CHLORIDE SERPL-SCNC: 102 MMOL/L (ref 95–110)
CO2 SERPL-SCNC: 26 MMOL/L (ref 23–29)
CREAT SERPL-MCNC: 0.8 MG/DL (ref 0.5–1.4)
DIFFERENTIAL METHOD: ABNORMAL
EOSINOPHIL # BLD AUTO: 0.3 K/UL (ref 0–0.5)
EOSINOPHIL NFR BLD: 2.8 % (ref 0–8)
ERYTHROCYTE [DISTWIDTH] IN BLOOD BY AUTOMATED COUNT: 15.9 % (ref 11.5–14.5)
EST. GFR  (NO RACE VARIABLE): >60 ML/MIN/1.73 M^2
GLUCOSE SERPL-MCNC: 155 MG/DL (ref 70–110)
HCT VFR BLD AUTO: 41.5 % (ref 37–48.5)
HGB BLD-MCNC: 12.9 G/DL (ref 12–16)
IMM GRANULOCYTES # BLD AUTO: 0.04 K/UL (ref 0–0.04)
IMM GRANULOCYTES NFR BLD AUTO: 0.4 % (ref 0–0.5)
LIPASE SERPL-CCNC: 18 U/L (ref 4–60)
LYMPHOCYTES # BLD AUTO: 1.9 K/UL (ref 1–4.8)
LYMPHOCYTES NFR BLD: 20.2 % (ref 18–48)
MCH RBC QN AUTO: 26.6 PG (ref 27–31)
MCHC RBC AUTO-ENTMCNC: 31.1 G/DL (ref 32–36)
MCV RBC AUTO: 86 FL (ref 82–98)
MONOCYTES # BLD AUTO: 0.9 K/UL (ref 0.3–1)
MONOCYTES NFR BLD: 9.9 % (ref 4–15)
NEUTROPHILS # BLD AUTO: 6.2 K/UL (ref 1.8–7.7)
NEUTROPHILS NFR BLD: 66.2 % (ref 38–73)
NRBC BLD-RTO: 0 /100 WBC
PLATELET # BLD AUTO: 256 K/UL (ref 150–450)
PMV BLD AUTO: 10.7 FL (ref 9.2–12.9)
POTASSIUM SERPL-SCNC: 4.9 MMOL/L (ref 3.5–5.1)
PROT SERPL-MCNC: 7.2 G/DL (ref 6–8.4)
RBC # BLD AUTO: 4.85 M/UL (ref 4–5.4)
SODIUM SERPL-SCNC: 135 MMOL/L (ref 136–145)
WBC # BLD AUTO: 9.37 K/UL (ref 3.9–12.7)

## 2022-12-15 PROCEDURE — 85025 COMPLETE CBC W/AUTO DIFF WBC: CPT | Performed by: INTERNAL MEDICINE

## 2022-12-15 PROCEDURE — 80053 COMPREHEN METABOLIC PANEL: CPT | Performed by: INTERNAL MEDICINE

## 2022-12-15 PROCEDURE — 36415 COLL VENOUS BLD VENIPUNCTURE: CPT | Mod: PO | Performed by: INTERNAL MEDICINE

## 2022-12-15 PROCEDURE — 83690 ASSAY OF LIPASE: CPT | Performed by: INTERNAL MEDICINE

## 2022-12-20 ENCOUNTER — LAB VISIT (OUTPATIENT)
Dept: LAB | Facility: HOSPITAL | Age: 72
End: 2022-12-20
Attending: INTERNAL MEDICINE
Payer: MEDICARE

## 2022-12-20 DIAGNOSIS — N18.32 TYPE 2 DIABETES MELLITUS WITH STAGE 3B CHRONIC KIDNEY DISEASE, WITHOUT LONG-TERM CURRENT USE OF INSULIN: ICD-10-CM

## 2022-12-20 DIAGNOSIS — E11.22 TYPE 2 DIABETES MELLITUS WITH STAGE 3B CHRONIC KIDNEY DISEASE, WITHOUT LONG-TERM CURRENT USE OF INSULIN: ICD-10-CM

## 2022-12-20 DIAGNOSIS — I10 ESSENTIAL HYPERTENSION: ICD-10-CM

## 2022-12-20 LAB
ANION GAP SERPL CALC-SCNC: 9 MMOL/L (ref 8–16)
BUN SERPL-MCNC: 13 MG/DL (ref 8–23)
CALCIUM SERPL-MCNC: 9.2 MG/DL (ref 8.7–10.5)
CHLORIDE SERPL-SCNC: 104 MMOL/L (ref 95–110)
CO2 SERPL-SCNC: 26 MMOL/L (ref 23–29)
CREAT SERPL-MCNC: 0.8 MG/DL (ref 0.5–1.4)
EST. GFR  (NO RACE VARIABLE): >60 ML/MIN/1.73 M^2
ESTIMATED AVG GLUCOSE: 151 MG/DL (ref 68–131)
GLUCOSE SERPL-MCNC: 160 MG/DL (ref 70–110)
HBA1C MFR BLD: 6.9 % (ref 4–5.6)
POTASSIUM SERPL-SCNC: 4.3 MMOL/L (ref 3.5–5.1)
SODIUM SERPL-SCNC: 139 MMOL/L (ref 136–145)
TSH SERPL DL<=0.005 MIU/L-ACNC: 3.23 UIU/ML (ref 0.4–4)

## 2022-12-20 PROCEDURE — 84443 ASSAY THYROID STIM HORMONE: CPT | Performed by: INTERNAL MEDICINE

## 2022-12-20 PROCEDURE — 83036 HEMOGLOBIN GLYCOSYLATED A1C: CPT | Performed by: INTERNAL MEDICINE

## 2022-12-20 PROCEDURE — 80048 BASIC METABOLIC PNL TOTAL CA: CPT | Performed by: INTERNAL MEDICINE

## 2022-12-20 PROCEDURE — 36415 COLL VENOUS BLD VENIPUNCTURE: CPT | Mod: PO | Performed by: INTERNAL MEDICINE

## 2022-12-21 RX ORDER — SITAGLIPTIN 100 MG/1
TABLET, FILM COATED ORAL
COMMUNITY
Start: 2022-09-27 | End: 2022-12-22

## 2022-12-21 RX ORDER — PANTOPRAZOLE SODIUM 40 MG/1
40 TABLET, DELAYED RELEASE ORAL EVERY MORNING
COMMUNITY
Start: 2022-11-28

## 2022-12-22 ENCOUNTER — OFFICE VISIT (OUTPATIENT)
Dept: FAMILY MEDICINE | Facility: CLINIC | Age: 72
End: 2022-12-22
Payer: MEDICARE

## 2022-12-22 VITALS
OXYGEN SATURATION: 98 % | HEIGHT: 65 IN | DIASTOLIC BLOOD PRESSURE: 78 MMHG | SYSTOLIC BLOOD PRESSURE: 136 MMHG | WEIGHT: 243.19 LBS | TEMPERATURE: 98 F | HEART RATE: 81 BPM | RESPIRATION RATE: 20 BRPM | BODY MASS INDEX: 40.52 KG/M2

## 2022-12-22 DIAGNOSIS — F33.0 MAJOR DEPRESSIVE DISORDER, RECURRENT EPISODE, MILD: ICD-10-CM

## 2022-12-22 DIAGNOSIS — I65.22 STENOSIS OF LEFT CAROTID ARTERY: ICD-10-CM

## 2022-12-22 DIAGNOSIS — E03.9 HYPOTHYROIDISM, UNSPECIFIED TYPE: ICD-10-CM

## 2022-12-22 DIAGNOSIS — I48.0 PAROXYSMAL ATRIAL FIBRILLATION: ICD-10-CM

## 2022-12-22 DIAGNOSIS — J45.20 MILD INTERMITTENT ASTHMA WITHOUT COMPLICATION: ICD-10-CM

## 2022-12-22 DIAGNOSIS — I50.9 CHRONIC HEART FAILURE, UNSPECIFIED HEART FAILURE TYPE: ICD-10-CM

## 2022-12-22 DIAGNOSIS — L40.0 PLAQUE PSORIASIS: ICD-10-CM

## 2022-12-22 DIAGNOSIS — Z12.11 SCREEN FOR COLON CANCER: ICD-10-CM

## 2022-12-22 DIAGNOSIS — N18.32 TYPE 2 DIABETES MELLITUS WITH STAGE 3B CHRONIC KIDNEY DISEASE, WITHOUT LONG-TERM CURRENT USE OF INSULIN: ICD-10-CM

## 2022-12-22 DIAGNOSIS — I87.2 CHRONIC STASIS DERMATITIS: ICD-10-CM

## 2022-12-22 DIAGNOSIS — E11.22 TYPE 2 DIABETES MELLITUS WITH STAGE 3B CHRONIC KIDNEY DISEASE, WITHOUT LONG-TERM CURRENT USE OF INSULIN: ICD-10-CM

## 2022-12-22 DIAGNOSIS — K76.0 FATTY LIVER: ICD-10-CM

## 2022-12-22 DIAGNOSIS — E66.01 MORBID OBESITY WITH BMI OF 40.0-44.9, ADULT: ICD-10-CM

## 2022-12-22 DIAGNOSIS — G47.33 OSA (OBSTRUCTIVE SLEEP APNEA): ICD-10-CM

## 2022-12-22 DIAGNOSIS — E78.5 HYPERLIPIDEMIA, UNSPECIFIED HYPERLIPIDEMIA TYPE: ICD-10-CM

## 2022-12-22 DIAGNOSIS — I35.0 NONRHEUMATIC AORTIC VALVE STENOSIS: ICD-10-CM

## 2022-12-22 DIAGNOSIS — I10 PRIMARY HYPERTENSION: ICD-10-CM

## 2022-12-22 DIAGNOSIS — F51.01 PRIMARY INSOMNIA: ICD-10-CM

## 2022-12-22 DIAGNOSIS — I25.10 CORONARY ARTERY DISEASE WITHOUT ANGINA PECTORIS, UNSPECIFIED VESSEL OR LESION TYPE, UNSPECIFIED WHETHER NATIVE OR TRANSPLANTED HEART: Primary | ICD-10-CM

## 2022-12-22 DIAGNOSIS — N32.81 OAB (OVERACTIVE BLADDER): ICD-10-CM

## 2022-12-22 DIAGNOSIS — I73.9 PAD (PERIPHERAL ARTERY DISEASE): ICD-10-CM

## 2022-12-22 PROCEDURE — 3044F HG A1C LEVEL LT 7.0%: CPT | Mod: CPTII,S$GLB,, | Performed by: INTERNAL MEDICINE

## 2022-12-22 PROCEDURE — 3061F NEG MICROALBUMINURIA REV: CPT | Mod: CPTII,S$GLB,, | Performed by: INTERNAL MEDICINE

## 2022-12-22 PROCEDURE — 1125F PR PAIN SEVERITY QUANTIFIED, PAIN PRESENT: ICD-10-PCS | Mod: CPTII,S$GLB,, | Performed by: INTERNAL MEDICINE

## 2022-12-22 PROCEDURE — 3288F FALL RISK ASSESSMENT DOCD: CPT | Mod: CPTII,S$GLB,, | Performed by: INTERNAL MEDICINE

## 2022-12-22 PROCEDURE — 1125F AMNT PAIN NOTED PAIN PRSNT: CPT | Mod: CPTII,S$GLB,, | Performed by: INTERNAL MEDICINE

## 2022-12-22 PROCEDURE — 3008F PR BODY MASS INDEX (BMI) DOCUMENTED: ICD-10-PCS | Mod: CPTII,S$GLB,, | Performed by: INTERNAL MEDICINE

## 2022-12-22 PROCEDURE — 99214 PR OFFICE/OUTPT VISIT, EST, LEVL IV, 30-39 MIN: ICD-10-PCS | Mod: S$GLB,,, | Performed by: INTERNAL MEDICINE

## 2022-12-22 PROCEDURE — 1160F PR REVIEW ALL MEDS BY PRESCRIBER/CLIN PHARMACIST DOCUMENTED: ICD-10-PCS | Mod: CPTII,S$GLB,, | Performed by: INTERNAL MEDICINE

## 2022-12-22 PROCEDURE — 1101F PR PT FALLS ASSESS DOC 0-1 FALLS W/OUT INJ PAST YR: ICD-10-PCS | Mod: CPTII,S$GLB,, | Performed by: INTERNAL MEDICINE

## 2022-12-22 PROCEDURE — 1159F PR MEDICATION LIST DOCUMENTED IN MEDICAL RECORD: ICD-10-PCS | Mod: CPTII,S$GLB,, | Performed by: INTERNAL MEDICINE

## 2022-12-22 PROCEDURE — 1160F RVW MEDS BY RX/DR IN RCRD: CPT | Mod: CPTII,S$GLB,, | Performed by: INTERNAL MEDICINE

## 2022-12-22 PROCEDURE — 3061F PR NEG MICROALBUMINURIA RESULT DOCUMENTED/REVIEW: ICD-10-PCS | Mod: CPTII,S$GLB,, | Performed by: INTERNAL MEDICINE

## 2022-12-22 PROCEDURE — 3078F PR MOST RECENT DIASTOLIC BLOOD PRESSURE < 80 MM HG: ICD-10-PCS | Mod: CPTII,S$GLB,, | Performed by: INTERNAL MEDICINE

## 2022-12-22 PROCEDURE — 3044F PR MOST RECENT HEMOGLOBIN A1C LEVEL <7.0%: ICD-10-PCS | Mod: CPTII,S$GLB,, | Performed by: INTERNAL MEDICINE

## 2022-12-22 PROCEDURE — 3066F PR DOCUMENTATION OF TREATMENT FOR NEPHROPATHY: ICD-10-PCS | Mod: CPTII,S$GLB,, | Performed by: INTERNAL MEDICINE

## 2022-12-22 PROCEDURE — 1101F PT FALLS ASSESS-DOCD LE1/YR: CPT | Mod: CPTII,S$GLB,, | Performed by: INTERNAL MEDICINE

## 2022-12-22 PROCEDURE — 3066F NEPHROPATHY DOC TX: CPT | Mod: CPTII,S$GLB,, | Performed by: INTERNAL MEDICINE

## 2022-12-22 PROCEDURE — 3288F PR FALLS RISK ASSESSMENT DOCUMENTED: ICD-10-PCS | Mod: CPTII,S$GLB,, | Performed by: INTERNAL MEDICINE

## 2022-12-22 PROCEDURE — 1159F MED LIST DOCD IN RCRD: CPT | Mod: CPTII,S$GLB,, | Performed by: INTERNAL MEDICINE

## 2022-12-22 PROCEDURE — 99214 OFFICE O/P EST MOD 30 MIN: CPT | Mod: S$GLB,,, | Performed by: INTERNAL MEDICINE

## 2022-12-22 PROCEDURE — 4010F ACE/ARB THERAPY RXD/TAKEN: CPT | Mod: CPTII,S$GLB,, | Performed by: INTERNAL MEDICINE

## 2022-12-22 PROCEDURE — 3008F BODY MASS INDEX DOCD: CPT | Mod: CPTII,S$GLB,, | Performed by: INTERNAL MEDICINE

## 2022-12-22 PROCEDURE — 3078F DIAST BP <80 MM HG: CPT | Mod: CPTII,S$GLB,, | Performed by: INTERNAL MEDICINE

## 2022-12-22 PROCEDURE — 3075F SYST BP GE 130 - 139MM HG: CPT | Mod: CPTII,S$GLB,, | Performed by: INTERNAL MEDICINE

## 2022-12-22 PROCEDURE — 3075F PR MOST RECENT SYSTOLIC BLOOD PRESS GE 130-139MM HG: ICD-10-PCS | Mod: CPTII,S$GLB,, | Performed by: INTERNAL MEDICINE

## 2022-12-22 PROCEDURE — 4010F PR ACE/ARB THEARPY RXD/TAKEN: ICD-10-PCS | Mod: CPTII,S$GLB,, | Performed by: INTERNAL MEDICINE

## 2022-12-22 RX ORDER — LINAGLIPTIN 5 MG/1
5 TABLET, FILM COATED ORAL DAILY
Qty: 90 TABLET | Refills: 3 | Status: SHIPPED | OUTPATIENT
Start: 2022-12-22 | End: 2023-11-15

## 2022-12-22 NOTE — PROGRESS NOTES
Subjective:       Patient ID: Sneha Mcghee is a 72 y.o. female.    Medication List with Changes/Refills   New Medications    LINAGLIPTIN (TRADJENTA) 5 MG TAB TABLET    Take 1 tablet (5 mg total) by mouth once daily.   Current Medications    ALBUTEROL (PROVENTIL) 2.5 MG /3 ML (0.083 %) NEBULIZER SOLUTION    Take 3 mLs (2.5 mg total) by nebulization every 6 (six) hours as needed for Wheezing. Rescue. Dispense one box    ALBUTEROL (PROVENTIL/VENTOLIN HFA) 90 MCG/ACTUATION INHALER    Inhale 2 puffs into the lungs every 6 (six) hours as needed for Wheezing. Rescue    ASCORBIC ACID, VITAMIN C, (VITAMIN C) 500 MG TABLET    Take 500 mg by mouth once daily.    ASPIRIN (ECOTRIN) 325 MG EC TABLET    81 mg.    ATORVASTATIN (LIPITOR) 40 MG TABLET    Take 1 tablet (40 mg total) by mouth once daily.    BLOOD SUGAR DIAGNOSTIC STRP    To check BG once a day and PRN low sugars, to use with insurance preferred meter    BLOOD-GLUCOSE METER KIT    To check BG qam and PRN lows, to use with insurance preferred meter    CALCIUM/MAGNESIUM (CALCIUM AND MAGNESIUM ORAL)    Take by mouth Daily.    CHOLECALCIFEROL, VITAMIN D3, 125 MCG (5,000 UNIT) TAB    cholecalciferol (vitamin D3) 125 mcg (5,000 unit) tablet   Take by oral route.    FLUTICASONE PROPIONATE (FLONASE) 50 MCG/ACTUATION NASAL SPRAY    SHAKE LIQUID AND USE 2 SPRAYS(100 MCG) IN EACH NOSTRIL EVERY DAY    FUROSEMIDE (LASIX) 20 MG TABLET    TAKE 1 TABLET BY MOUTH EVERY DAY    IPRATROPIUM (ATROVENT HFA) 17 MCG/ACTUATION INHALER    Inhale 2 puffs into the lungs every 6 (six) hours. Rescue    LANCETS MISC    To check BG once a day and PRN lows, to use with insurance preferred meter    LATANOPROST 0.005 % OPHTHALMIC SOLUTION    latanoprost 0.005 % eye drops   Instill 1 drop every day by ophthalmic route for 90 days.    LEVOTHYROXINE (SYNTHROID) 88 MCG TABLET    TAKE 1 TABLET(88 MCG) BY MOUTH BEFORE BREAKFAST    METFORMIN (GLUCOPHAGE-XR) 500 MG ER 24HR TABLET    Take 1 tablet (500 mg  total) by mouth 2 (two) times a day.    METOPROLOL SUCCINATE (TOPROL-XL) 100 MG 24 HR TABLET    Take 1 tablet (100 mg total) by mouth once daily.    MOMETASONE 0.1% (ELOCON) 0.1 % CREAM    Apply topically once daily.    NYSTATIN (MYCOSTATIN) POWDER    Apply topically 3 (three) times daily.    NYSTATIN-TRIAMCINOLONE (MYCOLOG II) CREAM    PRN    OLMESARTAN (BENICAR) 20 MG TABLET    Take 1 tablet (20 mg total) by mouth once daily.    OMEGA-3 FATTY ACIDS/FISH OIL (FISH OIL-OMEGA-3 FATTY ACIDS) 300-1,000 MG CAPSULE    Take 1 capsule by mouth once daily.    PANTOPRAZOLE (PROTONIX) 40 MG TABLET    Take 40 mg by mouth every morning.    POTASSIUM CHLORIDE (KLOR-CON) 10 MEQ TBSR    Take 10 mEq by mouth once daily.    PROPAFENONE (RHTHYMOL) 150 MG TAB    TAKE 1 TABLET(150 MG) BY MOUTH EVERY 8 HOURS    SANTYL OINTMENT    Apply topically once daily.    SERTRALINE (ZOLOFT) 100 MG TABLET    TAKE 1 TABLET(100 MG) BY MOUTH EVERY DAY    SUVOREXANT (BELSOMRA) 15 MG TAB    Take 1 tablet by mouth nightly as needed.    TIMOLOL MALEATE 0.5% (TIMOPTIC) 0.5 % DROP    Place 1 drop into both eyes 2 (two) times daily.    VIT C/E/ZN/COPPR/LUTEIN/ZEAXAN (PRESERVISION AREDS-2 ORAL)    Take by mouth once daily at 6am.    VITAMIN E, DL,TOCOPHERYL ACET, (VITAMIN E, DL, ACETATE,) 180 MG (400 UNIT) CAP    Take 400 Units by mouth once daily.   Discontinued Medications    JANUVIA 100 MG TAB        SIMVASTATIN (ZOCOR) 20 MG TABLET    TAKE ONE TABLET BY MOUTH EVERY EVENING       Chief Complaint: Follow-up  She is here today to f/u on chronic medical issues.      She has CAD s/p CABG x 4 vessels in 2012 with 8 stents.  She reports a cardiomyopathy with chronic heart failure.  She has paroxysmal Afib for many years. Echo on 8/2022 showed EF 65%, positive diastolic dysfunction, cLVH, moderate AS (HAMILTON 1.49 and gradient of 23), mild TR and PAP of 29. She continues on rhythmol 150 mg tid, metoprolol xl 100 mg qday, lasix 20 mg daily with KCL and aspirin 325 mg  daily. [id number] Score: 6 (stoke risk of 9.7%/year). She was seen by cardiology on 8/2022 who felt that she is okay on aspirin daily while she is monitored regularly in pacer clinic. If she develops AFib then to start eliquis. She denies chest pain or shortness of breath or palpitations.      She has hypertension and is taking toprol xl 100 mg qday and olmesartan 20 mg daily.  She developed a cough on ACEI.       She has hyperlipidemia and is taking atorvastatin 40 mg daily.  Lipids on 6/2022 were 147/172/35/77. She is on aspirin 325 mg daily.      She has carotid artery stenosis and is s/p left carotid stent.  Carotid u/s on 8/2022 showed patent stent in left carotid bulb and no significant stenosis.      She has PVD but has not had stenting of lower legs. She has chronic venous insufficiency and is s/p multiple ELVT.  She has chronic stasis dermatitis left > right.  She had a prolonged course for a non-healing ulcer of left lower leg that is now healed.  Venous u/s on 1/2022 showed venous reflux.  Arterial ultrasound on 1/2022 showed 50-75% stenosis of proximal left anterior tibial artery, SARITHA right 1.01 and left 0.97. She denies any recurrence of open wounds.      She has diabetes and is taking metformin 500 mg bid. She stopped her januvia for concern about side effects. Her HbA1c was 6.9 on 12/2022. She is UTD on her eye exam and due for her foot exam. Her microalbumin is negative on 6/2022 and she is on an ARB. Her home glucose run in the 140s.      She has a history of CKD that improved with labs on 12/2022 showing creatinine of 0.8 and GFR of >60.       She has asthma since 2012 that started after her heart surgery. She has symptoms about once every 6 months. Her symptoms are chest tightness and wheezing.  She denies any known triggers. She uses albuterol for her increased symptoms with good relief. Today she denies any active symptoms but she did have a flare about a week ago that responded to albuterol.        She has hypothyroid and is taking levothyroxine 88 mcg daily. Her TSH on 12/2022 was normal.      She has glaucoma and is using timolol and xalatan drops. She is followed by ophthalmology and reports her pressures have been stable.      She has incontinence of urine and uses pads. She was given oxybutynin xl 5 mg daily with good response but stopped taking. She denies dysuria or hematuria.      She has fatty liver.       She has depression and is taking zoloft 100 mg qday. She feels that this controls her symptoms. No suicidal ideations.  She does occasionally get severe anxiety with panic attacks. She continues to complain of sleep issues and will occasionally use belsomra 15 mg qhs PRN. She reports she uses very rarely and less than every 2 weeks.       She has VALENTE and refuses to wear CPAP.      She has left knee osteoarthritis and is followed by orthopedics. She was told she needs a TKR but does not want done at this time. She completed PT which helped her pain. She is considering restarting after the new year.      She lives alone and feels safe but she does have some concerns about her neighbors. She does not exercise and is very sedentary. She does eat heathy.  She has poor balance but denies any recent falls.      Colonoscopy---4/2016 repeat in 5 years ([facility])---has fecal kit at home   Mammogram----6/2021 neg ---very hesitant to get done due to pacemaker   DEXA-----2/2021 normal.   Tdap---more than 10 years   Influenza vaccine---10/2021  Pneumovax 23----11/2016  Prevnar 13----10/2017  Shingles vaccine-----none  Covid vaccine---- 2 doses      Review of Systems   Constitutional:  Negative for appetite change, fatigue, fever and unexpected weight change.   HENT:  Negative for congestion, ear pain, hearing loss, sore throat and trouble swallowing.    Eyes:  Negative for pain and visual disturbance.   Respiratory:  Negative for cough, chest tightness, shortness of breath and wheezing.    Cardiovascular:   "Negative for chest pain, palpitations and leg swelling.   Gastrointestinal:  Negative for abdominal pain, blood in stool, constipation, diarrhea, nausea and vomiting.   Endocrine: Negative for polyuria.   Genitourinary:  Negative for dysuria and hematuria.   Musculoskeletal:  Positive for arthralgias. Negative for back pain and myalgias.   Skin:  Negative for rash.   Neurological:  Negative for dizziness, weakness, numbness and headaches.   Hematological:  Does not bruise/bleed easily.   Psychiatric/Behavioral:  Positive for dysphoric mood. Negative for sleep disturbance and suicidal ideas. The patient is nervous/anxious.      Objective:      Vitals:    12/22/22 0734   BP: 136/78   Pulse: 81   Resp: 20   Temp: 97.9 °F (36.6 °C)   SpO2: 98%   Weight: 110.3 kg (243 lb 2.7 oz)   Height: 5' 5" (1.651 m)     Body mass index is 40.47 kg/m².  Physical Exam    General appearance: No acute distress, cooperative  Eyes: PERRL, EOMI, conjunctiva clear  Ears: normal external ear and pinna, tm clear without drainage, canals clear  Nose: Normal mucosa without drainage  Throat: no exudates or erythema, tonsils not enlarged  Mouth: no sores or lesions, moist mucous membranes  Neck: FROM, soft, supple, no thyromegaly, no bruits  Lymph: no anterior or posterior cervical adenopathy  Heart::  Regular rate and rhythm, 2/6 systolic murmur  Lung: Clear to ascultation bilaterally, no wheezing, no rales, no rhonchi, no distress  Abdomen: Soft, nontender, no distention, no hepatosplenomegaly, bowel sounds normal, no guarding, no rebound, no peritoneal signs  Skin: no rashes, no lesions  Extremities: no edema, stasis changes on lower leg, no open sores   Diabetic foot exam:   Left: Pulses: diminshed pedal pulses   Sensation: normal   Filament test present   Apperance: no ulcers, no callous formation, no deformities, yes onychomycosis, yes thickened nails, dry skin    Right: Pulses: diminished pedal pulses   Sensation: normal   Filament test " present   Appearance: no ulcers, no callous formation, no deformities, yes onychomycosis, yes thickened nails, dry skin   Neuro: CN 2-12 intact, 5/5 muscle strength upper and lower extremity bilaterally, 2+ DTRs UE and LE bilaterally, normal gait  Musculoskeletal: FROM, good strenth, no tenderness  Joint: normal appearance, no swelling, no warmth, no deformity in all joints    Assessment:       1. Coronary artery disease without angina pectoris, unspecified vessel or lesion type, unspecified whether native or transplanted heart    2. Nonrheumatic aortic valve stenosis    3. Paroxysmal atrial fibrillation    4. PAD (peripheral artery disease)    5. Primary hypertension    6. Chronic heart failure, unspecified heart failure type    7. Type 2 diabetes mellitus with stage 3b chronic kidney disease, without long-term current use of insulin    8. Hyperlipidemia, unspecified hyperlipidemia type    9. Stenosis of left carotid artery    10. Hypothyroidism, unspecified type    11. Chronic stasis dermatitis    12. Mild intermittent asthma without complication    13. Fatty liver    14. OAB (overactive bladder)    15. Major depressive disorder, recurrent episode, mild    16. Primary insomnia    17. Plaque psoriasis    18. VALENTE (obstructive sleep apnea)    19. Morbid obesity with BMI of 40.0-44.9, adult    20. Screen for colon cancer        Plan:       Coronary artery disease without angina pectoris, unspecified vessel or lesion type, unspecified whether native or transplanted heart  Stable and no active symptoms.    Nonrheumatic aortic valve stenosis  Stable on recent echo and continue to monitor.    Paroxysmal atrial fibrillation  NSR today on exam. She has pacer which is monitored closely. Continue aspirin for anticoagulation per cardiology.    PAD (peripheral artery disease)   Continue statin and aspirin. No open wounds.    Primary hypertension  Well controlled and continue current regimen.   -     CBC Auto Differential;  Future; Expected date: 12/22/2022  -     Comprehensive Metabolic Panel; Future; Expected date: 12/22/2022  -     Lipid Panel; Future; Expected date: 12/22/2022  -     TSH; Future; Expected date: 12/22/2022    Chronic heart failure, unspecified heart failure type  Well compensated on lasix daily.    Type 2 diabetes mellitus with stage 3b chronic kidney disease, without long-term current use of insulin  Uncontrolled and will start tradjenta 5 mg daily and continue metformin. Foot exam done today.   -     Hemoglobin A1C; Future; Expected date: 12/22/2022  -     Microalbumin/Creatinine Ratio, Urine; Future; Expected date: 12/22/2022  -     linaGLIPtin (TRADJENTA) 5 mg Tab tablet; Take 1 tablet (5 mg total) by mouth once daily.  Dispense: 90 tablet; Refill: 3    Hyperlipidemia, unspecified hyperlipidemia type  Good control on atorvastatin. Continue aspirin daily.    Stenosis of left carotid artery  Patent stent of left carotid artery. Continue statin and aspirin.    Hypothyroidism, unspecified type  Good control on this dose of levothyroxine.    Chronic stasis dermatitis  Stable and looks good today.  Continue lasix daily.    Mild intermittent asthma without complication  No active symptoms and doing better after her flare.     Fatty liver  Normal LFTs and encouraged weight loss    OAB (overactive bladder)  Mild symptoms and she does not want treatment.     Major depressive disorder, recurrent episode, mild  Well controlled and continue current regimen.     Primary insomnia  Stable on belsomra and does not use daily.    Plaque psoriasis  No active lesions on exam.    VALENTE (obstructive sleep apnea)  Uncontrolled and does not want cpap    Morbid obesity with BMI of 40.0-44.9, adult  Long discussion on the benefits of healthy eating and regular exercise to help lose weight and help control cad, hypertension, hyperlipidemia and diabetes.     Screen for colon cancer  -     Fecal Immunochemical Test (iFOBT); Future; Expected  date: 12/22/2022    Follow up in about 6 months (around 6/22/2023) for chronic medical issues.

## 2023-01-20 ENCOUNTER — TELEPHONE (OUTPATIENT)
Dept: FAMILY MEDICINE | Facility: CLINIC | Age: 73
End: 2023-01-20
Payer: MEDICARE

## 2023-01-22 DIAGNOSIS — E03.9 HYPOTHYROIDISM, UNSPECIFIED TYPE: ICD-10-CM

## 2023-01-22 RX ORDER — LEVOTHYROXINE SODIUM 88 UG/1
TABLET ORAL
Qty: 90 TABLET | Refills: 3 | Status: SHIPPED | OUTPATIENT
Start: 2023-01-22 | End: 2024-01-25

## 2023-01-22 NOTE — TELEPHONE ENCOUNTER
Refill Decision Note   Sneha Mcghee  is requesting a refill authorization.  Brief Assessment and Rationale for Refill:  Approve     Medication Therapy Plan:       Medication Reconciliation Completed: No   Comments:     No Care Gaps recommended.     Note composed:3:30 PM 01/22/2023

## 2023-01-22 NOTE — TELEPHONE ENCOUNTER
No new care gaps identified.  [facility] Embedded Care Gaps. Reference number: [id number]. 1/22/2023   3:51:49 AM CST

## 2023-03-14 ENCOUNTER — TELEPHONE (OUTPATIENT)
Dept: CARDIOLOGY | Facility: HOSPITAL | Age: 73
End: 2023-03-14
Payer: MEDICARE

## 2023-03-15 ENCOUNTER — TELEPHONE (OUTPATIENT)
Dept: CARDIOLOGY | Facility: HOSPITAL | Age: 73
End: 2023-03-15
Payer: MEDICARE

## 2023-03-15 NOTE — TELEPHONE ENCOUNTER
Pt called and stated she is not sure what doctor she wants to follow before getting her device checked.  Pt will call back when she decides. Informed pt if she finds a doctor elsewhere, let us know so we can transfer her HM.

## 2023-03-16 ENCOUNTER — TELEPHONE (OUTPATIENT)
Dept: FAMILY MEDICINE | Facility: CLINIC | Age: 73
End: 2023-03-16
Payer: MEDICARE

## 2023-03-16 ENCOUNTER — TELEPHONE (OUTPATIENT)
Dept: NEPHROLOGY | Facility: CLINIC | Age: 73
End: 2023-03-16
Payer: MEDICARE

## 2023-03-16 NOTE — TELEPHONE ENCOUNTER
"----- Message from Nara Gudino sent at 3/16/2023  9:28 AM CDT -----  "Type:  Patient Call Back    Who Called:PT    What is the reqeust in detail:Pt requesting call back in regards to an referral for [facility].Please Advise    Can the clinic reply by [facility]?no    Best Call Back Number:[phone]      Additional Information:Pt would like to speak with nurse have some other questions            "

## 2023-03-16 NOTE — TELEPHONE ENCOUNTER
----- Message from Mehreen Modi sent at 3/16/2023  3:50 PM CDT -----  Regarding: sooner appt  Contact: patient  Type:  Sooner Appointment Request    Caller is requesting a sooner appointment.  Caller declined first available appointment listed below.  Caller will not accept being placed on the waitlist and is requesting a message be sent to doctor.    Name of Caller:  Patient  When is the first available appointment?    Symptoms:  f/u kidney disease  Best Call Back Number:  [phone] (home)     Additional Information:  Please call pt to schedule thanks!

## 2023-03-16 NOTE — TELEPHONE ENCOUNTER
Spoke w/ pt. She states that she spoke w/ Dr Jacobo at last OV about her knee and getting PT at [facility]. She is now having a new bacl pain/problem and would like to discuss. Appt made for 9am tomorrow. Pt aware to keep her 6 mo f/u appt in June.

## 2023-03-17 ENCOUNTER — OFFICE VISIT (OUTPATIENT)
Dept: FAMILY MEDICINE | Facility: CLINIC | Age: 73
End: 2023-03-17
Payer: MEDICARE

## 2023-03-17 ENCOUNTER — TELEPHONE (OUTPATIENT)
Dept: FAMILY MEDICINE | Facility: CLINIC | Age: 73
End: 2023-03-17

## 2023-03-17 VITALS
BODY MASS INDEX: 39.97 KG/M2 | OXYGEN SATURATION: 97 % | HEART RATE: 64 BPM | HEIGHT: 65 IN | SYSTOLIC BLOOD PRESSURE: 132 MMHG | RESPIRATION RATE: 16 BRPM | DIASTOLIC BLOOD PRESSURE: 70 MMHG | WEIGHT: 239.88 LBS | TEMPERATURE: 97 F

## 2023-03-17 DIAGNOSIS — I35.0 AORTIC VALVE STENOSIS, ETIOLOGY OF CARDIAC VALVE DISEASE UNSPECIFIED: ICD-10-CM

## 2023-03-17 DIAGNOSIS — I25.118 CORONARY ARTERY DISEASE OF NATIVE ARTERY OF NATIVE HEART WITH STABLE ANGINA PECTORIS: ICD-10-CM

## 2023-03-17 DIAGNOSIS — M79.18 MYOFASCIAL PAIN SYNDROME OF THORACIC SPINE: Primary | ICD-10-CM

## 2023-03-17 DIAGNOSIS — Z12.31 ENCOUNTER FOR SCREENING MAMMOGRAM FOR MALIGNANT NEOPLASM OF BREAST: ICD-10-CM

## 2023-03-17 DIAGNOSIS — M17.12 PRIMARY OSTEOARTHRITIS OF LEFT KNEE: ICD-10-CM

## 2023-03-17 DIAGNOSIS — I48.0 PAROXYSMAL ATRIAL FIBRILLATION: ICD-10-CM

## 2023-03-17 PROCEDURE — 3288F PR FALLS RISK ASSESSMENT DOCUMENTED: ICD-10-PCS | Mod: CPTII,S$GLB,, | Performed by: INTERNAL MEDICINE

## 2023-03-17 PROCEDURE — 1159F MED LIST DOCD IN RCRD: CPT | Mod: CPTII,S$GLB,, | Performed by: INTERNAL MEDICINE

## 2023-03-17 PROCEDURE — 1101F PR PT FALLS ASSESS DOC 0-1 FALLS W/OUT INJ PAST YR: ICD-10-PCS | Mod: CPTII,S$GLB,, | Performed by: INTERNAL MEDICINE

## 2023-03-17 PROCEDURE — 1160F RVW MEDS BY RX/DR IN RCRD: CPT | Mod: CPTII,S$GLB,, | Performed by: INTERNAL MEDICINE

## 2023-03-17 PROCEDURE — 99214 OFFICE O/P EST MOD 30 MIN: CPT | Mod: S$GLB,,, | Performed by: INTERNAL MEDICINE

## 2023-03-17 PROCEDURE — 3008F BODY MASS INDEX DOCD: CPT | Mod: CPTII,S$GLB,, | Performed by: INTERNAL MEDICINE

## 2023-03-17 PROCEDURE — 1159F PR MEDICATION LIST DOCUMENTED IN MEDICAL RECORD: ICD-10-PCS | Mod: CPTII,S$GLB,, | Performed by: INTERNAL MEDICINE

## 2023-03-17 PROCEDURE — 93005 ELECTROCARDIOGRAM TRACING: CPT | Mod: S$GLB,,, | Performed by: INTERNAL MEDICINE

## 2023-03-17 PROCEDURE — 3078F DIAST BP <80 MM HG: CPT | Mod: CPTII,S$GLB,, | Performed by: INTERNAL MEDICINE

## 2023-03-17 PROCEDURE — 3075F SYST BP GE 130 - 139MM HG: CPT | Mod: CPTII,S$GLB,, | Performed by: INTERNAL MEDICINE

## 2023-03-17 PROCEDURE — 93010 ELECTROCARDIOGRAM REPORT: CPT | Mod: S$GLB,,, | Performed by: INTERNAL MEDICINE

## 2023-03-17 PROCEDURE — 3288F FALL RISK ASSESSMENT DOCD: CPT | Mod: CPTII,S$GLB,, | Performed by: INTERNAL MEDICINE

## 2023-03-17 PROCEDURE — 1160F PR REVIEW ALL MEDS BY PRESCRIBER/CLIN PHARMACIST DOCUMENTED: ICD-10-PCS | Mod: CPTII,S$GLB,, | Performed by: INTERNAL MEDICINE

## 2023-03-17 PROCEDURE — 3075F PR MOST RECENT SYSTOLIC BLOOD PRESS GE 130-139MM HG: ICD-10-PCS | Mod: CPTII,S$GLB,, | Performed by: INTERNAL MEDICINE

## 2023-03-17 PROCEDURE — 93005 EKG 12-LEAD: ICD-10-PCS | Mod: S$GLB,,, | Performed by: INTERNAL MEDICINE

## 2023-03-17 PROCEDURE — 3008F PR BODY MASS INDEX (BMI) DOCUMENTED: ICD-10-PCS | Mod: CPTII,S$GLB,, | Performed by: INTERNAL MEDICINE

## 2023-03-17 PROCEDURE — 1101F PT FALLS ASSESS-DOCD LE1/YR: CPT | Mod: CPTII,S$GLB,, | Performed by: INTERNAL MEDICINE

## 2023-03-17 PROCEDURE — 4010F PR ACE/ARB THEARPY RXD/TAKEN: ICD-10-PCS | Mod: CPTII,S$GLB,, | Performed by: INTERNAL MEDICINE

## 2023-03-17 PROCEDURE — 4010F ACE/ARB THERAPY RXD/TAKEN: CPT | Mod: CPTII,S$GLB,, | Performed by: INTERNAL MEDICINE

## 2023-03-17 PROCEDURE — 99214 PR OFFICE/OUTPT VISIT, EST, LEVL IV, 30-39 MIN: ICD-10-PCS | Mod: S$GLB,,, | Performed by: INTERNAL MEDICINE

## 2023-03-17 PROCEDURE — 93010 EKG 12-LEAD: ICD-10-PCS | Mod: S$GLB,,, | Performed by: INTERNAL MEDICINE

## 2023-03-17 PROCEDURE — 3078F PR MOST RECENT DIASTOLIC BLOOD PRESSURE < 80 MM HG: ICD-10-PCS | Mod: CPTII,S$GLB,, | Performed by: INTERNAL MEDICINE

## 2023-03-17 NOTE — PROGRESS NOTES
Subjective:       Patient ID: Sneha Mcghee is a 72 y.o. female.    Medication List with Changes/Refills   Current Medications    ALBUTEROL (PROVENTIL/VENTOLIN HFA) 90 MCG/ACTUATION INHALER    Inhale 2 puffs into the lungs every 6 (six) hours as needed for Wheezing. Rescue    ASCORBIC ACID, VITAMIN C, (VITAMIN C) 500 MG TABLET    Take 500 mg by mouth once daily.    ASPIRIN (ECOTRIN) 325 MG EC TABLET    81 mg.    ATORVASTATIN (LIPITOR) 40 MG TABLET    Take 1 tablet (40 mg total) by mouth once daily.    BLOOD SUGAR DIAGNOSTIC STRP    To check BG once a day and PRN low sugars, to use with insurance preferred meter    BLOOD-GLUCOSE METER KIT    To check BG qam and PRN lows, to use with insurance preferred meter    CALCIUM/MAGNESIUM (CALCIUM AND MAGNESIUM ORAL)    Take by mouth Daily.    CHOLECALCIFEROL, VITAMIN D3, 125 MCG (5,000 UNIT) TAB    cholecalciferol (vitamin D3) 125 mcg (5,000 unit) tablet   Take by oral route.    FLUTICASONE PROPIONATE (FLONASE) 50 MCG/ACTUATION NASAL SPRAY    SHAKE LIQUID AND USE 2 SPRAYS(100 MCG) IN EACH NOSTRIL EVERY DAY    FUROSEMIDE (LASIX) 20 MG TABLET    TAKE 1 TABLET BY MOUTH EVERY DAY    IPRATROPIUM (ATROVENT HFA) 17 MCG/ACTUATION INHALER    Inhale 2 puffs into the lungs every 6 (six) hours. Rescue    LANCETS MISC    To check BG once a day and PRN lows, to use with insurance preferred meter    LATANOPROST 0.005 % OPHTHALMIC SOLUTION    latanoprost 0.005 % eye drops   Instill 1 drop every day by ophthalmic route for 90 days.    LEVOTHYROXINE (SYNTHROID) 88 MCG TABLET    TAKE 1 TABLET(88 MCG) BY MOUTH BEFORE BREAKFAST    LINAGLIPTIN (TRADJENTA) 5 MG TAB TABLET    Take 1 tablet (5 mg total) by mouth once daily.    METFORMIN (GLUCOPHAGE-XR) 500 MG ER 24HR TABLET    Take 1 tablet (500 mg total) by mouth 2 (two) times a day.    METOPROLOL SUCCINATE (TOPROL-XL) 100 MG 24 HR TABLET    Take 1 tablet (100 mg total) by mouth once daily.    MOMETASONE 0.1% (ELOCON) 0.1 % CREAM    Apply topically  once daily.    NYSTATIN (MYCOSTATIN) POWDER    Apply topically 3 (three) times daily.    NYSTATIN-TRIAMCINOLONE (MYCOLOG II) CREAM    PRN    OLMESARTAN (BENICAR) 20 MG TABLET    Take 1 tablet (20 mg total) by mouth once daily.    OMEGA-3 FATTY ACIDS/FISH OIL (FISH OIL-OMEGA-3 FATTY ACIDS) 300-1,000 MG CAPSULE    Take 1 capsule by mouth once daily.    PANTOPRAZOLE (PROTONIX) 40 MG TABLET    Take 40 mg by mouth every morning.    POTASSIUM CHLORIDE (KLOR-CON) 10 MEQ TBSR    Take 10 mEq by mouth once daily.    PROPAFENONE (RHTHYMOL) 150 MG TAB    TAKE 1 TABLET(150 MG) BY MOUTH EVERY 8 HOURS    SANTYL OINTMENT    Apply topically once daily.    SERTRALINE (ZOLOFT) 100 MG TABLET    TAKE 1 TABLET(100 MG) BY MOUTH EVERY DAY    SUVOREXANT (BELSOMRA) 15 MG TAB    Take 1 tablet by mouth nightly as needed.    TIMOLOL MALEATE 0.5% (TIMOPTIC) 0.5 % DROP    Place 1 drop into both eyes 2 (two) times daily.    VIT C/E/ZN/COPPR/LUTEIN/ZEAXAN (PRESERVISION AREDS-2 ORAL)    Take by mouth once daily at 6am.    VITAMIN E, DL,TOCOPHERYL ACET, (VITAMIN E, DL, ACETATE,) 180 MG (400 UNIT) CAP    Take 400 Units by mouth once daily.       Chief Complaint: Back Pain (X- 1 week )  She presents today with multiple issues.     She was seen at [facility] for chest pain on 2/23/2023 after losing her balance in the car and hitting her right side. She was sitting in the car and had chest pain and increasing shortness of breath and nausea so called her daughter and was taken to ER.  I do not have the records. Per patient she had normal enzymes and reassuring EKG. She was advised to f/u with cardiology as outpatient for a stress test. She reports that chest pain was a heaviness that lasted hours then resolved. She was fatigued. No sweating but she was shortness of breath. Recall she has CAD s/p CABG x 4 vessels in 2012 with 8 stents.  She reports a cardiomyopathy with chronic heart failure.  She has paroxysmal Afib for many years. Echo on 8/2022 showed  "EF 65%, positive diastolic dysfunction, cLVH, moderate AS (HAMILTON 1.49 and gradient of 23), mild TR and PAP of 29. She continues on rhythmol 150 mg tid, metoprolol xl 100 mg qday, lasix 20 mg daily with KCL and aspirin 325 mg daily. [id number] Score: 6 (stoke risk of 9.7%/year).     One week ago she pulled a muscle while laying on the shoulder when she twisted wrong.  The pain is in her right upper back and has spread across her back to the left. Worse with twisting, reaching across her body or reaching up. Better with sitting or laying flat. No radiation down her arms or down her back. She would like a referral to PT for this pain as well her chronic left knee pain and balance issues.     Review of Systems   Constitutional:  Negative for appetite change, fatigue, fever and unexpected weight change.   HENT:  Negative for congestion, ear pain, hearing loss, sore throat and trouble swallowing.    Eyes:  Negative for pain and visual disturbance.   Respiratory:  Negative for cough, chest tightness, shortness of breath and wheezing.    Cardiovascular:  Positive for chest pain. Negative for palpitations and leg swelling.   Gastrointestinal:  Negative for abdominal pain, blood in stool, constipation, diarrhea, nausea and vomiting.   Endocrine: Negative for polyuria.   Genitourinary:  Negative for dysuria and hematuria.   Musculoskeletal:  Positive for back pain. Negative for arthralgias and myalgias.   Skin:  Negative for rash.   Neurological:  Negative for dizziness, weakness, numbness and headaches.   Hematological:  Does not bruise/bleed easily.   Psychiatric/Behavioral:  Negative for dysphoric mood, sleep disturbance and suicidal ideas. The patient is not nervous/anxious.      Objective:      Vitals:    03/17/23 0902   BP: 132/70   BP Location: Left arm   Patient Position: Sitting   Pulse: 64   Resp: 16   Temp: 97.3 °F (36.3 °C)   SpO2: 97%   Weight: 108.8 kg (239 lb 13.8 oz)   Height: 5' 5" (1.651 m)     Body mass index " is 39.91 kg/m².  Physical Exam    General appearance: No acute distress, cooperative  Neck: FROM, soft, supple, no thyromegaly, no bruits  Lymph: no anterior or posterior cervical adenopathy  Heart::  Regular rate and rhythm, 2/6 systolic murmur  Lung: Clear to ascultation bilaterally, no wheezing, no rales, no rhonchi, no distress  Abdomen: Soft, nontender, no distention, no hepatosplenomegaly, bowel sounds normal, no guarding, no rebound, no peritoneal signs  Skin: no rashes, no lesions  Extremities: no edema, no cyanosis  Neuro: no focal abnormalities, strength 5/5 b/l UE and LE, 2+ DTRs b/l UE and LE, normal gait  Peripheral pulses: 2+ pedal pulses bilaterally, good perfusion and color  Musculoskeletal: FROM, good strenth, tenderness across upper back, pain with ROM of arms in upper back   Joint: normal appearance, no swelling, no warmth, no deformity in all joints    Assessment:       1. Myofascial pain syndrome of thoracic spine    2. Primary osteoarthritis of left knee    3. Coronary artery disease of native artery of native heart with stable angina pectoris    4. Aortic valve stenosis, etiology of cardiac valve disease unspecified    5. Paroxysmal atrial fibrillation    6. Encounter for screening mammogram for malignant neoplasm of breast        Plan:       Myofascial pain syndrome of thoracic spine  Given reassurance that her pain is consistent with muscular back pain. Referral to PT.   -     Ambulatory referral/consult to Physical/Occupational Therapy; Future; Expected date: 03/24/2023    Primary osteoarthritis of left knee  Referral to PT to treat her chronic left knee pain and balance.   -     Ambulatory referral/consult to Physical/Occupational Therapy; Future; Expected date: 03/24/2023    Coronary artery disease of native artery of native heart with stable angina pectoris  New onset chest pain after a fall 3 weeks ago. Chest pain is very atypical and suspect due to panic attack. She has a history of  CAD so will get a nuclear stress test and refer to cardiology for evaluation.   -     Ambulatory referral/consult to Cardiology; Future; Expected date: 03/24/2023  -     Nuclear Stress - Cardiology Interpreted; Future  -     IN OFFICE EKG 12-LEAD (to Muse)    Aortic valve stenosis, etiology of cardiac valve disease unspecified  -     Ambulatory referral/consult to Cardiology; Future; Expected date: 03/24/2023    Paroxysmal atrial fibrillation  -     Ambulatory referral/consult to Cardiology; Future; Expected date: 03/24/2023    Encounter for screening mammogram for malignant neoplasm of breast  -     Mammo Digital Screening Bilat w/ Tu; Future; Expected date: 03/17/2023      Follow up for already scheduled.

## 2023-03-21 ENCOUNTER — PATIENT MESSAGE (OUTPATIENT)
Dept: FAMILY MEDICINE | Facility: CLINIC | Age: 73
End: 2023-03-21
Payer: MEDICARE

## 2023-03-23 ENCOUNTER — HOSPITAL ENCOUNTER (OUTPATIENT)
Dept: RADIOLOGY | Facility: HOSPITAL | Age: 73
Discharge: HOME | End: 2023-03-23
Attending: INTERNAL MEDICINE
Payer: MEDICARE

## 2023-03-23 DIAGNOSIS — Z12.31 ENCOUNTER FOR SCREENING MAMMOGRAM FOR MALIGNANT NEOPLASM OF BREAST: ICD-10-CM

## 2023-03-23 PROCEDURE — 77067 SCR MAMMO BI INCL CAD: CPT | Mod: 26,,, | Performed by: RADIOLOGY

## 2023-03-23 PROCEDURE — 77067 SCR MAMMO BI INCL CAD: CPT | Mod: TC,PO

## 2023-03-23 PROCEDURE — 77063 BREAST TOMOSYNTHESIS BI: CPT | Mod: 26,,, | Performed by: RADIOLOGY

## 2023-03-23 PROCEDURE — 77067 MAMMO DIGITAL SCREENING BILAT WITH TOMO: ICD-10-PCS | Mod: 26,,, | Performed by: RADIOLOGY

## 2023-03-23 PROCEDURE — 77063 MAMMO DIGITAL SCREENING BILAT WITH TOMO: ICD-10-PCS | Mod: 26,,, | Performed by: RADIOLOGY

## 2023-03-29 ENCOUNTER — PATIENT MESSAGE (OUTPATIENT)
Dept: CARDIOLOGY | Facility: HOSPITAL | Age: 73
End: 2023-03-29
Payer: MEDICARE

## 2023-03-30 ENCOUNTER — TELEPHONE (OUTPATIENT)
Dept: OPHTHALMOLOGY | Facility: CLINIC | Age: 73
End: 2023-03-30
Payer: MEDICARE

## 2023-03-30 ENCOUNTER — HOSPITAL ENCOUNTER (OUTPATIENT)
Dept: RADIOLOGY | Facility: HOSPITAL | Age: 73
Discharge: HOME | End: 2023-03-30
Attending: INTERNAL MEDICINE
Payer: MEDICARE

## 2023-03-30 ENCOUNTER — TELEPHONE (OUTPATIENT)
Dept: FAMILY MEDICINE | Facility: CLINIC | Age: 73
End: 2023-03-30
Payer: MEDICARE

## 2023-03-30 ENCOUNTER — CLINICAL SUPPORT (OUTPATIENT)
Dept: CARDIOLOGY | Facility: HOSPITAL | Age: 73
End: 2023-03-30
Attending: INTERNAL MEDICINE
Payer: MEDICARE

## 2023-03-30 VITALS — WEIGHT: 239 LBS | HEIGHT: 65 IN | BODY MASS INDEX: 39.82 KG/M2

## 2023-03-30 DIAGNOSIS — I25.118 CORONARY ARTERY DISEASE OF NATIVE ARTERY OF NATIVE HEART WITH STABLE ANGINA PECTORIS: ICD-10-CM

## 2023-03-30 LAB
CV PHARM DOSE: 0.4 MG
CV STRESS BASE HR: 61 BPM
DIASTOLIC BLOOD PRESSURE: 85 MMHG
NUC REST EJECTION FRACTION: 79
OHS CV CPX 1 MINUTE RECOVERY HEART RATE: 86 BPM
OHS CV CPX 85 PERCENT MAX PREDICTED HEART RATE MALE: 121
OHS CV CPX MAX PREDICTED HEART RATE: 143
OHS CV CPX PATIENT IS FEMALE: 1
OHS CV CPX PATIENT IS MALE: 0
OHS CV CPX PEAK DIASTOLIC BLOOD PRESSURE: 85 MMHG
OHS CV CPX PEAK HEAR RATE: 93 BPM
OHS CV CPX PEAK RATE PRESSURE PRODUCT: NORMAL
OHS CV CPX PEAK SYSTOLIC BLOOD PRESSURE: 154 MMHG
OHS CV CPX PERCENT MAX PREDICTED HEART RATE ACHIEVED: 65
OHS CV CPX RATE PRESSURE PRODUCT PRESENTING: 9394
OHS CV PHARM TIME: 915 MIN
SYSTOLIC BLOOD PRESSURE: 154 MMHG

## 2023-03-30 PROCEDURE — 63600175 PHARM REV CODE 636 W HCPCS: Mod: PO | Performed by: INTERNAL MEDICINE

## 2023-03-30 PROCEDURE — 78452 NUCLEAR STRESS - CARDIOLOGY INTERPRETED (CUPID ONLY): ICD-10-PCS | Mod: 26,,, | Performed by: INTERNAL MEDICINE

## 2023-03-30 PROCEDURE — 78452 HT MUSCLE IMAGE SPECT MULT: CPT | Mod: PO

## 2023-03-30 PROCEDURE — 93016 NUCLEAR STRESS - CARDIOLOGY INTERPRETED (CUPID ONLY): ICD-10-PCS | Mod: ,,, | Performed by: INTERNAL MEDICINE

## 2023-03-30 PROCEDURE — A9502 TC99M TETROFOSMIN: HCPCS | Mod: PO

## 2023-03-30 PROCEDURE — 93016 CV STRESS TEST SUPVJ ONLY: CPT | Mod: ,,, | Performed by: INTERNAL MEDICINE

## 2023-03-30 PROCEDURE — 93018 PR CARDIAC STRESS TST,INTERP/REPT ONLY: ICD-10-PCS | Mod: ,,, | Performed by: INTERNAL MEDICINE

## 2023-03-30 PROCEDURE — 93018 CV STRESS TEST I&R ONLY: CPT | Mod: ,,, | Performed by: INTERNAL MEDICINE

## 2023-03-30 PROCEDURE — 93017 CV STRESS TEST TRACING ONLY: CPT | Mod: PO

## 2023-03-30 PROCEDURE — 78452 HT MUSCLE IMAGE SPECT MULT: CPT | Mod: 26,,, | Performed by: INTERNAL MEDICINE

## 2023-03-30 RX ORDER — REGADENOSON 0.08 MG/ML
0.4 INJECTION, SOLUTION INTRAVENOUS
Status: COMPLETED | OUTPATIENT
Start: 2023-03-30 | End: 2023-03-30

## 2023-03-30 RX ADMIN — REGADENOSON 0.4 MG: 0.08 INJECTION, SOLUTION INTRAVENOUS at 09:03

## 2023-03-30 NOTE — TELEPHONE ENCOUNTER
----- Message from Kerry Coker RN sent at 3/30/2023  8:54 AM CDT -----  Regarding: Nuclear Stress Test  Hey, I just wanted to let you know that it was specifically requested per your office that Dr. Guo read her nuclear stress test today but he is out of town this week and not reading so we will send it to another one of our cardiologist to read. If this is an issue please let me know this am as we can't hold a stress that long and not have it read.    Thanks!  Kerry Coker RN\  Stress Lab

## 2023-04-03 ENCOUNTER — PATIENT MESSAGE (OUTPATIENT)
Dept: FAMILY MEDICINE | Facility: CLINIC | Age: 73
End: 2023-04-03
Payer: MEDICARE

## 2023-04-05 ENCOUNTER — TELEPHONE (OUTPATIENT)
Dept: OPHTHALMOLOGY | Facility: CLINIC | Age: 73
End: 2023-04-05
Payer: MEDICARE

## 2023-04-10 ENCOUNTER — TELEPHONE (OUTPATIENT)
Dept: OPHTHALMOLOGY | Facility: CLINIC | Age: 73
End: 2023-04-10
Payer: MEDICARE

## 2023-04-17 ENCOUNTER — OFFICE VISIT (OUTPATIENT)
Dept: OPHTHALMOLOGY | Facility: CLINIC | Age: 73
End: 2023-04-17
Payer: MEDICARE

## 2023-04-17 DIAGNOSIS — H35.3132 INTERMEDIATE STAGE NONEXUDATIVE AGE-RELATED MACULAR DEGENERATION OF BOTH EYES: ICD-10-CM

## 2023-04-17 DIAGNOSIS — H43.811 POSTERIOR VITREOUS DETACHMENT, RIGHT: ICD-10-CM

## 2023-04-17 DIAGNOSIS — H31.8 CHOROIDAL LESION: ICD-10-CM

## 2023-04-17 DIAGNOSIS — H35.3221 EXUDATIVE AGE-RELATED MACULAR DEGENERATION OF LEFT EYE WITH ACTIVE CHOROIDAL NEOVASCULARIZATION: Primary | ICD-10-CM

## 2023-04-17 DIAGNOSIS — H35.033 HYPERTENSIVE RETINOPATHY, BILATERAL: ICD-10-CM

## 2023-04-17 PROCEDURE — 99999 PR PBB SHADOW E&M-EST. PATIENT-LVL II: ICD-10-PCS | Mod: PBBFAC,,, | Performed by: OPHTHALMOLOGY

## 2023-04-17 PROCEDURE — 1160F PR REVIEW ALL MEDS BY PRESCRIBER/CLIN PHARMACIST DOCUMENTED: ICD-10-PCS | Mod: CPTII,S$GLB,, | Performed by: OPHTHALMOLOGY

## 2023-04-17 PROCEDURE — 67028 INJECTION EYE DRUG: CPT | Mod: LT,S$GLB,, | Performed by: OPHTHALMOLOGY

## 2023-04-17 PROCEDURE — 67028 PR INJECT INTRAVITREAL PHARMCOLOGIC: ICD-10-PCS | Mod: LT,S$GLB,, | Performed by: OPHTHALMOLOGY

## 2023-04-17 PROCEDURE — 92014 COMPRE OPH EXAM EST PT 1/>: CPT | Mod: 25,S$GLB,, | Performed by: OPHTHALMOLOGY

## 2023-04-17 PROCEDURE — 92134 CPTRZ OPH DX IMG PST SGM RTA: CPT | Mod: S$GLB,,, | Performed by: OPHTHALMOLOGY

## 2023-04-17 PROCEDURE — 92134 POSTERIOR SEGMENT OCT RETINA (OCULAR COHERENCE TOMOGRAPHY)-BOTH EYES: ICD-10-PCS | Mod: S$GLB,,, | Performed by: OPHTHALMOLOGY

## 2023-04-17 PROCEDURE — 1159F MED LIST DOCD IN RCRD: CPT | Mod: CPTII,S$GLB,, | Performed by: OPHTHALMOLOGY

## 2023-04-17 PROCEDURE — 4010F PR ACE/ARB THEARPY RXD/TAKEN: ICD-10-PCS | Mod: CPTII,S$GLB,, | Performed by: OPHTHALMOLOGY

## 2023-04-17 PROCEDURE — 1160F RVW MEDS BY RX/DR IN RCRD: CPT | Mod: CPTII,S$GLB,, | Performed by: OPHTHALMOLOGY

## 2023-04-17 PROCEDURE — 99999 PR PBB SHADOW E&M-EST. PATIENT-LVL II: CPT | Mod: PBBFAC,,, | Performed by: OPHTHALMOLOGY

## 2023-04-17 PROCEDURE — 4010F ACE/ARB THERAPY RXD/TAKEN: CPT | Mod: CPTII,S$GLB,, | Performed by: OPHTHALMOLOGY

## 2023-04-17 PROCEDURE — 92014 PR EYE EXAM, EST PATIENT,COMPREHESV: ICD-10-PCS | Mod: 25,S$GLB,, | Performed by: OPHTHALMOLOGY

## 2023-04-17 PROCEDURE — 1159F PR MEDICATION LIST DOCUMENTED IN MEDICAL RECORD: ICD-10-PCS | Mod: CPTII,S$GLB,, | Performed by: OPHTHALMOLOGY

## 2023-04-17 RX ORDER — FLUORESCEIN 500 MG/ML
5 INJECTION INTRAVENOUS ONCE
Status: COMPLETED | OUTPATIENT
Start: 2023-04-17 | End: 2023-06-05

## 2023-04-17 RX ADMIN — Medication 1.25 MG: at 10:04

## 2023-04-17 NOTE — PROGRESS NOTES
HPI    Patient here today for yearly AMD check. VA stable ou, no pain and no f/f.    Timolol bid ou  Latanoprost qhs ou  Last edited by Tabby Bishop on 4/17/2023  8:51 AM.        HPI     New Patient AMD consult- former pt of Dr. Jain    VA stable ou. No pain. No f/f.    Latanoprost ou qhs  Timolol ou bid    Last edited by Tabby Bishop on 4/4/2022  2:33 PM. (History)          OCT - Drusen OU  OD PVD  OS VMA at [facility].  PED increase with SRF    FP - OD with white choroidal patches  OS PED with small subfoveal SRF      A/P    1. Dry AMD OU  AREDS/AG    Wet AMD OS  4/23 - increased in PED with apical SRF    Avastin OS today    2. Scattered white choroidal patches  Fundus photos today    ASx - monitor for progression.  May need lymphoma monroe    3. PVD OD    4. HTN Ret OU    5.  PCIOL OU      1 month OCT(with JENNY OD)/FP and FA OD and dilate    Letter TO Dr. Tucker    Risks, benefits, and alternatives to treatment discussed in detail with the patient.  The patient voiced understanding and wished to proceed with the procedure    Injection Procedure Note:  Diagnosis: Wet AMD OS    Patient Identified and Time Out complete  Pt marked  Topical Proparacaine and Betadine.  Inject Avastin OS at 6:00 @ 3.5-4mm posterior to limbus  Post Operative Dx: Same  Complications: None  Follow up as above.

## 2023-04-17 NOTE — PATIENT INSTRUCTIONS

## 2023-04-18 ENCOUNTER — TELEPHONE (OUTPATIENT)
Dept: OPHTHALMOLOGY | Facility: CLINIC | Age: 73
End: 2023-04-18
Payer: MEDICARE

## 2023-04-18 NOTE — TELEPHONE ENCOUNTER
----- Message from Olinda Ugalde sent at 4/17/2023  6:33 PM CDT -----  Regarding: PT ADVICE  Contact: PT  Pt called regarding appt on 5.29.23 and wanted to verify to make sure appt was correct and doctor will see her being that it was a holiday.     Please advise. Pt can be reached at [phone].

## 2023-04-19 ENCOUNTER — TELEPHONE (OUTPATIENT)
Dept: CARDIOLOGY | Facility: HOSPITAL | Age: 73
End: 2023-04-19
Payer: MEDICARE

## 2023-04-19 DIAGNOSIS — Z95.0 STATUS POST PLACEMENT OF CARDIAC PACEMAKER: Primary | ICD-10-CM

## 2023-04-19 DIAGNOSIS — I48.0 PAROXYSMAL ATRIAL FIBRILLATION: ICD-10-CM

## 2023-05-23 ENCOUNTER — TELEPHONE (OUTPATIENT)
Dept: CARDIOLOGY | Facility: HOSPITAL | Age: 73
End: 2023-05-23
Payer: MEDICARE

## 2023-06-03 RX ORDER — ALBUTEROL SULFATE 90 UG/1
AEROSOL, METERED RESPIRATORY (INHALATION)
Qty: 25.5 G | Refills: 3 | Status: SHIPPED | OUTPATIENT
Start: 2023-06-03

## 2023-06-03 NOTE — TELEPHONE ENCOUNTER
Refill Decision Note   Sneha Mcghee  is requesting a refill authorization.  Brief Assessment and Rationale for Refill:  Approve     Medication Therapy Plan:  FLOS    Medication Reconciliation Completed: No   Comments:     No Care Gaps recommended.     Note composed:9:08 AM 06/03/2023

## 2023-06-03 NOTE — TELEPHONE ENCOUNTER
Care Due:                  Date            Visit Type   Department     Provider  --------------------------------------------------------------------------------                                EP -                              PRIMARY      [facility]  Last Visit: 03-      CARE ([facility])   GIANNA Jacobo                               -                              PRIMARY      [facility]  Next Visit: 06-      CARE ([facility])   MEDICINE       Elizabeth Jacobo                                                            Last  Test          Frequency    Reason                     Performed    Due Date  --------------------------------------------------------------------------------    HBA1C.......  6 months...  linaGLIPtin, metFORMIN...  12- 06-    [facility] Embedded Care Due Messages. Reference number: [id number].   6/03/2023 9:05:49 AM CDT

## 2023-06-05 ENCOUNTER — PROCEDURE VISIT (OUTPATIENT)
Dept: OPHTHALMOLOGY | Facility: CLINIC | Age: 73
End: 2023-06-05
Attending: OPHTHALMOLOGY
Payer: MEDICARE

## 2023-06-05 DIAGNOSIS — H35.3221 EXUDATIVE AGE-RELATED MACULAR DEGENERATION OF LEFT EYE WITH ACTIVE CHOROIDAL NEOVASCULARIZATION: Primary | ICD-10-CM

## 2023-06-05 PROCEDURE — 67028 PR INJECT INTRAVITREAL PHARMCOLOGIC: ICD-10-PCS | Mod: LT,S$GLB,, | Performed by: OPHTHALMOLOGY

## 2023-06-05 PROCEDURE — 92134 POSTERIOR SEGMENT OCT RETINA (OCULAR COHERENCE TOMOGRAPHY)-BOTH EYES: ICD-10-PCS | Mod: S$GLB,,, | Performed by: OPHTHALMOLOGY

## 2023-06-05 PROCEDURE — 92235 FLUORESCEIN ANGIOGRAPHY - OU - BOTH EYES: ICD-10-PCS | Mod: S$GLB,,, | Performed by: OPHTHALMOLOGY

## 2023-06-05 PROCEDURE — 92235 FLUORESCEIN ANGRPH MLTIFRAME: CPT | Mod: S$GLB,,, | Performed by: OPHTHALMOLOGY

## 2023-06-05 PROCEDURE — 92134 CPTRZ OPH DX IMG PST SGM RTA: CPT | Mod: S$GLB,,, | Performed by: OPHTHALMOLOGY

## 2023-06-05 PROCEDURE — 92014 COMPRE OPH EXAM EST PT 1/>: CPT | Mod: 25,S$GLB,, | Performed by: OPHTHALMOLOGY

## 2023-06-05 PROCEDURE — 92014 PR EYE EXAM, EST PATIENT,COMPREHESV: ICD-10-PCS | Mod: 25,S$GLB,, | Performed by: OPHTHALMOLOGY

## 2023-06-05 PROCEDURE — 67028 INJECTION EYE DRUG: CPT | Mod: LT,S$GLB,, | Performed by: OPHTHALMOLOGY

## 2023-06-05 RX ADMIN — FLUORESCEIN 500 MG: 500 INJECTION INTRAVENOUS at 10:06

## 2023-06-05 RX ADMIN — Medication 1.25 MG: at 11:06

## 2023-06-05 NOTE — PATIENT INSTRUCTIONS

## 2023-06-05 NOTE — PROGRESS NOTES
HPI     Macular Degeneration     Additional comments: 7 wk amd chk           Comments    Patient here today for 7 wk AMD check. VA stable ou, no pain and no f/f.    Timolol bid ou  Latanoprost qhs ou          Last edited by Jovany Wallace on 6/5/2023  9:30 AM.        OCT - Drusen OU  OD PVD  OS VMA at [facility].  PED - SRF resolved    FP - OD with white choroidal patches - stable.  Possible just tesselated fundus appearance  OS PED with small subfoveal SRF =- resolved    FA - OD - late staiing of drusen, no [facility] uptake   OS minimal leakage at apex of PED with Avastin on baord      A/P    1. Dry AMD OU  AREDS/AG    Wet AMD OS  4/23 - increased in PED with apical SRF  S/p Avastin OS    Avastin OS today    extend    2. Scattered white choroidal patches  Stable FP  No sig leakage or uptake on FA OD  Possible just tesselated fundus appearance    ASx - monitor for progression.  May need lymphoma monroe    3. PVD OD    4. HTN Ret OU    5.  PCIOL OU      2 months FP OD, OCT and dilate    Letter TO Dr. Tucker    Risks, benefits, and alternatives to treatment discussed in detail with the patient.  The patient voiced understanding and wished to proceed with the procedure    Injection Procedure Note:  Diagnosis: Wet AMD OS    Patient Identified and Time Out complete  Pt marked  Topical Proparacaine and Betadine.  Inject Avastin OS at 6:00 @ 3.5-4mm posterior to limbus  Post Operative Dx: Same  Complications: None  Follow up as above.

## 2023-06-14 ENCOUNTER — TELEPHONE (OUTPATIENT)
Dept: FAMILY MEDICINE | Facility: CLINIC | Age: 73
End: 2023-06-14
Payer: MEDICARE

## 2023-06-14 NOTE — TELEPHONE ENCOUNTER
----- Message from Maribell Foley sent at 6/14/2023  2:57 PM CDT -----  Regarding: advice  Contact: Patient  Type: Needs Medical Advice  Who Called:  Patient   Symptoms (please be specific):    How long has patient had these symptoms:    Pharmacy name and phone #:    Best Call Back Number: [phone] (home)     Additional Information: Patient stated that she would like to know if she could stop by doctor's office to  the container for her urine sample. 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 Please contact patient to advise. Thanks!

## 2023-06-14 NOTE — TELEPHONE ENCOUNTER
Pt has appt for labs and urine on 6/20. Pt would like to  specimen cup/ and hat so she can bring urine with her to the lab appt . Urine supplies will be at  for  [facility] .--lp

## 2023-06-21 ENCOUNTER — PATIENT MESSAGE (OUTPATIENT)
Dept: CARDIOLOGY | Facility: HOSPITAL | Age: 73
End: 2023-06-21
Payer: MEDICARE

## 2023-06-21 ENCOUNTER — TELEPHONE (OUTPATIENT)
Dept: CARDIOLOGY | Facility: HOSPITAL | Age: 73
End: 2023-06-21
Payer: MEDICARE

## 2023-06-21 ENCOUNTER — LAB VISIT (OUTPATIENT)
Dept: LAB | Facility: HOSPITAL | Age: 73
End: 2023-06-21
Attending: INTERNAL MEDICINE
Payer: MEDICARE

## 2023-06-21 DIAGNOSIS — N18.32 TYPE 2 DIABETES MELLITUS WITH STAGE 3B CHRONIC KIDNEY DISEASE, WITHOUT LONG-TERM CURRENT USE OF INSULIN: ICD-10-CM

## 2023-06-21 DIAGNOSIS — E11.22 TYPE 2 DIABETES MELLITUS WITH STAGE 3B CHRONIC KIDNEY DISEASE, WITHOUT LONG-TERM CURRENT USE OF INSULIN: ICD-10-CM

## 2023-06-21 DIAGNOSIS — I10 PRIMARY HYPERTENSION: ICD-10-CM

## 2023-06-21 LAB
ALBUMIN SERPL BCP-MCNC: 3.6 G/DL (ref 3.5–5.2)
ALP SERPL-CCNC: 113 U/L (ref 55–135)
ALT SERPL W/O P-5'-P-CCNC: 11 U/L (ref 10–44)
ANION GAP SERPL CALC-SCNC: 13 MMOL/L (ref 8–16)
AST SERPL-CCNC: 13 U/L (ref 10–40)
BASOPHILS # BLD AUTO: 0.05 K/UL (ref 0–0.2)
BASOPHILS NFR BLD: 0.5 % (ref 0–1.9)
BILIRUB SERPL-MCNC: 0.4 MG/DL (ref 0.1–1)
BUN SERPL-MCNC: 14 MG/DL (ref 8–23)
CALCIUM SERPL-MCNC: 9.4 MG/DL (ref 8.7–10.5)
CHLORIDE SERPL-SCNC: 103 MMOL/L (ref 95–110)
CHOLEST SERPL-MCNC: 172 MG/DL (ref 120–199)
CHOLEST/HDLC SERPL: 4.4 {RATIO} (ref 2–5)
CO2 SERPL-SCNC: 24 MMOL/L (ref 23–29)
CREAT SERPL-MCNC: 0.8 MG/DL (ref 0.5–1.4)
DIFFERENTIAL METHOD: ABNORMAL
EOSINOPHIL # BLD AUTO: 0.2 K/UL (ref 0–0.5)
EOSINOPHIL NFR BLD: 1.9 % (ref 0–8)
ERYTHROCYTE [DISTWIDTH] IN BLOOD BY AUTOMATED COUNT: 15.5 % (ref 11.5–14.5)
EST. GFR  (NO RACE VARIABLE): >60 ML/MIN/1.73 M^2
ESTIMATED AVG GLUCOSE: 140 MG/DL (ref 68–131)
GLUCOSE SERPL-MCNC: 147 MG/DL (ref 70–110)
HBA1C MFR BLD: 6.5 % (ref 4–5.6)
HCT VFR BLD AUTO: 40.3 % (ref 37–48.5)
HDLC SERPL-MCNC: 39 MG/DL (ref 40–75)
HDLC SERPL: 22.7 % (ref 20–50)
HGB BLD-MCNC: 13 G/DL (ref 12–16)
IMM GRANULOCYTES # BLD AUTO: 0.04 K/UL (ref 0–0.04)
IMM GRANULOCYTES NFR BLD AUTO: 0.4 % (ref 0–0.5)
LDLC SERPL CALC-MCNC: 102.4 MG/DL (ref 63–159)
LYMPHOCYTES # BLD AUTO: 1.3 K/UL (ref 1–4.8)
LYMPHOCYTES NFR BLD: 13.1 % (ref 18–48)
MCH RBC QN AUTO: 26.3 PG (ref 27–31)
MCHC RBC AUTO-ENTMCNC: 32.3 G/DL (ref 32–36)
MCV RBC AUTO: 81 FL (ref 82–98)
MONOCYTES # BLD AUTO: 0.8 K/UL (ref 0.3–1)
MONOCYTES NFR BLD: 7.6 % (ref 4–15)
NEUTROPHILS # BLD AUTO: 7.8 K/UL (ref 1.8–7.7)
NEUTROPHILS NFR BLD: 76.5 % (ref 38–73)
NONHDLC SERPL-MCNC: 133 MG/DL
NRBC BLD-RTO: 0 /100 WBC
PLATELET # BLD AUTO: 256 K/UL (ref 150–450)
PMV BLD AUTO: 10.8 FL (ref 9.2–12.9)
POTASSIUM SERPL-SCNC: 4.4 MMOL/L (ref 3.5–5.1)
PROT SERPL-MCNC: 7.4 G/DL (ref 6–8.4)
RBC # BLD AUTO: 4.95 M/UL (ref 4–5.4)
SODIUM SERPL-SCNC: 140 MMOL/L (ref 136–145)
TRIGL SERPL-MCNC: 153 MG/DL (ref 30–150)
TSH SERPL DL<=0.005 MIU/L-ACNC: 2.44 UIU/ML (ref 0.4–4)
WBC # BLD AUTO: 10.14 K/UL (ref 3.9–12.7)

## 2023-06-21 PROCEDURE — 85025 COMPLETE CBC W/AUTO DIFF WBC: CPT | Performed by: INTERNAL MEDICINE

## 2023-06-21 PROCEDURE — 80061 LIPID PANEL: CPT | Performed by: INTERNAL MEDICINE

## 2023-06-21 PROCEDURE — 83036 HEMOGLOBIN GLYCOSYLATED A1C: CPT | Performed by: INTERNAL MEDICINE

## 2023-06-21 PROCEDURE — 84443 ASSAY THYROID STIM HORMONE: CPT | Performed by: INTERNAL MEDICINE

## 2023-06-21 PROCEDURE — 80053 COMPREHEN METABOLIC PANEL: CPT | Performed by: INTERNAL MEDICINE

## 2023-06-21 PROCEDURE — 36415 COLL VENOUS BLD VENIPUNCTURE: CPT | Mod: PO | Performed by: INTERNAL MEDICINE

## 2023-06-21 NOTE — TELEPHONE ENCOUNTER
Left VM requesting return call back in regards to home monitor  Noted appt with another cardiologist and need to transfer home monitor

## 2023-06-23 ENCOUNTER — TELEPHONE (OUTPATIENT)
Dept: CARDIOLOGY | Facility: HOSPITAL | Age: 73
End: 2023-06-23
Payer: MEDICARE

## 2023-06-23 NOTE — TELEPHONE ENCOUNTER
LM with Dr. Nilton Larson's nurse at [facility]([phone]) re: transferring pt in [facility] to their clinic.  Pt also needs a device check.    Last in clinic check here:  3/8/22    MDT PPM    Model: [id number]  SN: [id number]

## 2023-06-28 ENCOUNTER — TELEPHONE (OUTPATIENT)
Dept: CARDIOLOGY | Facility: HOSPITAL | Age: 73
End: 2023-06-28
Payer: MEDICARE

## 2023-06-28 ENCOUNTER — PATIENT MESSAGE (OUTPATIENT)
Dept: FAMILY MEDICINE | Facility: CLINIC | Age: 73
End: 2023-06-28
Payer: MEDICARE

## 2023-06-28 NOTE — TELEPHONE ENCOUNTER
Spoke with Marsha with Dr. Callaway's office.  Gave her the information to enroll pt in [facility]    Release pt from [facility] pacemaker clinic.

## 2023-07-05 ENCOUNTER — PATIENT OUTREACH (OUTPATIENT)
Dept: ADMINISTRATIVE | Facility: HOSPITAL | Age: 73
End: 2023-07-05
Payer: MEDICARE

## 2023-07-05 ENCOUNTER — OFFICE VISIT (OUTPATIENT)
Dept: FAMILY MEDICINE | Facility: CLINIC | Age: 73
End: 2023-07-05
Payer: MEDICARE

## 2023-07-05 VITALS
HEART RATE: 60 BPM | BODY MASS INDEX: 39.2 KG/M2 | SYSTOLIC BLOOD PRESSURE: 142 MMHG | HEIGHT: 65 IN | OXYGEN SATURATION: 96 % | DIASTOLIC BLOOD PRESSURE: 80 MMHG | WEIGHT: 235.31 LBS | TEMPERATURE: 98 F

## 2023-07-05 DIAGNOSIS — E66.01 MORBID OBESITY WITH BMI OF 40.0-44.9, ADULT: ICD-10-CM

## 2023-07-05 DIAGNOSIS — E11.22 TYPE 2 DIABETES MELLITUS WITH STAGE 3B CHRONIC KIDNEY DISEASE, WITHOUT LONG-TERM CURRENT USE OF INSULIN: ICD-10-CM

## 2023-07-05 DIAGNOSIS — G47.33 OSA (OBSTRUCTIVE SLEEP APNEA): ICD-10-CM

## 2023-07-05 DIAGNOSIS — F33.0 MAJOR DEPRESSIVE DISORDER, RECURRENT EPISODE, MILD: ICD-10-CM

## 2023-07-05 DIAGNOSIS — K76.0 FATTY LIVER: ICD-10-CM

## 2023-07-05 DIAGNOSIS — Z78.0 ASYMPTOMATIC MENOPAUSAL STATE: ICD-10-CM

## 2023-07-05 DIAGNOSIS — I25.10 CORONARY ARTERY DISEASE WITHOUT ANGINA PECTORIS, UNSPECIFIED VESSEL OR LESION TYPE, UNSPECIFIED WHETHER NATIVE OR TRANSPLANTED HEART: Primary | ICD-10-CM

## 2023-07-05 DIAGNOSIS — I48.0 PAROXYSMAL ATRIAL FIBRILLATION: ICD-10-CM

## 2023-07-05 DIAGNOSIS — I73.9 PAD (PERIPHERAL ARTERY DISEASE): ICD-10-CM

## 2023-07-05 DIAGNOSIS — Z12.11 SCREEN FOR COLON CANCER: ICD-10-CM

## 2023-07-05 DIAGNOSIS — E03.9 HYPOTHYROIDISM, UNSPECIFIED TYPE: ICD-10-CM

## 2023-07-05 DIAGNOSIS — E78.5 HYPERLIPIDEMIA, UNSPECIFIED HYPERLIPIDEMIA TYPE: ICD-10-CM

## 2023-07-05 DIAGNOSIS — N32.81 OAB (OVERACTIVE BLADDER): ICD-10-CM

## 2023-07-05 DIAGNOSIS — N18.32 TYPE 2 DIABETES MELLITUS WITH STAGE 3B CHRONIC KIDNEY DISEASE, WITHOUT LONG-TERM CURRENT USE OF INSULIN: ICD-10-CM

## 2023-07-05 DIAGNOSIS — I50.9 CHRONIC HEART FAILURE, UNSPECIFIED HEART FAILURE TYPE: ICD-10-CM

## 2023-07-05 DIAGNOSIS — F51.01 PRIMARY INSOMNIA: ICD-10-CM

## 2023-07-05 DIAGNOSIS — I10 PRIMARY HYPERTENSION: ICD-10-CM

## 2023-07-05 DIAGNOSIS — J45.20 MILD INTERMITTENT ASTHMA WITHOUT COMPLICATION: ICD-10-CM

## 2023-07-05 DIAGNOSIS — I35.0 NONRHEUMATIC AORTIC VALVE STENOSIS: ICD-10-CM

## 2023-07-05 DIAGNOSIS — I65.22 STENOSIS OF LEFT CAROTID ARTERY: ICD-10-CM

## 2023-07-05 PROCEDURE — 3077F SYST BP >= 140 MM HG: CPT | Mod: CPTII,S$GLB,, | Performed by: INTERNAL MEDICINE

## 2023-07-05 PROCEDURE — 1160F RVW MEDS BY RX/DR IN RCRD: CPT | Mod: CPTII,S$GLB,, | Performed by: INTERNAL MEDICINE

## 2023-07-05 PROCEDURE — 3061F NEG MICROALBUMINURIA REV: CPT | Mod: CPTII,S$GLB,, | Performed by: INTERNAL MEDICINE

## 2023-07-05 PROCEDURE — 1159F PR MEDICATION LIST DOCUMENTED IN MEDICAL RECORD: ICD-10-PCS | Mod: CPTII,S$GLB,, | Performed by: INTERNAL MEDICINE

## 2023-07-05 PROCEDURE — 4010F ACE/ARB THERAPY RXD/TAKEN: CPT | Mod: CPTII,S$GLB,, | Performed by: INTERNAL MEDICINE

## 2023-07-05 PROCEDURE — 3288F FALL RISK ASSESSMENT DOCD: CPT | Mod: CPTII,S$GLB,, | Performed by: INTERNAL MEDICINE

## 2023-07-05 PROCEDURE — 3079F DIAST BP 80-89 MM HG: CPT | Mod: CPTII,S$GLB,, | Performed by: INTERNAL MEDICINE

## 2023-07-05 PROCEDURE — 3044F HG A1C LEVEL LT 7.0%: CPT | Mod: CPTII,S$GLB,, | Performed by: INTERNAL MEDICINE

## 2023-07-05 PROCEDURE — 3066F PR DOCUMENTATION OF TREATMENT FOR NEPHROPATHY: ICD-10-PCS | Mod: CPTII,S$GLB,, | Performed by: INTERNAL MEDICINE

## 2023-07-05 PROCEDURE — 99214 PR OFFICE/OUTPT VISIT, EST, LEVL IV, 30-39 MIN: ICD-10-PCS | Mod: S$GLB,,, | Performed by: INTERNAL MEDICINE

## 2023-07-05 PROCEDURE — 1101F PT FALLS ASSESS-DOCD LE1/YR: CPT | Mod: CPTII,S$GLB,, | Performed by: INTERNAL MEDICINE

## 2023-07-05 PROCEDURE — 3008F PR BODY MASS INDEX (BMI) DOCUMENTED: ICD-10-PCS | Mod: CPTII,S$GLB,, | Performed by: INTERNAL MEDICINE

## 2023-07-05 PROCEDURE — 3288F PR FALLS RISK ASSESSMENT DOCUMENTED: ICD-10-PCS | Mod: CPTII,S$GLB,, | Performed by: INTERNAL MEDICINE

## 2023-07-05 PROCEDURE — 3066F NEPHROPATHY DOC TX: CPT | Mod: CPTII,S$GLB,, | Performed by: INTERNAL MEDICINE

## 2023-07-05 PROCEDURE — 1126F AMNT PAIN NOTED NONE PRSNT: CPT | Mod: CPTII,S$GLB,, | Performed by: INTERNAL MEDICINE

## 2023-07-05 PROCEDURE — 3079F PR MOST RECENT DIASTOLIC BLOOD PRESSURE 80-89 MM HG: ICD-10-PCS | Mod: CPTII,S$GLB,, | Performed by: INTERNAL MEDICINE

## 2023-07-05 PROCEDURE — 1126F PR PAIN SEVERITY QUANTIFIED, NO PAIN PRESENT: ICD-10-PCS | Mod: CPTII,S$GLB,, | Performed by: INTERNAL MEDICINE

## 2023-07-05 PROCEDURE — 1159F MED LIST DOCD IN RCRD: CPT | Mod: CPTII,S$GLB,, | Performed by: INTERNAL MEDICINE

## 2023-07-05 PROCEDURE — 1101F PR PT FALLS ASSESS DOC 0-1 FALLS W/OUT INJ PAST YR: ICD-10-PCS | Mod: CPTII,S$GLB,, | Performed by: INTERNAL MEDICINE

## 2023-07-05 PROCEDURE — 3061F PR NEG MICROALBUMINURIA RESULT DOCUMENTED/REVIEW: ICD-10-PCS | Mod: CPTII,S$GLB,, | Performed by: INTERNAL MEDICINE

## 2023-07-05 PROCEDURE — 99214 OFFICE O/P EST MOD 30 MIN: CPT | Mod: S$GLB,,, | Performed by: INTERNAL MEDICINE

## 2023-07-05 PROCEDURE — 3044F PR MOST RECENT HEMOGLOBIN A1C LEVEL <7.0%: ICD-10-PCS | Mod: CPTII,S$GLB,, | Performed by: INTERNAL MEDICINE

## 2023-07-05 PROCEDURE — 4010F PR ACE/ARB THEARPY RXD/TAKEN: ICD-10-PCS | Mod: CPTII,S$GLB,, | Performed by: INTERNAL MEDICINE

## 2023-07-05 PROCEDURE — 3077F PR MOST RECENT SYSTOLIC BLOOD PRESSURE >= 140 MM HG: ICD-10-PCS | Mod: CPTII,S$GLB,, | Performed by: INTERNAL MEDICINE

## 2023-07-05 PROCEDURE — 3008F BODY MASS INDEX DOCD: CPT | Mod: CPTII,S$GLB,, | Performed by: INTERNAL MEDICINE

## 2023-07-05 PROCEDURE — 1160F PR REVIEW ALL MEDS BY PRESCRIBER/CLIN PHARMACIST DOCUMENTED: ICD-10-PCS | Mod: CPTII,S$GLB,, | Performed by: INTERNAL MEDICINE

## 2023-07-05 NOTE — PROGRESS NOTES
2023 Care Everywhere updates requested and reviewed.  Immunizations reconciled. Media reports reviewed.  Duplicate HM overrides and  orders removed.  Overdue HM topic chart audit and/or requested.  Overdue lab testing linked to upcoming lab appointments if applies.    Future Appointments   Date Time Provider Department Center   2023  9:00 AM Elizabeth Jacobo DO [facility]   2023  9:40 AM IVONE Burnette MD [facility] Due   Topic Date Due    TETANUS VACCINE  Never done    COVID-19 Vaccine (3 - Moderna series) 2021    Shingles Vaccine (3 of 3) 2023

## 2023-07-05 NOTE — PROGRESS NOTES
Subjective:       Patient ID: Sneha Mcghee is a 72 y.o. female.    Medication List with Changes/Refills   Current Medications    ALBUTEROL (PROVENTIL/VENTOLIN HFA) 90 MCG/ACTUATION INHALER    INHALE 2 PUFFS INTO THE LUNGS EVERY 6 HOURS AS NEEDED FOR WHEEZING    ASCORBIC ACID, VITAMIN C, (VITAMIN C) 500 MG TABLET    Take 500 mg by mouth once daily.    ASPIRIN (ECOTRIN) 325 MG EC TABLET    81 mg.    ATORVASTATIN (LIPITOR) 40 MG TABLET    TAKE 1 TABLET BY MOUTH ONCE DAILY    BLOOD SUGAR DIAGNOSTIC STRP    To check BG once a day and PRN low sugars, to use with insurance preferred meter    BLOOD-GLUCOSE METER KIT    To check BG qam and PRN lows, to use with insurance preferred meter    CALCIUM/MAGNESIUM (CALCIUM AND MAGNESIUM ORAL)    Take by mouth Daily.    CHOLECALCIFEROL, VITAMIN D3, 125 MCG (5,000 UNIT) TAB    cholecalciferol (vitamin D3) 125 mcg (5,000 unit) tablet   Take by oral route.    FLUTICASONE PROPIONATE (FLONASE) 50 MCG/ACTUATION NASAL SPRAY    SHAKE LIQUID AND USE 2 SPRAYS(100 MCG) IN EACH NOSTRIL EVERY DAY    FUROSEMIDE (LASIX) 20 MG TABLET    TAKE 1 TABLET BY MOUTH EVERY DAY    IPRATROPIUM (ATROVENT HFA) 17 MCG/ACTUATION INHALER    Inhale 2 puffs into the lungs every 6 (six) hours. Rescue    LANCETS MISC    To check BG once a day and PRN lows, to use with insurance preferred meter    LATANOPROST 0.005 % OPHTHALMIC SOLUTION    latanoprost 0.005 % eye drops   Instill 1 drop every day by ophthalmic route for 90 days.    LEVOTHYROXINE (SYNTHROID) 88 MCG TABLET    TAKE 1 TABLET(88 MCG) BY MOUTH BEFORE BREAKFAST    LINAGLIPTIN (TRADJENTA) 5 MG TAB TABLET    Take 1 tablet (5 mg total) by mouth once daily.    METFORMIN (GLUCOPHAGE-XR) 500 MG ER 24HR TABLET    Take 1 tablet (500 mg total) by mouth 2 (two) times a day.    METOPROLOL SUCCINATE (TOPROL-XL) 100 MG 24 HR TABLET    Take 1 tablet (100 mg total) by mouth once daily.    MOMETASONE 0.1% (ELOCON) 0.1 % CREAM    Apply topically once daily.    NYSTATIN  (MYCOSTATIN) POWDER    Apply topically 3 (three) times daily.    NYSTATIN-TRIAMCINOLONE (MYCOLOG II) CREAM    PRN    OLMESARTAN (BENICAR) 20 MG TABLET    Take 1 tablet (20 mg total) by mouth once daily.    OMEGA-3 FATTY ACIDS/FISH OIL (FISH OIL-OMEGA-3 FATTY ACIDS) 300-1,000 MG CAPSULE    Take 1 capsule by mouth once daily.    PANTOPRAZOLE (PROTONIX) 40 MG TABLET    Take 40 mg by mouth every morning.    POTASSIUM CHLORIDE (KLOR-CON) 10 MEQ TBSR    Take 10 mEq by mouth once daily.    PROPAFENONE (RHTHYMOL) 150 MG TAB    TAKE 1 TABLET(150 MG) BY MOUTH EVERY 8 HOURS    SANTYL OINTMENT    Apply topically once daily.    SERTRALINE (ZOLOFT) 100 MG TABLET    TAKE 1 TABLET(100 MG) BY MOUTH EVERY DAY    SUVOREXANT (BELSOMRA) 15 MG TAB    Take 1 tablet by mouth nightly as needed.    TIMOLOL MALEATE 0.5% (TIMOPTIC) 0.5 % DROP    Place 1 drop into both eyes 2 (two) times daily.    VIT C/E/ZN/COPPR/LUTEIN/ZEAXAN (PRESERVISION AREDS-2 ORAL)    Take by mouth once daily at 6am.    VITAMIN E, DL,TOCOPHERYL ACET, (VITAMIN E, DL, ACETATE,) 180 MG (400 UNIT) CAP    Take 400 Units by mouth once daily.       Chief Complaint: Follow-up  She is here today to f/u on chronic medical issues.      She has CAD s/p CABG x 4 vessels in 2012 with 8 stents.  She reports a cardiomyopathy with chronic heart failure.  She has paroxysmal Afib for many years. Echo on 8/2022 showed EF 65%, positive diastolic dysfunction, cLVH, moderate AS (HAMILTON 1.49 and gradient of 23), mild TR and PAP of 29. Nuclear stress on 3/2023 was negative. She continues on rhythmol 150 mg tid, metoprolol xl 100 mg qday, lasix 20 mg daily with KCL and aspirin 325 mg daily. [id number] Score: 6 (stoke risk of 9.7%/year). She was seen by cardiology on 8/2022 who felt that she is okay on aspirin daily while she is monitored regularly in [facility] clinic. If she develops AFib then to start eliquis. She denies chest pain or shortness of breath or palpitations. She is scheduled to see   [facility] in cardiology with echo and venous u/s of the lower legs.      She has hypertension and is taking toprol xl 100 mg qday and olmesartan 20 mg daily.  She developed a cough on ACEI.       She has hyperlipidemia and is taking atorvastatin 40 mg daily.  Lipids on 6/2023 were 172/153/39/102.  She is on aspirin 325 mg daily.      She has carotid artery stenosis and is s/p left carotid stent.  Carotid u/s on 8/2022 showed patent stent in left carotid bulb and no significant stenosis.      She has PVD but has not had stenting of lower legs. She has chronic venous insufficiency and is s/p multiple ELVT.  She has chronic stasis dermatitis left > right.  She had a prolonged course for a non-healing ulcer of left lower leg that is now healed.  Venous u/s on 1/2022 showed venous reflux.  Arterial ultrasound on 1/2022 showed 50-75% stenosis of proximal left anterior tibial artery, SARITHA right 1.01 and left 0.97. She denies any recurrence of open wounds.      She has diabetes and is taking metformin 500 mg bid and tradjenta 5 mg daily. Her HbA1c was 6.5 on 6/2023.  She is UTD on her eye exam and due foot exam. Her microalbumin is negative on 6/2023 and she is on an ARB. She is not checking her glucose at home.       She has a history of CKD that improved with labs on 6/2023 showing creatinine of 0.8 and GFR of >60.       She has asthma since 2012 that started after her heart surgery. She has symptoms about once every 6 months. Her symptoms are chest tightness and wheezing.  She denies any known triggers. She uses albuterol for her increased symptoms with good relief. Today she denies any active symptoms.      She has hypothyroid and is taking levothyroxine 88 mcg daily. Her TSH on 6/2023 was normal.      She has glaucoma and is using timolol and xalatan drops. She is followed by ophthalmology and reports her pressures have been stable but has wet  macular degeneration and is to start intraocular injections.      She has  incontinence of urine and uses pads. She was given oxybutynin xl 5 mg daily with good response but stopped taking. She denies dysuria or hematuria.      She has fatty liver.       She has depression and is taking zoloft 100 mg qday. She feels that this controls her symptoms. No suicidal ideations.  She does occasionally get severe anxiety with panic attacks. She continues to complain of sleep issues and will occasionally use belsomra 15 mg qhs PRN. She is not using this medication but rather using OTC sleep aid with good success.     She has VALENTE and refuses to wear CPAP.      She has left knee osteoarthritis and is followed by orthopedics. She was told she needs a TKR but does not want done at this time. She completed PT which helped her pain. She is considering restarting after the new year.      She lives alone and feels safe.  She does not exercise and is very sedentary. She does eat heathy.  She has poor balance but denies any recent falls.      Colonoscopy---4/2016 repeat in 5 years ([facility])---has fecal kit at home   Mammogram----3/2023 neg   DEXA-----2/2021 normal.   Tdap---more than 10 years   Influenza vaccine---10/2021  Pneumovax 23----11/2016  Prevnar 13----10/2017  Shingles vaccine-----5/2023  Covid vaccine---- 2 doses      Review of Systems   Constitutional:  Positive for fatigue. Negative for appetite change, fever and unexpected weight change.   HENT:  Negative for congestion, ear pain, hearing loss, sore throat and trouble swallowing.    Eyes:  Negative for pain and visual disturbance.   Respiratory:  Negative for cough, chest tightness, shortness of breath and wheezing.    Cardiovascular:  Negative for chest pain, palpitations and leg swelling.   Gastrointestinal:  Negative for abdominal pain, blood in stool, constipation, diarrhea, nausea and vomiting.   Endocrine: Negative for polyuria.   Genitourinary:  Negative for dysuria and hematuria.   Musculoskeletal:  Negative for arthralgias, back pain  "and myalgias.   Skin:  Negative for rash.   Neurological:  Negative for dizziness, weakness, numbness and headaches.   Hematological:  Does not bruise/bleed easily.   Psychiatric/Behavioral:  Positive for sleep disturbance. Negative for dysphoric mood and suicidal ideas. The patient is nervous/anxious.      Objective:      Vitals:    07/05/23 0903 07/05/23 0952   BP: (!) 150/88 (!) 142/80   BP Location: Left arm Left arm   Patient Position: Sitting Sitting   BP Method: Medium (Manual) Large (Manual)   Pulse: 61 60   Temp: 98.2 °F (36.8 °C)    SpO2: (!) 94% 96%   Weight: 106.8 kg (235 lb 5.5 oz)    Height: 5' 5" (1.651 m)      Body mass index is 39.16 kg/m².  Physical Exam    General appearance: No acute distress, cooperative  Eyes: PERRL, EOMI, conjunctiva clear  Ears: normal external ear and pinna, tm clear without drainage, canals clear  Nose: Normal mucosa without drainage  Throat: no exudates or erythema, tonsils not enlarged  Mouth: no sores or lesions, moist mucous membranes  Neck: FROM, soft, supple, no thyromegaly, no bruits  Lymph: no anterior or posterior cervical adenopathy  Heart::  Regular rate and rhythm, 2/6 systolic murmur  Lung: Clear to ascultation bilaterally, no wheezing, no rales, no rhonchi, no distress  Abdomen: Soft, nontender, no distention, no hepatosplenomegaly, bowel sounds normal, no guarding, no rebound, no peritoneal signs  Skin: no rashes, no lesions  Extremities: no edema, no cyanosis  Neuro: CN 2-12 intact, 5/5 muscle strength upper and lower extremity bilaterally, 2+ DTRs UE and LE bilaterally, normal gait, mild action tremor in left hand   Peripheral pulses: 1+ pedal pulses bilaterally, vascular changes in lower legs  Musculoskeletal: FROM, good strenth, no tenderness  Joint: normal appearance, no swelling, no warmth, no deformity in all joints    Assessment:       1. Coronary artery disease without angina pectoris, unspecified vessel or lesion type, unspecified whether native or " transplanted heart    2. Chronic heart failure, unspecified heart failure type    3. Nonrheumatic aortic valve stenosis    4. Paroxysmal atrial fibrillation    5. Primary hypertension    6. Hyperlipidemia, unspecified hyperlipidemia type    7. PAD (peripheral artery disease)    8. Stenosis of left carotid artery    9. Mild intermittent asthma without complication    10. Type 2 diabetes mellitus with stage 3b chronic kidney disease, without long-term current use of insulin    11. Hypothyroidism, unspecified type    12. Fatty liver    13. OAB (overactive bladder)    14. Major depressive disorder, recurrent episode, mild    15. Primary insomnia    16. VALENTE (obstructive sleep apnea)    17. Morbid obesity with BMI of 40.0-44.9, adult    18. Asymptomatic menopausal state    19. Screen for colon cancer        Plan:       Coronary artery disease without angina pectoris, unspecified vessel or lesion type, unspecified whether native or transplanted heart  Stable and she continues to follow with cardiology. Negative stress test on 3/2023.     Chronic heart failure, unspecified heart failure type  Well compensated on lasix daily    Nonrheumatic aortic valve stenosis  Asymptomatic and scheduled for repeat echo for surveillance    Paroxysmal atrial fibrillation  NSR and advised by cardiology to continue aspirin for anticoagulation    Primary hypertension  Elevated today. She will check at home and nurse to call with readings in one week    Hyperlipidemia, unspecified hyperlipidemia type  Good control on atorvastatin 40 mg daily. Continue aspirin daily    PAD (peripheral artery disease)  Continue statin and aspirin. Scheduled to get a vascular study.     Stenosis of left carotid artery  Continue statin and aspirin    Mild intermittent asthma without complication  Well controlled and continue current regimen.     Type 2 diabetes mellitus with stage 3b chronic kidney disease, without long-term current use of insulin  Good control on  metformin and trajenta.   -     Hemoglobin A1C; Future; Expected date: 07/05/2023  -     Basic Metabolic Panel; Future; Expected date: 07/05/2023    Hypothyroidism, unspecified type  Good control on this dose of levothyroxine    Fatty liver  Stable and encouraged weight loss    OAB (overactive bladder)  Mild and continue to monitor    Major depressive disorder, recurrent episode, mild  Good control on zoloft    Primary insomnia  Controlled on OTC sleep aid    VALENTE (obstructive sleep apnea)  Stable and patient is compliant with use. Patient benefits from regular use of CPAP.     Morbid obesity with BMI of 40.0-44.9, adult  Long discussion on the benefits of healthy eating and regular exercise to help lose weight and help control diabetes, cad, hypertension and hyperlipidemia.     Asymptomatic menopausal state  -     DXA Bone Density Axial Skeleton 1 or more sites; Future; Expected date: 07/05/2023    Screen for colon cancer  -     Fecal Immunochemical Test (iFOBT); Future; Expected date: 07/05/2023    Follow up in about 6 months (around 1/5/2024) for chronic medical issues.

## 2023-07-10 ENCOUNTER — HOSPITAL ENCOUNTER (OUTPATIENT)
Dept: RADIOLOGY | Facility: HOSPITAL | Age: 73
Discharge: HOME | End: 2023-07-10
Attending: INTERNAL MEDICINE
Payer: MEDICARE

## 2023-07-10 DIAGNOSIS — Z78.0 ASYMPTOMATIC MENOPAUSAL STATE: ICD-10-CM

## 2023-07-10 PROCEDURE — 77080 DXA BONE DENSITY AXIAL: CPT | Mod: 26,,, | Performed by: RADIOLOGY

## 2023-07-10 PROCEDURE — 77080 DXA BONE DENSITY AXIAL: CPT | Mod: TC,PO

## 2023-07-10 PROCEDURE — 77080 DXA BONE DENSITY AXIAL SKELETON 1 OR MORE SITES: ICD-10-PCS | Mod: 26,,, | Performed by: RADIOLOGY

## 2023-07-11 ENCOUNTER — PATIENT MESSAGE (OUTPATIENT)
Dept: FAMILY MEDICINE | Facility: CLINIC | Age: 73
End: 2023-07-11
Payer: MEDICARE

## 2023-07-29 DIAGNOSIS — I10 PRIMARY HYPERTENSION: ICD-10-CM

## 2023-07-29 DIAGNOSIS — I48.0 PAROXYSMAL ATRIAL FIBRILLATION: ICD-10-CM

## 2023-07-29 NOTE — TELEPHONE ENCOUNTER
No care due was identified.  [facility] Embedded Care Due Messages. Reference number: [id number].   7/29/2023 5:23:01 AM CDT

## 2023-07-30 NOTE — TELEPHONE ENCOUNTER
Refill Routing Note   Medication(s) are not appropriate for processing by [facility] for the following reason(s):      Medication outside of protocol  Required vitals abnormal    [facility] action(s):  Defer  Route Care Due:  None identified            Appointments  past 12m or future 3m with PCP    Date Provider   Last Visit   7/5/2023 Elizabeth Jacobo, DO   Next Visit   1/9/2024 Elizabeth Jacobo, DO   ED visits in past 90 days: 0        Note composed:9:36 PM 07/29/2023

## 2023-07-31 RX ORDER — OLMESARTAN MEDOXOMIL 20 MG/1
20 TABLET ORAL
Qty: 90 TABLET | Refills: 3 | Status: SHIPPED | OUTPATIENT
Start: 2023-07-31 | End: 2023-11-15

## 2023-07-31 RX ORDER — PROPAFENONE HYDROCHLORIDE 150 MG/1
TABLET, COATED ORAL
Qty: 180 TABLET | Refills: 2 | Status: SHIPPED | OUTPATIENT
Start: 2023-07-31 | End: 2023-11-15

## 2023-07-31 RX ORDER — FUROSEMIDE 20 MG/1
TABLET ORAL
Qty: 90 TABLET | Refills: 3 | Status: SHIPPED | OUTPATIENT
Start: 2023-07-31

## 2023-09-11 ENCOUNTER — TELEPHONE (OUTPATIENT)
Dept: FAMILY MEDICINE | Facility: CLINIC | Age: 73
End: 2023-09-11
Payer: MEDICARE

## 2023-09-11 ENCOUNTER — OFFICE VISIT (OUTPATIENT)
Dept: URGENT CARE | Facility: CLINIC | Age: 73
End: 2023-09-11
Payer: MEDICARE

## 2023-09-11 VITALS
TEMPERATURE: 97 F | BODY MASS INDEX: 39.15 KG/M2 | HEIGHT: 65 IN | SYSTOLIC BLOOD PRESSURE: 190 MMHG | DIASTOLIC BLOOD PRESSURE: 88 MMHG | OXYGEN SATURATION: 97 % | HEART RATE: 70 BPM | RESPIRATION RATE: 18 BRPM | WEIGHT: 235 LBS

## 2023-09-11 DIAGNOSIS — R03.0 ELEVATED BLOOD PRESSURE READING: ICD-10-CM

## 2023-09-11 DIAGNOSIS — M79.642 LEFT HAND PAIN: Primary | ICD-10-CM

## 2023-09-11 PROCEDURE — 99213 OFFICE O/P EST LOW 20 MIN: CPT | Mod: S$GLB,,, | Performed by: PHYSICIAN ASSISTANT

## 2023-09-11 PROCEDURE — 73130 XR HAND COMPLETE 3 VIEW LEFT: ICD-10-PCS | Mod: LT,S$GLB,, | Performed by: RADIOLOGY

## 2023-09-11 PROCEDURE — 73130 X-RAY EXAM OF HAND: CPT | Mod: LT,S$GLB,, | Performed by: RADIOLOGY

## 2023-09-11 PROCEDURE — 99213 PR OFFICE/OUTPT VISIT, EST, LEVL III, 20-29 MIN: ICD-10-PCS | Mod: S$GLB,,, | Performed by: PHYSICIAN ASSISTANT

## 2023-09-11 NOTE — PROGRESS NOTES
"Subjective:      Patient ID: Sneha Mcghee is a 72 y.o. female.    Vitals:  height is 5' 5" (1.651 m) and weight is 106.6 kg (235 lb). Her oral temperature is 97.3 °F (36.3 °C). Her blood pressure is 190/88 (abnormal) and her pulse is 70. Her respiration is 18 and oxygen saturation is 97%.     Chief Complaint: Hand Pain    Patient presents to urgent care with L hand pain and swelling since yesterday. Patient reports that she has not taken anything OTC prior to arrival. Patient rates her pain to be a 8/10 on exam.    Hand Pain   The incident occurred 12 to 24 hours ago. The incident occurred at home. There was no injury mechanism. The pain is present in the left hand. The quality of the pain is described as aching. The pain does not radiate. The pain is at a severity of 8/10. The pain is severe. The pain has been Constant since the incident. Pertinent negatives include no chest pain, muscle weakness, numbness or tingling. The symptoms are aggravated by movement, palpation and lifting. She has tried nothing for the symptoms.       Cardiovascular:  Negative for chest pain.   Neurological:  Negative for numbness.      Objective:     Physical Exam    Assessment:     1. Left hand pain    2. Elevated blood pressure reading        Plan:       Left hand pain  -     XR HAND COMPLETE 3 VIEW LEFT; Future; Expected date: 09/11/2023  -     [facility] - OTHER  -     Ambulatory referral/consult to Orthopedics    Elevated blood pressure reading      Patient Instructions   You must understand that you've received an Urgent Care treatment only and that you may be released before all your medical problems are known or treated. You, the patient, will arrange for follow up care as instructed.  Follow up with your PCP or specialty clinic as directed if not improved or as needed. You can call [phone] to schedule an appointment with the appropriate provider.  If your condition worsens we recommend that you receive another evaluation at the " Emergency Department for any concerns or worsening of condition.  Patient aware and verbalized understanding.    Discussed XRAY results with patient.  Patient aware and verbalized understanding.    Rest, Ice, Compression and Elevation.   Brace for better support/comfort as discussed.  OTC Tylenol every 4-6 hours for pain as needed.  Follow-up with PCP for further evaluation as needed.  Follow-up with Ortho for further evaluation as needed.  Strict ER precautions given to patient.  Patient aware and verbalized understanding.

## 2023-09-11 NOTE — TELEPHONE ENCOUNTER
----- Message from Monica Red sent at 9/11/2023  8:26 AM CDT -----  Contact: Self  Type:  Patient Requesting A Return Call    Who Called:  Patient  Who Left Message for Patient:  N/A-asking to speak to the nurse  Does the patient know what this is regarding?:  severe pain in L hand   Best Call Back Number:  [phone]  Additional Information: Pt is wanting to know what she can take over the counter for this, and wants to discuss results from previous heart & vein tests. Thank You

## 2023-09-11 NOTE — PATIENT INSTRUCTIONS
You must understand that you've received an Urgent Care treatment only and that you may be released before all your medical problems are known or treated. You, the patient, will arrange for follow up care as instructed.  Follow up with your PCP or specialty clinic as directed if not improved or as needed. You can call [phone] to schedule an appointment with the appropriate provider.  If your condition worsens we recommend that you receive another evaluation at the Emergency Department for any concerns or worsening of condition.  Patient aware and verbalized understanding.    Discussed XRAY results with patient.  Patient aware and verbalized understanding.    Rest, Ice, Compression and Elevation.   Brace for better support/comfort as discussed.  OTC Tylenol every 4-6 hours for pain as needed.  Follow-up with PCP for further evaluation as needed.  Follow-up with Ortho for further evaluation as needed.  Strict ER precautions given to patient.  Patient aware and verbalized understanding.

## 2023-09-13 ENCOUNTER — OFFICE VISIT (OUTPATIENT)
Dept: ORTHOPEDICS | Facility: CLINIC | Age: 73
End: 2023-09-13
Payer: MEDICARE

## 2023-09-13 VITALS — BODY MASS INDEX: 39.15 KG/M2 | WEIGHT: 235 LBS | HEIGHT: 65 IN

## 2023-09-13 DIAGNOSIS — L03.119 CELLULITIS OF HAND: Primary | ICD-10-CM

## 2023-09-13 PROCEDURE — 1101F PR PT FALLS ASSESS DOC 0-1 FALLS W/OUT INJ PAST YR: ICD-10-PCS | Mod: CPTII,S$GLB,, | Performed by: PHYSICIAN ASSISTANT

## 2023-09-13 PROCEDURE — 3061F NEG MICROALBUMINURIA REV: CPT | Mod: CPTII,S$GLB,, | Performed by: PHYSICIAN ASSISTANT

## 2023-09-13 PROCEDURE — 3044F HG A1C LEVEL LT 7.0%: CPT | Mod: CPTII,S$GLB,, | Performed by: PHYSICIAN ASSISTANT

## 2023-09-13 PROCEDURE — 3288F PR FALLS RISK ASSESSMENT DOCUMENTED: ICD-10-PCS | Mod: CPTII,S$GLB,, | Performed by: PHYSICIAN ASSISTANT

## 2023-09-13 PROCEDURE — 1125F PR PAIN SEVERITY QUANTIFIED, PAIN PRESENT: ICD-10-PCS | Mod: CPTII,S$GLB,, | Performed by: PHYSICIAN ASSISTANT

## 2023-09-13 PROCEDURE — 3061F PR NEG MICROALBUMINURIA RESULT DOCUMENTED/REVIEW: ICD-10-PCS | Mod: CPTII,S$GLB,, | Performed by: PHYSICIAN ASSISTANT

## 2023-09-13 PROCEDURE — 3008F PR BODY MASS INDEX (BMI) DOCUMENTED: ICD-10-PCS | Mod: CPTII,S$GLB,, | Performed by: PHYSICIAN ASSISTANT

## 2023-09-13 PROCEDURE — 99999 PR PBB SHADOW E&M-EST. PATIENT-LVL IV: CPT | Mod: PBBFAC,,, | Performed by: PHYSICIAN ASSISTANT

## 2023-09-13 PROCEDURE — 3288F FALL RISK ASSESSMENT DOCD: CPT | Mod: CPTII,S$GLB,, | Performed by: PHYSICIAN ASSISTANT

## 2023-09-13 PROCEDURE — 1101F PT FALLS ASSESS-DOCD LE1/YR: CPT | Mod: CPTII,S$GLB,, | Performed by: PHYSICIAN ASSISTANT

## 2023-09-13 PROCEDURE — 1160F PR REVIEW ALL MEDS BY PRESCRIBER/CLIN PHARMACIST DOCUMENTED: ICD-10-PCS | Mod: CPTII,S$GLB,, | Performed by: PHYSICIAN ASSISTANT

## 2023-09-13 PROCEDURE — 99214 PR OFFICE/OUTPT VISIT, EST, LEVL IV, 30-39 MIN: ICD-10-PCS | Mod: S$GLB,,, | Performed by: PHYSICIAN ASSISTANT

## 2023-09-13 PROCEDURE — 3066F PR DOCUMENTATION OF TREATMENT FOR NEPHROPATHY: ICD-10-PCS | Mod: CPTII,S$GLB,, | Performed by: PHYSICIAN ASSISTANT

## 2023-09-13 PROCEDURE — 4010F ACE/ARB THERAPY RXD/TAKEN: CPT | Mod: CPTII,S$GLB,, | Performed by: PHYSICIAN ASSISTANT

## 2023-09-13 PROCEDURE — 1159F MED LIST DOCD IN RCRD: CPT | Mod: CPTII,S$GLB,, | Performed by: PHYSICIAN ASSISTANT

## 2023-09-13 PROCEDURE — 3066F NEPHROPATHY DOC TX: CPT | Mod: CPTII,S$GLB,, | Performed by: PHYSICIAN ASSISTANT

## 2023-09-13 PROCEDURE — 99214 OFFICE O/P EST MOD 30 MIN: CPT | Mod: S$GLB,,, | Performed by: PHYSICIAN ASSISTANT

## 2023-09-13 PROCEDURE — 1159F PR MEDICATION LIST DOCUMENTED IN MEDICAL RECORD: ICD-10-PCS | Mod: CPTII,S$GLB,, | Performed by: PHYSICIAN ASSISTANT

## 2023-09-13 PROCEDURE — 3008F BODY MASS INDEX DOCD: CPT | Mod: CPTII,S$GLB,, | Performed by: PHYSICIAN ASSISTANT

## 2023-09-13 PROCEDURE — 3044F PR MOST RECENT HEMOGLOBIN A1C LEVEL <7.0%: ICD-10-PCS | Mod: CPTII,S$GLB,, | Performed by: PHYSICIAN ASSISTANT

## 2023-09-13 PROCEDURE — 1125F AMNT PAIN NOTED PAIN PRSNT: CPT | Mod: CPTII,S$GLB,, | Performed by: PHYSICIAN ASSISTANT

## 2023-09-13 PROCEDURE — 4010F PR ACE/ARB THEARPY RXD/TAKEN: ICD-10-PCS | Mod: CPTII,S$GLB,, | Performed by: PHYSICIAN ASSISTANT

## 2023-09-13 PROCEDURE — 1160F RVW MEDS BY RX/DR IN RCRD: CPT | Mod: CPTII,S$GLB,, | Performed by: PHYSICIAN ASSISTANT

## 2023-09-13 PROCEDURE — 99999 PR PBB SHADOW E&M-EST. PATIENT-LVL IV: ICD-10-PCS | Mod: PBBFAC,,, | Performed by: PHYSICIAN ASSISTANT

## 2023-09-13 RX ORDER — SULFAMETHOXAZOLE AND TRIMETHOPRIM 800; 160 MG/1; MG/1
1 TABLET ORAL 2 TIMES DAILY
Qty: 14 TABLET | Refills: 0 | Status: SHIPPED | OUTPATIENT
Start: 2023-09-13 | End: 2023-09-20

## 2023-09-13 RX ORDER — SIMVASTATIN 5 MG/1
TABLET, FILM COATED ORAL
Status: ON HOLD | COMMUNITY
End: 2023-10-17 | Stop reason: HOSPADM

## 2023-09-13 RX ORDER — VITAMIN E 268 MG
CAPSULE ORAL
Status: ON HOLD | COMMUNITY
End: 2023-10-17 | Stop reason: HOSPADM

## 2023-09-13 RX ORDER — MULTIVIT WITH IRON,MINERALS
TABLET ORAL
Status: ON HOLD | COMMUNITY
End: 2023-10-17 | Stop reason: HOSPADM

## 2023-09-13 NOTE — PROGRESS NOTES
9/13/2023    HPI:  Sneha Mcghee is a 72 y.o. female, who presents to clinic today for evaluation of her right hand redness, pain, and swelling.  States symptoms started approximately 5 days ago.  States symptoms have been persistent in that time.  States this prompted her to follow up with urgent care proximally 2 days ago.  States x-rays were taken which showed no acute fracture.  States he was placed in a splint and instructed to follow up with our clinic.  Denies any acute injury she can recall.  Denies any paresthesias.  Denies any other complaints at this time.    PMHX:  Past Medical History:   Diagnosis Date    Asthma     Atrial fibrillation     CAD (coronary artery disease)     CABG x 4    Cataract     os    Depression     Diabetes mellitus type II     Heart failure     Hyperlipidemia     Hypertension     Hypothyroidism, unspecified     Intermediate stage nonexudative age-related macular degeneration of both eyes 4/4/2022    Primary osteoarthritis of right knee 01/19/2021    PVD (peripheral vascular disease)     Stenosis of left carotid artery 01/19/2021       PSHX:  Past Surgical History:   Procedure Laterality Date    APPENDECTOMY      BACK SURGERY      discetomy     CARPAL TUNNEL RELEASE      R     CATARACT EXTRACTION      od d 5-15-13    CORONARY ARTERY BYPASS GRAFT  2012    x 4    CORONARY STENT PLACEMENT      8 stents    KNEE ARTHROSCOPY W/ PARTIAL MEDIAL MENISCECTOMY         FMHX:  Family History   Problem Relation Age of Onset    Glaucoma Paternal Grandmother     Glaucoma Paternal Grandfather     Diabetes Mother     Breast cancer Mother     Coronary artery disease Mother     Skin cancer Father     Coronary artery disease Father     Stroke Father     Diabetes Brother     Coronary artery disease Maternal Grandmother     Coronary artery disease Maternal Grandfather     Amblyopia Neg Hx     Blindness Neg Hx     Cataracts Neg Hx     Hypertension Neg Hx     Macular degeneration Neg Hx     Retinal  detachment Neg Hx     Strabismus Neg Hx     Thyroid disease Neg Hx        SOCHX:  Social History     Tobacco Use    Smoking status: Never    Smokeless tobacco: Never   Substance Use Topics    Alcohol use: Not Currently       ALLERGIES:  Codeine    CURRENT MEDICATIONS:  Current Outpatient Medications on File Prior to Visit   Medication Sig Dispense Refill    albuterol (PROVENTIL/VENTOLIN HFA) 90 mcg/actuation inhaler INHALE 2 PUFFS INTO THE LUNGS EVERY 6 HOURS AS NEEDED FOR WHEEZING 25.5 g 3    ascorbic acid, vitamin C, (VITAMIN C) 500 MG tablet Take 500 mg by mouth once daily.      aspirin (ECOTRIN) 325 MG EC tablet 81 mg.      atorvastatin (LIPITOR) 40 MG tablet TAKE 1 TABLET BY MOUTH ONCE DAILY 90 tablet 3    blood sugar diagnostic Strp To check BG once a day and PRN low sugars, to use with insurance preferred meter 100 strip 3    calcium/magnesium (CALCIUM AND MAGNESIUM ORAL) Take by mouth Daily.      cholecalciferol, vitamin D3, 125 mcg (5,000 unit) Tab cholecalciferol (vitamin D3) 125 mcg (5,000 unit) tablet   Take by oral route.      fluticasone propionate (FLONASE) 50 mcg/actuation nasal spray SHAKE LIQUID AND USE 2 SPRAYS(100 MCG) IN EACH NOSTRIL EVERY DAY 48 g 3    furosemide (LASIX) 20 MG tablet TAKE 1 TABLET BY MOUTH EVERY DAY 90 tablet 3    lancets Misc To check BG once a day and PRN lows, to use with insurance preferred meter 100 each 3    latanoprost 0.005 % ophthalmic solution latanoprost 0.005 % eye drops   Instill 1 drop every day by ophthalmic route for 90 days.      levothyroxine (SYNTHROID) 88 MCG tablet TAKE 1 TABLET(88 MCG) BY MOUTH BEFORE BREAKFAST 90 tablet 3    linaGLIPtin (TRADJENTA) 5 mg Tab tablet Take 1 tablet (5 mg total) by mouth once daily. 90 tablet 3    metFORMIN (GLUCOPHAGE-XR) 500 MG ER 24hr tablet Take 1 tablet (500 mg total) by mouth 2 (two) times a day. 180 tablet 3    metoprolol succinate (TOPROL-XL) 100 MG 24 hr tablet Take 1 tablet (100 mg total) by mouth once daily. 90  "tablet 3    mometasone 0.1% (ELOCON) 0.1 % cream Apply topically once daily. 45 g 3    nystatin (MYCOSTATIN) powder Apply topically 3 (three) times daily. 60 g 2    nystatin-triamcinolone (MYCOLOG II) cream PRN      olmesartan (BENICAR) 20 MG tablet TAKE 1 TABLET(20 MG) BY MOUTH EVERY DAY 90 tablet 3    omega-3 fatty acids/fish oil (FISH OIL-OMEGA-3 FATTY ACIDS) 300-1,000 mg capsule Take 1 capsule by mouth once daily.      pantoprazole (PROTONIX) 40 MG tablet Take 40 mg by mouth every morning.      potassium chloride (KLOR-CON) 10 MEQ TbSR Take 10 mEq by mouth once daily.      potassium gluconate 2.5 mEq Tab Take by oral route.      propafenone (RHTHYMOL) 150 MG Tab TAKE 1 TABLET(150 MG) BY MOUTH EVERY 8 HOURS 180 tablet 2    SANTYL ointment Apply topically once daily.      sertraline (ZOLOFT) 100 MG tablet TAKE 1 TABLET(100 MG) BY MOUTH EVERY DAY 90 tablet 3    simvastatin (ZOCOR) 5 MG tablet Take 1 tablet every day by oral route for 90 days.      suvorexant (BELSOMRA) 15 mg Tab Take 1 tablet by mouth nightly as needed.      timolol maleate 0.5% (TIMOPTIC) 0.5 % Drop Place 1 drop into both eyes 2 (two) times daily. 5 mL 11    vit C/E/Zn/coppr/lutein/zeaxan (PRESERVISION AREDS-2 ORAL) Take by mouth once daily at 6am.      vitamin E 400 UNIT capsule Take 1 capsule every day by oral route.      vitamin E, dl,tocopheryl acet, (VITAMIN E, DL, ACETATE,) 180 mg (400 unit) Cap Take 400 Units by mouth once daily.      blood-glucose meter kit To check BG qam and PRN lows, to use with insurance preferred meter 1 each 0    ipratropium (ATROVENT HFA) 17 mcg/actuation inhaler Inhale 2 puffs into the lungs every 6 (six) hours. Rescue 12.9 g 0     No current facility-administered medications on file prior to visit.       REVIEW OF SYSTEMS:  Review of Systems Complete; Negative, unless noted above.    GENERAL PHYSICAL EXAM:   Ht 5' 5" (1.651 m)   Wt 106.6 kg (235 lb)   BMI 39.11 kg/m²    GEN: well developed, well nourished, no " acute distress   PULM: No wheezing, no respiratory distress   CV: RRR    ORTHO EXAM:   Examination of the left hand reveals mild erythema over the radial dorsal portion of the left hand.  Tenderness to palpation over the areas of the erythema.  No fluctuance, drainage, purulence, or any other signs of developing abscess.  Presence of moderate edema over the area of the erythema.  No skin breakdown noted.  Able to flex extend the fingers appropriately.  Tenderness with terminal flexion of the right index and right middle fingers.  Normal sensation in the radial, ulnar, median nerve distributions.  Capillary refill less than 2 seconds in all fingers.    RADIOLOGY:   X-rays of the left hand were taken today in clinic.  X-rays reviewed by myself.  Imaging showed no acute fracture or dislocation no subluxation.  Presence of an osseous calcification over the ulnar portion of the 2nd MCP joint.  Presence of soft tissue swelling over the dorsum of the left hand, which given the lack of injury would be consistent with cellulitis.  No other significant abnormalities noted.    ASSESSMENT:   Cellulitis of the left hand    PLAN:  1. I discussed with Sneha Mcghee the cellulitis pathology and treatment options in detail during today's visit.  After treatment options were discussed, we decided the best course of action at this time is perform a short course of oral antibiotics via Bactrim.  We did discuss for any worsening redness, swelling, or pain to report to nearest emergency department.  She verbally agreed with the treatment plan.    2. She was prescribed Bactrim DS in clinic today.  She was instructed to discontinue the medication for any adverse effects and contact clinic.  She verbalized understanding.      3. I would like her follow up in clinic in 5 days for repeat evaluation.  She was instructed to contact the clinic for any problems or concerns in the interim.

## 2023-09-21 ENCOUNTER — PROCEDURE VISIT (OUTPATIENT)
Dept: OPHTHALMOLOGY | Facility: CLINIC | Age: 73
End: 2023-09-21
Payer: MEDICARE

## 2023-09-21 DIAGNOSIS — H35.3221 EXUDATIVE AGE-RELATED MACULAR DEGENERATION OF LEFT EYE WITH ACTIVE CHOROIDAL NEOVASCULARIZATION: Primary | ICD-10-CM

## 2023-09-21 DIAGNOSIS — H35.033 HYPERTENSIVE RETINOPATHY, BILATERAL: ICD-10-CM

## 2023-09-21 DIAGNOSIS — H35.3132 INTERMEDIATE STAGE NONEXUDATIVE AGE-RELATED MACULAR DEGENERATION OF BOTH EYES: ICD-10-CM

## 2023-09-21 DIAGNOSIS — H43.811 POSTERIOR VITREOUS DETACHMENT, RIGHT: ICD-10-CM

## 2023-09-21 PROCEDURE — 92134 POSTERIOR SEGMENT OCT RETINA (OCULAR COHERENCE TOMOGRAPHY)-BOTH EYES: ICD-10-PCS | Mod: S$GLB,,, | Performed by: OPHTHALMOLOGY

## 2023-09-21 PROCEDURE — 92014 COMPRE OPH EXAM EST PT 1/>: CPT | Mod: 25,S$GLB,, | Performed by: OPHTHALMOLOGY

## 2023-09-21 PROCEDURE — 92134 CPTRZ OPH DX IMG PST SGM RTA: CPT | Mod: S$GLB,,, | Performed by: OPHTHALMOLOGY

## 2023-09-21 PROCEDURE — 67028 PR INJECT INTRAVITREAL PHARMCOLOGIC: ICD-10-PCS | Mod: LT,S$GLB,, | Performed by: OPHTHALMOLOGY

## 2023-09-21 PROCEDURE — 67028 INJECTION EYE DRUG: CPT | Mod: LT,S$GLB,, | Performed by: OPHTHALMOLOGY

## 2023-09-21 PROCEDURE — 92014 PR EYE EXAM, EST PATIENT,COMPREHESV: ICD-10-PCS | Mod: 25,S$GLB,, | Performed by: OPHTHALMOLOGY

## 2023-09-21 RX ORDER — PROMETHAZINE HYDROCHLORIDE AND DEXTROMETHORPHAN HYDROBROMIDE 6.25; 15 MG/5ML; MG/5ML
SYRUP ORAL
Status: ON HOLD | COMMUNITY
End: 2023-10-17 | Stop reason: HOSPADM

## 2023-09-21 RX ORDER — CLINDAMYCIN HYDROCHLORIDE 300 MG/1
CAPSULE ORAL
Status: ON HOLD | COMMUNITY
End: 2023-10-17 | Stop reason: HOSPADM

## 2023-09-21 RX ORDER — DOXYCYCLINE 100 MG/1
CAPSULE ORAL
Status: ON HOLD | COMMUNITY
End: 2023-10-17 | Stop reason: HOSPADM

## 2023-09-21 RX ORDER — AMOXICILLIN AND CLAVULANATE POTASSIUM 875; 125 MG/1; MG/1
1 TABLET, FILM COATED ORAL 2 TIMES DAILY
Status: ON HOLD | COMMUNITY
End: 2023-10-17 | Stop reason: HOSPADM

## 2023-09-21 RX ORDER — AMPICILLIN 500 MG/1
CAPSULE ORAL
Status: ON HOLD | COMMUNITY
End: 2023-10-17 | Stop reason: HOSPADM

## 2023-09-21 RX ORDER — PREDNISONE 20 MG/1
TABLET ORAL
Status: ON HOLD | COMMUNITY
End: 2023-10-17 | Stop reason: HOSPADM

## 2023-09-21 RX ORDER — AMOXICILLIN 875 MG/1
TABLET, FILM COATED ORAL
Status: ON HOLD | COMMUNITY
End: 2023-10-17 | Stop reason: HOSPADM

## 2023-09-21 RX ORDER — TIMOLOL 5.12 MG/ML
1 SOLUTION/ DROPS OPHTHALMIC EVERY MORNING
COMMUNITY

## 2023-09-21 RX ADMIN — Medication 1.25 MG: at 03:09

## 2023-09-21 NOTE — PROGRESS NOTES
HPI     Macular Degeneration     Additional comments: 3 mon amd chk           HPI     Macular Degeneration     Additional comments: 7 wk amd chk           Comments    Patient here today for 7 wk AMD check. VA stable ou, no pain and no f/f.    Timolol bid ou  Latanoprost qhs ou          Last edited by Jovany Wallace on 6/5/2023  9:30 AM.        OCT - Drusen OU  OD PVD  OS VMA at [facility].  PED - SRF resolved    FP - OD with white choroidal patches - stable.  Possible just tesselated fundus appearance  OS PED with small subfoveal SRF =- resolved    Prior FA - OD - late staiing of drusen, no [facility] uptake   OS minimal leakage at apex of PED with Avastin on baord      A/P    1. Dry AMD OU  AREDS/AG    Wet AMD OS  4/23 - increased in PED with apical SRF  S/p Avastin OS x 2  9/23 - trace increase in SRF at 14 weeks    Avastin OS today    extend    2. Scattered white choroidal patches   Stable FP  No sig leakage or uptake on FA OD  Possible just tesselated fundus appearance    3. PVD OD    4. HTN Ret OU    5.  PCIOL OU      10 weeks OCT no dilate    Letter TO Dr. Tucker    Risks, benefits, and alternatives to treatment discussed in detail with the patient.  The patient voiced understanding and wished to proceed with the procedure    Injection Procedure Note:  Diagnosis: Wet AMD OS    Patient Identified and Time Out complete  Pt marked  Topical Proparacaine and Betadine.  Inject Avastin OS at 6:00 @ 3.5-4mm posterior to limbus  Post Operative Dx: Same  Complications: None  Follow up as above.

## 2023-10-04 DIAGNOSIS — E11.22 TYPE 2 DIABETES MELLITUS WITH STAGE 3B CHRONIC KIDNEY DISEASE, WITHOUT LONG-TERM CURRENT USE OF INSULIN: ICD-10-CM

## 2023-10-04 DIAGNOSIS — N18.32 TYPE 2 DIABETES MELLITUS WITH STAGE 3B CHRONIC KIDNEY DISEASE, WITHOUT LONG-TERM CURRENT USE OF INSULIN: ICD-10-CM

## 2023-10-04 RX ORDER — LANCETS
EACH MISCELLANEOUS
Qty: 100 EACH | Refills: 3 | Status: SHIPPED | OUTPATIENT
Start: 2023-10-04

## 2023-10-04 NOTE — TELEPHONE ENCOUNTER
No care due was identified.  Health [facility] Embedded Care Due Messages. Reference number: [id number].   10/04/2023 2:53:07 PM CDT

## 2023-10-04 NOTE — TELEPHONE ENCOUNTER
----- Message from Ruthann Mullen sent at 10/4/2023  8:51 AM CDT -----  Contact: pt  Type:  RX Refill Request    Who Called: pt    Refill or New Rx:refill    RX Name and Strength:blood sugar diagnostic Strp  lancets Misc    How is the patient currently taking it? (ex. 1XDay):as directed    Is this a 30 day or 90 day RX:    Preferred Pharmacy with phone number:  [facility][id number] - [facility] [address] [address] AT [facility] [address]  [address] [place] [address]  Phone: [phone] Fax: [phone]      Local or Mail Order:local   Ordering Provider:Donnell  Would the patient rather a call back or a response via [facility]? call  Best Call Back Number:[phone]  Additional Information: Please advise thank you

## 2023-10-04 NOTE — TELEPHONE ENCOUNTER
Refill Decision Note   Sneha Leesobald  is requesting a refill authorization.  Brief Assessment and Rationale for Refill:  Approve     Medication Therapy Plan:         Comments:     Note composed:4:13 PM 10/04/2023

## 2023-10-05 DIAGNOSIS — N18.32 TYPE 2 DIABETES MELLITUS WITH STAGE 3B CHRONIC KIDNEY DISEASE, WITHOUT LONG-TERM CURRENT USE OF INSULIN: ICD-10-CM

## 2023-10-05 DIAGNOSIS — E11.22 TYPE 2 DIABETES MELLITUS WITH STAGE 3B CHRONIC KIDNEY DISEASE, WITHOUT LONG-TERM CURRENT USE OF INSULIN: ICD-10-CM

## 2023-10-05 NOTE — TELEPHONE ENCOUNTER
----- Message from Aliciajclamont Araujo sent at 10/5/2023 10:49 AM CDT -----  Regarding: Refill/[employer]  RX Refill Request      Who Called: Pt    Refill or New Rx: Refill     RX Name and Strength: blood sugar diagnostic Strp     How is the patient currently taking it? (ex. 1XDay): 1Xday    Is this a 30 day or 90 day RX: 100 Strips     Preferred Pharmacy with phone number:   [facility][id number] - [facility] [address] [address] AT [facility] [address]  [address] [place] [id number]  Phone: [phone] Fax: [phone]        Local or Mail Order: Local    Ordering Provider: Donnell    Would the patient rather a call back or a response via [facility]?  Call back    Best Call Back Number: [phone]      Additional Information: Sts needing new diabetic kit. Would prefer Accu-Check, but if not will take One Touch. Says the strips were $77 and does not want to pay that. Also says she is completely out of her supplies.  Please Advise - Thank you

## 2023-10-05 NOTE — TELEPHONE ENCOUNTER
No care due was identified.  Health [facility] Embedded Care Due Messages. Reference number: [id number].   10/05/2023 11:48:07 AM CDT

## 2023-10-17 ENCOUNTER — TELEPHONE (OUTPATIENT)
Dept: FAMILY MEDICINE | Facility: CLINIC | Age: 73
End: 2023-10-17
Payer: MEDICARE

## 2023-10-17 PROBLEM — I50.9 ACUTE ON CHRONIC HEART FAILURE: Status: ACTIVE | Noted: 2023-10-17

## 2023-10-17 PROBLEM — R07.89 CHEST DISCOMFORT: Status: ACTIVE | Noted: 2023-10-17

## 2023-10-17 NOTE — TELEPHONE ENCOUNTER
----- Message from Mehreen Modi sent at 10/17/2023 12:16 PM CDT -----  Regarding: hosp f/u  Contact:   Type:  Sooner Appointment Request    Caller is requesting a sooner appointment.  Caller declined first available appointment listed below.  Caller will not accept being placed on the waitlist and is requesting a message be sent to doctor.    Name of Caller:     When is the first available appointment?    Symptoms:  hosp f/u dx: chest pain  Would the patient rather a call back or a response via [facility]? Call back  Best Call Back Number:  [phone]    Additional Information:  call to be scheduled within 1 week thanks!

## 2023-10-23 ENCOUNTER — PATIENT MESSAGE (OUTPATIENT)
Dept: ADMINISTRATIVE | Facility: HOSPITAL | Age: 73
End: 2023-10-23
Payer: MEDICARE

## 2023-10-23 ENCOUNTER — TELEPHONE (OUTPATIENT)
Dept: FAMILY MEDICINE | Facility: CLINIC | Age: 73
End: 2023-10-23

## 2023-10-23 ENCOUNTER — PATIENT OUTREACH (OUTPATIENT)
Dept: ADMINISTRATIVE | Facility: HOSPITAL | Age: 73
End: 2023-10-23
Payer: MEDICARE

## 2023-10-23 NOTE — PROGRESS NOTES
Uncontrolled BP REPORT  BP Readings from Last 3 Encounters:   10/17/23 (!) 128/90   09/11/23 (!) 190/88   07/05/23 (!) 142/80       Non-compliant report chart audits for HYPERTENSION MANAGEMENT     Outreach to patient in reference to hypertension management       NEED REMOTE HOME BP READING DOCUMENTED   OR  BP FOLLOW UP WITH NURSE VISIT OR CARE TEAM MEMBER

## 2023-10-29 ENCOUNTER — PATIENT MESSAGE (OUTPATIENT)
Dept: FAMILY MEDICINE | Facility: CLINIC | Age: 73
End: 2023-10-29
Payer: MEDICARE

## 2023-11-07 ENCOUNTER — TELEPHONE (OUTPATIENT)
Dept: FAMILY MEDICINE | Facility: CLINIC | Age: 73
End: 2023-11-07
Payer: MEDICARE

## 2023-11-07 NOTE — TELEPHONE ENCOUNTER
----- Message from Randa Romero sent at 11/7/2023 12:21 PM CST -----  Contact: pt  Type:  Needs Medical Advice    Who Called: pt    Would the patient rather a call back or a response via [facility]? Call back    Best Call Back Number: [phone]    Additional Information: wants to let you know that she is in [facility]. Has to do with her A-Fib.    thanks

## 2023-11-13 ENCOUNTER — TELEPHONE (OUTPATIENT)
Dept: FAMILY MEDICINE | Facility: CLINIC | Age: 73
End: 2023-11-13
Payer: MEDICARE

## 2023-11-13 NOTE — TELEPHONE ENCOUNTER
Spoke to pt and she is asking to be seen sooner than scheduled with Dr. Jacobo on 11/27/23.  Spoke with Dr. Jacobo.  No sooner appts available.  Scheduled pt to be seen in [place] with MD only per pts request.  Pt states she will get her records from [place] and bring them with her to appt.

## 2023-11-13 NOTE — TELEPHONE ENCOUNTER
----- Message from Sloane Fernandez, Patient Care Assistant sent at 11/10/2023  3:56 PM CST -----  Contact: self  Pt is calling to get a sooner hosp visit appt [phone]  thanks

## 2023-11-13 NOTE — TELEPHONE ENCOUNTER
----- Message from Sloane Fernandez, Patient Care Assistant sent at 11/10/2023  3:56 PM CST -----  Contact: self  Pt is calling to get a sooner hosp visit appt [phone]  thanks

## 2023-11-15 ENCOUNTER — OFFICE VISIT (OUTPATIENT)
Dept: FAMILY MEDICINE | Facility: CLINIC | Age: 73
End: 2023-11-15
Payer: MEDICARE

## 2023-11-15 ENCOUNTER — LAB VISIT (OUTPATIENT)
Dept: LAB | Facility: HOSPITAL | Age: 73
End: 2023-11-15
Attending: INTERNAL MEDICINE
Payer: MEDICARE

## 2023-11-15 VITALS
SYSTOLIC BLOOD PRESSURE: 104 MMHG | HEART RATE: 65 BPM | BODY MASS INDEX: 38.5 KG/M2 | WEIGHT: 231.06 LBS | HEIGHT: 65 IN | OXYGEN SATURATION: 97 % | DIASTOLIC BLOOD PRESSURE: 68 MMHG

## 2023-11-15 DIAGNOSIS — D64.9 ANEMIA, UNSPECIFIED TYPE: ICD-10-CM

## 2023-11-15 DIAGNOSIS — I50.32 CHRONIC DIASTOLIC HEART FAILURE: ICD-10-CM

## 2023-11-15 DIAGNOSIS — I48.0 PAROXYSMAL ATRIAL FIBRILLATION: Primary | ICD-10-CM

## 2023-11-15 DIAGNOSIS — T80.89XA BRUISING AT INJECTION SITE: ICD-10-CM

## 2023-11-15 DIAGNOSIS — E78.2 MIXED HYPERLIPIDEMIA: ICD-10-CM

## 2023-11-15 DIAGNOSIS — I10 ESSENTIAL HYPERTENSION: ICD-10-CM

## 2023-11-15 LAB
ALBUMIN SERPL BCP-MCNC: 3.8 G/DL (ref 3.5–5.2)
ALP SERPL-CCNC: 109 U/L (ref 55–135)
ALT SERPL W/O P-5'-P-CCNC: 23 U/L (ref 10–44)
ANION GAP SERPL CALC-SCNC: 10 MMOL/L (ref 8–16)
AST SERPL-CCNC: 16 U/L (ref 10–40)
BASOPHILS # BLD AUTO: 0.08 K/UL (ref 0–0.2)
BASOPHILS NFR BLD: 0.8 % (ref 0–1.9)
BILIRUB SERPL-MCNC: 0.5 MG/DL (ref 0.1–1)
BUN SERPL-MCNC: 29 MG/DL (ref 8–23)
CALCIUM SERPL-MCNC: 9.8 MG/DL (ref 8.7–10.5)
CHLORIDE SERPL-SCNC: 100 MMOL/L (ref 95–110)
CO2 SERPL-SCNC: 28 MMOL/L (ref 23–29)
CREAT SERPL-MCNC: 1 MG/DL (ref 0.5–1.4)
DIFFERENTIAL METHOD: ABNORMAL
EOSINOPHIL # BLD AUTO: 0.5 K/UL (ref 0–0.5)
EOSINOPHIL NFR BLD: 4.8 % (ref 0–8)
ERYTHROCYTE [DISTWIDTH] IN BLOOD BY AUTOMATED COUNT: 15.6 % (ref 11.5–14.5)
EST. GFR  (NO RACE VARIABLE): 59.5 ML/MIN/1.73 M^2
FERRITIN SERPL-MCNC: 117 NG/ML (ref 20–300)
GLUCOSE SERPL-MCNC: 167 MG/DL (ref 70–110)
HCT VFR BLD AUTO: 41.1 % (ref 37–48.5)
HGB BLD-MCNC: 13 G/DL (ref 12–16)
IMM GRANULOCYTES # BLD AUTO: 0.05 K/UL (ref 0–0.04)
IMM GRANULOCYTES NFR BLD AUTO: 0.5 % (ref 0–0.5)
IRON SERPL-MCNC: 57 UG/DL (ref 30–160)
LYMPHOCYTES # BLD AUTO: 1.7 K/UL (ref 1–4.8)
LYMPHOCYTES NFR BLD: 15.8 % (ref 18–48)
MAGNESIUM SERPL-MCNC: 2.3 MG/DL (ref 1.6–2.6)
MCH RBC QN AUTO: 26.7 PG (ref 27–31)
MCHC RBC AUTO-ENTMCNC: 31.6 G/DL (ref 32–36)
MCV RBC AUTO: 85 FL (ref 82–98)
MONOCYTES # BLD AUTO: 1.2 K/UL (ref 0.3–1)
MONOCYTES NFR BLD: 11.6 % (ref 4–15)
NEUTROPHILS # BLD AUTO: 7.1 K/UL (ref 1.8–7.7)
NEUTROPHILS NFR BLD: 66.5 % (ref 38–73)
NRBC BLD-RTO: 0 /100 WBC
PLATELET # BLD AUTO: 272 K/UL (ref 150–450)
PMV BLD AUTO: 10.8 FL (ref 9.2–12.9)
POTASSIUM SERPL-SCNC: 4.4 MMOL/L (ref 3.5–5.1)
PROT SERPL-MCNC: 7.7 G/DL (ref 6–8.4)
RBC # BLD AUTO: 4.86 M/UL (ref 4–5.4)
SATURATED IRON: 14 % (ref 20–50)
SODIUM SERPL-SCNC: 138 MMOL/L (ref 136–145)
TOTAL IRON BINDING CAPACITY: 401 UG/DL (ref 250–450)
TRANSFERRIN SERPL-MCNC: 271 MG/DL (ref 200–375)
WBC # BLD AUTO: 10.65 K/UL (ref 3.9–12.7)

## 2023-11-15 PROCEDURE — 1101F PT FALLS ASSESS-DOCD LE1/YR: CPT | Mod: CPTII,S$GLB,, | Performed by: INTERNAL MEDICINE

## 2023-11-15 PROCEDURE — 99214 PR OFFICE/OUTPT VISIT, EST, LEVL IV, 30-39 MIN: ICD-10-PCS | Mod: S$GLB,,, | Performed by: INTERNAL MEDICINE

## 2023-11-15 PROCEDURE — 93005 EKG 12-LEAD: ICD-10-PCS | Mod: S$GLB,,, | Performed by: INTERNAL MEDICINE

## 2023-11-15 PROCEDURE — 99214 OFFICE O/P EST MOD 30 MIN: CPT | Mod: S$GLB,,, | Performed by: INTERNAL MEDICINE

## 2023-11-15 PROCEDURE — 4010F PR ACE/ARB THEARPY RXD/TAKEN: ICD-10-PCS | Mod: CPTII,S$GLB,, | Performed by: INTERNAL MEDICINE

## 2023-11-15 PROCEDURE — 3044F HG A1C LEVEL LT 7.0%: CPT | Mod: CPTII,S$GLB,, | Performed by: INTERNAL MEDICINE

## 2023-11-15 PROCEDURE — 3066F NEPHROPATHY DOC TX: CPT | Mod: CPTII,S$GLB,, | Performed by: INTERNAL MEDICINE

## 2023-11-15 PROCEDURE — 83735 ASSAY OF MAGNESIUM: CPT | Performed by: INTERNAL MEDICINE

## 2023-11-15 PROCEDURE — 85025 COMPLETE CBC W/AUTO DIFF WBC: CPT | Performed by: INTERNAL MEDICINE

## 2023-11-15 PROCEDURE — 3078F PR MOST RECENT DIASTOLIC BLOOD PRESSURE < 80 MM HG: ICD-10-PCS | Mod: CPTII,S$GLB,, | Performed by: INTERNAL MEDICINE

## 2023-11-15 PROCEDURE — 3061F NEG MICROALBUMINURIA REV: CPT | Mod: CPTII,S$GLB,, | Performed by: INTERNAL MEDICINE

## 2023-11-15 PROCEDURE — 1126F AMNT PAIN NOTED NONE PRSNT: CPT | Mod: CPTII,S$GLB,, | Performed by: INTERNAL MEDICINE

## 2023-11-15 PROCEDURE — 1159F PR MEDICATION LIST DOCUMENTED IN MEDICAL RECORD: ICD-10-PCS | Mod: CPTII,S$GLB,, | Performed by: INTERNAL MEDICINE

## 2023-11-15 PROCEDURE — 1101F PR PT FALLS ASSESS DOC 0-1 FALLS W/OUT INJ PAST YR: ICD-10-PCS | Mod: CPTII,S$GLB,, | Performed by: INTERNAL MEDICINE

## 2023-11-15 PROCEDURE — 99999 PR PBB SHADOW E&M-EST. PATIENT-LVL IV: ICD-10-PCS | Mod: PBBFAC,,, | Performed by: INTERNAL MEDICINE

## 2023-11-15 PROCEDURE — 3074F PR MOST RECENT SYSTOLIC BLOOD PRESSURE < 130 MM HG: ICD-10-PCS | Mod: CPTII,S$GLB,, | Performed by: INTERNAL MEDICINE

## 2023-11-15 PROCEDURE — 4010F ACE/ARB THERAPY RXD/TAKEN: CPT | Mod: CPTII,S$GLB,, | Performed by: INTERNAL MEDICINE

## 2023-11-15 PROCEDURE — 93010 EKG 12-LEAD: ICD-10-PCS | Mod: S$GLB,,, | Performed by: INTERNAL MEDICINE

## 2023-11-15 PROCEDURE — 3061F PR NEG MICROALBUMINURIA RESULT DOCUMENTED/REVIEW: ICD-10-PCS | Mod: CPTII,S$GLB,, | Performed by: INTERNAL MEDICINE

## 2023-11-15 PROCEDURE — 3008F PR BODY MASS INDEX (BMI) DOCUMENTED: ICD-10-PCS | Mod: CPTII,S$GLB,, | Performed by: INTERNAL MEDICINE

## 2023-11-15 PROCEDURE — 93010 ELECTROCARDIOGRAM REPORT: CPT | Mod: S$GLB,,, | Performed by: INTERNAL MEDICINE

## 2023-11-15 PROCEDURE — 80053 COMPREHEN METABOLIC PANEL: CPT | Performed by: INTERNAL MEDICINE

## 2023-11-15 PROCEDURE — 3008F BODY MASS INDEX DOCD: CPT | Mod: CPTII,S$GLB,, | Performed by: INTERNAL MEDICINE

## 2023-11-15 PROCEDURE — 82728 ASSAY OF FERRITIN: CPT | Performed by: INTERNAL MEDICINE

## 2023-11-15 PROCEDURE — 84466 ASSAY OF TRANSFERRIN: CPT | Performed by: INTERNAL MEDICINE

## 2023-11-15 PROCEDURE — 83540 ASSAY OF IRON: CPT | Performed by: INTERNAL MEDICINE

## 2023-11-15 PROCEDURE — 1159F MED LIST DOCD IN RCRD: CPT | Mod: CPTII,S$GLB,, | Performed by: INTERNAL MEDICINE

## 2023-11-15 PROCEDURE — 1126F PR PAIN SEVERITY QUANTIFIED, NO PAIN PRESENT: ICD-10-PCS | Mod: CPTII,S$GLB,, | Performed by: INTERNAL MEDICINE

## 2023-11-15 PROCEDURE — 1160F RVW MEDS BY RX/DR IN RCRD: CPT | Mod: CPTII,S$GLB,, | Performed by: INTERNAL MEDICINE

## 2023-11-15 PROCEDURE — 3288F FALL RISK ASSESSMENT DOCD: CPT | Mod: CPTII,S$GLB,, | Performed by: INTERNAL MEDICINE

## 2023-11-15 PROCEDURE — 36415 COLL VENOUS BLD VENIPUNCTURE: CPT | Mod: PO | Performed by: INTERNAL MEDICINE

## 2023-11-15 PROCEDURE — 3044F PR MOST RECENT HEMOGLOBIN A1C LEVEL <7.0%: ICD-10-PCS | Mod: CPTII,S$GLB,, | Performed by: INTERNAL MEDICINE

## 2023-11-15 PROCEDURE — 93005 ELECTROCARDIOGRAM TRACING: CPT | Mod: S$GLB,,, | Performed by: INTERNAL MEDICINE

## 2023-11-15 PROCEDURE — 3288F PR FALLS RISK ASSESSMENT DOCUMENTED: ICD-10-PCS | Mod: CPTII,S$GLB,, | Performed by: INTERNAL MEDICINE

## 2023-11-15 PROCEDURE — 3066F PR DOCUMENTATION OF TREATMENT FOR NEPHROPATHY: ICD-10-PCS | Mod: CPTII,S$GLB,, | Performed by: INTERNAL MEDICINE

## 2023-11-15 PROCEDURE — 3078F DIAST BP <80 MM HG: CPT | Mod: CPTII,S$GLB,, | Performed by: INTERNAL MEDICINE

## 2023-11-15 PROCEDURE — 1160F PR REVIEW ALL MEDS BY PRESCRIBER/CLIN PHARMACIST DOCUMENTED: ICD-10-PCS | Mod: CPTII,S$GLB,, | Performed by: INTERNAL MEDICINE

## 2023-11-15 PROCEDURE — 99999 PR PBB SHADOW E&M-EST. PATIENT-LVL IV: CPT | Mod: PBBFAC,,, | Performed by: INTERNAL MEDICINE

## 2023-11-15 PROCEDURE — 3074F SYST BP LT 130 MM HG: CPT | Mod: CPTII,S$GLB,, | Performed by: INTERNAL MEDICINE

## 2023-11-15 NOTE — PROGRESS NOTES
"[facility]  Primary [facility]   [address] [facility] [address]       Patient ID: Sneha Mcghee     Chief Complaint:   Chief Complaint   Patient presents with    Follow-up     Hospital           HPI:  Hospital follow-up from [facility] where she spent a few days due to a recurrence of atrial fibrillation with rapid ventricular response.  Her regular cardiologist was away, so Dr. Cortez cardioverted her in an appropriate fashion.  Some medicines were changed and she was told after the cardioversion that she would need to get labs done.  Unfortunately, I do not have access to [facility] notes.  I did message Dr. Cortez.  We are going to stop the metoprolol and continue the sotalol because her blood pressure is on the low end.  I am going to check some labs today as well because she does have extensive bruising mainly on the left side of her lower abdomen so want to make sure we are not dealing with an anemia that is too severe.  She is a bit fatigued as well and I can attribute that to the low systolic blood pressure.  Olmesartan was stopped and I do not think she needs right now.     Review of Systems       Fatigue   Leg cramps     Objective:      Physical Exam   Physical Exam         Vitals:   Vitals:    11/15/23 0941   BP: 104/68   Pulse: 65   SpO2: 97%   Weight: 104.8 kg (231 lb 0.7 oz)   Height: 5' 5" (1.651 m)        Assessment:           Plan:       Sneha Mcghee  was seen today for follow-up and may need lab work.    Diagnoses and all orders for this visit:    Sneha was seen today for follow-up.    Diagnoses and all orders for this visit:    Paroxysmal atrial fibrillation  -     IN OFFICE EKG 12-LEAD (to Muse)  Continue Sotalol   Stop Metoprolol    Anemia, unspecified type  -     CBC Auto Differential; Future  -     Comprehensive Metabolic Panel; Future  Check labs      Chronic diastolic heart failure  -     Magnesium; Future  Check labs      Mixed hyperlipidemia  Monitor " Labs    Bruising at injection site  Due to lovenox   Will resolve with time     Essential hypertension  Overmedicating - stop Metoprolol  Stopped Olmesartan as well          Jovany Medina MD

## 2023-11-18 ENCOUNTER — TELEPHONE (OUTPATIENT)
Dept: FAMILY MEDICINE | Facility: CLINIC | Age: 73
End: 2023-11-18
Payer: MEDICARE

## 2023-11-18 NOTE — TELEPHONE ENCOUNTER
----- Message from Mariela Subramanian sent at 11/17/2023  1:13 PM CST -----  Type: Needs Results  Who Called:  pt  Best Call Back Number: [phone]  Additional Information: pt stated due to pt's condition she would like to be advised asap with pt's results and wants to ensure pt's results are forwarded to Dr. SEPIDEH Cortez please call pt back to advise asap thanks!

## 2023-11-26 NOTE — TELEPHONE ENCOUNTER
No care due was identified.  Health [facility] Embedded Care Due Messages. Reference number: [id number].   11/26/2023 5:27:03 AM CST

## 2023-11-27 ENCOUNTER — OFFICE VISIT (OUTPATIENT)
Dept: FAMILY MEDICINE | Facility: CLINIC | Age: 73
End: 2023-11-27
Payer: MEDICARE

## 2023-11-27 VITALS
BODY MASS INDEX: 38.75 KG/M2 | WEIGHT: 232.56 LBS | HEART RATE: 75 BPM | OXYGEN SATURATION: 95 % | TEMPERATURE: 97 F | DIASTOLIC BLOOD PRESSURE: 72 MMHG | SYSTOLIC BLOOD PRESSURE: 112 MMHG | HEIGHT: 65 IN

## 2023-11-27 DIAGNOSIS — I50.32 CHRONIC DIASTOLIC HEART FAILURE: ICD-10-CM

## 2023-11-27 DIAGNOSIS — I48.0 PAROXYSMAL ATRIAL FIBRILLATION: Primary | ICD-10-CM

## 2023-11-27 DIAGNOSIS — I25.10 CORONARY ARTERY DISEASE WITHOUT ANGINA PECTORIS, UNSPECIFIED VESSEL OR LESION TYPE, UNSPECIFIED WHETHER NATIVE OR TRANSPLANTED HEART: ICD-10-CM

## 2023-11-27 DIAGNOSIS — N18.32 TYPE 2 DIABETES MELLITUS WITH STAGE 3B CHRONIC KIDNEY DISEASE, WITHOUT LONG-TERM CURRENT USE OF INSULIN: ICD-10-CM

## 2023-11-27 DIAGNOSIS — I10 ESSENTIAL HYPERTENSION: ICD-10-CM

## 2023-11-27 DIAGNOSIS — F33.0 MAJOR DEPRESSIVE DISORDER, RECURRENT EPISODE, MILD: ICD-10-CM

## 2023-11-27 DIAGNOSIS — E11.22 TYPE 2 DIABETES MELLITUS WITH STAGE 3B CHRONIC KIDNEY DISEASE, WITHOUT LONG-TERM CURRENT USE OF INSULIN: ICD-10-CM

## 2023-11-27 DIAGNOSIS — E78.2 MIXED HYPERLIPIDEMIA: ICD-10-CM

## 2023-11-27 PROCEDURE — 1101F PR PT FALLS ASSESS DOC 0-1 FALLS W/OUT INJ PAST YR: ICD-10-PCS | Mod: CPTII,S$GLB,, | Performed by: INTERNAL MEDICINE

## 2023-11-27 PROCEDURE — 3044F PR MOST RECENT HEMOGLOBIN A1C LEVEL <7.0%: ICD-10-PCS | Mod: CPTII,S$GLB,, | Performed by: INTERNAL MEDICINE

## 2023-11-27 PROCEDURE — 3008F BODY MASS INDEX DOCD: CPT | Mod: CPTII,S$GLB,, | Performed by: INTERNAL MEDICINE

## 2023-11-27 PROCEDURE — 1126F AMNT PAIN NOTED NONE PRSNT: CPT | Mod: CPTII,S$GLB,, | Performed by: INTERNAL MEDICINE

## 2023-11-27 PROCEDURE — 3078F DIAST BP <80 MM HG: CPT | Mod: CPTII,S$GLB,, | Performed by: INTERNAL MEDICINE

## 2023-11-27 PROCEDURE — 80048 BASIC METABOLIC PNL TOTAL CA: CPT | Performed by: INTERNAL MEDICINE

## 2023-11-27 PROCEDURE — 4010F ACE/ARB THERAPY RXD/TAKEN: CPT | Mod: CPTII,S$GLB,, | Performed by: INTERNAL MEDICINE

## 2023-11-27 PROCEDURE — 3061F PR NEG MICROALBUMINURIA RESULT DOCUMENTED/REVIEW: ICD-10-PCS | Mod: CPTII,S$GLB,, | Performed by: INTERNAL MEDICINE

## 2023-11-27 PROCEDURE — 3288F FALL RISK ASSESSMENT DOCD: CPT | Mod: CPTII,S$GLB,, | Performed by: INTERNAL MEDICINE

## 2023-11-27 PROCEDURE — 36415 PR COLLECTION VENOUS BLOOD,VENIPUNCTURE: ICD-10-PCS | Mod: S$GLB,,, | Performed by: INTERNAL MEDICINE

## 2023-11-27 PROCEDURE — 3008F PR BODY MASS INDEX (BMI) DOCUMENTED: ICD-10-PCS | Mod: CPTII,S$GLB,, | Performed by: INTERNAL MEDICINE

## 2023-11-27 PROCEDURE — 1160F PR REVIEW ALL MEDS BY PRESCRIBER/CLIN PHARMACIST DOCUMENTED: ICD-10-PCS | Mod: CPTII,S$GLB,, | Performed by: INTERNAL MEDICINE

## 2023-11-27 PROCEDURE — 99214 PR OFFICE/OUTPT VISIT, EST, LEVL IV, 30-39 MIN: ICD-10-PCS | Mod: S$GLB,,, | Performed by: INTERNAL MEDICINE

## 2023-11-27 PROCEDURE — 3066F PR DOCUMENTATION OF TREATMENT FOR NEPHROPATHY: ICD-10-PCS | Mod: CPTII,S$GLB,, | Performed by: INTERNAL MEDICINE

## 2023-11-27 PROCEDURE — 36415 COLL VENOUS BLD VENIPUNCTURE: CPT | Mod: S$GLB,,, | Performed by: INTERNAL MEDICINE

## 2023-11-27 PROCEDURE — 99214 OFFICE O/P EST MOD 30 MIN: CPT | Mod: S$GLB,,, | Performed by: INTERNAL MEDICINE

## 2023-11-27 PROCEDURE — 3288F PR FALLS RISK ASSESSMENT DOCUMENTED: ICD-10-PCS | Mod: CPTII,S$GLB,, | Performed by: INTERNAL MEDICINE

## 2023-11-27 PROCEDURE — 4010F PR ACE/ARB THEARPY RXD/TAKEN: ICD-10-PCS | Mod: CPTII,S$GLB,, | Performed by: INTERNAL MEDICINE

## 2023-11-27 PROCEDURE — 3074F SYST BP LT 130 MM HG: CPT | Mod: CPTII,S$GLB,, | Performed by: INTERNAL MEDICINE

## 2023-11-27 PROCEDURE — 3074F PR MOST RECENT SYSTOLIC BLOOD PRESSURE < 130 MM HG: ICD-10-PCS | Mod: CPTII,S$GLB,, | Performed by: INTERNAL MEDICINE

## 2023-11-27 PROCEDURE — 1101F PT FALLS ASSESS-DOCD LE1/YR: CPT | Mod: CPTII,S$GLB,, | Performed by: INTERNAL MEDICINE

## 2023-11-27 PROCEDURE — 3061F NEG MICROALBUMINURIA REV: CPT | Mod: CPTII,S$GLB,, | Performed by: INTERNAL MEDICINE

## 2023-11-27 PROCEDURE — 1160F RVW MEDS BY RX/DR IN RCRD: CPT | Mod: CPTII,S$GLB,, | Performed by: INTERNAL MEDICINE

## 2023-11-27 PROCEDURE — 1126F PR PAIN SEVERITY QUANTIFIED, NO PAIN PRESENT: ICD-10-PCS | Mod: CPTII,S$GLB,, | Performed by: INTERNAL MEDICINE

## 2023-11-27 PROCEDURE — 1159F MED LIST DOCD IN RCRD: CPT | Mod: CPTII,S$GLB,, | Performed by: INTERNAL MEDICINE

## 2023-11-27 PROCEDURE — 3044F HG A1C LEVEL LT 7.0%: CPT | Mod: CPTII,S$GLB,, | Performed by: INTERNAL MEDICINE

## 2023-11-27 PROCEDURE — 1159F PR MEDICATION LIST DOCUMENTED IN MEDICAL RECORD: ICD-10-PCS | Mod: CPTII,S$GLB,, | Performed by: INTERNAL MEDICINE

## 2023-11-27 PROCEDURE — 3066F NEPHROPATHY DOC TX: CPT | Mod: CPTII,S$GLB,, | Performed by: INTERNAL MEDICINE

## 2023-11-27 PROCEDURE — 3078F PR MOST RECENT DIASTOLIC BLOOD PRESSURE < 80 MM HG: ICD-10-PCS | Mod: CPTII,S$GLB,, | Performed by: INTERNAL MEDICINE

## 2023-11-27 RX ORDER — SIMVASTATIN 5 MG/1
5 TABLET, FILM COATED ORAL NIGHTLY
COMMUNITY
End: 2024-01-09

## 2023-11-27 RX ORDER — POTASSIUM CHLORIDE 750 MG/1
10 CAPSULE, EXTENDED RELEASE ORAL ONCE
COMMUNITY

## 2023-11-27 RX ORDER — METFORMIN HYDROCHLORIDE 500 MG/1
500 TABLET, EXTENDED RELEASE ORAL 2 TIMES DAILY
Qty: 180 TABLET | Refills: 3 | Status: SHIPPED | OUTPATIENT
Start: 2023-11-27

## 2023-11-27 RX ORDER — APIXABAN 5 MG/1
5 TABLET, FILM COATED ORAL 2 TIMES DAILY
COMMUNITY
Start: 2023-10-25

## 2023-11-27 RX ORDER — DAPAGLIFLOZIN 10 MG/1
10 TABLET, FILM COATED ORAL DAILY
Qty: 90 TABLET | Refills: 3 | Status: SHIPPED | OUTPATIENT
Start: 2023-11-27

## 2023-11-27 RX ORDER — SOTALOL HYDROCHLORIDE 80 MG/1
80 TABLET ORAL 2 TIMES DAILY
COMMUNITY

## 2023-11-27 RX ORDER — SERTRALINE HYDROCHLORIDE 50 MG/1
50 TABLET, FILM COATED ORAL DAILY
COMMUNITY
End: 2023-12-14 | Stop reason: SDUPTHER

## 2023-11-27 RX ORDER — METOLAZONE 5 MG/1
5 TABLET ORAL DAILY PRN
COMMUNITY
Start: 2023-10-26 | End: 2024-03-19

## 2023-11-27 NOTE — PROGRESS NOTES
Subjective:       Patient ID: Sneha Mcghee is a 73 y.o. female.    Medication List with Changes/Refills   New Medications    DAPAGLIFLOZIN PROPANEDIOL (FARXIGA) 10 MG TABLET    Take 1 tablet (10 mg total) by mouth once daily.   Current Medications    ALBUTEROL (PROVENTIL/VENTOLIN HFA) 90 MCG/ACTUATION INHALER    INHALE 2 PUFFS INTO THE LUNGS EVERY 6 HOURS AS NEEDED FOR WHEEZING    ASPIRIN (ASPIR-81 ORAL)    Take 81 mg by mouth once daily.    BLOOD SUGAR DIAGNOSTIC STRP    To check BG once a day and PRN low sugars, to use with insurance preferred meter    BLOOD-GLUCOSE METER KIT    To check BG qam and PRN lows, to use with insurance preferred meter    ELIQUIS 5 MG TAB    Take 5 mg by mouth 2 (two) times daily.    FUROSEMIDE (LASIX) 20 MG TABLET    TAKE 1 TABLET BY MOUTH EVERY DAY    LANCETS MISC    To check BG once a day and PRN lows, to use with insurance preferred meter    LATANOPROST 0.005 % OPHTHALMIC SOLUTION    latanoprost 0.005 % eye drops   Instill 1 drop every day by ophthalmic route for 90 days.    LEVOTHYROXINE (SYNTHROID) 88 MCG TABLET    TAKE 1 TABLET(88 MCG) BY MOUTH BEFORE BREAKFAST    METFORMIN (GLUCOPHAGE-XR) 500 MG ER 24HR TABLET    TAKE 1 TABLET(500 MG) BY MOUTH TWICE DAILY    METOLAZONE (ZAROXOLYN) 5 MG TABLET    Take 5 mg by mouth.    OMEGA-3 FATTY ACIDS/FISH OIL (FISH OIL-OMEGA-3 FATTY ACIDS) 300-1,000 MG CAPSULE    Take 1 capsule by mouth once daily.    PANTOPRAZOLE (PROTONIX) 40 MG TABLET    Take 40 mg by mouth every morning.    POTASSIUM CHLORIDE (MICRO-K) 10 MEQ CPSR    Take 10 mEq by mouth once.    SERTRALINE (ZOLOFT) 50 MG TABLET    Take 50 mg by mouth once daily.    SIMVASTATIN (ZOCOR) 5 MG TABLET    Take 5 mg by mouth every evening.    SOTALOL (SOTALOL AF) 80 MG TABLET    Take 80 mg by mouth 2 (two) times daily.    TIMOLOL (BETIMOL) 0.5 % OPHTHALMIC SOLUTION    Place 1 drop into both eyes every morning.    TIMOLOL MALEATE 0.5% (TIMOPTIC) 0.5 % DROP    Place 1 drop into both eyes 2  (two) times daily.    VIT C/E/ZN/COPPR/LUTEIN/ZEAXAN (PRESERVISION AREDS-2 ORAL)    Take by mouth once daily at 6am.   Discontinued Medications    ASPIRIN (ECOTRIN) 325 MG EC TABLET    81 mg.    ATORVASTATIN (LIPITOR) 40 MG TABLET    TAKE 1 TABLET BY MOUTH ONCE DAILY    METOPROLOL SUCCINATE (TOPROL-XL) 100 MG 24 HR TABLET    Take 1 tablet (100 mg total) by mouth once daily.    SERTRALINE (ZOLOFT) 100 MG TABLET    TAKE 1 TABLET(100 MG) BY MOUTH EVERY DAY       Chief Complaint: Follow-up (Hospital f/u)  She presents today after hospitalization at [facility] for 3 days on 11/7/2023.  She only has d/c paperwork.     She was admitted for Afib with RVR and CHF exacerbation.  She was diuresed and on 11/9/2023 had a successful DCCV.  She reports having an echo prior to that procedure.  Her medications were adjusted and she was d/c home. She was seen on 11/15/2023 with another provider for hospital d/c and noted to have low BP. Her medication were further adjusted.  Today she reports she is feeling well.  She is still having intermittent low BP with SBP in the 90s with some lightheadedness but this is improving. No chest pain or lower leg swelling. No palpitations. She is scheduled to see cardiology in mid December.     She has Afib and is taking sotalol 80 mg bid with eliquis 5 mg bid. Her metoprolol and rhythmol was stopped.  She is tolerating well. No palpitations.     She has hypertension and her metoprolol and olmesartan was stopped due to low BP. She is now only taking sotalol.     She has chronic lower leg swelling due to diastolic dysfunction. She is now taking lasix 20 mg daily with metolazone 5 mg three days a week.  Her kidney function on 11/15/2023 showed creatinine of 1.0 with GFR of 59.  She is taking KCL 10 meq daily and potasium was 4.1.     She has hyperlipidemia and her atorvastatin 40 mg daily was changed to simvastatin 5 mg daily.  She has been on for 2.5 weeks and tolerating well.     She has uncontrolled  "diabetes with HbA1c of 6.9 on 10/2023. She continues on metformin 500 mg bid and her tradjenta was stopped while hospitalized.     She has depression/anxiety and she continues on zoloft but at the 50 mg daily dose. She feels this is working well.  She continues to struggle with sleep issues and is taking an over the counter supplement.     She has significant abdominal bruising due to lovenox injections while hospitalized.      Review of Systems   Constitutional:  Negative for appetite change, fatigue, fever and unexpected weight change.   HENT:  Negative for congestion, ear pain, hearing loss, sore throat and trouble swallowing.    Eyes:  Negative for pain and visual disturbance.   Respiratory:  Positive for shortness of breath. Negative for cough, chest tightness and wheezing.    Cardiovascular:  Negative for chest pain, palpitations and leg swelling.   Gastrointestinal:  Negative for abdominal pain, blood in stool, constipation, diarrhea, nausea and vomiting.   Endocrine: Negative for polyuria.   Genitourinary:  Negative for dysuria and hematuria.   Musculoskeletal:  Negative for arthralgias, back pain and myalgias.   Skin:  Negative for rash.   Neurological:  Positive for light-headedness. Negative for dizziness, weakness, numbness and headaches.   Hematological:  Does not bruise/bleed easily.   Psychiatric/Behavioral:  Positive for sleep disturbance. Negative for dysphoric mood and suicidal ideas. The patient is not nervous/anxious.        Objective:      Vitals:    11/27/23 1144   BP: 112/72   BP Location: Left arm   Patient Position: Sitting   BP Method: Medium (Manual)   Pulse: 75   Temp: 97 °F (36.1 °C)   SpO2: 95%   Weight: 105.5 kg (232 lb 9.4 oz)   Height: 5' 5" (1.651 m)     Body mass index is 38.7 kg/m².  Physical Exam    General appearance: No acute distress, cooperative  Eyes: PERRL, EOMI, conjunctiva clear  Neck: FROM, soft, supple, no thyromegaly, no bruits, no JVD   Lymph: no anterior or posterior " cervical adenopathy  Heart::  Regular rate and rhythm, 3/6 systolic murmur  Lung: Clear to ascultation bilaterally, no wheezing, no rales, no rhonchi, no distress  Abdomen: Soft, nontender, no distention, no hepatosplenomegaly, bowel sounds normal, no guarding, no rebound, no peritoneal signs  Skin: no rashes, no lesions, bruising on lower abdominal panus.   Extremities: no edema, no cyanosis  Neuro: no focal abnormalities, strength 5/5 b/l UE and LE, 2+ DTRs b/l UE and LE, normal gait  Peripheral pulses: diminished pedal pulses bilaterally,venous stasis changes   Musculoskeletal: FROM, good strenth, no tenderness  Joint: normal appearance, no swelling, no warmth, no deformity in all joints    Assessment:       1. Paroxysmal atrial fibrillation    2. Coronary artery disease without angina pectoris, unspecified vessel or lesion type, unspecified whether native or transplanted heart    3. Chronic diastolic heart failure    4. Essential hypertension    5. Mixed hyperlipidemia    6. Type 2 diabetes mellitus with stage 3b chronic kidney disease, without long-term current use of insulin    7. Major depressive disorder, recurrent episode, mild        Plan:       Paroxysmal atrial fibrillation  NSR today on exam. Continue on sotalol and eliquis.    -     Basic Metabolic Panel    Coronary artery disease without angina pectoris, unspecified vessel or lesion type, unspecified whether native or transplanted heart  Stable and no active symptoms.    Chronic diastolic heart failure  Well compensated today on lasix 20 mg daily and metolazone 5 mg three times a week. Will check renal function today    Essential hypertension  Low BP and her medication was adjusted. She is now only taking sotalol with some improvement of her BP.     Mixed hyperlipidemia  Will recheck lipids on simvastatin 5 mg daily with labs in January.   -     Lipid Panel; Future; Expected date: 11/27/2023    Type 2 diabetes mellitus with stage 3b chronic kidney  disease, without long-term current use of insulin  Uncontrolled and will add farxiga to help with diabetes, and CHF.    -     dapagliflozin propanediol (FARXIGA) 10 mg tablet; Take 1 tablet (10 mg total) by mouth once daily.  Dispense: 90 tablet; Refill: 3    Major depressive disorder, recurrent episode, mild  She is doing well on lower dose of zoloft.     Follow up in about 6 weeks (around 1/8/2024) for chronic medical issues, already scheduled.

## 2023-11-28 ENCOUNTER — PATIENT MESSAGE (OUTPATIENT)
Dept: FAMILY MEDICINE | Facility: CLINIC | Age: 73
End: 2023-11-28
Payer: MEDICARE

## 2023-11-28 LAB
ANION GAP SERPL CALC-SCNC: 9 MMOL/L (ref 8–16)
BUN SERPL-MCNC: 16 MG/DL (ref 8–23)
CALCIUM SERPL-MCNC: 9.4 MG/DL (ref 8.7–10.5)
CHLORIDE SERPL-SCNC: 105 MMOL/L (ref 95–110)
CO2 SERPL-SCNC: 26 MMOL/L (ref 23–29)
CREAT SERPL-MCNC: 0.8 MG/DL (ref 0.5–1.4)
EST. GFR  (NO RACE VARIABLE): >60 ML/MIN/1.73 M^2
GLUCOSE SERPL-MCNC: 186 MG/DL (ref 70–110)
POTASSIUM SERPL-SCNC: 4.3 MMOL/L (ref 3.5–5.1)
SODIUM SERPL-SCNC: 140 MMOL/L (ref 136–145)

## 2023-11-28 RX ORDER — FLUTICASONE PROPIONATE 50 MCG
SPRAY, SUSPENSION (ML) NASAL
Qty: 48 G | Refills: 3 | Status: SHIPPED | OUTPATIENT
Start: 2023-11-28

## 2023-11-28 NOTE — TELEPHONE ENCOUNTER
Refill Routing Note   Medication(s) are not appropriate for processing by [facility] for the following reason(s):        No active prescription written by provider    [facility] action(s):  Defer        Medication Therapy Plan: discontinued on 10/17/2023 by [address] NP for the following reason: Stop Taking at Discharge.      Appointments  past 12m or future 3m with PCP    Date Provider   Last Visit   11/27/2023 Elizabeth Jacobo, DO   Next Visit   1/9/2024 Elizabeth Jacobo, DO   ED visits in past 90 days: 0        Note composed:3:14 PM 11/28/2023

## 2023-11-28 NOTE — TELEPHONE ENCOUNTER
No care due was identified.  [facility] Embedded Care Due Messages. Reference number: [id number].   11/28/2023 11:58:15 AM CST

## 2023-12-04 ENCOUNTER — TELEPHONE (OUTPATIENT)
Dept: FAMILY MEDICINE | Facility: CLINIC | Age: 73
End: 2023-12-04
Payer: MEDICARE

## 2023-12-04 ENCOUNTER — PROCEDURE VISIT (OUTPATIENT)
Dept: OPHTHALMOLOGY | Facility: CLINIC | Age: 73
End: 2023-12-04
Payer: MEDICARE

## 2023-12-04 DIAGNOSIS — H35.3132 INTERMEDIATE STAGE NONEXUDATIVE AGE-RELATED MACULAR DEGENERATION OF BOTH EYES: ICD-10-CM

## 2023-12-04 DIAGNOSIS — H35.033 HYPERTENSIVE RETINOPATHY, BILATERAL: ICD-10-CM

## 2023-12-04 DIAGNOSIS — H35.3221 EXUDATIVE AGE-RELATED MACULAR DEGENERATION OF LEFT EYE WITH ACTIVE CHOROIDAL NEOVASCULARIZATION: Primary | ICD-10-CM

## 2023-12-04 PROCEDURE — 67028 PR INJECT INTRAVITREAL PHARMCOLOGIC: ICD-10-PCS | Mod: LT,S$GLB,, | Performed by: OPHTHALMOLOGY

## 2023-12-04 PROCEDURE — 67028 INJECTION EYE DRUG: CPT | Mod: LT,S$GLB,, | Performed by: OPHTHALMOLOGY

## 2023-12-04 PROCEDURE — 92012 INTRM OPH EXAM EST PATIENT: CPT | Mod: 25,S$GLB,, | Performed by: OPHTHALMOLOGY

## 2023-12-04 PROCEDURE — 92012 PR EYE EXAM, EST PATIENT,INTERMED: ICD-10-PCS | Mod: 25,S$GLB,, | Performed by: OPHTHALMOLOGY

## 2023-12-04 PROCEDURE — 92134 CPTRZ OPH DX IMG PST SGM RTA: CPT | Mod: S$GLB,,, | Performed by: OPHTHALMOLOGY

## 2023-12-04 PROCEDURE — 92134 POSTERIOR SEGMENT OCT RETINA (OCULAR COHERENCE TOMOGRAPHY)-BOTH EYES: ICD-10-PCS | Mod: S$GLB,,, | Performed by: OPHTHALMOLOGY

## 2023-12-04 RX ADMIN — Medication 1.25 MG: at 02:12

## 2023-12-04 NOTE — TELEPHONE ENCOUNTER
----- Message from Briseida Coy sent at 12/2/2023 10:52 AM CST -----  Contact: patient  Type:  Needs Medical Advice    Who Called: patient     Would the patient rather a call back or a response via [facility]? Call     Best Call Back Number:  [phone] (home)      Additional Information: Patient would like to speak with the nurse in regards to a referral.     Please call to advise

## 2023-12-04 NOTE — TELEPHONE ENCOUNTER
No longer able to see [facility] due to insurance. Wanted a recommendation on who to see. Advised pt to Speak with Dr. Burnette, who she is seeing today, for a recommendation.

## 2023-12-07 ENCOUNTER — TELEPHONE (OUTPATIENT)
Dept: FAMILY MEDICINE | Facility: CLINIC | Age: 73
End: 2023-12-07
Payer: MEDICARE

## 2023-12-07 NOTE — TELEPHONE ENCOUNTER
----- Message from Hood Hannon sent at 12/7/2023  3:28 PM CST -----  Regarding: results  Type:  Needs Medical Advice    Who Called: pt    Best Call Back Number: [phone]      Additional Information: pt wants a call back to discuss her test results, and get a copy of it as well.  please call to discuss.

## 2023-12-14 DIAGNOSIS — F33.0 MAJOR DEPRESSIVE DISORDER, RECURRENT EPISODE, MILD: ICD-10-CM

## 2023-12-14 RX ORDER — SERTRALINE HYDROCHLORIDE 50 MG/1
50 TABLET, FILM COATED ORAL DAILY
Qty: 90 TABLET | Refills: 2 | Status: SHIPPED | OUTPATIENT
Start: 2023-12-14

## 2023-12-14 NOTE — TELEPHONE ENCOUNTER
----- Message from Daysi Adams sent at 12/14/2023 10:44 AM CST -----  Regarding: Needs refills  Type:  RX Refill Request    Who Called:  Sneha  Refill or New Rx:  refill  RX Name and Strength:   Disp Refills Start End   sertraline (ZOLOFT) 50 MG tablet           How is the patient currently taking it? (ex. 1XDay):  see chart  Is this a 30 day or 90 day RX:  see chart  Preferred Pharmacy with phone number:    [id number] - [facility] [address] [facility] [address]  [id number] [place] [id number]  Phone: [phone] Fax: [phone]      Local or Mail Order:  local  Ordering Provider:  michelle Knapp Call Back Number:  [phone]    Additional Information:  Pt is completely out of medication please fill as soon as possible

## 2023-12-14 NOTE — TELEPHONE ENCOUNTER
No care due was identified.  [facility] Embedded Care Due Messages. Reference number: [id number].   12/14/2023 11:03:47 AM CST

## 2023-12-22 NOTE — PROGRESS NOTES
HPI     3 month follow up   avastin injection  os      Additional comments: AVASTIN INJECTION OS   ARMD     CHORODIAL NEOVASCULARIZATION          OCT - Drusen OU  OD PVD  OS VMA at [facility].  PED - SRF increase    Prior FP - OD with white choroidal patches - stable.  Possible just tesselated fundus appearance  OS PED with small subfoveal SRF =- resolved    Prior FA - OD - late staiing of drusen, no [facility] uptake   OS minimal leakage at apex of PED with Avastin on baord      A/P    1. Dry AMD OU  AREDS/AG    Wet AMD OS  4/23 - increased in PED with apical SRF  S/p Avastin OS x 2  9/23 - trace increase in SRF at 14 weeks  12/23 - increase OS at 10 weeks    Avastin OS today    Tighten back to 8 wk  Get E[facility] approval    2. Scattered white choroidal patches   Stable FP  No sig leakage or uptake on FA OD  Possible just tesselated fundus appearance    3. PVD OD    4. HTN Ret OU    5.  PCIOL OU      8 weeks OCT and dilate    Letter TO Dr. Tucker    Risks, benefits, and alternatives to treatment discussed in detail with the patient.  The patient voiced understanding and wished to proceed with the procedure    Injection Procedure Note:  Diagnosis: Wet AMD OS    Patient Identified and Time Out complete  Pt marked  Topical Proparacaine and Betadine.  Inject Avastin OS at 6:00 @ 3.5-4mm posterior to limbus  Post Operative Dx: Same  Complications: None  Follow up as above.

## 2024-01-05 ENCOUNTER — LAB VISIT (OUTPATIENT)
Dept: LAB | Facility: HOSPITAL | Age: 74
End: 2024-01-05
Attending: INTERNAL MEDICINE
Payer: MEDICARE

## 2024-01-05 DIAGNOSIS — N18.32 TYPE 2 DIABETES MELLITUS WITH STAGE 3B CHRONIC KIDNEY DISEASE, WITHOUT LONG-TERM CURRENT USE OF INSULIN: ICD-10-CM

## 2024-01-05 DIAGNOSIS — E11.22 TYPE 2 DIABETES MELLITUS WITH STAGE 3B CHRONIC KIDNEY DISEASE, WITHOUT LONG-TERM CURRENT USE OF INSULIN: ICD-10-CM

## 2024-01-05 DIAGNOSIS — E78.2 MIXED HYPERLIPIDEMIA: ICD-10-CM

## 2024-01-05 LAB
ANION GAP SERPL CALC-SCNC: 12 MMOL/L (ref 8–16)
BUN SERPL-MCNC: 27 MG/DL (ref 8–23)
CALCIUM SERPL-MCNC: 9.8 MG/DL (ref 8.7–10.5)
CHLORIDE SERPL-SCNC: 100 MMOL/L (ref 95–110)
CHOLEST SERPL-MCNC: 262 MG/DL (ref 120–199)
CHOLEST/HDLC SERPL: 7.3 {RATIO} (ref 2–5)
CO2 SERPL-SCNC: 26 MMOL/L (ref 23–29)
CREAT SERPL-MCNC: 1.4 MG/DL (ref 0.5–1.4)
EST. GFR  (NO RACE VARIABLE): 39.7 ML/MIN/1.73 M^2
ESTIMATED AVG GLUCOSE: 154 MG/DL (ref 68–131)
GLUCOSE SERPL-MCNC: 169 MG/DL (ref 70–110)
HBA1C MFR BLD: 7 % (ref 4–5.6)
HDLC SERPL-MCNC: 36 MG/DL (ref 40–75)
HDLC SERPL: 13.7 % (ref 20–50)
LDLC SERPL CALC-MCNC: 170.2 MG/DL (ref 63–159)
NONHDLC SERPL-MCNC: 226 MG/DL
POTASSIUM SERPL-SCNC: 5.1 MMOL/L (ref 3.5–5.1)
SODIUM SERPL-SCNC: 138 MMOL/L (ref 136–145)
TRIGL SERPL-MCNC: 279 MG/DL (ref 30–150)

## 2024-01-05 PROCEDURE — 36415 COLL VENOUS BLD VENIPUNCTURE: CPT | Mod: PO | Performed by: INTERNAL MEDICINE

## 2024-01-05 PROCEDURE — 83036 HEMOGLOBIN GLYCOSYLATED A1C: CPT | Performed by: INTERNAL MEDICINE

## 2024-01-05 PROCEDURE — 80061 LIPID PANEL: CPT | Performed by: INTERNAL MEDICINE

## 2024-01-05 PROCEDURE — 80048 BASIC METABOLIC PNL TOTAL CA: CPT | Performed by: INTERNAL MEDICINE

## 2024-01-09 ENCOUNTER — OFFICE VISIT (OUTPATIENT)
Dept: FAMILY MEDICINE | Facility: CLINIC | Age: 74
End: 2024-01-09
Payer: MEDICARE

## 2024-01-09 VITALS
TEMPERATURE: 98 F | OXYGEN SATURATION: 95 % | BODY MASS INDEX: 37.23 KG/M2 | WEIGHT: 223.44 LBS | RESPIRATION RATE: 18 BRPM | HEART RATE: 74 BPM | SYSTOLIC BLOOD PRESSURE: 116 MMHG | HEIGHT: 65 IN | DIASTOLIC BLOOD PRESSURE: 72 MMHG

## 2024-01-09 DIAGNOSIS — I48.0 PAROXYSMAL ATRIAL FIBRILLATION: Primary | ICD-10-CM

## 2024-01-09 DIAGNOSIS — N18.32 STAGE 3B CHRONIC KIDNEY DISEASE: ICD-10-CM

## 2024-01-09 DIAGNOSIS — I10 ESSENTIAL HYPERTENSION: ICD-10-CM

## 2024-01-09 DIAGNOSIS — E78.2 MIXED HYPERLIPIDEMIA: ICD-10-CM

## 2024-01-09 DIAGNOSIS — K21.9 GASTROESOPHAGEAL REFLUX DISEASE WITHOUT ESOPHAGITIS: ICD-10-CM

## 2024-01-09 DIAGNOSIS — J45.20 MILD INTERMITTENT ASTHMA WITHOUT COMPLICATION: ICD-10-CM

## 2024-01-09 DIAGNOSIS — F51.01 PRIMARY INSOMNIA: ICD-10-CM

## 2024-01-09 DIAGNOSIS — E66.01 MORBID OBESITY WITH BMI OF 40.0-44.9, ADULT: ICD-10-CM

## 2024-01-09 DIAGNOSIS — N32.81 OAB (OVERACTIVE BLADDER): ICD-10-CM

## 2024-01-09 DIAGNOSIS — H35.3221 EXUDATIVE AGE-RELATED MACULAR DEGENERATION OF LEFT EYE WITH ACTIVE CHOROIDAL NEOVASCULARIZATION: ICD-10-CM

## 2024-01-09 DIAGNOSIS — N18.32 TYPE 2 DIABETES MELLITUS WITH STAGE 3B CHRONIC KIDNEY DISEASE, WITHOUT LONG-TERM CURRENT USE OF INSULIN: ICD-10-CM

## 2024-01-09 DIAGNOSIS — Z12.11 SCREEN FOR COLON CANCER: ICD-10-CM

## 2024-01-09 DIAGNOSIS — D64.9 ANEMIA, UNSPECIFIED TYPE: ICD-10-CM

## 2024-01-09 DIAGNOSIS — I73.9 PAD (PERIPHERAL ARTERY DISEASE): ICD-10-CM

## 2024-01-09 DIAGNOSIS — M17.12 PRIMARY OSTEOARTHRITIS OF LEFT KNEE: ICD-10-CM

## 2024-01-09 DIAGNOSIS — G47.33 OSA (OBSTRUCTIVE SLEEP APNEA): ICD-10-CM

## 2024-01-09 DIAGNOSIS — I25.118 CORONARY ARTERY DISEASE OF NATIVE ARTERY OF NATIVE HEART WITH STABLE ANGINA PECTORIS: ICD-10-CM

## 2024-01-09 DIAGNOSIS — I25.5 ISCHEMIC CARDIOMYOPATHY: ICD-10-CM

## 2024-01-09 DIAGNOSIS — Z12.31 ENCOUNTER FOR SCREENING MAMMOGRAM FOR MALIGNANT NEOPLASM OF BREAST: ICD-10-CM

## 2024-01-09 DIAGNOSIS — K76.0 FATTY LIVER: ICD-10-CM

## 2024-01-09 DIAGNOSIS — F33.0 MAJOR DEPRESSIVE DISORDER, RECURRENT EPISODE, MILD: ICD-10-CM

## 2024-01-09 DIAGNOSIS — I50.32 CHRONIC DIASTOLIC HEART FAILURE: ICD-10-CM

## 2024-01-09 DIAGNOSIS — E11.22 TYPE 2 DIABETES MELLITUS WITH STAGE 3B CHRONIC KIDNEY DISEASE, WITHOUT LONG-TERM CURRENT USE OF INSULIN: ICD-10-CM

## 2024-01-09 DIAGNOSIS — E03.9 HYPOTHYROIDISM, UNSPECIFIED TYPE: ICD-10-CM

## 2024-01-09 DIAGNOSIS — I65.22 STENOSIS OF LEFT CAROTID ARTERY: ICD-10-CM

## 2024-01-09 DIAGNOSIS — I35.0 NONRHEUMATIC AORTIC VALVE STENOSIS: ICD-10-CM

## 2024-01-09 PROCEDURE — 99215 OFFICE O/P EST HI 40 MIN: CPT | Mod: S$GLB,,, | Performed by: INTERNAL MEDICINE

## 2024-01-09 RX ORDER — ATORVASTATIN CALCIUM 40 MG/1
40 TABLET, FILM COATED ORAL DAILY
Qty: 90 TABLET | Refills: 3 | Status: SHIPPED | OUTPATIENT
Start: 2024-01-09 | End: 2025-01-08

## 2024-01-09 RX ORDER — VITAMIN E 268 MG
CAPSULE ORAL
COMMUNITY

## 2024-01-09 RX ORDER — MOMETASONE FUROATE 1 MG/G
CREAM TOPICAL
COMMUNITY

## 2024-01-09 RX ORDER — NYSTATIN 100000 U/G
CREAM TOPICAL
COMMUNITY

## 2024-01-09 RX ORDER — MELATONIN 10 MG
20 CAPSULE ORAL
COMMUNITY

## 2024-01-09 NOTE — PROGRESS NOTES
[employer] was given to patient on 1/9/2024 9:58 AM

## 2024-01-09 NOTE — PROGRESS NOTES
Subjective:       Patient ID: Sneha Mcghee is a 73 y.o. female.    Medication List with Changes/Refills   New Medications    ATORVASTATIN (LIPITOR) 40 MG TABLET    Take 1 tablet (40 mg total) by mouth once daily.   Current Medications    ALBUTEROL (PROVENTIL/VENTOLIN HFA) 90 MCG/ACTUATION INHALER    INHALE 2 PUFFS INTO THE LUNGS EVERY 6 HOURS AS NEEDED FOR WHEEZING    ASPIRIN (ASPIR-81 ORAL)    Take 81 mg by mouth once daily.    BLOOD SUGAR DIAGNOSTIC STRP    To check BG once a day and PRN low sugars, to use with insurance preferred meter    BLOOD-GLUCOSE METER KIT    To check BG qam and PRN lows, to use with insurance preferred meter    DAPAGLIFLOZIN PROPANEDIOL (FARXIGA) 10 MG TABLET    Take 1 tablet (10 mg total) by mouth once daily.    ELIQUIS 5 MG TAB    Take 5 mg by mouth 2 (two) times daily.    FLUTICASONE PROPIONATE (FLONASE) 50 MCG/ACTUATION NASAL SPRAY    SHAKE LIQUID AND USE 2 SPRAYS(100 MCG) IN EACH NOSTRIL EVERY DAY    FUROSEMIDE (LASIX) 20 MG TABLET    TAKE 1 TABLET BY MOUTH EVERY DAY    LANCETS MISC    To check BG once a day and PRN lows, to use with insurance preferred meter    LATANOPROST 0.005 % OPHTHALMIC SOLUTION    latanoprost 0.005 % eye drops   Instill 1 drop every day by ophthalmic route for 90 days.    LEVOTHYROXINE (SYNTHROID) 88 MCG TABLET    TAKE 1 TABLET(88 MCG) BY MOUTH BEFORE BREAKFAST    MELATONIN 10 MG CAP    Take 20 mg by mouth.    METFORMIN (GLUCOPHAGE-XR) 500 MG ER 24HR TABLET    TAKE 1 TABLET(500 MG) BY MOUTH TWICE DAILY    METOLAZONE (ZAROXOLYN) 5 MG TABLET    Take 5 mg by mouth daily as needed (worsening lower leg swelling).    MOMETASONE 0.1% (ELOCON) 0.1 % CREAM    APPLY TOPICALLY TO THE AFFECTED AREA EVERY DAY    NYSTATIN (MYCOSTATIN) CREAM    Apply 1 application as needed by topical route for 30 days.    OMEGA-3 FATTY ACIDS/FISH OIL (FISH OIL-OMEGA-3 FATTY ACIDS) 300-1,000 MG CAPSULE    Take 1 capsule by mouth once daily.    PANTOPRAZOLE (PROTONIX) 40 MG TABLET    Take 40  mg by mouth every morning.    POTASSIUM CHLORIDE (MICRO-K) 10 MEQ CPSR    Take 10 mEq by mouth once.    SERTRALINE (ZOLOFT) 50 MG TABLET    Take 1 tablet (50 mg total) by mouth once daily.    SOTALOL (SOTALOL AF) 80 MG TABLET    Take 80 mg by mouth 2 (two) times daily.    TIMOLOL (BETIMOL) 0.5 % OPHTHALMIC SOLUTION    Place 1 drop into both eyes every morning.    TIMOLOL MALEATE 0.5% (TIMOPTIC) 0.5 % DROP    Place 1 drop into both eyes 2 (two) times daily.    VIT C/E/ZN/COPPR/LUTEIN/ZEAXAN (PRESERVISION AREDS-2 ORAL)    Take by mouth once daily at 6am.    VITAMIN E 400 UNIT CAPSULE    Take 1 capsule every day by oral route.   Discontinued Medications    SIMVASTATIN (ZOCOR) 5 MG TABLET    Take 5 mg by mouth every evening.       Chief Complaint: Follow-up  he is here today to f/u on chronic medical issues.      She has CAD s/p CABG x 4 vessels in 2012 with 8 stents.  She reports a cardiomyopathy with chronic heart failure.  She has paroxysmal Afib for many years. Echo on 8/2022 showed EF 65%, positive diastolic dysfunction, cLVH, moderate AS (HAMILTON 1.49 and gradient of 23), mild TR and PAP of 29. Nuclear stress on 3/2023 was negative. She is taking sotalol 80 mg bid and eliquis 5 mg bid. [id number] Score: 6 (stoke risk of 9.7%/year).  She is followed by an outside cardiologist.     She has hyperlipidemia and her medication was changed from atorvastatin 40 to simvastatin 5 mg during a hospital stay on 11/2023. Repeat lipids on 1/2024 were 262/279/36/170.       She has carotid artery stenosis and is s/p left carotid stent.  Carotid u/s on 8/2022 showed patent stent in left carotid bulb and no significant stenosis.      She has PVD but has not had stenting of lower legs. She has chronic venous insufficiency and is s/p multiple ELVT.  She has chronic stasis dermatitis left > right.  She had a prolonged course for a non-healing ulcer of left lower leg that is now healed.  Venous u/s on 1/2022 showed venous reflux.  Arterial  ultrasound on 1/2022 showed 50-75% stenosis of proximal left anterior tibial artery, SARITHA right 1.01 and left 0.97. She denies any recurrence of open wounds. She continues on lasix 20 mg daily with metolazone 5 mg before lasix three days a week. She feels her lower leg swelling has been well controlled but does not notice much difference in her diuresis with and without metolazone.      She has diabetes and is taking metformin 500 mg bid and farxiga 10 mg daily. Her HbA1c was 7.0 on 1/2024.  She is UTD on her eye exam and due foot exam. Her microalbumin is negative on 6/2023. She is not checking her glucose at home.       She has CKD  with increased creatinine of 1.4 with GFR of 39 on 1/2024 (since starting metolazone in addition to lasix).       She has asthma since 2012 that started after her heart surgery. She has symptoms about once every 6 months. Her symptoms are chest tightness and wheezing.  She denies any known triggers. She uses albuterol for her increased symptoms with good relief. Today she denies any active symptoms.      She has hypothyroid and is taking levothyroxine 88 mcg daily. Her TSH on 6/2023 was normal.      She has glaucoma and is using timolol and xalatan drops. She is followed by ophthalmology and reports her pressures have been stable but has wet  macular degeneration and continues on  intraocular injections.      She has incontinence of urine and uses pads. She denies dysuria or hematuria.      She has fatty liver.       She has depression and is taking zoloft 50 mg qday. She feels that this controls her symptoms. No suicidal ideations.  She does occasionally get severe anxiety with panic attacks.  She has insomnia and uses OTC medication.      She has VALENTE and refuses to wear CPAP.      She has left knee osteoarthritis and is followed by orthopedics. She was told she needs a TKR but does not want done at this time. She would like to restart PT.      She lives alone and feels safe.  She does  "not exercise and is very sedentary. She does eat heathy.  She has poor balance but denies any recent falls.      Colonoscopy---4/2016 repeat in 5 years ([facility])---has fecal kit at home   Mammogram----3/2023 neg   DEXA-----7/2023 normal  Tdap---more than 10 years   Influenza vaccine---9/2023  Pneumovax 23----11/2016  Prevnar 13----10/2017  Shingles vaccine-----5/2023  Covid vaccine---- 2 doses       Review of Systems   Constitutional:  Negative for appetite change, fatigue, fever and unexpected weight change.   HENT:  Negative for congestion, ear pain, hearing loss, sore throat and trouble swallowing.    Eyes:  Negative for pain and visual disturbance.   Respiratory:  Negative for cough, chest tightness, shortness of breath and wheezing.    Cardiovascular:  Positive for leg swelling. Negative for chest pain and palpitations.   Gastrointestinal:  Negative for abdominal pain, blood in stool, constipation, diarrhea, nausea and vomiting.   Endocrine: Negative for polyuria.   Genitourinary:  Negative for dysuria and hematuria.   Musculoskeletal:  Negative for arthralgias, back pain and myalgias.   Skin:  Negative for rash.   Neurological:  Negative for dizziness, weakness, numbness and headaches.   Hematological:  Does not bruise/bleed easily.   Psychiatric/Behavioral:  Negative for dysphoric mood, sleep disturbance and suicidal ideas. The patient is not nervous/anxious.        Objective:      Vitals:    01/09/24 0914   BP: 116/72   Pulse: 74   Resp: 18   Temp: 97.5 °F (36.4 °C)   SpO2: 95%   Weight: 101.4 kg (223 lb 7 oz)   Height: 5' 5" (1.651 m)     Body mass index is 37.18 kg/m².  Physical Exam    General appearance: No acute distress, cooperative  Eyes: PERRL, EOMI, conjunctiva clear  Ears: normal external ear and pinna, tm clear without drainage, canals clear  Nose: Normal mucosa without drainage  Throat: no exudates or erythema, tonsils not enlarged  Mouth: no sores or lesions, moist mucous membranes  Neck: FROM, " soft, supple, no thyromegaly, no bruits  Lymph: no anterior or posterior cervical adenopathy  Heart::  Regular rate and rhythm, 3/6 systolic murmur  Lung: Clear to ascultation bilaterally, no wheezing, no rales, no rhonchi, no distress  Abdomen: Soft, nontender, no distention, no hepatosplenomegaly, bowel sounds normal, no guarding, no rebound, no peritoneal signs  Skin: no rashes, no lesions  Extremities: trace bilateral edema, no cyanosis  Diabetic foot exam:   Left: Pulses: 1+ pedal pulses   Sensation: normal   Filament test present   Apperance: no ulcers, yes callous formation, no deformities, yes onychomycosis, yes thickened nails   Right: Pulses: 1+ pedal pulses   Sensation: normal   Filament test present   Appearance: no ulcers, yes callous formation, no deformities, yes onychomycosis, yes thickened nails  Neuro: CN 2-12 intact, 5/5 muscle strength upper and lower extremity bilaterally, 2+ DTRs UE and LE bilaterally, normal gait  Peripheral pulses: 1+ pedal pulses bilaterally,venous stasis changes of lower legs with erythema, dry flaky skin  Musculoskeletal: FROM, good strenth, no tenderness  Joint: normal appearance, no swelling, no warmth, no deformity in all joints    Assessment:       1. Paroxysmal atrial fibrillation    2. Coronary artery disease of native artery of native heart with stable angina pectoris    3. Ischemic cardiomyopathy    4. Chronic diastolic heart failure    5. Nonrheumatic aortic valve stenosis    6. Essential hypertension    7. Mixed hyperlipidemia    8. PAD (peripheral artery disease)    9. Stenosis of left carotid artery    10. Mild intermittent asthma without complication    11. Hypothyroidism, unspecified type    12. Type 2 diabetes mellitus with stage 3b chronic kidney disease, without long-term current use of insulin    13. Stage 3b chronic kidney disease    14. Anemia, unspecified type    15. Gastroesophageal reflux disease without esophagitis    16. Fatty liver    17. OAB  (overactive bladder)    18. Major depressive disorder, recurrent episode, mild    19. Primary insomnia    20. Exudative age-related macular degeneration of left eye with active choroidal neovascularization    21. VALENTE (obstructive sleep apnea)    22. Primary osteoarthritis of left knee    23. Morbid obesity with BMI of 40.0-44.9, adult    24. Encounter for screening mammogram for malignant neoplasm of breast    25. Screen for colon cancer        Plan:       Paroxysmal atrial fibrillation  NSR today on exam. Continue sotalol and eliquis    Coronary artery disease of native artery of native heart with stable angina pectoris  STable and no active symptoms.     Ischemic cardiomyopathy with Chronic diastolic heart failure  She is well compensated but on too much diuretic (increasing renal function). Will change metolazone to 5 mg PRN and continue lasix 20 mg daily. Continue farxiga    Nonrheumatic aortic valve stenosis  Stable and asymptomatic. Continue to follow with cardiology    Essential hypertension  Well controlled and continue current regimen.   -     CBC Auto Differential; Future; Expected date: 01/09/2024  -     Comprehensive Metabolic Panel; Future; Expected date: 01/09/2024  -     TSH; Future; Expected date: 01/09/2024  -     Lipid Panel; Future; Expected date: 01/09/2024    Mixed hyperlipidemia  Uncontrolled and will switch her simvastatin back to atorvastatin at 40 mg daily. Recheck lipids before next appt  -     atorvastatin (LIPITOR) 40 MG tablet; Take 1 tablet (40 mg total) by mouth once daily.  Dispense: 90 tablet; Refill: 3    PAD (peripheral artery disease)  Continue statin and eliquis    Stenosis of left carotid artery  Continue statin and eliquis    Mild intermittent asthma without complication  Stable and no active symptoms.    Hypothyroidism, unspecified type  Good control on this dose of levothyroxine    Type 2 diabetes mellitus with stage 3b chronic kidney disease, without long-term current use of  insulin  Uncontrolled and will continue current regimen (she ate poorly during the holidays).  Recheck HbA1c and if still elevated then will increase her dose of metformin. Foot exam done today.   -     Hemoglobin A1C; Future; Expected date: 01/09/2024  -     Microalbumin/Creatinine Ratio, Urine; Future; Expected date: 01/09/2024    Stage 3b chronic kidney disease  Increased creatinine due over diuresis. Adjust diuretics as above.   -     PTH, intact; Future; Expected date: 01/09/2024    Anemia, unspecified type  Stable and will recheck with iron studies before her next appt.   -     Iron and TIBC; Future; Expected date: 01/09/2024  -     Ferritin; Future; Expected date: 01/09/2024    Gastroesophageal reflux disease without esophagitis  Well controlled and continue current regimen.     Fatty liver  Normal LFTs and encourage weight loss    OAB (overactive bladder)  At baseline and she is not on treatment.     Major depressive disorder, recurrent episode, mild  Well controlled and continue current regimen.     Primary insomnia  Stable and continue to monitor    Exudative age-related macular degeneration of left eye with active choroidal neovascularization  Stable per patient and continue to follow with ophthalmology    VALENTE (obstructive sleep apnea)  She continues to refuse  cpap. She would benefit from regular use    Primary osteoarthritis of left knee  Referral back to PT for evaluation and treatment. She did well in the past.   -     Ambulatory referral/consult to Physical/Occupational Therapy; Future; Expected date: 01/16/2024    Morbid obesity with BMI of 40.0-44.9, adult  Long discussion on the benefits of healthy eating and regular exercise to help lose weight and help control diabetes, hypertension, cad, and hyperlipidemia.     Encounter for screening mammogram for malignant neoplasm of breast  -     Mammo Digital Screening Bilat w/ Tu; Future; Expected date: 03/25/2024    Screen for colon cancer  -     Fecal  Immunochemical Test (iFOBT); Future; Expected date: 01/09/2024    Follow up in about 4 months (around 5/9/2024) for chronic medical issues.     50 minutes of total time spent on the encounter, which includes face to face time and non-face to face time preparing to see the patient (eg, review of tests), Obtaining and/or reviewing separately obtained history, documenting clinical information in the electronic or other health record, independently interpreting results (not separately reported) and communicating results to the patient/family/caregiver, or Care coordination (not separately reported).

## 2024-01-25 DIAGNOSIS — E03.9 HYPOTHYROIDISM, UNSPECIFIED TYPE: ICD-10-CM

## 2024-01-25 RX ORDER — LEVOTHYROXINE SODIUM 88 UG/1
TABLET ORAL
Qty: 90 TABLET | Refills: 1 | Status: SHIPPED | OUTPATIENT
Start: 2024-01-25

## 2024-01-25 NOTE — TELEPHONE ENCOUNTER
No care due was identified.  [facility] Embedded Care Due Messages. Reference number: [id number].   1/25/2024 5:25:03 AM CST

## 2024-01-25 NOTE — TELEPHONE ENCOUNTER
Refill Decision Note   Snehaedmar LeesTaz  is requesting a refill authorization.  Brief Assessment and Rationale for Refill:  Approve     Medication Therapy Plan:         Comments:     Note composed:10:24 AM 01/25/2024

## 2024-01-29 ENCOUNTER — PROCEDURE VISIT (OUTPATIENT)
Dept: OPHTHALMOLOGY | Facility: CLINIC | Age: 74
End: 2024-01-29
Payer: MEDICARE

## 2024-01-29 DIAGNOSIS — H35.3221 EXUDATIVE AGE-RELATED MACULAR DEGENERATION OF LEFT EYE WITH ACTIVE CHOROIDAL NEOVASCULARIZATION: Primary | ICD-10-CM

## 2024-01-29 DIAGNOSIS — H35.033 HYPERTENSIVE RETINOPATHY, BILATERAL: ICD-10-CM

## 2024-01-29 DIAGNOSIS — H35.3132 INTERMEDIATE STAGE NONEXUDATIVE AGE-RELATED MACULAR DEGENERATION OF BOTH EYES: ICD-10-CM

## 2024-01-29 DIAGNOSIS — H43.811 POSTERIOR VITREOUS DETACHMENT, RIGHT: ICD-10-CM

## 2024-01-29 PROCEDURE — 92134 CPTRZ OPH DX IMG PST SGM RTA: CPT | Mod: S$GLB,,, | Performed by: OPHTHALMOLOGY

## 2024-01-29 PROCEDURE — 92014 COMPRE OPH EXAM EST PT 1/>: CPT | Mod: 25,S$GLB,, | Performed by: OPHTHALMOLOGY

## 2024-01-29 PROCEDURE — 67028 INJECTION EYE DRUG: CPT | Mod: LT,S$GLB,, | Performed by: OPHTHALMOLOGY

## 2024-03-07 ENCOUNTER — OFFICE VISIT (OUTPATIENT)
Dept: URGENT CARE | Facility: CLINIC | Age: 74
End: 2024-03-07
Payer: MEDICARE

## 2024-03-07 VITALS
RESPIRATION RATE: 15 BRPM | HEART RATE: 60 BPM | TEMPERATURE: 98 F | DIASTOLIC BLOOD PRESSURE: 75 MMHG | SYSTOLIC BLOOD PRESSURE: 128 MMHG | BODY MASS INDEX: 36.65 KG/M2 | OXYGEN SATURATION: 97 % | HEIGHT: 65 IN | WEIGHT: 220 LBS

## 2024-03-07 DIAGNOSIS — L30.9 DERMATITIS: Primary | ICD-10-CM

## 2024-03-07 DIAGNOSIS — B35.0 TINEA CAPITIS: ICD-10-CM

## 2024-03-07 PROCEDURE — 99213 OFFICE O/P EST LOW 20 MIN: CPT | Mod: S$GLB,,, | Performed by: PHYSICIAN ASSISTANT

## 2024-03-07 RX ORDER — KETOCONAZOLE 20 MG/ML
SHAMPOO, SUSPENSION TOPICAL
Qty: 120 ML | Refills: 0 | Status: SHIPPED | OUTPATIENT
Start: 2024-03-07

## 2024-03-07 RX ORDER — PREDNISONE 20 MG/1
20 TABLET ORAL DAILY
Qty: 5 TABLET | Refills: 0 | Status: SHIPPED | OUTPATIENT
Start: 2024-03-07 | End: 2024-03-12

## 2024-03-07 RX ORDER — KETOCONAZOLE 20 MG/ML
SHAMPOO, SUSPENSION TOPICAL
Qty: 120 ML | Refills: 0 | Status: SHIPPED | OUTPATIENT
Start: 2024-03-07 | End: 2024-03-07

## 2024-03-07 NOTE — PROGRESS NOTES
"Subjective:      Patient ID: Sneha Mcghee is a 73 y.o. female.    Vitals:  height is 5' 5" (1.651 m) and weight is 99.8 kg (220 lb). Her oral temperature is 97.8 °F (36.6 °C). Her blood pressure is 128/75 and her pulse is 60. Her respiration is 15 and oxygen saturation is 97%.     Chief Complaint: Rash    Patient presents to clinic with complaint of rash on back, neck, and scalp. Symptom has been on going for a few weeks.     Rash  This is a new problem. The current episode started 1 to 4 weeks ago. The problem is unchanged. The affected locations include the scalp, neck and back. The rash is characterized by redness and itchiness. She was exposed to nothing. Pertinent negatives include no anorexia, congestion, cough, diarrhea, eye pain, facial edema, fatigue, fever, joint pain, nail changes, rhinorrhea, shortness of breath, sore throat or vomiting.       Constitution: Negative for chills, sweating, fatigue and fever.   HENT:  Negative for ear pain, drooling, congestion, sore throat, trouble swallowing and voice change.    Neck: Negative for neck pain, neck stiffness, painful lymph nodes and neck swelling.   Cardiovascular:  Negative for chest pain, leg swelling, palpitations, sob on exertion and passing out.   Eyes:  Negative for eye pain, eye redness, photophobia, double vision, blurred vision and eyelid swelling.   Respiratory:  Negative for chest tightness, cough, sputum production, bloody sputum, shortness of breath, stridor and wheezing.    Gastrointestinal:  Negative for abdominal pain, abdominal bloating, nausea, vomiting, constipation, diarrhea and heartburn.   Musculoskeletal:  Negative for joint pain, joint swelling, abnormal ROM of joint, back pain, muscle cramps and muscle ache.   Skin:  Positive for rash and erythema. Negative for hives.   Allergic/Immunologic: Negative for seasonal allergies, food allergies, hives, itching and sneezing.   Neurological:  Negative for dizziness, light-headedness, " passing out, loss of balance, headaches, altered mental status, loss of consciousness and seizures.   Hematologic/Lymphatic: Negative for swollen lymph nodes.   Psychiatric/Behavioral:  Negative for altered mental status and nervous/anxious. The patient is not nervous/anxious.       Objective:     Physical Exam   Constitutional: She is oriented to person, place, and time. She appears well-developed.   HENT:   Head: Normocephalic and atraumatic. Head is without abrasion, without contusion and without laceration.   Ears:   Right Ear: External ear normal.   Left Ear: External ear normal.   Nose: Nose normal.   Mouth/Throat: Oropharynx is clear and moist and mucous membranes are normal.   Eyes: Conjunctivae, EOM and lids are normal. Pupils are equal, round, and reactive to light.   Neck: Trachea normal and phonation normal. Neck supple.   Cardiovascular: Normal rate, regular rhythm and normal heart sounds.   Pulmonary/Chest: Effort normal and breath sounds normal. No stridor. No respiratory distress.   Musculoskeletal: Normal range of motion.         General: Normal range of motion.   Neurological: She is alert and oriented to person, place, and time.   Skin: Skin is warm, dry, intact and rash. Capillary refill takes less than 2 seconds. erythema No abrasion, No burn, No bruising and No ecchymosis         Comments: +raised, erythematous rash to scalp and neck. +excoriations secondary to scratching.   Psychiatric: Her speech is normal and behavior is normal. Judgment and thought content normal.   Nursing note and vitals reviewed.      Assessment:     1. Dermatitis    2. Tinea capitis        Plan:       Dermatitis    Tinea capitis    Other orders  -     predniSONE (DELTASONE) 20 MG tablet; Take 1 tablet (20 mg total) by mouth once daily. for 5 days  Dispense: 5 tablet; Refill: 0  -     ketoconazole (NIZORAL) 2 % shampoo; Apply topically twice a week.  Dispense: 120 mL; Refill: 0      Patient Instructions    INSTRUCTIONS:  - Rest.  - Drink plenty of fluids.  - Take Tylenol and/or Ibuprofen as directed as needed for fever/pain.  Do not take more than the recommended dose.  - follow up with your PCP within the next 1-2 weeks as needed.  - You must understand that you have received an Urgent Care treatment only and that you may be released before all of your medical problems are known or treated.   - You, the patient, will arrange for follow up care as instructed.   - If your condition worsens or fails to improve we recommend that you receive another evaluation at the ER immediately or contact your PCP to discuss your concerns.   - You can call [phone] or [phone] to help schedule an appointment with the appropriate provider.     -If you smoke cigarettes, it would be beneficial for you to stop.

## 2024-03-07 NOTE — PATIENT INSTRUCTIONS

## 2024-03-08 ENCOUNTER — TELEPHONE (OUTPATIENT)
Dept: URGENT CARE | Facility: CLINIC | Age: 74
End: 2024-03-08
Payer: MEDICARE

## 2024-03-08 ENCOUNTER — TELEPHONE (OUTPATIENT)
Dept: FAMILY MEDICINE | Facility: CLINIC | Age: 74
End: 2024-03-08
Payer: MEDICARE

## 2024-03-08 NOTE — TELEPHONE ENCOUNTER
Returned call to patient, she was wanting to see about a topical cream that she thought she was going to get from urgent care. The pharmacy did not receive one. The patient was not trying to send a message to Dr Jacobo. Patient stated that she was going to call the urgent care to see if they can send in the cream they spoke with her about.

## 2024-03-08 NOTE — TELEPHONE ENCOUNTER
----- Message from Tasha Morris sent at 3/8/2024  1:53 PM CST -----  Contact: self  Type:  Needs Medical Advice  Who Called: Pt  Symptoms (please be specific):  itching all over,    How long has patient had these symptoms:  1 week, getting worse  Pharmacy name and phone #:     [id number] - [facility] [address] [place] [address] AT [place] [address]  [address] [address] [address]  Phone: [phone] Fax: [phone]  Best Call Back Number:  [phone]  Additional Information:  pt was seen at  3/7 and given    predniSONE (DELTASONE) 20 MG tablet; Take 1 tablet (20 mg total) by mouth once daily. for 5 days  Dispense: 5 tablet; Refill: 0  ketoconazole (NIZORAL) 2 % shampoo; Apply topically twice a week.  Dispense: 120 mL; Refill: 0  However, pt is requesting a topical cream or ointment to put on to stop the itching...   Please call to advise... Thank you...

## 2024-03-08 NOTE — TELEPHONE ENCOUNTER
Spoke with patient regarding ketoconazole (NIZORAL) 2 % shampoo. Advised patient to apply topically twice a week to affected areas per RX label.

## 2024-03-14 ENCOUNTER — OFFICE VISIT (OUTPATIENT)
Dept: FAMILY MEDICINE | Facility: CLINIC | Age: 74
End: 2024-03-14
Payer: MEDICARE

## 2024-03-14 VITALS
SYSTOLIC BLOOD PRESSURE: 124 MMHG | BODY MASS INDEX: 36.18 KG/M2 | DIASTOLIC BLOOD PRESSURE: 80 MMHG | OXYGEN SATURATION: 93 % | WEIGHT: 217.13 LBS | HEIGHT: 65 IN | TEMPERATURE: 98 F | HEART RATE: 67 BPM

## 2024-03-14 DIAGNOSIS — L28.2 PRURITIC RASH: ICD-10-CM

## 2024-03-14 DIAGNOSIS — L30.9 DERMATITIS: Primary | ICD-10-CM

## 2024-03-14 PROCEDURE — 99213 OFFICE O/P EST LOW 20 MIN: CPT | Mod: S$GLB,,, | Performed by: NURSE PRACTITIONER

## 2024-03-14 RX ORDER — HYDROXYZINE HYDROCHLORIDE 25 MG/1
12.5 TABLET, FILM COATED ORAL 3 TIMES DAILY PRN
Qty: 20 TABLET | Refills: 0 | Status: SHIPPED | OUTPATIENT
Start: 2024-03-14

## 2024-03-14 RX ORDER — BETAMETHASONE DIPROPIONATE 0.5 MG/G
CREAM TOPICAL 2 TIMES DAILY
Qty: 50 G | Refills: 2 | Status: SHIPPED | OUTPATIENT
Start: 2024-03-14

## 2024-03-14 RX ORDER — METOPROLOL SUCCINATE 50 MG/1
50 TABLET, EXTENDED RELEASE ORAL
COMMUNITY
Start: 2024-01-03

## 2024-03-14 NOTE — PROGRESS NOTES
Subjective:       Patient ID: Sneha Mcghee is a 73 y.o. female.    Chief Complaint: itchy scalp and Hair Loss  HPI She presents with a rash that started as mild irritation on her scalp and has now spread to her neck and back. Rash is very itchy. She feels this started after using hair dye. No other changes: no new detergents, products.. The rash appears after she scratches. It is now red and inflamed. No opens sore or lesions. States in scalp. See ROS    The following portion of the patients history was reviewed and updated as appropriate: allergies, current medications, past medical and surgical history. Past social history and problem list reviewed. Family PMH and Past social history reviewed. Tobacco, Illicit drug use reviewed.      Review of patient's allergies indicates:   Allergen Reactions    Codeine Nausea Only         Current Outpatient Medications:     albuterol (PROVENTIL/VENTOLIN HFA) 90 mcg/actuation inhaler, INHALE 2 PUFFS INTO THE LUNGS EVERY 6 HOURS AS NEEDED FOR WHEEZING, Disp: 25.5 g, Rfl: 3    aspirin (ASPIR-81 ORAL), Take 81 mg by mouth once daily., Disp: , Rfl:     atorvastatin (LIPITOR) 40 MG tablet, Take 1 tablet (40 mg total) by mouth once daily., Disp: 90 tablet, Rfl: 3    blood sugar diagnostic Strp, To check BG once a day and PRN low sugars, to use with insurance preferred meter, Disp: 100 strip, Rfl: 3    blood-glucose meter kit, To check BG qam and PRN lows, to use with insurance preferred meter, Disp: 1 each, Rfl: 0    dapagliflozin propanediol (FARXIGA) 10 mg tablet, Take 1 tablet (10 mg total) by mouth once daily., Disp: 90 tablet, Rfl: 3    ELIQUIS 5 mg Tab, Take 5 mg by mouth 2 (two) times daily., Disp: , Rfl:     fluticasone propionate (FLONASE) 50 mcg/actuation nasal spray, SHAKE LIQUID AND USE 2 SPRAYS(100 MCG) IN EACH NOSTRIL EVERY DAY, Disp: 48 g, Rfl: 3    furosemide (LASIX) 20 MG tablet, TAKE 1 TABLET BY MOUTH EVERY DAY, Disp: 90 tablet, Rfl: 3    ketoconazole (NIZORAL) 2 %  shampoo, Apply topically twice a week., Disp: 120 mL, Rfl: 0    lancets Misc, To check BG once a day and PRN lows, to use with insurance preferred meter, Disp: 100 each, Rfl: 3    latanoprost 0.005 % ophthalmic solution, latanoprost 0.005 % eye drops  Instill 1 drop every day by ophthalmic route for 90 days., Disp: , Rfl:     levothyroxine (SYNTHROID) 88 MCG tablet, TAKE 1 TABLET(88 MCG) BY MOUTH BEFORE BREAKFAST, Disp: 90 tablet, Rfl: 1    melatonin 10 mg Cap, Take 20 mg by mouth., Disp: , Rfl:     metFORMIN (GLUCOPHAGE-XR) 500 MG ER 24hr tablet, TAKE 1 TABLET(500 MG) BY MOUTH TWICE DAILY, Disp: 180 tablet, Rfl: 3    metoprolol succinate (TOPROL-XL) 50 MG 24 hr tablet, Take 50 mg by mouth., Disp: , Rfl:     mometasone 0.1% (ELOCON) 0.1 % cream, APPLY TOPICALLY TO THE AFFECTED AREA EVERY DAY, Disp: , Rfl:     nystatin (MYCOSTATIN) cream, Apply 1 application as needed by topical route for 30 days., Disp: , Rfl:     omega-3 fatty acids/fish oil (FISH OIL-OMEGA-3 FATTY ACIDS) 300-1,000 mg capsule, Take 1 capsule by mouth once daily., Disp: , Rfl:     pantoprazole (PROTONIX) 40 MG tablet, Take 40 mg by mouth every morning., Disp: , Rfl:     potassium chloride (MICRO-K) 10 MEQ CpSR, Take 10 mEq by mouth once., Disp: , Rfl:     sertraline (ZOLOFT) 50 MG tablet, Take 1 tablet (50 mg total) by mouth once daily., Disp: 90 tablet, Rfl: 2    sotaloL (SOTALOL AF) 80 MG tablet, Take 80 mg by mouth 2 (two) times daily., Disp: , Rfl:     timoloL (BETIMOL) 0.5 % ophthalmic solution, Place 1 drop into both eyes every morning., Disp: , Rfl:     timolol maleate 0.5% (TIMOPTIC) 0.5 % Drop, Place 1 drop into both eyes 2 (two) times daily., Disp: 5 mL, Rfl: 11    vit C/E/Zn/coppr/lutein/zeaxan (PRESERVISION AREDS-2 ORAL), Take by mouth once daily at 6am., Disp: , Rfl:     vitamin E 400 UNIT capsule, Take 1 capsule every day by oral route., Disp: , Rfl:     metOLazone (ZAROXOLYN) 5 MG tablet, Take 5 mg by mouth daily as needed (worsening  lower leg swelling)., Disp: , Rfl:     Past Medical History:   Diagnosis Date    Asthma     Atrial fibrillation     CAD (coronary artery disease)     CABG x 4    Cataract     os    Depression     Diabetes mellitus type II     Heart failure     Hyperlipidemia     Hypertension     Hypothyroidism, unspecified     Intermediate stage nonexudative age-related macular degeneration of both eyes 4/4/2022    Primary osteoarthritis of right knee 01/19/2021    PVD (peripheral vascular disease)     Stenosis of left carotid artery 01/19/2021       Past Surgical History:   Procedure Laterality Date    APPENDECTOMY      BACK SURGERY      discetomy     CARDIOVERSION      CARPAL TUNNEL RELEASE      R     CATARACT EXTRACTION      od d 5-15-13    CORONARY ARTERY BYPASS GRAFT  2012    x 4    CORONARY STENT PLACEMENT      8 stents    KNEE ARTHROSCOPY W/ PARTIAL MEDIAL MENISCECTOMY         Social History     Socioeconomic History    Marital status:    Occupational History    Occupation: retired     Tobacco Use    Smoking status: Never    Smokeless tobacco: Never   Substance and Sexual Activity    Alcohol use: Not Currently    Drug use: No    Sexual activity: Not Currently     Review of Systems   Constitutional:  Negative for fatigue and fever.   HENT: Negative.     Respiratory:  Negative for cough, chest tightness, shortness of breath and wheezing.    Cardiovascular:  Negative for chest pain and palpitations.   Gastrointestinal:  Negative for abdominal pain, diarrhea, nausea and vomiting.   Genitourinary: Negative.    Musculoskeletal:  Negative for arthralgias, back pain and gait problem.   Skin:  Positive for rash (see HPI).   Neurological:  Negative for headaches.   Psychiatric/Behavioral:  Negative for dysphoric mood and sleep disturbance. The patient is not nervous/anxious.        Objective:      /80 (BP Location: Right arm, Patient Position: Sitting, BP Method: Medium (Manual))   Pulse 67   Temp  "97.7 °F (36.5 °C)   Ht 5' 5" (1.651 m)   Wt 98.5 kg (217 lb 2.5 oz)   SpO2 (!) 93%   BMI 36.14 kg/m²      Physical Exam  Constitutional:       Appearance: Normal appearance. She is obese.   HENT:      Head: Normocephalic.   Eyes:      Pupils: Pupils are equal, round, and reactive to light.   Neck:      Thyroid: No thyromegaly.      Vascular: No carotid bruit.   Cardiovascular:      Rate and Rhythm: Normal rate and regular rhythm.      Pulses: Normal pulses.      Heart sounds: Normal heart sounds. No murmur heard.  Pulmonary:      Effort: Pulmonary effort is normal.      Breath sounds: Normal breath sounds. No wheezing.   Abdominal:      General: Bowel sounds are normal.      Tenderness: There is no abdominal tenderness.   Musculoskeletal:         General: Normal range of motion.      Cervical back: Normal range of motion.      Right lower leg: No edema.      Left lower leg: No edema.      Comments: Gait normal.  strong, equal   Skin:     General: Skin is warm and dry.      Capillary Refill: Capillary refill takes less than 2 seconds.      Findings: Rash present.      Comments: erythematous patches on scalp, neck and back with evidence of excoriations from scratching. No pustules. No skin breakdown   Neurological:      General: No focal deficit present.      Mental Status: She is alert.   Psychiatric:         Attention and Perception: Attention and perception normal.         Mood and Affect: Mood and affect normal.         Speech: Speech normal.         Behavior: Behavior normal.         Assessment:       1. Dermatitis    2. Pruritic rash        Plan:       Dermatitis: will give topical cream to use. If not improving may need round of prednisone    Pruritic rash: hydroxyzine prn. Can cause sedation so do not take and drive.     Other orders  -     augmented betamethasone dipropionate (DIPROLENE-AF) 0.05 % cream; Apply topically 2 (two) times daily.  Dispense: 50 g; Refill: 2  -     hydrOXYzine HCL (ATARAX) " 25 MG tablet; Take 0.5 tablets (12.5 mg total) by mouth 3 (three) times daily as needed for Itching.  Dispense: 20 tablet; Refill: 0       Continue current medication  Take medications only as prescribed  Healthy diet, exercise  Adequate rest  Adequate hydration  Avoid allergens  Avoid excessive caffeine     Follow up if not improving

## 2024-03-18 ENCOUNTER — PROCEDURE VISIT (OUTPATIENT)
Dept: OPHTHALMOLOGY | Facility: CLINIC | Age: 74
End: 2024-03-18
Payer: MEDICARE

## 2024-03-18 DIAGNOSIS — H35.3221 EXUDATIVE AGE-RELATED MACULAR DEGENERATION OF LEFT EYE WITH ACTIVE CHOROIDAL NEOVASCULARIZATION: Primary | ICD-10-CM

## 2024-03-18 PROCEDURE — 67028 INJECTION EYE DRUG: CPT | Mod: LT,S$GLB,, | Performed by: OPHTHALMOLOGY

## 2024-03-18 PROCEDURE — 92012 INTRM OPH EXAM EST PATIENT: CPT | Mod: 25,S$GLB,, | Performed by: OPHTHALMOLOGY

## 2024-03-18 PROCEDURE — 92134 CPTRZ OPH DX IMG PST SGM RTA: CPT | Mod: S$GLB,,, | Performed by: OPHTHALMOLOGY

## 2024-03-19 ENCOUNTER — OFFICE VISIT (OUTPATIENT)
Dept: FAMILY MEDICINE | Facility: CLINIC | Age: 74
End: 2024-03-19
Payer: MEDICARE

## 2024-03-19 VITALS
HEIGHT: 65 IN | BODY MASS INDEX: 36.84 KG/M2 | TEMPERATURE: 98 F | RESPIRATION RATE: 18 BRPM | SYSTOLIC BLOOD PRESSURE: 124 MMHG | OXYGEN SATURATION: 97 % | HEART RATE: 73 BPM | WEIGHT: 221.13 LBS | DIASTOLIC BLOOD PRESSURE: 78 MMHG

## 2024-03-19 DIAGNOSIS — R41.3 MEMORY PROBLEM: Primary | ICD-10-CM

## 2024-03-19 DIAGNOSIS — L23.9 ALLERGIC DERMATITIS: ICD-10-CM

## 2024-03-19 PROCEDURE — 99213 OFFICE O/P EST LOW 20 MIN: CPT | Mod: S$GLB,,, | Performed by: INTERNAL MEDICINE

## 2024-03-19 RX ORDER — PREDNISONE 10 MG/1
TABLET ORAL
Qty: 12 TABLET | Refills: 0 | Status: SHIPPED | OUTPATIENT
Start: 2024-03-19

## 2024-03-19 NOTE — PROGRESS NOTES
Subjective:       Patient ID: Sneha Mcghee is a 73 y.o. female.    Medication List with Changes/Refills   New Medications    PREDNISONE (DELTASONE) 10 MG TABLET    Take 2 pills daily for 5 days then 1 pill daily for 2 days then stop   Current Medications    ALBUTEROL (PROVENTIL/VENTOLIN HFA) 90 MCG/ACTUATION INHALER    INHALE 2 PUFFS INTO THE LUNGS EVERY 6 HOURS AS NEEDED FOR WHEEZING    ASPIRIN (ASPIR-81 ORAL)    Take 81 mg by mouth once daily.    ATORVASTATIN (LIPITOR) 40 MG TABLET    Take 1 tablet (40 mg total) by mouth once daily.    AUGMENTED BETAMETHASONE DIPROPIONATE (DIPROLENE-AF) 0.05 % CREAM    Apply topically 2 (two) times daily.    BLOOD SUGAR DIAGNOSTIC STRP    To check BG once a day and PRN low sugars, to use with insurance preferred meter    BLOOD-GLUCOSE METER KIT    To check BG qam and PRN lows, to use with insurance preferred meter    DAPAGLIFLOZIN PROPANEDIOL (FARXIGA) 10 MG TABLET    Take 1 tablet (10 mg total) by mouth once daily.    ELIQUIS 5 MG TAB    Take 5 mg by mouth 2 (two) times daily.    FLUTICASONE PROPIONATE (FLONASE) 50 MCG/ACTUATION NASAL SPRAY    SHAKE LIQUID AND USE 2 SPRAYS(100 MCG) IN EACH NOSTRIL EVERY DAY    FUROSEMIDE (LASIX) 20 MG TABLET    TAKE 1 TABLET BY MOUTH EVERY DAY    HYDROXYZINE HCL (ATARAX) 25 MG TABLET    Take 0.5 tablets (12.5 mg total) by mouth 3 (three) times daily as needed for Itching.    KETOCONAZOLE (NIZORAL) 2 % SHAMPOO    Apply topically twice a week.    LANCETS MISC    To check BG once a day and PRN lows, to use with insurance preferred meter    LATANOPROST 0.005 % OPHTHALMIC SOLUTION    latanoprost 0.005 % eye drops   Instill 1 drop every day by ophthalmic route for 90 days.    LEVOTHYROXINE (SYNTHROID) 88 MCG TABLET    TAKE 1 TABLET(88 MCG) BY MOUTH BEFORE BREAKFAST    MELATONIN 10 MG CAP    Take 20 mg by mouth.    METFORMIN (GLUCOPHAGE-XR) 500 MG ER 24HR TABLET    TAKE 1 TABLET(500 MG) BY MOUTH TWICE DAILY    METOPROLOL SUCCINATE (TOPROL-XL) 50 MG 24  HR TABLET    Take 50 mg by mouth.    MOMETASONE 0.1% (ELOCON) 0.1 % CREAM    APPLY TOPICALLY TO THE AFFECTED AREA EVERY DAY    NYSTATIN (MYCOSTATIN) CREAM    Apply 1 application as needed by topical route for 30 days.    OMEGA-3 FATTY ACIDS/FISH OIL (FISH OIL-OMEGA-3 FATTY ACIDS) 300-1,000 MG CAPSULE    Take 1 capsule by mouth once daily.    PANTOPRAZOLE (PROTONIX) 40 MG TABLET    Take 40 mg by mouth every morning.    POTASSIUM CHLORIDE (MICRO-K) 10 MEQ CPSR    Take 10 mEq by mouth once.    SERTRALINE (ZOLOFT) 50 MG TABLET    Take 1 tablet (50 mg total) by mouth once daily.    SOTALOL (SOTALOL AF) 80 MG TABLET    Take 80 mg by mouth 2 (two) times daily.    TIMOLOL (BETIMOL) 0.5 % OPHTHALMIC SOLUTION    Place 1 drop into both eyes every morning.    TIMOLOL MALEATE 0.5% (TIMOPTIC) 0.5 % DROP    Place 1 drop into both eyes 2 (two) times daily.    VIT C/E/ZN/COPPR/LUTEIN/ZEAXAN (PRESERVISION AREDS-2 ORAL)    Take by mouth once daily at 6am.    VITAMIN E 400 UNIT CAPSULE    Take 1 capsule every day by oral route.   Discontinued Medications    METOLAZONE (ZAROXOLYN) 5 MG TABLET    Take 5 mg by mouth daily as needed (worsening lower leg swelling).       Chief Complaint: rash and memory.   Recall she has cad, Afib, HLD, HTN, CKD, depression and DM.     She presents with a rash that started as mild irritation on her scalp and has now spread to her neck and back.  The rash worsens with itching and it is very itchy.  She feels this started after using hair dye. No other changes to detergent.  The rash appears after she scratches. It is now red and inflamed.  She was given hydroxyzine at a visit to NP but she has been took nervous to use because of cardiac side effects.     She complains of worsening memory issues. She is very forgetful and this worsens when she is very stressed or left alone.  She forgets names and places but denies getting loss.  She denies any dangerous behaviors.  She continues to do her finances without  "problems. She lives alone but her son is involved. He had noticed worsening memory and feels she struggles to remember her medications. She does have depression and anxiety that is overall controlled. She does feel that when her son is out of town her anxiety worsens and this will also worsen her memory.     Review of Systems   Constitutional:  Negative for appetite change, fatigue, fever and unexpected weight change.   HENT:  Negative for congestion, ear pain, hearing loss, sore throat and trouble swallowing.    Eyes:  Negative for pain and visual disturbance.   Respiratory:  Negative for cough, chest tightness, shortness of breath and wheezing.    Cardiovascular:  Negative for chest pain, palpitations and leg swelling.   Gastrointestinal:  Negative for abdominal pain, blood in stool, constipation, diarrhea, nausea and vomiting.   Endocrine: Negative for polyuria.   Genitourinary:  Negative for dysuria and hematuria.   Musculoskeletal:  Negative for arthralgias, back pain and myalgias.   Skin:  Positive for color change and rash.   Neurological:  Negative for dizziness, weakness, numbness and headaches.   Hematological:  Does not bruise/bleed easily.   Psychiatric/Behavioral:  Positive for confusion and dysphoric mood. Negative for sleep disturbance and suicidal ideas. The patient is nervous/anxious.        Objective:      Vitals:    03/19/24 0724   BP: 124/78   Pulse: 73   Resp: 18   Temp: 97.5 °F (36.4 °C)   SpO2: 97%   Weight: 100.3 kg (221 lb 1.9 oz)   Height: 5' 5" (1.651 m)     Body mass index is 36.8 kg/m².  Physical Exam    General appearance: alert, no acute distress  Head: atraumatic  Eyes: PERRL, EMOI, normal conjunctiva, no drainage  Ears: tm normal with good visualization of landmarks, no erythema or pus, canals normal, external ear normal  Nose: normal mucosa, no polyps or sores, no rhinorrhea  Throat: no erythema, no exudates, tonsils appear normal  Mouth: no sores or lesion, moist mucous " membranes  Neck: supple, FROM, no masses, no tenderness  Lymph: no posterior or cervical adenopathy  Lungs: no distress, no retractions, clear to ascultation bilaterally, no wheezing, no rales, no rhonchi  Heart:: Regular rate and rhythm, 2/6 systolic murmur  Abdomen: soft, non-tender, no guarding, no rebound, no peritoneal signs, bowel sounds normal, no hepatosplenomegaly, no masses  Skin: erythematous patches on scalp, neck and back with evidence of excoriations (fingernail marks)  Perfusion: good capillary refill, normal pulses    Assessment:       1. Memory problem    2. Allergic dermatitis        Plan:       Memory problem  Will refer to neuropsychiatry for testing.  Will check labs.  Referral to neurology for evaluation.   -     Ambulatory referral/consult to Adult Neuropsychology; Future; Expected date: 03/26/2024  -     Vitamin B12; Future; Expected date: 03/19/2024  -     RPR; Future; Expected date: 03/19/2024  -     Ambulatory referral/consult to Neurology; Future; Expected date: 03/26/2024    Allergic dermatitis  Uncontrolled and will start tapering oral steroids over one week. Advised to use OTC topical steroids if needed. Okay to use antihistamines to help with the itching (vistaril) or non-sedating antihistamines (like allegra or claritin).  No evidence of secondary infection but she is at risk. Advised of the symptoms of concern to return to clinic.   -     predniSONE (DELTASONE) 10 MG tablet; Take 2 pills daily for 5 days then 1 pill daily for 2 days then stop  Dispense: 12 tablet; Refill: 0    Follow up in about 4 weeks (around 4/16/2024) for already scheduled.

## 2024-03-20 NOTE — PROGRESS NOTES
HPI     1 month  avastin  os     DFE      Additional comments: AVASTIN  OS     DFE     OCT   BLURRED VA            OCT - Drusen OU  OD PVD  OS VMA at [facility].  PED - SRF     Prior FP - OD with white choroidal patches - stable.  Possible just tesselated fundus appearance  OS PED with small subfoveal SRF =- resolved    Prior FA - OD - late staiing of drusen, no [facility] uptake   OS minimal leakage at apex of PED with Avastin on baord      A/P    1. Dry AMD OU  AREDS/AG    Wet AMD OS  4/23 - increased in PED with apical SRF  S/p Avastin OS x 3  9/23 - trace increase in SRF at 14 weeks  12/23 - increase OS at 10 weeks    Eylea OS today      2. Scattered white choroidal patches   Stable FP  No sig leakage or uptake on FA OD  Possible just tesselated fundus appearance    3. PVD OD    4. HTN Ret OU    5.  PCIOL OU      8 weeks OCT no  dilate - LDFE 1/24    Letter TO Dr. Tucker    Risks, benefits, and alternatives to treatment discussed in detail with the patient.  The patient voiced understanding and wished to proceed with the procedure    Injection Procedure Note:  Diagnosis: Wet AMD OS    Patient Identified and Time Out complete  Pt marked  Topical Proparacaine and Betadine.  Inject Eylea OS at 6:00 @ 3.5-4mm posterior to limbus  Post Operative Dx: Same  Complications: None  Follow up as above.

## 2024-03-20 NOTE — PROGRESS NOTES
HPI     Macular Degeneration     Additional comments: 8 week Eylea           Comments    VA stable ou, no pain ou and floaters without flashes ou.    Latanoprost ou qhs  Timolol ou qd            Last edited by Tabby Bishop on 3/18/2024  9:53 AM.          OCT - Drusen OU  OD PVD  OS VMA at [facility].  PED - SRF improved    Prior FP - OD with white choroidal patches - stable.  Possible just tesselated fundus appearance  OS PED with small subfoveal SRF =- resolved    Prior FA - OD - late staiing of drusen, no [facility] uptake   OS minimal leakage at apex of PED with Avastin on baord      A/P    1. Dry AMD OU  AREDS/AG    Wet AMD OS  4/23 - increased in PED with apical SRF  S/p Avastin OS x 3  S/p Eylea OS x 1  9/23 - trace increase in SRF at 14 weeks  12/23 - increase OS at 10 weeks    Eylea OS today    Extend on Eylea      2. Scattered white choroidal patches   Stable FP  No sig leakage or uptake on FA OD  Possible just tesselated fundus appearance    3. PVD OD    4. HTN Ret OU    5.  PCIOL OU      8 weeks OCT no  dilate - LDFE 1/24    Letter TO Dr. Tucker    Risks, benefits, and alternatives to treatment discussed in detail with the patient.  The patient voiced understanding and wished to proceed with the procedure    Injection Procedure Note:  Diagnosis: Wet AMD OS    Patient Identified and Time Out complete  Pt marked  Topical Proparacaine and Betadine.  Inject Eylea OS at 6:00 @ 3.5-4mm posterior to limbus  Post Operative Dx: Same  Complications: None  Follow up as above.

## 2024-03-25 ENCOUNTER — HOSPITAL ENCOUNTER (OUTPATIENT)
Dept: RADIOLOGY | Facility: HOSPITAL | Age: 74
Discharge: HOME | End: 2024-03-25
Attending: INTERNAL MEDICINE
Payer: MEDICARE

## 2024-03-25 DIAGNOSIS — Z12.31 ENCOUNTER FOR SCREENING MAMMOGRAM FOR MALIGNANT NEOPLASM OF BREAST: ICD-10-CM

## 2024-03-25 PROCEDURE — 77067 SCR MAMMO BI INCL CAD: CPT | Mod: 26,,, | Performed by: RADIOLOGY

## 2024-03-25 PROCEDURE — 77063 BREAST TOMOSYNTHESIS BI: CPT | Mod: 26,,, | Performed by: RADIOLOGY

## 2024-03-25 PROCEDURE — 77067 SCR MAMMO BI INCL CAD: CPT | Mod: TC,PO

## 2024-04-01 ENCOUNTER — TELEPHONE (OUTPATIENT)
Dept: FAMILY MEDICINE | Facility: CLINIC | Age: 74
End: 2024-04-01
Payer: MEDICARE

## 2024-04-01 NOTE — TELEPHONE ENCOUNTER
----- Message from Daysi Adams sent at 4/1/2024  8:31 AM CDT -----  Regarding: Needs referral  Type: Needs Medical Advice  Who Called:  Pt    Best Call Back Number: [phone]    Additional Information: Pt is requesting Elizaebth rosales gets her a referral for dermatology, regarding itching and rash that has worsened and not gone away

## 2024-05-02 ENCOUNTER — TELEPHONE (OUTPATIENT)
Dept: FAMILY MEDICINE | Facility: CLINIC | Age: 74
End: 2024-05-02

## 2024-05-02 NOTE — TELEPHONE ENCOUNTER
----- Message from Briseida Zbigniew sent at 5/2/2024  7:52 AM CDT -----  Contact: patient  Type:  Needs Medical Advice    Who Called: patient     Would the patient rather a call back or a response via [facility]? Call     Best Call Back Number:  [phone]    Additional Information: patient would like to speak with the nurse in regards to problems that she is having with her leg.     Please call to advise

## 2024-05-09 ENCOUNTER — LAB VISIT (OUTPATIENT)
Dept: LAB | Facility: HOSPITAL | Age: 74
End: 2024-05-09
Attending: INTERNAL MEDICINE
Payer: MEDICARE

## 2024-05-09 DIAGNOSIS — E11.22 TYPE 2 DIABETES MELLITUS WITH STAGE 3B CHRONIC KIDNEY DISEASE, WITHOUT LONG-TERM CURRENT USE OF INSULIN: ICD-10-CM

## 2024-05-09 DIAGNOSIS — N18.32 STAGE 3B CHRONIC KIDNEY DISEASE: ICD-10-CM

## 2024-05-09 DIAGNOSIS — I10 ESSENTIAL HYPERTENSION: ICD-10-CM

## 2024-05-09 DIAGNOSIS — N18.32 TYPE 2 DIABETES MELLITUS WITH STAGE 3B CHRONIC KIDNEY DISEASE, WITHOUT LONG-TERM CURRENT USE OF INSULIN: ICD-10-CM

## 2024-05-09 DIAGNOSIS — D64.9 ANEMIA, UNSPECIFIED TYPE: ICD-10-CM

## 2024-05-09 DIAGNOSIS — R41.3 MEMORY PROBLEM: ICD-10-CM

## 2024-05-09 LAB
ALBUMIN SERPL BCP-MCNC: 3.5 G/DL (ref 3.5–5.2)
ALP SERPL-CCNC: 110 U/L (ref 55–135)
ALT SERPL W/O P-5'-P-CCNC: 12 U/L (ref 10–44)
ANION GAP SERPL CALC-SCNC: 6 MMOL/L (ref 8–16)
AST SERPL-CCNC: 13 U/L (ref 10–40)
BASOPHILS # BLD AUTO: 0.06 K/UL (ref 0–0.2)
BASOPHILS NFR BLD: 0.6 % (ref 0–1.9)
BILIRUB SERPL-MCNC: 0.5 MG/DL (ref 0.1–1)
BUN SERPL-MCNC: 18 MG/DL (ref 8–23)
CALCIUM SERPL-MCNC: 9.4 MG/DL (ref 8.7–10.5)
CHLORIDE SERPL-SCNC: 107 MMOL/L (ref 95–110)
CHOLEST SERPL-MCNC: 133 MG/DL (ref 120–199)
CHOLEST/HDLC SERPL: 3.6 {RATIO} (ref 2–5)
CO2 SERPL-SCNC: 26 MMOL/L (ref 23–29)
CREAT SERPL-MCNC: 0.9 MG/DL (ref 0.5–1.4)
DIFFERENTIAL METHOD BLD: ABNORMAL
EOSINOPHIL # BLD AUTO: 0.5 K/UL (ref 0–0.5)
EOSINOPHIL NFR BLD: 5.4 % (ref 0–8)
ERYTHROCYTE [DISTWIDTH] IN BLOOD BY AUTOMATED COUNT: 16.9 % (ref 11.5–14.5)
EST. GFR  (NO RACE VARIABLE): >60 ML/MIN/1.73 M^2
ESTIMATED AVG GLUCOSE: 151 MG/DL (ref 68–131)
FERRITIN SERPL-MCNC: 50 NG/ML (ref 20–300)
GLUCOSE SERPL-MCNC: 153 MG/DL (ref 70–110)
HBA1C MFR BLD: 6.9 % (ref 4–5.6)
HCT VFR BLD AUTO: 42.6 % (ref 37–48.5)
HDLC SERPL-MCNC: 37 MG/DL (ref 40–75)
HDLC SERPL: 27.8 % (ref 20–50)
HGB BLD-MCNC: 13.2 G/DL (ref 12–16)
IMM GRANULOCYTES # BLD AUTO: 0.04 K/UL (ref 0–0.04)
IMM GRANULOCYTES NFR BLD AUTO: 0.4 % (ref 0–0.5)
IRON SERPL-MCNC: 48 UG/DL (ref 30–160)
LDLC SERPL CALC-MCNC: 66.4 MG/DL (ref 63–159)
LYMPHOCYTES # BLD AUTO: 1.5 K/UL (ref 1–4.8)
LYMPHOCYTES NFR BLD: 15.5 % (ref 18–48)
MCH RBC QN AUTO: 25.3 PG (ref 27–31)
MCHC RBC AUTO-ENTMCNC: 31 G/DL (ref 32–36)
MCV RBC AUTO: 82 FL (ref 82–98)
MONOCYTES # BLD AUTO: 0.9 K/UL (ref 0.3–1)
MONOCYTES NFR BLD: 9.1 % (ref 4–15)
NEUTROPHILS # BLD AUTO: 6.6 K/UL (ref 1.8–7.7)
NEUTROPHILS NFR BLD: 69 % (ref 38–73)
NONHDLC SERPL-MCNC: 96 MG/DL
NRBC BLD-RTO: 0 /100 WBC
PLATELET # BLD AUTO: 268 K/UL (ref 150–450)
PMV BLD AUTO: 10.7 FL (ref 9.2–12.9)
POTASSIUM SERPL-SCNC: 4.5 MMOL/L (ref 3.5–5.1)
PROT SERPL-MCNC: 7.3 G/DL (ref 6–8.4)
PTH-INTACT SERPL-MCNC: 60.8 PG/ML (ref 9–77)
RBC # BLD AUTO: 5.22 M/UL (ref 4–5.4)
SATURATED IRON: 12 % (ref 20–50)
SODIUM SERPL-SCNC: 139 MMOL/L (ref 136–145)
TOTAL IRON BINDING CAPACITY: 398 UG/DL (ref 250–450)
TRANSFERRIN SERPL-MCNC: 269 MG/DL (ref 200–375)
TRIGL SERPL-MCNC: 148 MG/DL (ref 30–150)
TSH SERPL DL<=0.005 MIU/L-ACNC: 2.01 UIU/ML (ref 0.4–4)
VIT B12 SERPL-MCNC: 404 PG/ML (ref 210–950)
WBC # BLD AUTO: 9.59 K/UL (ref 3.9–12.7)

## 2024-05-09 PROCEDURE — 83036 HEMOGLOBIN GLYCOSYLATED A1C: CPT | Performed by: INTERNAL MEDICINE

## 2024-05-09 PROCEDURE — 82728 ASSAY OF FERRITIN: CPT | Performed by: INTERNAL MEDICINE

## 2024-05-09 PROCEDURE — 85025 COMPLETE CBC W/AUTO DIFF WBC: CPT | Performed by: INTERNAL MEDICINE

## 2024-05-09 PROCEDURE — 83540 ASSAY OF IRON: CPT | Performed by: INTERNAL MEDICINE

## 2024-05-09 PROCEDURE — 83970 ASSAY OF PARATHORMONE: CPT | Performed by: INTERNAL MEDICINE

## 2024-05-09 PROCEDURE — 36415 COLL VENOUS BLD VENIPUNCTURE: CPT | Mod: PO | Performed by: INTERNAL MEDICINE

## 2024-05-09 PROCEDURE — 82607 VITAMIN B-12: CPT | Performed by: INTERNAL MEDICINE

## 2024-05-09 PROCEDURE — 86592 SYPHILIS TEST NON-TREP QUAL: CPT | Performed by: INTERNAL MEDICINE

## 2024-05-09 PROCEDURE — 80053 COMPREHEN METABOLIC PANEL: CPT | Performed by: INTERNAL MEDICINE

## 2024-05-09 PROCEDURE — 84443 ASSAY THYROID STIM HORMONE: CPT | Performed by: INTERNAL MEDICINE

## 2024-05-09 PROCEDURE — 80061 LIPID PANEL: CPT | Performed by: INTERNAL MEDICINE

## 2024-05-10 ENCOUNTER — PATIENT MESSAGE (OUTPATIENT)
Dept: FAMILY MEDICINE | Facility: CLINIC | Age: 74
End: 2024-05-10
Payer: MEDICARE

## 2024-05-10 LAB — RPR SER QL: NORMAL

## 2024-05-16 ENCOUNTER — OFFICE VISIT (OUTPATIENT)
Dept: FAMILY MEDICINE | Facility: CLINIC | Age: 74
End: 2024-05-16
Payer: MEDICARE

## 2024-05-16 VITALS
DIASTOLIC BLOOD PRESSURE: 80 MMHG | HEART RATE: 65 BPM | WEIGHT: 222.88 LBS | SYSTOLIC BLOOD PRESSURE: 120 MMHG | RESPIRATION RATE: 17 BRPM | BODY MASS INDEX: 37.13 KG/M2 | HEIGHT: 65 IN | TEMPERATURE: 98 F | OXYGEN SATURATION: 95 %

## 2024-05-16 DIAGNOSIS — F33.0 MAJOR DEPRESSIVE DISORDER, RECURRENT EPISODE, MILD: ICD-10-CM

## 2024-05-16 DIAGNOSIS — K21.9 GASTROESOPHAGEAL REFLUX DISEASE WITHOUT ESOPHAGITIS: ICD-10-CM

## 2024-05-16 DIAGNOSIS — I25.118 CORONARY ARTERY DISEASE OF NATIVE ARTERY OF NATIVE HEART WITH STABLE ANGINA PECTORIS: Primary | ICD-10-CM

## 2024-05-16 DIAGNOSIS — I65.22 STENOSIS OF LEFT CAROTID ARTERY: ICD-10-CM

## 2024-05-16 DIAGNOSIS — H35.3221 EXUDATIVE AGE-RELATED MACULAR DEGENERATION OF LEFT EYE WITH ACTIVE CHOROIDAL NEOVASCULARIZATION: ICD-10-CM

## 2024-05-16 DIAGNOSIS — F51.01 PRIMARY INSOMNIA: ICD-10-CM

## 2024-05-16 DIAGNOSIS — Z12.11 SCREEN FOR COLON CANCER: ICD-10-CM

## 2024-05-16 DIAGNOSIS — I73.9 PAD (PERIPHERAL ARTERY DISEASE): ICD-10-CM

## 2024-05-16 DIAGNOSIS — I10 ESSENTIAL HYPERTENSION: ICD-10-CM

## 2024-05-16 DIAGNOSIS — D64.9 ANEMIA, UNSPECIFIED TYPE: ICD-10-CM

## 2024-05-16 DIAGNOSIS — R41.3 MEMORY PROBLEM: ICD-10-CM

## 2024-05-16 DIAGNOSIS — I25.5 ISCHEMIC CARDIOMYOPATHY: ICD-10-CM

## 2024-05-16 DIAGNOSIS — I48.0 PAROXYSMAL ATRIAL FIBRILLATION: ICD-10-CM

## 2024-05-16 DIAGNOSIS — S81.801A OPEN WOUND OF RIGHT LOWER LEG, INITIAL ENCOUNTER: ICD-10-CM

## 2024-05-16 DIAGNOSIS — E11.22 TYPE 2 DIABETES MELLITUS WITH STAGE 3B CHRONIC KIDNEY DISEASE, WITHOUT LONG-TERM CURRENT USE OF INSULIN: ICD-10-CM

## 2024-05-16 DIAGNOSIS — K76.0 FATTY LIVER: ICD-10-CM

## 2024-05-16 DIAGNOSIS — I35.0 NONRHEUMATIC AORTIC VALVE STENOSIS: ICD-10-CM

## 2024-05-16 DIAGNOSIS — E66.01 MORBID OBESITY WITH BMI OF 40.0-44.9, ADULT: ICD-10-CM

## 2024-05-16 DIAGNOSIS — G47.33 OSA (OBSTRUCTIVE SLEEP APNEA): ICD-10-CM

## 2024-05-16 DIAGNOSIS — E78.2 MIXED HYPERLIPIDEMIA: ICD-10-CM

## 2024-05-16 DIAGNOSIS — I50.32 CHRONIC DIASTOLIC HEART FAILURE: ICD-10-CM

## 2024-05-16 DIAGNOSIS — N32.81 OAB (OVERACTIVE BLADDER): ICD-10-CM

## 2024-05-16 DIAGNOSIS — N18.32 TYPE 2 DIABETES MELLITUS WITH STAGE 3B CHRONIC KIDNEY DISEASE, WITHOUT LONG-TERM CURRENT USE OF INSULIN: ICD-10-CM

## 2024-05-16 DIAGNOSIS — J45.20 MILD INTERMITTENT ASTHMA WITHOUT COMPLICATION: ICD-10-CM

## 2024-05-16 DIAGNOSIS — E03.9 HYPOTHYROIDISM, UNSPECIFIED TYPE: ICD-10-CM

## 2024-05-16 DIAGNOSIS — N18.32 STAGE 3B CHRONIC KIDNEY DISEASE: ICD-10-CM

## 2024-05-16 DIAGNOSIS — M17.12 PRIMARY OSTEOARTHRITIS OF LEFT KNEE: ICD-10-CM

## 2024-05-16 PROCEDURE — 3061F NEG MICROALBUMINURIA REV: CPT | Mod: CPTII,S$GLB,, | Performed by: INTERNAL MEDICINE

## 2024-05-16 PROCEDURE — 3074F SYST BP LT 130 MM HG: CPT | Mod: CPTII,S$GLB,, | Performed by: INTERNAL MEDICINE

## 2024-05-16 PROCEDURE — 3079F DIAST BP 80-89 MM HG: CPT | Mod: CPTII,S$GLB,, | Performed by: INTERNAL MEDICINE

## 2024-05-16 PROCEDURE — 3288F FALL RISK ASSESSMENT DOCD: CPT | Mod: CPTII,S$GLB,, | Performed by: INTERNAL MEDICINE

## 2024-05-16 PROCEDURE — 1101F PT FALLS ASSESS-DOCD LE1/YR: CPT | Mod: CPTII,S$GLB,, | Performed by: INTERNAL MEDICINE

## 2024-05-16 PROCEDURE — 1160F RVW MEDS BY RX/DR IN RCRD: CPT | Mod: CPTII,S$GLB,, | Performed by: INTERNAL MEDICINE

## 2024-05-16 PROCEDURE — 1159F MED LIST DOCD IN RCRD: CPT | Mod: CPTII,S$GLB,, | Performed by: INTERNAL MEDICINE

## 2024-05-16 PROCEDURE — 99215 OFFICE O/P EST HI 40 MIN: CPT | Mod: S$GLB,,, | Performed by: INTERNAL MEDICINE

## 2024-05-16 PROCEDURE — 1126F AMNT PAIN NOTED NONE PRSNT: CPT | Mod: CPTII,S$GLB,, | Performed by: INTERNAL MEDICINE

## 2024-05-16 PROCEDURE — 3044F HG A1C LEVEL LT 7.0%: CPT | Mod: CPTII,S$GLB,, | Performed by: INTERNAL MEDICINE

## 2024-05-16 PROCEDURE — 3008F BODY MASS INDEX DOCD: CPT | Mod: CPTII,S$GLB,, | Performed by: INTERNAL MEDICINE

## 2024-05-16 PROCEDURE — 3066F NEPHROPATHY DOC TX: CPT | Mod: CPTII,S$GLB,, | Performed by: INTERNAL MEDICINE

## 2024-05-16 NOTE — PROGRESS NOTES
Subjective:       Patient ID: Sneha Mcghee is a 73 y.o. female.    Medication List with Changes/Refills   Current Medications    ALBUTEROL (PROVENTIL/VENTOLIN HFA) 90 MCG/ACTUATION INHALER    INHALE 2 PUFFS INTO THE LUNGS EVERY 6 HOURS AS NEEDED FOR WHEEZING    ASPIRIN (ASPIR-81 ORAL)    Take 81 mg by mouth once daily.    ATORVASTATIN (LIPITOR) 40 MG TABLET    Take 1 tablet (40 mg total) by mouth once daily.    AUGMENTED BETAMETHASONE DIPROPIONATE (DIPROLENE-AF) 0.05 % CREAM    Apply topically 2 (two) times daily.    BLOOD SUGAR DIAGNOSTIC STRP    To check BG once a day and PRN low sugars, to use with insurance preferred meter    BLOOD-GLUCOSE METER KIT    To check BG qam and PRN lows, to use with insurance preferred meter    DAPAGLIFLOZIN PROPANEDIOL (FARXIGA) 10 MG TABLET    Take 1 tablet (10 mg total) by mouth once daily.    ELIQUIS 5 MG TAB    Take 5 mg by mouth 2 (two) times daily.    FLUTICASONE PROPIONATE (FLONASE) 50 MCG/ACTUATION NASAL SPRAY    SHAKE LIQUID AND USE 2 SPRAYS(100 MCG) IN EACH NOSTRIL EVERY DAY    FUROSEMIDE (LASIX) 20 MG TABLET    TAKE 1 TABLET BY MOUTH EVERY DAY    HYDROXYZINE HCL (ATARAX) 25 MG TABLET    Take 0.5 tablets (12.5 mg total) by mouth 3 (three) times daily as needed for Itching.    KETOCONAZOLE (NIZORAL) 2 % SHAMPOO    Apply topically twice a week.    LANCETS MISC    To check BG once a day and PRN lows, to use with insurance preferred meter    LATANOPROST 0.005 % OPHTHALMIC SOLUTION    latanoprost 0.005 % eye drops   Instill 1 drop every day by ophthalmic route for 90 days.    LEVOTHYROXINE (SYNTHROID) 88 MCG TABLET    TAKE 1 TABLET(88 MCG) BY MOUTH BEFORE BREAKFAST    MELATONIN 10 MG CAP    Take 20 mg by mouth.    METFORMIN (GLUCOPHAGE-XR) 500 MG ER 24HR TABLET    TAKE 1 TABLET(500 MG) BY MOUTH TWICE DAILY    METOPROLOL SUCCINATE (TOPROL-XL) 50 MG 24 HR TABLET    Take 50 mg by mouth.    MOMETASONE 0.1% (ELOCON) 0.1 % CREAM    APPLY TOPICALLY TO THE AFFECTED AREA EVERY DAY     NYSTATIN (MYCOSTATIN) CREAM    Apply 1 application as needed by topical route for 30 days.    OMEGA-3 FATTY ACIDS/FISH OIL (FISH OIL-OMEGA-3 FATTY ACIDS) 300-1,000 MG CAPSULE    Take 1 capsule by mouth once daily.    PANTOPRAZOLE (PROTONIX) 40 MG TABLET    Take 40 mg by mouth every morning.    POTASSIUM CHLORIDE (MICRO-K) 10 MEQ CPSR    Take 10 mEq by mouth once.    PREDNISONE (DELTASONE) 10 MG TABLET    Take 2 pills daily for 5 days then 1 pill daily for 2 days then stop    SERTRALINE (ZOLOFT) 50 MG TABLET    Take 1 tablet (50 mg total) by mouth once daily.    SOTALOL (SOTALOL AF) 80 MG TABLET    Take 80 mg by mouth 2 (two) times daily.    TIMOLOL (BETIMOL) 0.5 % OPHTHALMIC SOLUTION    Place 1 drop into both eyes every morning.    TIMOLOL MALEATE 0.5% (TIMOPTIC) 0.5 % DROP    Place 1 drop into both eyes 2 (two) times daily.    VIT C/E/ZN/COPPR/LUTEIN/ZEAXAN (PRESERVISION AREDS-2 ORAL)    Take by mouth once daily at 6am.    VITAMIN E 400 UNIT CAPSULE    Take 1 capsule every day by oral route.       Chief Complaint: Follow-up  She is here today to f/u on chronic medical issues.     She injured her right lower leg on a car door and now has a sore. She has a history of delayed healing of lower leg sores due to diabetes and venous insufficiency. She is using topical santyl ointment and scheduled to see wound care this afternoon.      She has CAD s/p CABG x 4 vessels in 2012 with 8 stents.  She reports a cardiomyopathy with chronic heart failure.  She has paroxysmal Afib for many years. Echo on 8/2022 showed EF 65%, positive diastolic dysfunction, cLVH, moderate AS (HAMILTON 1.49 and gradient of 23), mild TR and PAP of 29. Nuclear stress on 3/2023 was negative. She is taking sotalol 80 mg bid and eliquis 5 mg bid and metoprolol xl 50 mg daily. [id number] Score: 6 (stoke risk of 9.7%/year).  She was seen by cardiology on 3/2024 and no changes were made.      She has hyperlipidemia and is taking atorvastatin 40 mg daily.   Lipids on 5/2024 were 133/148/37/66. She continues on aspirin daily.     She has carotid artery stenosis and is s/p left carotid stent.  Carotid u/s on 8/2022 showed patent stent in left carotid bulb and no significant stenosis.      She has PVD but has not had stenting of lower legs. She has chronic venous insufficiency and is s/p multiple ELVT.  She has chronic stasis dermatitis left > right.  She had a prolonged course for a non-healing ulcer of left lower leg that is now healed.  Venous u/s on 1/2022 showed venous reflux.  Arterial ultrasound on 1/2022 showed 50-75% stenosis of proximal left anterior tibial artery, SARITHA right 1.01 and left 0.97. She denies any recurrence of open wounds. She continues on lasix 20 mg daily and feels her lower leg swelling is controlled.  Starting farxiga has helped her lower leg swelling.      She has diabetes and is taking metformin 500 mg bid and farxiga 10 mg daily. Her HbA1c was 6.9 on 5/2024. She does not check her glucose at home.  She is UTD on her eye exam and foot exam. Her microalbumin is negative on 5/2024.       She has CKD  with increased creatinine of 1.4 with GFR of 39 on 1/2024 (since starting metolazone in addition to lasix).  Repeat labs (off metolazone) on 5/2024 show creatinine of 0.9 with GFR of > 60.       She has asthma since 2012 that started after her heart surgery. She has symptoms about once every 6 months. Her symptoms are chest tightness and wheezing.  She denies any known triggers. She uses albuterol for her increased symptoms with good relief. Today she denies any active symptoms.      She has hypothyroid and is taking levothyroxine 88 mcg daily. Her TSH on 5/2024 was normal.      She has glaucoma and is using timolol and xalatan drops. She is followed by ophthalmology and reports her pressures have been stable but has wet  macular degeneration and continues on  intraocular injections.      She has GERD and is taking pantoprazole 40 mg daily.      She  has fatty liver and normal LFTs on 5/2024.      She has depression and is taking zoloft 50 mg qday. She feels that this controls her symptoms. No suicidal ideations.  She does occasionally get severe anxiety with panic attacks.  She has insomnia and uses OTC medication.     She complains of memory problems .  REcall she is very forgetful and this worsens when she is very stressed or left alone. She forgets names and places but denies getting loss. She denies any dangerous behaviors. She continues to do her finances without problems. She lives alone but her son is involved. He had noticed worsening memory and feels she struggles to remember her medications. She feels she is doing better. Labs on 5/2024 were reassuring.      She has VALENTE and refuses to wear CPAP.      She has left knee osteoarthritis and is followed by orthopedics. She was told she needs a TKR but does not want done at this time. She is currently in PT to help with left pain and balance.      She lives alone and feels safe.  She does not exercise and is very sedentary. She does eat heathy.  She has poor balance but denies any recent falls.      Colonoscopy---4/2016 repeat in 5 years ([facility])---has fecal kit at home   Mammogram----3/2024 neg   DEXA-----7/2023 normal  Tdap---more than 10 years   Influenza vaccine---9/2023  Pneumovax 23----11/2016  Prevnar 13----10/2017  Shingles vaccine-----5/2023, 3/2024  Covid vaccine---- 2 doses      RSV vaccine----none     Review of Systems   Constitutional:  Negative for appetite change, fatigue, fever and unexpected weight change.   HENT:  Negative for congestion, ear pain, hearing loss, sore throat and trouble swallowing.    Eyes:  Negative for pain and visual disturbance.   Respiratory:  Negative for cough, chest tightness, shortness of breath and wheezing.    Cardiovascular:  Negative for chest pain, palpitations and leg swelling.   Gastrointestinal:  Negative for abdominal pain, blood in stool, constipation,  "diarrhea, nausea and vomiting.   Endocrine: Negative for polyuria.   Genitourinary:  Negative for dysuria and hematuria.   Musculoskeletal:  Positive for arthralgias. Negative for back pain and myalgias.   Skin:  Negative for rash.   Neurological:  Negative for dizziness, weakness, numbness and headaches.   Hematological:  Does not bruise/bleed easily.   Psychiatric/Behavioral:  Negative for dysphoric mood, sleep disturbance and suicidal ideas. The patient is not nervous/anxious.        Objective:      Vitals:    05/16/24 0856   BP: 120/80   BP Location: Left arm   Patient Position: Sitting   BP Method: Medium (Manual)   Pulse: 65   Resp: 17   Temp: 98.2 °F (36.8 °C)   SpO2: 95%   Weight: 101.1 kg (222 lb 14.2 oz)   Height: 5' 5" (1.651 m)     Body mass index is 37.09 kg/m².  Physical Exam    General appearance: No acute distress, cooperative  Eyes: PERRL, EOMI, conjunctiva clear  Ears: normal external ear and pinna, tm clear without drainage, canals clear  Nose: Normal mucosa without drainage  Throat: no exudates or erythema, tonsils not enlarged  Mouth: no sores or lesions, moist mucous membranes  Neck: FROM, soft, supple, no thyromegaly, no bruits  Lymph: no anterior or posterior cervical adenopathy  Heart::  Regular rate and rhythm, 3/6 systolic  murmur  Lung: Clear to ascultation bilaterally, no wheezing, no rales, no rhonchi, no distress  Abdomen: Soft, nontender, no distention, no hepatosplenomegaly, bowel sounds normal, no guarding, no rebound, no peritoneal signs  Skin: see below   Extremities: no edema, no cyanosis  Neuro: CN 2-12 intact, 5/5 muscle strength upper and lower extremity bilaterally, 2+ DTRs UE and LE bilaterally, normal gait  Peripheral pulses: 2+ pedal pulses bilaterally, good perfusion and color  Musculoskeletal: FROM, good strenth, no tenderness  Joint: normal appearance, no swelling, no warmth, no deformity in all joints      Ulcer is 2 cm x 1 cm,  surrounding erythema is 8 cm x 7 cm in " distribution of her band aid.      Assessment:       1. Coronary artery disease of native artery of native heart with stable angina pectoris    2. Ischemic cardiomyopathy    3. Chronic diastolic heart failure    4. Paroxysmal atrial fibrillation    5. Nonrheumatic aortic valve stenosis    6. Essential hypertension    7. Mixed hyperlipidemia    8. PAD (peripheral artery disease)    9. Stenosis of left carotid artery    10. Mild intermittent asthma without complication    11. Hypothyroidism, unspecified type    12. Type 2 diabetes mellitus with stage 3b chronic kidney disease, without long-term current use of insulin    13. Open wound of right lower leg, initial encounter    14. Stage 3b chronic kidney disease    15. Anemia, unspecified type    16. Gastroesophageal reflux disease without esophagitis    17. Fatty liver    18. OAB (overactive bladder)    19. Memory problem    20. Major depressive disorder, recurrent episode, mild    21. Primary insomnia    22. Exudative age-related macular degeneration of left eye with active choroidal neovascularization    23. VALENTE (obstructive sleep apnea)    24. Primary osteoarthritis of left knee    25. Morbid obesity with BMI of 40.0-44.9, adult    26. Screen for colon cancer        Plan:       Coronary artery disease of native artery of native heart with stable angina pectoris  STable and no active symptoms    Ischemic cardiomyopathy with Chronic diastolic heart failure  Well compensated on lasix daily.     Paroxysmal atrial fibrillation  NSR today on exam. Continue sotalol and eliquis    Nonrheumatic aortic valve stenosis  Mild and will recheck echo in 2025    Essential hypertension  Well controlled and continue current regimen.   -     Basic Metabolic Panel; Future; Expected date: 05/16/2024  -     CBC Auto Differential; Future; Expected date: 05/16/2024    Mixed hyperlipidemia  Good control on atorvastatin and aspirin    PAD (peripheral artery disease)  Continue statin and  aspirin    Stenosis of left carotid artery  Continue statin and aspirin    Mild intermittent asthma without complication  Stable and no active symptoms    Hypothyroidism, unspecified type  Good control on this dose of levothyroxine    Type 2 diabetes mellitus with stage 3b chronic kidney disease, without long-term current use of insulin  Good control on this regimen.   -     Hemoglobin A1C; Future; Expected date: 05/16/2024    Open wound of right lower leg, initial encounter  Due to injury and scheduled to see wound care today. Dressed in clinic    Stage 3b chronic kidney disease  Improved with stopping metolazone.    Anemia, unspecified type  Stable and continue to monitor    Gastroesophageal reflux disease without esophagitis  Well controlled and continue current regimen.     Fatty liver  Normal LFTs and encouraged weight loss    OAB (overactive bladder)  Stable and continue to monitor.     Memory problem  Stable per patient. Continue to follow    Major depressive disorder, recurrent episode, mild  Good control on zoloft    Primary insomnia  Stable    Exudative age-related macular degeneration of left eye with active choroidal neovascularization  Stable and continue to follow with ophthalmology    VALENTE (obstructive sleep apnea)  She refuses cpap but would benefit from use    Primary osteoarthritis of left knee  Stable and no complaints today    Morbid obesity with BMI of 40.0-44.9, adult  Long discussion on the benefits of healthy eating and regular exercise to help lose weight and help control diabetes, cad, hypertension and hyperlipidemia.     Screen for colon cancer  -     Fecal Immunochemical Test (iFOBT); Future; Expected date: 05/16/2024    Follow up in about 4 months (around 9/16/2024) for chronic medical issues.     50 minutes of total time spent on the encounter, which includes face to face time and non-face to face time preparing to see the patient (eg, review of tests), Obtaining and/or reviewing  separately obtained history, documenting clinical information in the electronic or other health record, independently interpreting results (not separately reported) and communicating results to the patient/family/caregiver, or Care coordination (not separately reported).

## 2024-05-20 ENCOUNTER — PROCEDURE VISIT (OUTPATIENT)
Dept: OPHTHALMOLOGY | Facility: CLINIC | Age: 74
End: 2024-05-20
Payer: MEDICARE

## 2024-05-20 DIAGNOSIS — H35.3221 EXUDATIVE AGE-RELATED MACULAR DEGENERATION OF LEFT EYE WITH ACTIVE CHOROIDAL NEOVASCULARIZATION: Primary | ICD-10-CM

## 2024-05-20 PROCEDURE — 92012 INTRM OPH EXAM EST PATIENT: CPT | Mod: 25,S$GLB,, | Performed by: OPHTHALMOLOGY

## 2024-05-20 PROCEDURE — 92134 CPTRZ OPH DX IMG PST SGM RTA: CPT | Mod: S$GLB,,, | Performed by: OPHTHALMOLOGY

## 2024-05-20 PROCEDURE — 67028 INJECTION EYE DRUG: CPT | Mod: LT,S$GLB,, | Performed by: OPHTHALMOLOGY

## 2024-05-20 NOTE — PROGRESS NOTES
HPI     2 month follow up    armd      Additional comments: Eyela  os   Armd       Blurred va     Oct   Dm   Last bs    unsure  Last A1c    6.9           Comments    VA stable ou, no pain ou and floaters without flashes ou.    Latanoprost ou qhs  Timolol ou qd            Last edited by Janet Osborn on 5/20/2024  9:21 AM.            OCT - Drusen OU  OD PVD  OS VMA at [facility].  PED - SRF Resolved    Prior FP - OD with white choroidal patches - stable.  Possible just tesselated fundus appearance  OS PED with small subfoveal SRF =- resolved    Prior FA - OD - late staiing of drusen, no [facility] uptake   OS minimal leakage at apex of PED with Avastin on baord      A/P    1. Dry AMD OU  AREDS/AG    Wet AMD OS  4/23 - increased in PED with apical SRF  S/p Avastin OS x 3  S/p Eylea OS x 2  9/23 - trace increase in SRF at 14 weeks  12/23 - increase OS at 10 weeks    Eylea OS today    Extend on Eylea      2. Scattered white choroidal patches   Stable FP  No sig leakage or uptake on FA OD  Possible just tesselated fundus appearance    3. PVD OD    4. HTN Ret OU    5.  PCIOL OU    10-11 weeks OCT and dilate - LDFE 1/24    Letter TO Dr. Tucker    Risks, benefits, and alternatives to treatment discussed in detail with the patient.  The patient voiced understanding and wished to proceed with the procedure    Injection Procedure Note:  Diagnosis: Wet AMD OS    Patient Identified and Time Out complete  Pt marked  Topical Proparacaine and Betadine.  Inject Eylea OS at 6:00 @ 3.5-4mm posterior to limbus  Post Operative Dx: Same  Complications: None  Follow up as above.

## 2024-05-29 ENCOUNTER — OFFICE VISIT (OUTPATIENT)
Dept: OPTOMETRY | Facility: CLINIC | Age: 74
End: 2024-05-29
Payer: MEDICARE

## 2024-05-29 DIAGNOSIS — H52.4 MYOPIA WITH ASTIGMATISM AND PRESBYOPIA, BILATERAL: ICD-10-CM

## 2024-05-29 DIAGNOSIS — H52.203 MYOPIA WITH ASTIGMATISM AND PRESBYOPIA, BILATERAL: ICD-10-CM

## 2024-05-29 DIAGNOSIS — H04.123 DRY EYE SYNDROME OF BILATERAL LACRIMAL GLANDS: Primary | ICD-10-CM

## 2024-05-29 DIAGNOSIS — H52.13 MYOPIA WITH ASTIGMATISM AND PRESBYOPIA, BILATERAL: ICD-10-CM

## 2024-05-29 PROCEDURE — 99203 OFFICE O/P NEW LOW 30 MIN: CPT | Mod: S$GLB,,,

## 2024-05-29 PROCEDURE — 1159F MED LIST DOCD IN RCRD: CPT | Mod: CPTII,S$GLB,,

## 2024-05-29 PROCEDURE — 3066F NEPHROPATHY DOC TX: CPT | Mod: CPTII,S$GLB,,

## 2024-05-29 PROCEDURE — 3061F NEG MICROALBUMINURIA REV: CPT | Mod: CPTII,S$GLB,,

## 2024-05-29 PROCEDURE — 3044F HG A1C LEVEL LT 7.0%: CPT | Mod: CPTII,S$GLB,,

## 2024-05-29 PROCEDURE — 1126F AMNT PAIN NOTED NONE PRSNT: CPT | Mod: CPTII,S$GLB,,

## 2024-05-29 PROCEDURE — 3288F FALL RISK ASSESSMENT DOCD: CPT | Mod: CPTII,S$GLB,,

## 2024-05-29 PROCEDURE — 99999 PR PBB SHADOW E&M-EST. PATIENT-LVL III: CPT | Mod: PBBFAC,,,

## 2024-05-29 PROCEDURE — 1101F PT FALLS ASSESS-DOCD LE1/YR: CPT | Mod: CPTII,S$GLB,,

## 2024-05-29 PROCEDURE — 92015 DETERMINE REFRACTIVE STATE: CPT | Mod: S$GLB,,,

## 2024-05-29 NOTE — PROGRESS NOTES
HPI    Pt here for dry eye and Mrx.     Saw Dr. Burnette 05/20 and is returning 08/05. Current glasses 4 y.o.VA   could be improved. Pt having dry eyes, using Refresh BID.  Last edited by Elmer Prajapati, OD on 5/29/2024  8:22 AM.            Assessment /Plan     For exam results, see Encounter Report.    Dry eye syndrome of bilateral lacrimal glands    Myopia with astigmatism and presbyopia, bilateral      Pt states she has constant dry eye signs and symptoms, using Refresh BID with minimal relief. 1+ SPK inferior OD, 2+ SPK inferior nasal OS. Ed pt on findings and on nature/chronicity of dry eye. Advised pt increase use of artificial tears to TID-QID and incorporate gel drop/jailyn QHS. Caution on ceiling fans and vents. Ed pt to RTC if symptoms worsen. Otherwise, monitor.  Discussed spectacle options with pt and released final spec rx. Ed pt on change in rx and adaptation.    *Anterior segment and refraction exam only. Pt followed by retina. Last seen 05/20/24, following up again 08/05/24. No DFE today.*    RTC: as scheduled with retina, optom prn.

## 2024-07-03 RX ORDER — ALBUTEROL SULFATE 90 UG/1
AEROSOL, METERED RESPIRATORY (INHALATION)
Qty: 25.5 G | Refills: 3 | Status: SHIPPED | OUTPATIENT
Start: 2024-07-03

## 2024-07-03 NOTE — TELEPHONE ENCOUNTER
Refill Decision Note   Snehaedmar LeesTaz  is requesting a refill authorization.  Brief Assessment and Rationale for Refill:  Approve     Medication Therapy Plan:         Comments:     Note composed:7:13 AM 07/03/2024

## 2024-07-03 NOTE — TELEPHONE ENCOUNTER
No care due was identified.  [facility] Embedded Care Due Messages. Reference number: [id number].   7/03/2024 3:55:34 AM CDT

## 2024-07-15 DIAGNOSIS — E11.22 TYPE 2 DIABETES MELLITUS WITH STAGE 3B CHRONIC KIDNEY DISEASE, WITHOUT LONG-TERM CURRENT USE OF INSULIN: ICD-10-CM

## 2024-07-15 DIAGNOSIS — N18.32 TYPE 2 DIABETES MELLITUS WITH STAGE 3B CHRONIC KIDNEY DISEASE, WITHOUT LONG-TERM CURRENT USE OF INSULIN: ICD-10-CM

## 2024-07-15 DIAGNOSIS — R21 SKIN RASH: Primary | ICD-10-CM

## 2024-07-15 RX ORDER — DAPAGLIFLOZIN 10 MG/1
10 TABLET, FILM COATED ORAL DAILY
Qty: 90 TABLET | Refills: 3 | Status: CANCELLED | OUTPATIENT
Start: 2024-07-15

## 2024-07-15 NOTE — TELEPHONE ENCOUNTER
No care due was identified.  [facility] Embedded Care Due Messages. Reference number: [id number].   7/15/2024 3:55:35 PM CDT

## 2024-07-15 NOTE — TELEPHONE ENCOUNTER
----- Message from Inessa Yu sent at 7/15/2024  2:38 PM CDT -----  Contact: walk in  Pt dropped off a note for Dr. Jacobo regarding a rx refill and a referral to a dermatologist. In box.

## 2024-07-16 RX ORDER — DAPAGLIFLOZIN 5 MG/1
10 TABLET, FILM COATED ORAL DAILY
Qty: 180 TABLET | Refills: 3 | Status: SHIPPED | OUTPATIENT
Start: 2024-07-16 | End: 2024-07-22

## 2024-07-16 NOTE — TELEPHONE ENCOUNTER
Referral for dermatology signed.     Farxiga changed from 10 mg daily to 5 mg two pills daily due to insurance. Please let the patient know about the  new dosing     Thanks

## 2024-07-16 NOTE — TELEPHONE ENCOUNTER
LVM with message to call back or check MCM for medication dose change and number to schedule with Derm.

## 2024-07-22 RX ORDER — DAPAGLIFLOZIN 10 MG/1
10 TABLET, FILM COATED ORAL DAILY
Qty: 90 TABLET | Refills: 3 | Status: SHIPPED | OUTPATIENT
Start: 2024-07-22

## 2024-07-22 NOTE — TELEPHONE ENCOUNTER
She needs to be on farxiga 10 mg daily. If she has 5 mg then she needs to take 5 mg 2 pills a day.  I am sure insurance does not want to cover this dose.     I will resend the farxiga at 10 mg daily    Thanks

## 2024-07-23 DIAGNOSIS — E03.9 HYPOTHYROIDISM, UNSPECIFIED TYPE: ICD-10-CM

## 2024-07-23 RX ORDER — LEVOTHYROXINE SODIUM 88 UG/1
TABLET ORAL
Qty: 90 TABLET | Refills: 3 | Status: SHIPPED | OUTPATIENT
Start: 2024-07-23

## 2024-07-23 NOTE — TELEPHONE ENCOUNTER
Refill Decision Note   Sneha Leesobald  is requesting a refill authorization.  Brief Assessment and Rationale for Refill:  Approve     Medication Therapy Plan:        Comments:     Note composed:11:11 AM 07/23/2024

## 2024-07-23 NOTE — TELEPHONE ENCOUNTER
No care due was identified.  [facility] Embedded Care Due Messages. Reference number: [id number].   7/23/2024 5:21:50 AM CDT

## 2024-08-05 ENCOUNTER — PROCEDURE VISIT (OUTPATIENT)
Dept: OPHTHALMOLOGY | Facility: CLINIC | Age: 74
End: 2024-08-05
Payer: MEDICARE

## 2024-08-05 DIAGNOSIS — H35.3132 INTERMEDIATE STAGE NONEXUDATIVE AGE-RELATED MACULAR DEGENERATION OF BOTH EYES: ICD-10-CM

## 2024-08-05 DIAGNOSIS — H35.3221 EXUDATIVE AGE-RELATED MACULAR DEGENERATION OF LEFT EYE WITH ACTIVE CHOROIDAL NEOVASCULARIZATION: Primary | ICD-10-CM

## 2024-08-05 DIAGNOSIS — H35.033 HYPERTENSIVE RETINOPATHY, BILATERAL: ICD-10-CM

## 2024-08-05 DIAGNOSIS — H43.811 POSTERIOR VITREOUS DETACHMENT, RIGHT: ICD-10-CM

## 2024-08-05 PROCEDURE — 67028 INJECTION EYE DRUG: CPT | Mod: LT,S$GLB,, | Performed by: OPHTHALMOLOGY

## 2024-08-05 PROCEDURE — 92134 CPTRZ OPH DX IMG PST SGM RTA: CPT | Mod: S$GLB,,, | Performed by: OPHTHALMOLOGY

## 2024-08-05 PROCEDURE — 92014 COMPRE OPH EXAM EST PT 1/>: CPT | Mod: 25,S$GLB,, | Performed by: OPHTHALMOLOGY

## 2024-08-05 RX ORDER — NYSTATIN 100000 U/G
CREAM TOPICAL
COMMUNITY

## 2024-09-09 DIAGNOSIS — F33.0 MAJOR DEPRESSIVE DISORDER, RECURRENT EPISODE, MILD: ICD-10-CM

## 2024-09-09 NOTE — TELEPHONE ENCOUNTER
Care Due:                  Date            Visit Type   Department     Provider  --------------------------------------------------------------------------------                                EP -                              PRIMARY      [facility]  Last Visit: 05-      CARE ([facility])   MEDICINE       Elizabeth Jacobo                              [facility]                              PRIMARY      [facility]  Next Visit: 09-      CARE ([facility])   [facility]       Elizabeth Jacobo                                                            Last  Test          Frequency    Reason                     Performed    Due Date  --------------------------------------------------------------------------------    HBA1C.......  6 months...  dapagliflozin, metFORMIN.  05-   11-    [facility] Embedded Care Due Messages. Reference number: [id number].   9/09/2024 5:07:45 PM CDT

## 2024-09-10 RX ORDER — SERTRALINE HYDROCHLORIDE 50 MG/1
50 TABLET, FILM COATED ORAL
Qty: 90 TABLET | Refills: 2 | Status: SHIPPED | OUTPATIENT
Start: 2024-09-10

## 2024-09-10 RX ORDER — FUROSEMIDE 20 MG/1
TABLET ORAL
Qty: 90 TABLET | Refills: 2 | Status: SHIPPED | OUTPATIENT
Start: 2024-09-10

## 2024-09-10 NOTE — TELEPHONE ENCOUNTER
Refill Routing Note   Medication(s) are not appropriate for processing by [facility] for the following reason(s):     DDI not previously overridden by current provider--after initial override, the Refill Center will be able to continue overrides      Drug-disease interaction: : sertraline and Fatty liver     [facility] action(s):  Defer   Requires labs : Yes      Medication Therapy Plan: DDI not previously overridden by current participating provider    Pharmacist review requested: Yes     Appointments  past 12m or future 3m with PCP    Date Provider   Last Visit   5/16/2024 Elizabeth Jacobo, DO   Next Visit   9/10/2024 Elizabeth Jacobo, DO   ED visits in past 90 days: 0        Note composed:3:18 PM 09/10/2024

## 2024-09-10 NOTE — TELEPHONE ENCOUNTER
No care due was identified.  [facility] Embedded Care Due Messages. Reference number: [id number].   9/10/2024 4:01:09 AM CDT

## 2024-09-10 NOTE — TELEPHONE ENCOUNTER
Refill Decision Note   Snehaedmar LeesTaz  is requesting a refill authorization.  Brief Assessment and Rationale for Refill:  Approve     Medication Therapy Plan:        Comments:     Note composed:11:02 AM 09/10/2024

## 2024-09-10 NOTE — TELEPHONE ENCOUNTER
Refill Decision Note   Sneha Mcghee  is requesting a refill authorization.  Brief Assessment and Rationale for Refill:  Approve     Medication Therapy Plan:        Pharmacist review requested: Yes   Comments:     Note composed:3:55 PM 09/10/2024

## 2024-09-16 ENCOUNTER — LAB VISIT (OUTPATIENT)
Dept: LAB | Facility: HOSPITAL | Age: 74
End: 2024-09-16
Attending: INTERNAL MEDICINE
Payer: MEDICARE

## 2024-09-16 DIAGNOSIS — I10 ESSENTIAL HYPERTENSION: ICD-10-CM

## 2024-09-16 DIAGNOSIS — E11.22 TYPE 2 DIABETES MELLITUS WITH STAGE 3B CHRONIC KIDNEY DISEASE, WITHOUT LONG-TERM CURRENT USE OF INSULIN: ICD-10-CM

## 2024-09-16 DIAGNOSIS — N18.32 TYPE 2 DIABETES MELLITUS WITH STAGE 3B CHRONIC KIDNEY DISEASE, WITHOUT LONG-TERM CURRENT USE OF INSULIN: ICD-10-CM

## 2024-09-16 LAB
ANION GAP SERPL CALC-SCNC: 11 MMOL/L (ref 8–16)
BASOPHILS # BLD AUTO: 0.04 K/UL (ref 0–0.2)
BASOPHILS NFR BLD: 0.5 % (ref 0–1.9)
BUN SERPL-MCNC: 21 MG/DL (ref 8–23)
CALCIUM SERPL-MCNC: 9.7 MG/DL (ref 8.7–10.5)
CHLORIDE SERPL-SCNC: 103 MMOL/L (ref 95–110)
CO2 SERPL-SCNC: 25 MMOL/L (ref 23–29)
CREAT SERPL-MCNC: 1.2 MG/DL (ref 0.5–1.4)
DIFFERENTIAL METHOD BLD: ABNORMAL
EOSINOPHIL # BLD AUTO: 0.2 K/UL (ref 0–0.5)
EOSINOPHIL NFR BLD: 2.9 % (ref 0–8)
ERYTHROCYTE [DISTWIDTH] IN BLOOD BY AUTOMATED COUNT: 16.7 % (ref 11.5–14.5)
EST. GFR  (NO RACE VARIABLE): 47.8 ML/MIN/1.73 M^2
ESTIMATED AVG GLUCOSE: 143 MG/DL (ref 68–131)
GLUCOSE SERPL-MCNC: 132 MG/DL (ref 70–110)
HBA1C MFR BLD: 6.6 % (ref 4–5.6)
HCT VFR BLD AUTO: 43.9 % (ref 37–48.5)
HGB BLD-MCNC: 13.6 G/DL (ref 12–16)
IMM GRANULOCYTES # BLD AUTO: 0.02 K/UL (ref 0–0.04)
IMM GRANULOCYTES NFR BLD AUTO: 0.3 % (ref 0–0.5)
LYMPHOCYTES # BLD AUTO: 1.8 K/UL (ref 1–4.8)
LYMPHOCYTES NFR BLD: 22.6 % (ref 18–48)
MCH RBC QN AUTO: 25.4 PG (ref 27–31)
MCHC RBC AUTO-ENTMCNC: 31 G/DL (ref 32–36)
MCV RBC AUTO: 82 FL (ref 82–98)
MONOCYTES # BLD AUTO: 0.9 K/UL (ref 0.3–1)
MONOCYTES NFR BLD: 11 % (ref 4–15)
NEUTROPHILS # BLD AUTO: 4.9 K/UL (ref 1.8–7.7)
NEUTROPHILS NFR BLD: 62.7 % (ref 38–73)
NRBC BLD-RTO: 0 /100 WBC
PLATELET # BLD AUTO: 238 K/UL (ref 150–450)
PMV BLD AUTO: 10.9 FL (ref 9.2–12.9)
POTASSIUM SERPL-SCNC: 5.6 MMOL/L (ref 3.5–5.1)
RBC # BLD AUTO: 5.36 M/UL (ref 4–5.4)
SODIUM SERPL-SCNC: 139 MMOL/L (ref 136–145)
WBC # BLD AUTO: 7.83 K/UL (ref 3.9–12.7)

## 2024-09-16 PROCEDURE — 83036 HEMOGLOBIN GLYCOSYLATED A1C: CPT | Performed by: INTERNAL MEDICINE

## 2024-09-16 PROCEDURE — 85025 COMPLETE CBC W/AUTO DIFF WBC: CPT | Performed by: INTERNAL MEDICINE

## 2024-09-16 PROCEDURE — 80048 BASIC METABOLIC PNL TOTAL CA: CPT | Performed by: INTERNAL MEDICINE

## 2024-09-19 ENCOUNTER — OFFICE VISIT (OUTPATIENT)
Dept: OPHTHALMOLOGY | Facility: CLINIC | Age: 74
End: 2024-09-19
Payer: MEDICARE

## 2024-09-19 ENCOUNTER — TELEPHONE (OUTPATIENT)
Dept: FAMILY MEDICINE | Facility: CLINIC | Age: 74
End: 2024-09-19

## 2024-09-19 ENCOUNTER — PATIENT MESSAGE (OUTPATIENT)
Dept: FAMILY MEDICINE | Facility: CLINIC | Age: 74
End: 2024-09-19

## 2024-09-19 ENCOUNTER — LAB VISIT (OUTPATIENT)
Dept: LAB | Facility: HOSPITAL | Age: 74
End: 2024-09-19
Attending: INTERNAL MEDICINE
Payer: MEDICARE

## 2024-09-19 DIAGNOSIS — H35.3221 EXUDATIVE AGE-RELATED MACULAR DEGENERATION OF LEFT EYE WITH ACTIVE CHOROIDAL NEOVASCULARIZATION: Primary | ICD-10-CM

## 2024-09-19 DIAGNOSIS — E87.5 HYPERKALEMIA: Primary | ICD-10-CM

## 2024-09-19 DIAGNOSIS — E87.5 HYPERKALEMIA: ICD-10-CM

## 2024-09-19 LAB
ANION GAP SERPL CALC-SCNC: 11 MMOL/L (ref 8–16)
BUN SERPL-MCNC: 29 MG/DL (ref 8–23)
CALCIUM SERPL-MCNC: 10 MG/DL (ref 8.7–10.5)
CHLORIDE SERPL-SCNC: 105 MMOL/L (ref 95–110)
CO2 SERPL-SCNC: 26 MMOL/L (ref 23–29)
CREAT SERPL-MCNC: 1.1 MG/DL (ref 0.5–1.4)
EST. GFR  (NO RACE VARIABLE): 53 ML/MIN/1.73 M^2
GLUCOSE SERPL-MCNC: 134 MG/DL (ref 70–110)
POTASSIUM SERPL-SCNC: 5.1 MMOL/L (ref 3.5–5.1)
SODIUM SERPL-SCNC: 142 MMOL/L (ref 136–145)

## 2024-09-19 PROCEDURE — 92134 CPTRZ OPH DX IMG PST SGM RTA: CPT | Mod: S$GLB,,, | Performed by: OPHTHALMOLOGY

## 2024-09-19 PROCEDURE — 1101F PT FALLS ASSESS-DOCD LE1/YR: CPT | Mod: CPTII,S$GLB,, | Performed by: OPHTHALMOLOGY

## 2024-09-19 PROCEDURE — 3061F NEG MICROALBUMINURIA REV: CPT | Mod: CPTII,S$GLB,, | Performed by: OPHTHALMOLOGY

## 2024-09-19 PROCEDURE — 3066F NEPHROPATHY DOC TX: CPT | Mod: CPTII,S$GLB,, | Performed by: OPHTHALMOLOGY

## 2024-09-19 PROCEDURE — 3288F FALL RISK ASSESSMENT DOCD: CPT | Mod: CPTII,S$GLB,, | Performed by: OPHTHALMOLOGY

## 2024-09-19 PROCEDURE — 36415 COLL VENOUS BLD VENIPUNCTURE: CPT | Mod: PO | Performed by: INTERNAL MEDICINE

## 2024-09-19 PROCEDURE — 1159F MED LIST DOCD IN RCRD: CPT | Mod: CPTII,S$GLB,, | Performed by: OPHTHALMOLOGY

## 2024-09-19 PROCEDURE — 1126F AMNT PAIN NOTED NONE PRSNT: CPT | Mod: CPTII,S$GLB,, | Performed by: OPHTHALMOLOGY

## 2024-09-19 PROCEDURE — 92014 COMPRE OPH EXAM EST PT 1/>: CPT | Mod: S$GLB,,, | Performed by: OPHTHALMOLOGY

## 2024-09-19 PROCEDURE — 1160F RVW MEDS BY RX/DR IN RCRD: CPT | Mod: CPTII,S$GLB,, | Performed by: OPHTHALMOLOGY

## 2024-09-19 PROCEDURE — 80048 BASIC METABOLIC PNL TOTAL CA: CPT | Mod: PO | Performed by: INTERNAL MEDICINE

## 2024-09-19 PROCEDURE — 3044F HG A1C LEVEL LT 7.0%: CPT | Mod: CPTII,S$GLB,, | Performed by: OPHTHALMOLOGY

## 2024-09-19 PROCEDURE — 99999 PR PBB SHADOW E&M-EST. PATIENT-LVL IV: CPT | Mod: PBBFAC,,, | Performed by: OPHTHALMOLOGY

## 2024-09-19 PROCEDURE — 92201 OPSCPY EXTND RTA DRAW UNI/BI: CPT | Mod: S$GLB,,, | Performed by: OPHTHALMOLOGY

## 2024-09-19 RX ORDER — POTASSIUM CHLORIDE 1500 MG/1
TABLET, EXTENDED RELEASE ORAL
COMMUNITY
Start: 2024-09-13

## 2024-09-19 NOTE — TELEPHONE ENCOUNTER
Pt was rescheduled to 9/25. Spoke with pt and informed Dr. Jacobo would like labwork this week. Stated she was at [facility] now and would go after her appt to do the bloodwork.

## 2024-09-19 NOTE — PROGRESS NOTES
HPI    DLS  8/5/24  Pt sts she see wavy lines in OS and see grey lines. No   flashes no floaters.     Timolol qam ou  Latanoprost ou qhs  Last edited by Suyapa Coy on 9/19/2024  7:43 AM.      .           OCT - Drusen OU  OD PVD  OS VMA at [facility].  PED - SRF Resolved. Collapsed PED with outers egment loss    Prior FP - OD with white choroidal patches - stable.  Possible just tesselated fundus appearance  OS PED with small subfoveal SRF =- resolved    Prior FA - OD - late staiing of drusen, no [facility] uptake   OS minimal leakage at apex of PED with Avastin on baord      A/P    1. Dry AMD OU  AREDS/AG    Wet AMD OS  4/23 - increased in PED with apical SRF  S/p Avastin OS x 3  S/p Eylea OS x 4 (last 8/5/24)  9/23 - trace increase in SRF at 14 weeks  12/23 - increase OS at 10 weeks    Keep original 12 week FU - consider obs if stable      2. Scattered white choroidal patches   Stable FP  No sig leakage or uptake on FA OD  Possible just tesselated fundus appearance    3. PVD OD    4. HTN Ret OU    5.  PCIOL OU      6 weeks OCT no dilate, see in room - LDFE 9/24    Letter TO Dr. Tucker

## 2024-09-19 NOTE — TELEPHONE ENCOUNTER
She rescheduled her appt with me from today to next week which is okay but her potasium was elevated and I need  this rechecked.     Please have her go to the lab to recheck potasium     Thanks

## 2024-09-24 ENCOUNTER — TELEPHONE (OUTPATIENT)
Dept: OPHTHALMOLOGY | Facility: CLINIC | Age: 74
End: 2024-09-24
Payer: MEDICARE

## 2024-09-24 NOTE — TELEPHONE ENCOUNTER
----- Message from Olinda Jansen sent at 9/24/2024 11:28 AM CDT -----  Regarding: Results  Contact: [phone]            Name of Caller: Isaiah Amezcua Preference:  [phone]    Nature of Call: Requesting a call back to go over results

## 2024-09-25 ENCOUNTER — OFFICE VISIT (OUTPATIENT)
Dept: FAMILY MEDICINE | Facility: CLINIC | Age: 74
End: 2024-09-25
Payer: MEDICARE

## 2024-09-25 VITALS
HEIGHT: 65 IN | DIASTOLIC BLOOD PRESSURE: 60 MMHG | BODY MASS INDEX: 36.34 KG/M2 | TEMPERATURE: 98 F | HEART RATE: 66 BPM | SYSTOLIC BLOOD PRESSURE: 116 MMHG | WEIGHT: 218.13 LBS | OXYGEN SATURATION: 95 % | RESPIRATION RATE: 18 BRPM

## 2024-09-25 DIAGNOSIS — I35.0 NONRHEUMATIC AORTIC VALVE STENOSIS: ICD-10-CM

## 2024-09-25 DIAGNOSIS — N18.32 STAGE 3B CHRONIC KIDNEY DISEASE: ICD-10-CM

## 2024-09-25 DIAGNOSIS — I48.0 PAROXYSMAL ATRIAL FIBRILLATION: ICD-10-CM

## 2024-09-25 DIAGNOSIS — Z12.31 ENCOUNTER FOR SCREENING MAMMOGRAM FOR MALIGNANT NEOPLASM OF BREAST: ICD-10-CM

## 2024-09-25 DIAGNOSIS — D64.9 ANEMIA, UNSPECIFIED TYPE: ICD-10-CM

## 2024-09-25 DIAGNOSIS — N32.81 OAB (OVERACTIVE BLADDER): ICD-10-CM

## 2024-09-25 DIAGNOSIS — K76.0 FATTY LIVER: ICD-10-CM

## 2024-09-25 DIAGNOSIS — E03.9 HYPOTHYROIDISM, UNSPECIFIED TYPE: ICD-10-CM

## 2024-09-25 DIAGNOSIS — I65.22 STENOSIS OF LEFT CAROTID ARTERY: ICD-10-CM

## 2024-09-25 DIAGNOSIS — I10 ESSENTIAL HYPERTENSION: ICD-10-CM

## 2024-09-25 DIAGNOSIS — I50.32 CHRONIC DIASTOLIC HEART FAILURE: ICD-10-CM

## 2024-09-25 DIAGNOSIS — Z23 ENCOUNTER FOR VACCINATION: ICD-10-CM

## 2024-09-25 DIAGNOSIS — K21.9 GASTROESOPHAGEAL REFLUX DISEASE WITHOUT ESOPHAGITIS: ICD-10-CM

## 2024-09-25 DIAGNOSIS — F33.0 MAJOR DEPRESSIVE DISORDER, RECURRENT EPISODE, MILD: ICD-10-CM

## 2024-09-25 DIAGNOSIS — E11.22 TYPE 2 DIABETES MELLITUS WITH STAGE 3B CHRONIC KIDNEY DISEASE, WITHOUT LONG-TERM CURRENT USE OF INSULIN: ICD-10-CM

## 2024-09-25 DIAGNOSIS — E87.5 HYPERKALEMIA: ICD-10-CM

## 2024-09-25 DIAGNOSIS — F51.01 PRIMARY INSOMNIA: ICD-10-CM

## 2024-09-25 DIAGNOSIS — H35.3221 EXUDATIVE AGE-RELATED MACULAR DEGENERATION OF LEFT EYE WITH ACTIVE CHOROIDAL NEOVASCULARIZATION: ICD-10-CM

## 2024-09-25 DIAGNOSIS — R41.3 MEMORY PROBLEM: ICD-10-CM

## 2024-09-25 DIAGNOSIS — I25.118 CORONARY ARTERY DISEASE OF NATIVE ARTERY OF NATIVE HEART WITH STABLE ANGINA PECTORIS: Primary | ICD-10-CM

## 2024-09-25 DIAGNOSIS — I25.5 ISCHEMIC CARDIOMYOPATHY: ICD-10-CM

## 2024-09-25 DIAGNOSIS — E78.2 MIXED HYPERLIPIDEMIA: ICD-10-CM

## 2024-09-25 DIAGNOSIS — N18.32 TYPE 2 DIABETES MELLITUS WITH STAGE 3B CHRONIC KIDNEY DISEASE, WITHOUT LONG-TERM CURRENT USE OF INSULIN: ICD-10-CM

## 2024-09-25 DIAGNOSIS — G47.33 OSA (OBSTRUCTIVE SLEEP APNEA): ICD-10-CM

## 2024-09-25 DIAGNOSIS — J45.20 MILD INTERMITTENT ASTHMA WITHOUT COMPLICATION: ICD-10-CM

## 2024-09-25 DIAGNOSIS — E66.01 MORBID OBESITY WITH BMI OF 40.0-44.9, ADULT: ICD-10-CM

## 2024-09-25 DIAGNOSIS — I73.9 PAD (PERIPHERAL ARTERY DISEASE): ICD-10-CM

## 2024-09-25 DIAGNOSIS — M17.12 PRIMARY OSTEOARTHRITIS OF LEFT KNEE: ICD-10-CM

## 2024-09-25 PROCEDURE — 1159F MED LIST DOCD IN RCRD: CPT | Mod: CPTII,S$GLB,, | Performed by: INTERNAL MEDICINE

## 2024-09-25 PROCEDURE — 3288F FALL RISK ASSESSMENT DOCD: CPT | Mod: CPTII,S$GLB,, | Performed by: INTERNAL MEDICINE

## 2024-09-25 PROCEDURE — G0008 ADMIN INFLUENZA VIRUS VAC: HCPCS | Mod: S$GLB,,, | Performed by: INTERNAL MEDICINE

## 2024-09-25 PROCEDURE — 3078F DIAST BP <80 MM HG: CPT | Mod: CPTII,S$GLB,, | Performed by: INTERNAL MEDICINE

## 2024-09-25 PROCEDURE — 1160F RVW MEDS BY RX/DR IN RCRD: CPT | Mod: CPTII,S$GLB,, | Performed by: INTERNAL MEDICINE

## 2024-09-25 PROCEDURE — 3061F NEG MICROALBUMINURIA REV: CPT | Mod: CPTII,S$GLB,, | Performed by: INTERNAL MEDICINE

## 2024-09-25 PROCEDURE — 3008F BODY MASS INDEX DOCD: CPT | Mod: CPTII,S$GLB,, | Performed by: INTERNAL MEDICINE

## 2024-09-25 PROCEDURE — G2211 COMPLEX E/M VISIT ADD ON: HCPCS | Mod: S$GLB,,, | Performed by: INTERNAL MEDICINE

## 2024-09-25 PROCEDURE — 99214 OFFICE O/P EST MOD 30 MIN: CPT | Mod: S$GLB,,, | Performed by: INTERNAL MEDICINE

## 2024-09-25 PROCEDURE — 1126F AMNT PAIN NOTED NONE PRSNT: CPT | Mod: CPTII,S$GLB,, | Performed by: INTERNAL MEDICINE

## 2024-09-25 PROCEDURE — 1101F PT FALLS ASSESS-DOCD LE1/YR: CPT | Mod: CPTII,S$GLB,, | Performed by: INTERNAL MEDICINE

## 2024-09-25 PROCEDURE — 90653 IIV ADJUVANT VACCINE IM: CPT | Mod: S$GLB,,, | Performed by: INTERNAL MEDICINE

## 2024-09-25 PROCEDURE — 3074F SYST BP LT 130 MM HG: CPT | Mod: CPTII,S$GLB,, | Performed by: INTERNAL MEDICINE

## 2024-09-25 PROCEDURE — 3044F HG A1C LEVEL LT 7.0%: CPT | Mod: CPTII,S$GLB,, | Performed by: INTERNAL MEDICINE

## 2024-09-25 PROCEDURE — 3066F NEPHROPATHY DOC TX: CPT | Mod: CPTII,S$GLB,, | Performed by: INTERNAL MEDICINE

## 2024-09-25 RX ORDER — SERTRALINE HYDROCHLORIDE 50 MG/1
75 TABLET, FILM COATED ORAL DAILY
Qty: 135 TABLET | Refills: 2 | Status: SHIPPED | OUTPATIENT
Start: 2024-09-25

## 2024-09-25 NOTE — PROGRESS NOTES
Subjective:       Patient ID: Sneha Mcghee is a 74 y.o. female.    Medication List with Changes/Refills   Current Medications    ALBUTEROL (PROVENTIL/VENTOLIN HFA) 90 MCG/ACTUATION INHALER    INHALE 2 PUFFS INTO THE LUNGS EVERY 6 HOURS AS NEEDED FOR WHEEZING    ASPIRIN (ASPIR-81 ORAL)    Take 81 mg by mouth once daily.    ATORVASTATIN (LIPITOR) 40 MG TABLET    Take 1 tablet (40 mg total) by mouth once daily.    AUGMENTED BETAMETHASONE DIPROPIONATE (DIPROLENE-AF) 0.05 % CREAM    Apply topically 2 (two) times daily.    BLOOD SUGAR DIAGNOSTIC STRP    To check BG once a day and PRN low sugars, to use with insurance preferred meter    BLOOD-GLUCOSE METER KIT    To check BG qam and PRN lows, to use with insurance preferred meter    DAPAGLIFLOZIN PROPANEDIOL (FARXIGA) 10 MG TABLET    Take 1 tablet (10 mg total) by mouth once daily.    ELIQUIS 5 MG TAB    Take 5 mg by mouth 2 (two) times daily.    FLUTICASONE PROPIONATE (FLONASE) 50 MCG/ACTUATION NASAL SPRAY    SHAKE LIQUID AND USE 2 SPRAYS(100 MCG) IN EACH NOSTRIL EVERY DAY    FUROSEMIDE (LASIX) 20 MG TABLET    TAKE 1 TABLET BY MOUTH EVERY DAY    HYDROXYZINE HCL (ATARAX) 25 MG TABLET    Take 0.5 tablets (12.5 mg total) by mouth 3 (three) times daily as needed for Itching.    KETOCONAZOLE (NIZORAL) 2 % SHAMPOO    Apply topically twice a week.    LANCETS MISC    To check BG once a day and PRN lows, to use with insurance preferred meter    LATANOPROST 0.005 % OPHTHALMIC SOLUTION    latanoprost 0.005 % eye drops   Instill 1 drop every day by ophthalmic route for 90 days.    LEVOTHYROXINE (SYNTHROID) 88 MCG TABLET    TAKE 1 TABLET(88 MCG) BY MOUTH BEFORE BREAKFAST    MELATONIN 10 MG CAP    Take 20 mg by mouth.    METFORMIN (GLUCOPHAGE-XR) 500 MG ER 24HR TABLET    TAKE 1 TABLET(500 MG) BY MOUTH TWICE DAILY    METOPROLOL SUCCINATE (TOPROL-XL) 50 MG 24 HR TABLET    Take 50 mg by mouth.    MOMETASONE 0.1% (ELOCON) 0.1 % CREAM    APPLY TOPICALLY TO THE AFFECTED AREA EVERY DAY     NYSTATIN (MYCOSTATIN) CREAM    Apply 1 application as needed by topical route for 30 days.    NYSTATIN (MYCOSTATIN) CREAM    Apply topically.    OMEGA-3 FATTY ACIDS/FISH OIL (FISH OIL-OMEGA-3 FATTY ACIDS) 300-1,000 MG CAPSULE    Take 1 capsule by mouth once daily.    PANTOPRAZOLE (PROTONIX) 40 MG TABLET    Take 40 mg by mouth every morning.    POTASSIUM CHLORIDE (K-TAB) 20 MEQ        POTASSIUM CHLORIDE (MICRO-K) 10 MEQ CPSR    Take 10 mEq by mouth once.    PREDNISONE (DELTASONE) 10 MG TABLET    Take 2 pills daily for 5 days then 1 pill daily for 2 days then stop    SILVER SULFADIAZINE 1% (SILVADENE) 1 % CREAM    Apply topically 2 (two) times daily.    SOTALOL (SOTALOL AF) 80 MG TABLET    Take 80 mg by mouth 2 (two) times daily.    TIMOLOL (BETIMOL) 0.5 % OPHTHALMIC SOLUTION    Place 1 drop into both eyes every morning.    TIMOLOL MALEATE 0.5% (TIMOPTIC) 0.5 % DROP    Place 1 drop into both eyes 2 (two) times daily.    TRIAMCINOLONE ACETONIDE 0.1% (KENALOG) 0.1 % CREAM    Apply topically 2 (two) times daily. for 7 days    VIT C/E/ZN/COPPR/LUTEIN/ZEAXAN (PRESERVISION AREDS-2 ORAL)    Take by mouth once daily at 6am.    VITAMIN E 400 UNIT CAPSULE       Changed and/or Refilled Medications    Modified Medication Previous Medication    SERTRALINE (ZOLOFT) 50 MG TABLET sertraline (ZOLOFT) 50 MG tablet       Take 1.5 tablets (75 mg total) by mouth once daily.    TAKE 1 TABLET(50 MG) BY MOUTH EVERY DAY       Chief Complaint: Follow-up  She is here today to f/u on chronic medical issues.       She has CAD s/p CABG x 4 vessels in 2012 with 8 stents.  She reports a cardiomyopathy with chronic heart failure.  She has paroxysmal Afib for many years. Echo on 8/2022 showed EF 65%, positive diastolic dysfunction, cLVH, moderate AS (HAMILTON 1.49 and gradient of 23), mild TR and PAP of 29. Nuclear stress on 3/2023 was negative. She is taking sotalol 80 mg bid and eliquis 5 mg bid and metoprolol xl 50 mg daily. [id number] Score: 6 (mike  risk of 9.7%/year).  She was seen by cardiology on 7/2024 and no changes were made.      She has hyperlipidemia and is taking atorvastatin 40 mg daily.  Lipids on 5/2024 were 133/148/37/66. She continues on aspirin daily. She does complain of leg cramps.  She started taking extra potasium but this did not help. Her potasium level was elevated to 5.6 on 9/2024 and to 5.1 on recheck.      She has carotid artery stenosis and is s/p left carotid stent.  Carotid u/s on 8/2022 showed patent stent in left carotid bulb and no significant stenosis.      She has PVD but has not had stenting of lower legs. She has chronic venous insufficiency and is s/p multiple ELVT.  She has chronic stasis dermatitis left > right.  She had a prolonged course for a non-healing ulcer of left lower leg that is now healed.  Venous u/s on 1/2022 showed venous reflux.  Arterial ultrasound on 1/2022 showed 50-75% stenosis of proximal left anterior tibial artery, SARITHA right 1.01 and left 0.97. She denies any recurrence of open wounds. She continues on lasix 20 mg as needed and feels her lower leg swelling is controlled.  Starting farxiga has helped her lower leg swelling.      She has diabetes and is taking metformin 500 mg bid and farxiga 10 mg daily. Her HbA1c was 6.6 on 9/2024.  She does not check her glucose at home.  She is UTD on her eye exam and foot exam. Her microalbumin is negative on 5/2024.       She has CKD  with increased creatinine of 1.2 with GFR of 47 on 9/2024.     She has asthma since 2012 that started after her heart surgery. She has symptoms about once every 6 months. Her symptoms are chest tightness and wheezing.  She denies any known triggers. She uses albuterol for her increased symptoms with good relief. Today she denies any active symptoms.      She has hypothyroid and is taking levothyroxine 88 mcg daily. Her TSH on 5/2024 was normal.      She has glaucoma and is using timolol and xalatan drops. She is followed by  ophthalmology and reports her pressures have been stable but has wet  macular degeneration in the left eye and continues on  intraocular injections.      She has GERD and is taking pantoprazole 40 mg daily.      She has fatty liver and normal LFTs on 5/2024.      She has depression and is taking zoloft 50 mg qday. She does report increasing symptoms of depression and anxiety.  No suicidal ideations.  She does occasionally get severe anxiety with panic attacks.  She has insomnia and uses OTC medication.      She complains of memory problems .  Recall she is very forgetful and this worsens when she is very stressed or left alone. She forgets names and places but denies getting loss. She denies any dangerous behaviors. She continues to do her finances without problems. She lives alone but her son is involved. He had noticed worsening memory and feels she struggles to remember her medications. She feels she is doing better. Labs on 5/2024 were reassuring.      She has VALENTE and refuses to wear CPAP.      She has left knee osteoarthritis and is followed by orthopedics. She was told she needs a TKR but does not want done at this time. She is currently in PT to help with left pain and balance.      She lives with her niece who moved in temporary and feels safe.  She does not exercise and is very sedentary. She does eat heathy.  She has poor balance but denies any recent falls.      Colonoscopy---4/2016 repeat in 5 years ([facility])---has fecal kit at home but does not want to do at this time.   Mammogram----3/2024 neg   DEXA-----7/2023 normal  Tdap---more than 10 years   Influenza vaccine---9/2023  Pneumovax 23----11/2016  Prevnar 13----10/2017  Shingles vaccine-----5/2023, 3/2024  Covid vaccine---- 2 doses      RSV vaccine----none      Review of Systems   Constitutional:  Positive for fatigue. Negative for appetite change, fever and unexpected weight change.   HENT:  Negative for congestion, ear pain, hearing loss, sore  "throat and trouble swallowing.    Eyes:  Negative for pain and visual disturbance.   Respiratory:  Negative for cough, chest tightness, shortness of breath and wheezing.    Cardiovascular:  Negative for chest pain, palpitations and leg swelling.   Gastrointestinal:  Positive for diarrhea. Negative for abdominal pain, blood in stool, constipation, nausea and vomiting.   Endocrine: Negative for polyuria.   Genitourinary:  Negative for dysuria and hematuria.   Musculoskeletal:  Negative for arthralgias, back pain and myalgias.   Skin:  Negative for rash.   Neurological:  Negative for dizziness, weakness, numbness and headaches.   Hematological:  Does not bruise/bleed easily.   Psychiatric/Behavioral:  Positive for dysphoric mood and sleep disturbance. Negative for suicidal ideas. The patient is not nervous/anxious.        Objective:      Vitals:    09/25/24 0902   BP: 116/60   BP Location: Right arm   Patient Position: Sitting   BP Method: Medium (Manual)   Pulse: 66   Resp: 18   Temp: 98.2 °F (36.8 °C)   SpO2: 95%   Weight: 98.9 kg (218 lb 2.3 oz)   Height: 5' 5" (1.651 m)     Body mass index is 36.3 kg/m².  Physical Exam    General appearance: No acute distress, cooperative  Eyes: PERRL, EOMI, conjunctiva clear  Ears: normal external ear and pinna, tm clear without drainage, canals clear  Nose: Normal mucosa without drainage  Throat: no exudates or erythema, tonsils not enlarged  Mouth: no sores or lesions, moist mucous membranes  Neck: FROM, soft, supple, no thyromegaly, no bruits  Lymph: no anterior or posterior cervical adenopathy  Heart::  Regular rate and rhythm, 2/6 systolic murmur  Lung: Clear to ascultation bilaterally, no wheezing, no rales, no rhonchi, no distress  Abdomen: Soft, nontender, no distention, no hepatosplenomegaly, bowel sounds normal, no guarding, no rebound, no peritoneal signs  Skin: no rashes, no lesions  Extremities: no edema, no cyanosis  Neuro: CN 2-12 intact, 5/5 muscle strength upper " and lower extremity bilaterally, 2+ DTRs UE and LE bilaterally, normal gait  Peripheral pulses:  diminished pedal pulses bilaterally, venous stasis changes to lower legs   Musculoskeletal: FROM, good strenth, no tenderness  Joint: normal appearance, no swelling, no warmth, no deformity in all joints    Assessment:       1. Coronary artery disease of native artery of native heart with stable angina pectoris    2. Ischemic cardiomyopathy    3. Chronic diastolic heart failure    4. Paroxysmal atrial fibrillation    5. Nonrheumatic aortic valve stenosis    6. Essential hypertension    7. Mixed hyperlipidemia    8. PAD (peripheral artery disease)    9. Stenosis of left carotid artery    10. Mild intermittent asthma without complication    11. Type 2 diabetes mellitus with stage 3b chronic kidney disease, without long-term current use of insulin    12. Hypothyroidism, unspecified type    13. Hyperkalemia    14. Stage 3b chronic kidney disease    15. Anemia, unspecified type    16. Gastroesophageal reflux disease without esophagitis    17. Fatty liver    18. OAB (overactive bladder)    19. Major depressive disorder, recurrent episode, mild    20. Memory problem    21. Primary insomnia    22. Exudative age-related macular degeneration of left eye with active choroidal neovascularization    23. VALENTE (obstructive sleep apnea)    24. Primary osteoarthritis of left knee    25. Morbid obesity with BMI of 40.0-44.9, adult    26. Encounter for screening mammogram for malignant neoplasm of breast    27. Encounter for vaccination        Plan:       Coronary artery disease of native artery of native heart with stable angina pectoris  Stable and no active symptoms.    Ischemic cardiomyopathy with Chronic diastolic heart failure  Controlled on lasix as needed. ADvised to only use potasium when she uses lasix    Paroxysmal atrial fibrillation  NSR today on exam. Continue sotalol, metoprolol and eliquis    Nonrheumatic aortic valve  stenosis  Stable and continue to follow with cardiology    Essential hypertension  Well controlled and continue current regimen.   -     CBC Auto Differential; Future; Expected date: 09/25/2024  -     Comprehensive Metabolic Panel; Future; Expected date: 09/25/2024  -     Lipid Panel; Future; Expected date: 09/25/2024  -     TSH; Future; Expected date: 09/25/2024    Mixed hyperlipidemia  Good control on atorvastatin and apsirin    PAD (peripheral artery disease)  Continue statin and aspirin    Stenosis of left carotid artery  Continue statin and aspirin    Mild intermittent asthma without complication  Stable and no active symptoms    Type 2 diabetes mellitus with stage 3b chronic kidney disease, without long-term current use of insulin  Good control on metformin and farxiga.   -     Hemoglobin A1C; Future; Expected date: 09/25/2024  -     Microalbumin/Creatinine Ratio, Urine; Future; Expected date: 09/25/2024    Hypothyroidism, unspecified type  Good control on this dose of levothyroxine    Hyperkalemia  Stop supplemental potasium and only use when she uses lasix    Stage 3b chronic kidney disease  Stable and continue to monitor    Anemia, unspecified type  Improved on most recent labs    Gastroesophageal reflux disease without esophagitis  Well controlled and continue current regimen.     Fatty liver  LFTs normal and encourage weight loss    OAB (overactive bladder)  Stable    Major depressive disorder, recurrent episode, mild  Increased symptoms and will increase her zoloft to 75 mg daily  -     sertraline (ZOLOFT) 50 MG tablet; Take 1.5 tablets (75 mg total) by mouth once daily.  Dispense: 135 tablet; Refill: 2    Memory problem  Stable and continue to monitor    Primary insomnia  At baseline    Exudative age-related macular degeneration of left eye with active choroidal neovascularization  She continues to follow with ophthalmology for her left eye which has progressed    VALENTE (obstructive sleep  apnea)  Uncontrolled and she refuses to use cpap    Primary osteoarthritis of left knee  At baseline    Morbid obesity with BMI of 40.0-44.9, adult  Long discussion on the benefits of healthy eating and regular exercise to help lose weight and help control cad, hypertension, hyperlipidemia and diabetes.     Encounter for screening mammogram for malignant neoplasm of breast  -     Mammo Digital Screening Bilat w/ Tu; Future; Expected date: 03/26/2025    Encounter for vaccination  -     influenza (adjuvanted) (Fluad) 45 mcg/0.5 mL IM vaccine (> or = 64 yo) 0.5 mL    Follow up in about 4 months (around 1/25/2025) for chronic medical issues.

## 2024-09-25 NOTE — PATIENT INSTRUCTIONS
Get the RSV vaccine at the pharmacy.       For the leg cramps:  Try magnesium 400 mg once a day  Try co Q10 once a day.

## 2024-09-30 ENCOUNTER — TELEPHONE (OUTPATIENT)
Dept: FAMILY MEDICINE | Facility: CLINIC | Age: 74
End: 2024-09-30
Payer: MEDICARE

## 2024-09-30 NOTE — TELEPHONE ENCOUNTER
----- Message from Leyla sent at 9/30/2024  8:31 AM CDT -----  Contact: pt  Type: Needs Medical Advice         Who Called:pt  Best Call Back Number:[phone]  Additional Information: Requesting a call back regarding pt was seen Wednesday and talked about a stool kit for cancer and she said she did not receive one. Pt is asking if she can come pick one up.   Please Advise- Thank you

## 2024-10-29 ENCOUNTER — TELEPHONE (OUTPATIENT)
Dept: FAMILY MEDICINE | Facility: CLINIC | Age: 74
End: 2024-10-29

## 2024-10-29 NOTE — TELEPHONE ENCOUNTER
----- Message from Hood sent at 10/29/2024 11:11 AM CDT -----  Regarding: appt  Type:  Sooner Apoointment Request      Name of Caller:pt    When is the first available appointment?12/24    Symptoms:left arm pain/ skin check      Best Call Back Number:[phone]      Additional Information: pt wants to be seen today willing to see anyone in clinic.  please call to discuss.

## 2024-10-29 NOTE — TELEPHONE ENCOUNTER
LVM with clinic number to return call to clinic. Can put in at 8 am tomorrow with Lainey if pt is available.

## 2024-11-03 ENCOUNTER — OFFICE VISIT (OUTPATIENT)
Dept: URGENT CARE | Facility: CLINIC | Age: 74
End: 2024-11-03
Payer: MEDICARE

## 2024-11-03 VITALS
TEMPERATURE: 98 F | BODY MASS INDEX: 36.32 KG/M2 | SYSTOLIC BLOOD PRESSURE: 138 MMHG | HEART RATE: 60 BPM | HEIGHT: 65 IN | RESPIRATION RATE: 18 BRPM | OXYGEN SATURATION: 97 % | WEIGHT: 218 LBS | DIASTOLIC BLOOD PRESSURE: 81 MMHG

## 2024-11-03 DIAGNOSIS — L03.114 CELLULITIS OF LEFT UPPER EXTREMITY: Primary | ICD-10-CM

## 2024-11-03 PROCEDURE — 99213 OFFICE O/P EST LOW 20 MIN: CPT | Mod: S$GLB,,, | Performed by: NURSE PRACTITIONER

## 2024-11-03 RX ORDER — CEPHALEXIN 500 MG/1
500 CAPSULE ORAL 4 TIMES DAILY
Qty: 28 CAPSULE | Refills: 0 | Status: SHIPPED | OUTPATIENT
Start: 2024-11-03 | End: 2024-11-10

## 2024-11-03 NOTE — PATIENT INSTRUCTIONS
Wash with soap and water, continue putting Medihoney to wound  ED for any further evaluations fever, chills, increase swelling or drainange.     Follow up with KIM

## 2024-11-03 NOTE — PROGRESS NOTES
"Subjective:      Patient ID: Sneha Mcghee is a 74 y.o. female.    Vitals:  height is 5' 5" (1.651 m) and weight is 98.9 kg (218 lb). Her oral temperature is 97.7 °F (36.5 °C). Her blood pressure is 138/81 and her pulse is 60. Her respiration is 18 and oxygen saturation is 97%.     Chief Complaint: Arm Injury    Patient is present in the clinic with a injury to left forearm. Symptom appear two days ago.     Arm Injury   The incident occurred 2 days ago. The incident occurred at home. The injury mechanism is unknown. The pain is present in the left forearm. The quality of the pain is described as aching. The pain radiates to the left arm. The pain is at a severity of 0/10. The patient is experiencing no pain. The pain has been Constant since the incident. Pertinent negatives include no chest pain, muscle weakness, numbness or tingling. Nothing aggravates the symptoms. She has tried nothing for the symptoms. The treatment provided no relief.       Cardiovascular:  Negative for chest pain.   Skin:  Positive for erythema.   Neurological:  Negative for numbness.      Objective:     Physical Exam   Constitutional: She is oriented to person, place, and time. She appears well-developed. She is cooperative.  Non-toxic appearance. She does not appear ill. No distress.   HENT:   Head: Normocephalic and atraumatic.   Ears:   Right Ear: Hearing normal.   Left Ear: Hearing normal.   Nose: Nose normal. No mucosal edema, rhinorrhea or nasal deformity. No epistaxis. Right sinus exhibits no maxillary sinus tenderness and no frontal sinus tenderness. Left sinus exhibits no maxillary sinus tenderness and no frontal sinus tenderness.   Mouth/Throat: Uvula is midline and mucous membranes are normal. No trismus in the jaw. Normal dentition. No uvula swelling. No posterior oropharyngeal edema or posterior oropharyngeal erythema.   Eyes: Conjunctivae and lids are normal. No scleral icterus.   Neck: Trachea normal and phonation normal. Neck " supple. No edema present. No erythema present. No neck rigidity present.   Cardiovascular: Normal rate, regular rhythm, normal heart sounds and normal pulses.   Pulmonary/Chest: Effort normal and breath sounds normal. No respiratory distress. She has no decreased breath sounds. She has no rhonchi.   Abdominal: Normal appearance.   Musculoskeletal: Normal range of motion.         General: No deformity. Normal range of motion.   Neurological: She is alert and oriented to person, place, and time. She exhibits normal muscle tone. Coordination normal.   Skin: Skin is warm, dry, intact, not diaphoretic and not pale. erythema         Comments: Red, erythema, draining open wound from what appears to have been a skin tear to the left forearm. Multiple dry scabbed areas all over the left arm   Psychiatric: Her speech is normal and behavior is normal. Judgment and thought content normal.   Nursing note and vitals reviewed.      Assessment:     1. Cellulitis of left upper extremity        Plan:       Cellulitis of left upper extremity  -     cephALEXin (KEFLEX) 500 MG capsule; Take 1 capsule (500 mg total) by mouth 4 (four) times daily. for 7 days  Dispense: 28 capsule; Refill: 0    Wash with soap and water, continue putting Medihoney to wound  ED for any further evaluations fever, chills, increase swelling or drainange.     Follow up with PCP

## 2024-11-04 ENCOUNTER — PROCEDURE VISIT (OUTPATIENT)
Dept: OPHTHALMOLOGY | Facility: CLINIC | Age: 74
End: 2024-11-04
Payer: MEDICARE

## 2024-11-04 DIAGNOSIS — H35.3132 INTERMEDIATE STAGE NONEXUDATIVE AGE-RELATED MACULAR DEGENERATION OF BOTH EYES: ICD-10-CM

## 2024-11-04 DIAGNOSIS — H35.3221 EXUDATIVE AGE-RELATED MACULAR DEGENERATION OF LEFT EYE WITH ACTIVE CHOROIDAL NEOVASCULARIZATION: Primary | ICD-10-CM

## 2024-11-04 DIAGNOSIS — H43.811 POSTERIOR VITREOUS DETACHMENT, RIGHT: ICD-10-CM

## 2024-11-04 DIAGNOSIS — H35.033 HYPERTENSIVE RETINOPATHY, BILATERAL: ICD-10-CM

## 2024-11-04 PROCEDURE — 92012 INTRM OPH EXAM EST PATIENT: CPT | Mod: S$GLB,,, | Performed by: OPHTHALMOLOGY

## 2024-11-04 PROCEDURE — 92134 CPTRZ OPH DX IMG PST SGM RTA: CPT | Mod: S$GLB,,, | Performed by: OPHTHALMOLOGY

## 2024-11-04 NOTE — PROGRESS NOTES
HPI    DLS: 9/19/24    Pt presents for 12wk Eylea w/ OCT and no dilation. Per pt states vision   since last visit is the same. The patient denies any redness, soreness, or   pain. Pt has no flashes but some small  floaters none new.    EYE MEDS: Timolol qam ou   Latanoprost ou qhs     Last edited by Linda Gudino on 11/4/2024  8:47 AM.            OCT - Drusen OU  OD PVD  OS VMA at [facility].  PED - SRF Resolved. Collapsed PED with outer  segment loss    Prior FP - OD with white choroidal patches - stable.  Possible just tesselated fundus appearance  OS PED with small subfoveal SRF =- resolved    Prior FA - OD - late staiing of drusen, no [facility] uptake   OS minimal leakage at apex of PED with Avastin on baord      A/P    1. Dry AMD OU  AREDS/AG    Wet AMD OS  4/23 - increased in PED with apical SRF  S/p Avastin OS x 3  S/p Eylea OS x 4 (last 8/5/24)  9/23 - trace increase in SRF at 14 weeks  12/23 - increase OS at 10 weeks      Stable at 12 weeks - try obs today      2. Scattered white choroidal patches   Stable FP  No sig leakage or uptake on FA OD  Possible just tesselated fundus appearance    3. PVD OD    4. HTN Ret OU    5.  PCIOL OU      12 weeks OCT and dilate  - LDFE 9/24    Letter TO Dr. Tucker

## 2024-11-12 ENCOUNTER — TELEPHONE (OUTPATIENT)
Dept: OPHTHALMOLOGY | Facility: CLINIC | Age: 74
End: 2024-11-12
Payer: MEDICARE

## 2024-11-12 DIAGNOSIS — H40.059 OCULAR HYPERTENSION: ICD-10-CM

## 2024-11-12 RX ORDER — TIMOLOL MALEATE 5 MG/ML
1 SOLUTION/ DROPS OPHTHALMIC 2 TIMES DAILY
Qty: 15 ML | Refills: 11 | Status: SHIPPED | OUTPATIENT
Start: 2024-11-12

## 2024-11-12 RX ORDER — LATANOPROST 50 UG/ML
1 SOLUTION/ DROPS OPHTHALMIC NIGHTLY
Qty: 7.5 ML | Refills: 4 | Status: SHIPPED | OUTPATIENT
Start: 2024-11-12

## 2024-11-12 NOTE — TELEPHONE ENCOUNTER
----- Message from Javed Dale sent at 11/8/2024 11:27 AM CST -----    ----- Message -----  From: Rajinder Phillips  Sent: 11/8/2024  11:19 AM CST  To: [facility] Staff    Type:  RX Refill Request    Who Called:the patient  Refill or New Rx:refil  RX Name and Strength:  latanoprost 0.005 % ophthalmic solution      timolol maleate 0.5% (TIMOPTIC) 0.5 % Drop    How is the patient currently taking it? (ex. 1XDay):as rx'd  Is this a 30 day or 90 day RX:30/90  Preferred Pharmacy with phone number:  [id number] - [place] [address] AT [facility] [address] [address]  Phone: [phone] Fax: [phone]      Local or Mail Order:local  Ordering Provider:  Would the patient rather a call back or a response via [facility]? Call/  Best Call Back Number:[phone]    Additional Information: Thanks

## 2024-11-18 ENCOUNTER — OFFICE VISIT (OUTPATIENT)
Dept: FAMILY MEDICINE | Facility: CLINIC | Age: 74
End: 2024-11-18
Payer: MEDICARE

## 2024-11-18 ENCOUNTER — LAB VISIT (OUTPATIENT)
Dept: LAB | Facility: HOSPITAL | Age: 74
End: 2024-11-18
Attending: NURSE PRACTITIONER
Payer: MEDICARE

## 2024-11-18 VITALS
DIASTOLIC BLOOD PRESSURE: 88 MMHG | BODY MASS INDEX: 36.73 KG/M2 | SYSTOLIC BLOOD PRESSURE: 138 MMHG | TEMPERATURE: 98 F | WEIGHT: 220.44 LBS | OXYGEN SATURATION: 93 % | HEIGHT: 65 IN | HEART RATE: 60 BPM

## 2024-11-18 DIAGNOSIS — R21 RASH AND NONSPECIFIC SKIN ERUPTION: ICD-10-CM

## 2024-11-18 DIAGNOSIS — L03.114 CELLULITIS OF LEFT UPPER EXTREMITY: ICD-10-CM

## 2024-11-18 DIAGNOSIS — L03.114 CELLULITIS OF LEFT UPPER EXTREMITY: Primary | ICD-10-CM

## 2024-11-18 LAB
ALBUMIN SERPL BCP-MCNC: 3.9 G/DL (ref 3.5–5.2)
ALP SERPL-CCNC: 120 U/L (ref 40–150)
ALT SERPL W/O P-5'-P-CCNC: 11 U/L (ref 10–44)
ANION GAP SERPL CALC-SCNC: 9 MMOL/L (ref 8–16)
AST SERPL-CCNC: 18 U/L (ref 10–40)
BASOPHILS # BLD AUTO: 0.07 K/UL (ref 0–0.2)
BASOPHILS NFR BLD: 0.8 % (ref 0–1.9)
BILIRUB SERPL-MCNC: 0.5 MG/DL (ref 0.1–1)
BUN SERPL-MCNC: 24 MG/DL (ref 8–23)
CALCIUM SERPL-MCNC: 9.9 MG/DL (ref 8.7–10.5)
CHLORIDE SERPL-SCNC: 106 MMOL/L (ref 95–110)
CO2 SERPL-SCNC: 24 MMOL/L (ref 23–29)
CREAT SERPL-MCNC: 1 MG/DL (ref 0.5–1.4)
DIFFERENTIAL METHOD BLD: ABNORMAL
EOSINOPHIL # BLD AUTO: 0.5 K/UL (ref 0–0.5)
EOSINOPHIL NFR BLD: 5 % (ref 0–8)
ERYTHROCYTE [DISTWIDTH] IN BLOOD BY AUTOMATED COUNT: 16.7 % (ref 11.5–14.5)
EST. GFR  (NO RACE VARIABLE): 59.1 ML/MIN/1.73 M^2
GLUCOSE SERPL-MCNC: 107 MG/DL (ref 70–110)
HCT VFR BLD AUTO: 44.4 % (ref 37–48.5)
HGB BLD-MCNC: 13.9 G/DL (ref 12–16)
IMM GRANULOCYTES # BLD AUTO: 0.03 K/UL (ref 0–0.04)
IMM GRANULOCYTES NFR BLD AUTO: 0.3 % (ref 0–0.5)
LYMPHOCYTES # BLD AUTO: 1.7 K/UL (ref 1–4.8)
LYMPHOCYTES NFR BLD: 19.5 % (ref 18–48)
MCH RBC QN AUTO: 26.4 PG (ref 27–31)
MCHC RBC AUTO-ENTMCNC: 31.3 G/DL (ref 32–36)
MCV RBC AUTO: 84 FL (ref 82–98)
MONOCYTES # BLD AUTO: 1.1 K/UL (ref 0.3–1)
MONOCYTES NFR BLD: 12.7 % (ref 4–15)
NEUTROPHILS # BLD AUTO: 5.5 K/UL (ref 1.8–7.7)
NEUTROPHILS NFR BLD: 61.7 % (ref 38–73)
NRBC BLD-RTO: 0 /100 WBC
PLATELET # BLD AUTO: 250 K/UL (ref 150–450)
PMV BLD AUTO: 11.2 FL (ref 9.2–12.9)
POTASSIUM SERPL-SCNC: 5 MMOL/L (ref 3.5–5.1)
PROT SERPL-MCNC: 7.9 G/DL (ref 6–8.4)
RBC # BLD AUTO: 5.26 M/UL (ref 4–5.4)
SODIUM SERPL-SCNC: 139 MMOL/L (ref 136–145)
WBC # BLD AUTO: 8.93 K/UL (ref 3.9–12.7)

## 2024-11-18 PROCEDURE — 85025 COMPLETE CBC W/AUTO DIFF WBC: CPT | Performed by: NURSE PRACTITIONER

## 2024-11-18 PROCEDURE — 3079F DIAST BP 80-89 MM HG: CPT | Mod: CPTII,S$GLB,, | Performed by: NURSE PRACTITIONER

## 2024-11-18 PROCEDURE — 99999 PR PBB SHADOW E&M-EST. PATIENT-LVL V: CPT | Mod: PBBFAC,,, | Performed by: NURSE PRACTITIONER

## 2024-11-18 PROCEDURE — 3075F SYST BP GE 130 - 139MM HG: CPT | Mod: CPTII,S$GLB,, | Performed by: NURSE PRACTITIONER

## 2024-11-18 PROCEDURE — 36415 COLL VENOUS BLD VENIPUNCTURE: CPT | Mod: PO | Performed by: NURSE PRACTITIONER

## 2024-11-18 PROCEDURE — 1126F AMNT PAIN NOTED NONE PRSNT: CPT | Mod: CPTII,S$GLB,, | Performed by: NURSE PRACTITIONER

## 2024-11-18 PROCEDURE — 80053 COMPREHEN METABOLIC PANEL: CPT | Performed by: NURSE PRACTITIONER

## 2024-11-18 PROCEDURE — 3061F NEG MICROALBUMINURIA REV: CPT | Mod: CPTII,S$GLB,, | Performed by: NURSE PRACTITIONER

## 2024-11-18 PROCEDURE — 3288F FALL RISK ASSESSMENT DOCD: CPT | Mod: CPTII,S$GLB,, | Performed by: NURSE PRACTITIONER

## 2024-11-18 PROCEDURE — 3044F HG A1C LEVEL LT 7.0%: CPT | Mod: CPTII,S$GLB,, | Performed by: NURSE PRACTITIONER

## 2024-11-18 PROCEDURE — 3066F NEPHROPATHY DOC TX: CPT | Mod: CPTII,S$GLB,, | Performed by: NURSE PRACTITIONER

## 2024-11-18 PROCEDURE — 1101F PT FALLS ASSESS-DOCD LE1/YR: CPT | Mod: CPTII,S$GLB,, | Performed by: NURSE PRACTITIONER

## 2024-11-18 PROCEDURE — 3008F BODY MASS INDEX DOCD: CPT | Mod: CPTII,S$GLB,, | Performed by: NURSE PRACTITIONER

## 2024-11-18 PROCEDURE — 99214 OFFICE O/P EST MOD 30 MIN: CPT | Mod: S$GLB,,, | Performed by: NURSE PRACTITIONER

## 2024-11-18 PROCEDURE — 1159F MED LIST DOCD IN RCRD: CPT | Mod: CPTII,S$GLB,, | Performed by: NURSE PRACTITIONER

## 2024-11-18 RX ORDER — DOXYCYCLINE HYCLATE 100 MG
100 TABLET ORAL EVERY 12 HOURS
Qty: 14 TABLET | Refills: 0 | Status: SHIPPED | OUTPATIENT
Start: 2024-11-18 | End: 2024-11-21 | Stop reason: SDUPTHER

## 2024-11-18 NOTE — PROGRESS NOTES
Subjective:       Patient ID: Sneha Mcghee is a 74 y.o. female.    Chief Complaint: Follow-up    HPI  New patient to me presents for concerns of left arm infection    Evaluated at urgent care 11/3/24 for what was documented appearing as an open wound from skin tear with surrounding erythema and drainage. She was prescribed keflex x 7 days    She reports redness was about quarter size when she was seen at urgent care  She reports mild improvement while on antibiotics and then gradually worsening symptoms after completing Keflex      CKD IIIa most recent GFR 53  T2DM well controlled A1c 6.6     Vitals:    11/18/24 1138   BP: 138/88   Pulse: 60   Temp: 97.6 °F (36.4 °C)     Review of Systems   Constitutional:  Negative for chills, diaphoresis and fever.   Skin:  Positive for color change and rash.       Past Medical History:   Diagnosis Date    Asthma     Atrial fibrillation     CAD (coronary artery disease)     CABG x 4    Cataract     os    Depression     Diabetes mellitus type II     Heart failure     Hyperlipidemia     Hypertension     Hypothyroidism, unspecified     Intermediate stage nonexudative age-related macular degeneration of both eyes 4/4/2022    Primary osteoarthritis of right knee 01/19/2021    PVD (peripheral vascular disease)     Stenosis of left carotid artery 01/19/2021     Objective:      Physical Exam  Constitutional:       General: She is not in acute distress.     Appearance: She is well-developed. She is not ill-appearing, toxic-appearing or diaphoretic.   HENT:      Right Ear: Hearing normal.      Left Ear: Hearing normal.   Pulmonary:      Effort: No tachypnea or respiratory distress.   Skin:     Coloration: Skin is not pale.   Neurological:      Mental Status: She is alert and oriented to person, place, and time.   Psychiatric:         Speech: Speech normal.         Behavior: Behavior normal.         Thought Content: Thought content normal.         Judgment: Judgment normal.                Assessment:       1. Cellulitis of left upper extremity    2. Rash and nonspecific skin eruption        Plan:       Cellulitis of left upper extremity  -     CBC Auto Differential; Future; Expected date: 11/18/2024  -     Comprehensive Metabolic Panel; Future; Expected date: 11/18/2024  -     doxycycline (VIBRA-TABS) 100 MG tablet; Take 1 tablet (100 mg total) by mouth every 12 (twelve) hours. for 7 days  Dispense: 14 tablet; Refill: 0    Rash and nonspecific skin eruption   Triamcinolone (patient has Rx) to under arm/ scattered rash area           Follow up in about 3 days (around 11/21/2024).    Medication List with Changes/Refills   New Medications    DOXYCYCLINE (VIBRA-TABS) 100 MG TABLET    Take 1 tablet (100 mg total) by mouth every 12 (twelve) hours. for 7 days   Current Medications    ALBUTEROL (PROVENTIL/VENTOLIN HFA) 90 MCG/ACTUATION INHALER    INHALE 2 PUFFS INTO THE LUNGS EVERY 6 HOURS AS NEEDED FOR WHEEZING    ASPIRIN (ASPIR-81 ORAL)    Take 81 mg by mouth once daily.    ATORVASTATIN (LIPITOR) 40 MG TABLET    Take 1 tablet (40 mg total) by mouth once daily.    AUGMENTED BETAMETHASONE DIPROPIONATE (DIPROLENE-AF) 0.05 % CREAM    Apply topically 2 (two) times daily.    BLOOD SUGAR DIAGNOSTIC STRP    To check BG once a day and PRN low sugars, to use with insurance preferred meter    BLOOD-GLUCOSE METER KIT    To check BG qam and PRN lows, to use with insurance preferred meter    DAPAGLIFLOZIN PROPANEDIOL (FARXIGA) 10 MG TABLET    Take 1 tablet (10 mg total) by mouth once daily.    ELIQUIS 5 MG TAB    Take 5 mg by mouth 2 (two) times daily.    FLUTICASONE PROPIONATE (FLONASE) 50 MCG/ACTUATION NASAL SPRAY    SHAKE LIQUID AND USE 2 SPRAYS(100 MCG) IN EACH NOSTRIL EVERY DAY    FUROSEMIDE (LASIX) 20 MG TABLET    TAKE 1 TABLET BY MOUTH EVERY DAY    HYDROXYZINE HCL (ATARAX) 25 MG TABLET    Take 0.5 tablets (12.5 mg total) by mouth 3 (three) times daily as needed for Itching.    KETOCONAZOLE (NIZORAL) 2 %  SHAMPOO    Apply topically twice a week.    LANCETS MISC    To check BG once a day and PRN lows, to use with insurance preferred meter    LATANOPROST 0.005 % OPHTHALMIC SOLUTION    Place 1 drop into both eyes every evening.    LEVOTHYROXINE (SYNTHROID) 88 MCG TABLET    TAKE 1 TABLET(88 MCG) BY MOUTH BEFORE BREAKFAST    MELATONIN 10 MG CAP    Take 20 mg by mouth.    METFORMIN (GLUCOPHAGE-XR) 500 MG ER 24HR TABLET    TAKE 1 TABLET(500 MG) BY MOUTH TWICE DAILY    METOPROLOL SUCCINATE (TOPROL-XL) 50 MG 24 HR TABLET    Take 50 mg by mouth.    MOMETASONE 0.1% (ELOCON) 0.1 % CREAM    APPLY TOPICALLY TO THE AFFECTED AREA EVERY DAY    NYSTATIN (MYCOSTATIN) CREAM    Apply 1 application as needed by topical route for 30 days.    NYSTATIN (MYCOSTATIN) CREAM    Apply topically.    OMEGA-3 FATTY ACIDS/FISH OIL (FISH OIL-OMEGA-3 FATTY ACIDS) 300-1,000 MG CAPSULE    Take 1 capsule by mouth once daily.    PANTOPRAZOLE (PROTONIX) 40 MG TABLET    Take 40 mg by mouth every morning.    POTASSIUM CHLORIDE (K-TAB) 20 MEQ        POTASSIUM CHLORIDE (MICRO-K) 10 MEQ CPSR    Take 10 mEq by mouth once.    PREDNISONE (DELTASONE) 10 MG TABLET    Take 2 pills daily for 5 days then 1 pill daily for 2 days then stop    SERTRALINE (ZOLOFT) 50 MG TABLET    Take 1.5 tablets (75 mg total) by mouth once daily.    SILVER SULFADIAZINE 1% (SILVADENE) 1 % CREAM    Apply topically 2 (two) times daily.    SOTALOL (SOTALOL AF) 80 MG TABLET    Take 80 mg by mouth 2 (two) times daily.    TIMOLOL (BETIMOL) 0.5 % OPHTHALMIC SOLUTION    Place 1 drop into both eyes every morning.    TIMOLOL MALEATE 0.5% (TIMOPTIC) 0.5 % DROP    Place 1 drop into both eyes 2 (two) times daily.    TRIAMCINOLONE ACETONIDE 0.1% (KENALOG) 0.1 % CREAM    Apply topically 2 (two) times daily. for 7 days    VIT C/E/ZN/COPPR/LUTEIN/ZEAXAN (PRESERVISION AREDS-2 ORAL)    Take by mouth once daily at 6am.    VITAMIN E 400 UNIT CAPSULE

## 2024-11-21 ENCOUNTER — OFFICE VISIT (OUTPATIENT)
Dept: FAMILY MEDICINE | Facility: CLINIC | Age: 74
End: 2024-11-21
Payer: MEDICARE

## 2024-11-21 VITALS
OXYGEN SATURATION: 95 % | HEART RATE: 60 BPM | WEIGHT: 221.13 LBS | DIASTOLIC BLOOD PRESSURE: 82 MMHG | HEIGHT: 65 IN | BODY MASS INDEX: 36.84 KG/M2 | SYSTOLIC BLOOD PRESSURE: 128 MMHG

## 2024-11-21 DIAGNOSIS — L03.114 CELLULITIS OF LEFT UPPER EXTREMITY: Primary | Chronic | ICD-10-CM

## 2024-11-21 DIAGNOSIS — R21 RASH AND NONSPECIFIC SKIN ERUPTION: ICD-10-CM

## 2024-11-21 PROCEDURE — 1101F PT FALLS ASSESS-DOCD LE1/YR: CPT | Mod: CPTII,S$GLB,, | Performed by: NURSE PRACTITIONER

## 2024-11-21 PROCEDURE — 3079F DIAST BP 80-89 MM HG: CPT | Mod: CPTII,S$GLB,, | Performed by: NURSE PRACTITIONER

## 2024-11-21 PROCEDURE — 1126F AMNT PAIN NOTED NONE PRSNT: CPT | Mod: CPTII,S$GLB,, | Performed by: NURSE PRACTITIONER

## 2024-11-21 PROCEDURE — 3066F NEPHROPATHY DOC TX: CPT | Mod: CPTII,S$GLB,, | Performed by: NURSE PRACTITIONER

## 2024-11-21 PROCEDURE — 1159F MED LIST DOCD IN RCRD: CPT | Mod: CPTII,S$GLB,, | Performed by: NURSE PRACTITIONER

## 2024-11-21 PROCEDURE — 3288F FALL RISK ASSESSMENT DOCD: CPT | Mod: CPTII,S$GLB,, | Performed by: NURSE PRACTITIONER

## 2024-11-21 PROCEDURE — 99999 PR PBB SHADOW E&M-EST. PATIENT-LVL V: CPT | Mod: PBBFAC,,, | Performed by: NURSE PRACTITIONER

## 2024-11-21 PROCEDURE — 3074F SYST BP LT 130 MM HG: CPT | Mod: CPTII,S$GLB,, | Performed by: NURSE PRACTITIONER

## 2024-11-21 PROCEDURE — 3061F NEG MICROALBUMINURIA REV: CPT | Mod: CPTII,S$GLB,, | Performed by: NURSE PRACTITIONER

## 2024-11-21 PROCEDURE — 3008F BODY MASS INDEX DOCD: CPT | Mod: CPTII,S$GLB,, | Performed by: NURSE PRACTITIONER

## 2024-11-21 PROCEDURE — 3044F HG A1C LEVEL LT 7.0%: CPT | Mod: CPTII,S$GLB,, | Performed by: NURSE PRACTITIONER

## 2024-11-21 PROCEDURE — 99214 OFFICE O/P EST MOD 30 MIN: CPT | Mod: S$GLB,,, | Performed by: NURSE PRACTITIONER

## 2024-11-21 RX ORDER — DOXYCYCLINE HYCLATE 100 MG
100 TABLET ORAL EVERY 12 HOURS
Qty: 14 TABLET | Refills: 0 | Status: SHIPPED | OUTPATIENT
Start: 2024-11-25 | End: 2024-12-02

## 2024-11-21 RX ORDER — TRIAMCINOLONE ACETONIDE 1 MG/G
CREAM TOPICAL 2 TIMES DAILY
Qty: 80 G | Refills: 1 | Status: SHIPPED | OUTPATIENT
Start: 2024-11-21 | End: 2024-11-28

## 2024-11-21 RX ORDER — DAPAGLIFLOZIN 10 MG/1
10 TABLET, FILM COATED ORAL
Qty: 90 TABLET | Refills: 1 | Status: SHIPPED | OUTPATIENT
Start: 2024-11-21

## 2024-11-21 NOTE — PROGRESS NOTES
Subjective:       Patient ID: Sneha Mcghee is a 74 y.o. female.    Chief Complaint: Recurrent Skin Infections    HPI  Patient presents for FU of left forearm cellulitis and surrounding rash  She was started on doxycycline 100mg BID three days ago + triamcinolone topical cream. She reports improvement in erythema. Surrounding rash unchanged.       Vitals:    11/21/24 1123   BP: 128/82   Pulse: 60     Review of Systems   Constitutional:  Negative for fever.   Skin:  Positive for rash.       Past Medical History:   Diagnosis Date    Asthma     Atrial fibrillation     CAD (coronary artery disease)     CABG x 4    Cataract     os    Depression     Diabetes mellitus type II     Heart failure     Hyperlipidemia     Hypertension     Hypothyroidism, unspecified     Intermediate stage nonexudative age-related macular degeneration of both eyes 4/4/2022    Primary osteoarthritis of right knee 01/19/2021    PVD (peripheral vascular disease)     Stenosis of left carotid artery 01/19/2021     Objective:      Physical Exam  Constitutional:       General: She is not in acute distress.     Appearance: She is well-developed. She is not ill-appearing, toxic-appearing or diaphoretic.   HENT:      Right Ear: Hearing normal.      Left Ear: Hearing normal.   Pulmonary:      Effort: No tachypnea or respiratory distress.   Skin:     Coloration: Skin is not pale.      Findings: Rash present.      Comments: Improvement in left forearm erythema    Neurological:      Mental Status: She is alert and oriented to person, place, and time.   Psychiatric:         Speech: Speech normal.         Behavior: Behavior normal.         Thought Content: Thought content normal.         Judgment: Judgment normal.         Assessment:       1. Cellulitis of left upper extremity    2. Rash and nonspecific skin eruption        Plan:       Cellulitis of left upper extremity  -     doxycycline (VIBRA-TABS) 100 MG tablet; Take 1 tablet (100 mg total) by mouth every  12 (twelve) hours. for 7 days  Dispense: 14 tablet; Refill: 0    Rash and nonspecific skin eruption  -     Ambulatory referral/consult to Dermatology; Future; Expected date: 11/28/2024  -     triamcinolone acetonide 0.1% (KENALOG) 0.1 % cream; Apply topically 2 (two) times daily. for 7 days  Dispense: 80 g; Refill: 1      Extend doxy  Derm eval    education provided on supportive care, symptom monitoring and return precautions           Medication List with Changes/Refills   Current Medications    ALBUTEROL (PROVENTIL/VENTOLIN HFA) 90 MCG/ACTUATION INHALER    INHALE 2 PUFFS INTO THE LUNGS EVERY 6 HOURS AS NEEDED FOR WHEEZING    ASPIRIN (ASPIR-81 ORAL)    Take 81 mg by mouth once daily.    ATORVASTATIN (LIPITOR) 40 MG TABLET    Take 1 tablet (40 mg total) by mouth once daily.    AUGMENTED BETAMETHASONE DIPROPIONATE (DIPROLENE-AF) 0.05 % CREAM    Apply topically 2 (two) times daily.    BLOOD SUGAR DIAGNOSTIC STRP    To check BG once a day and PRN low sugars, to use with insurance preferred meter    BLOOD-GLUCOSE METER KIT    To check BG qam and PRN lows, to use with insurance preferred meter    DAPAGLIFLOZIN PROPANEDIOL (FARXIGA) 10 MG TABLET    Take 1 tablet (10 mg total) by mouth once daily.    ELIQUIS 5 MG TAB    Take 5 mg by mouth 2 (two) times daily.    FLUTICASONE PROPIONATE (FLONASE) 50 MCG/ACTUATION NASAL SPRAY    SHAKE LIQUID AND USE 2 SPRAYS(100 MCG) IN EACH NOSTRIL EVERY DAY    FUROSEMIDE (LASIX) 20 MG TABLET    TAKE 1 TABLET BY MOUTH EVERY DAY    HYDROXYZINE HCL (ATARAX) 25 MG TABLET    Take 0.5 tablets (12.5 mg total) by mouth 3 (three) times daily as needed for Itching.    KETOCONAZOLE (NIZORAL) 2 % SHAMPOO    Apply topically twice a week.    LANCETS MISC    To check BG once a day and PRN lows, to use with insurance preferred meter    LATANOPROST 0.005 % OPHTHALMIC SOLUTION    Place 1 drop into both eyes every evening.    LEVOTHYROXINE (SYNTHROID) 88 MCG TABLET    TAKE 1 TABLET(88 MCG) BY MOUTH BEFORE  BREAKFAST    MELATONIN 10 MG CAP    Take 20 mg by mouth.    METFORMIN (GLUCOPHAGE-XR) 500 MG ER 24HR TABLET    TAKE 1 TABLET(500 MG) BY MOUTH TWICE DAILY    METOPROLOL SUCCINATE (TOPROL-XL) 50 MG 24 HR TABLET    Take 50 mg by mouth.    MOMETASONE 0.1% (ELOCON) 0.1 % CREAM    APPLY TOPICALLY TO THE AFFECTED AREA EVERY DAY    NYSTATIN (MYCOSTATIN) CREAM    Apply 1 application as needed by topical route for 30 days.    NYSTATIN (MYCOSTATIN) CREAM    Apply topically.    OMEGA-3 FATTY ACIDS/FISH OIL (FISH OIL-OMEGA-3 FATTY ACIDS) 300-1,000 MG CAPSULE    Take 1 capsule by mouth once daily.    PANTOPRAZOLE (PROTONIX) 40 MG TABLET    Take 40 mg by mouth every morning.    POTASSIUM CHLORIDE (K-TAB) 20 MEQ        POTASSIUM CHLORIDE (MICRO-K) 10 MEQ CPSR    Take 10 mEq by mouth once.    PREDNISONE (DELTASONE) 10 MG TABLET    Take 2 pills daily for 5 days then 1 pill daily for 2 days then stop    SERTRALINE (ZOLOFT) 50 MG TABLET    Take 1.5 tablets (75 mg total) by mouth once daily.    SILVER SULFADIAZINE 1% (SILVADENE) 1 % CREAM    Apply topically 2 (two) times daily.    SOTALOL (SOTALOL AF) 80 MG TABLET    Take 80 mg by mouth 2 (two) times daily.    TIMOLOL (BETIMOL) 0.5 % OPHTHALMIC SOLUTION    Place 1 drop into both eyes every morning.    TIMOLOL MALEATE 0.5% (TIMOPTIC) 0.5 % DROP    Place 1 drop into both eyes 2 (two) times daily.    VIT C/E/ZN/COPPR/LUTEIN/ZEAXAN (PRESERVISION AREDS-2 ORAL)    Take by mouth once daily at 6am.    VITAMIN E 400 UNIT CAPSULE       Changed and/or Refilled Medications    Modified Medication Previous Medication    DOXYCYCLINE (VIBRA-TABS) 100 MG TABLET doxycycline (VIBRA-TABS) 100 MG tablet       Take 1 tablet (100 mg total) by mouth every 12 (twelve) hours. for 7 days    Take 1 tablet (100 mg total) by mouth every 12 (twelve) hours. for 7 days    TRIAMCINOLONE ACETONIDE 0.1% (KENALOG) 0.1 % CREAM triamcinolone acetonide 0.1% (KENALOG) 0.1 % cream       Apply topically 2 (two) times daily. for  7 days    Apply topically 2 (two) times daily. for 7 days

## 2024-11-22 NOTE — TELEPHONE ENCOUNTER
No care due was identified.  [facility] Embedded Care Due Messages. Reference number: [id number].   11/21/2024 8:32:47 PM CST

## 2024-11-22 NOTE — TELEPHONE ENCOUNTER
Refill Decision Note   Sneha Leesobald  is requesting a refill authorization.    Brief Assessment and Rationale for Refill:   Approve       Medication Therapy Plan:         Comments:     Note composed:11:40 PM 11/21/2024

## 2024-12-05 ENCOUNTER — PATIENT MESSAGE (OUTPATIENT)
Dept: FAMILY MEDICINE | Facility: CLINIC | Age: 74
End: 2024-12-05

## 2024-12-05 ENCOUNTER — OFFICE VISIT (OUTPATIENT)
Dept: FAMILY MEDICINE | Facility: CLINIC | Age: 74
End: 2024-12-05
Payer: MEDICARE

## 2024-12-05 ENCOUNTER — PATIENT MESSAGE (OUTPATIENT)
Dept: FAMILY MEDICINE | Facility: CLINIC | Age: 74
End: 2024-12-05
Payer: MEDICARE

## 2024-12-05 ENCOUNTER — HOSPITAL ENCOUNTER (OUTPATIENT)
Dept: RADIOLOGY | Facility: HOSPITAL | Age: 74
Discharge: HOME | End: 2024-12-05
Attending: NURSE PRACTITIONER
Payer: MEDICARE

## 2024-12-05 VITALS
HEART RATE: 60 BPM | DIASTOLIC BLOOD PRESSURE: 68 MMHG | RESPIRATION RATE: 16 BRPM | WEIGHT: 219.25 LBS | SYSTOLIC BLOOD PRESSURE: 118 MMHG | BODY MASS INDEX: 36.53 KG/M2 | HEIGHT: 65 IN | TEMPERATURE: 98 F | OXYGEN SATURATION: 97 %

## 2024-12-05 DIAGNOSIS — B37.31 VAGINAL CANDIDA: ICD-10-CM

## 2024-12-05 DIAGNOSIS — F51.01 PRIMARY INSOMNIA: ICD-10-CM

## 2024-12-05 DIAGNOSIS — R26.81 UNSTEADY GAIT: ICD-10-CM

## 2024-12-05 DIAGNOSIS — S80.02XA CONTUSION OF LEFT KNEE, INITIAL ENCOUNTER: ICD-10-CM

## 2024-12-05 DIAGNOSIS — B37.2 YEAST INFECTION OF THE SKIN: ICD-10-CM
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
## 2024-12-05 DIAGNOSIS — S20.02XA CONTUSION OF LEFT BREAST, INITIAL ENCOUNTER: ICD-10-CM

## 2024-12-05 DIAGNOSIS — R21 RASH: ICD-10-CM

## 2024-12-05 DIAGNOSIS — R39.9 UTI SYMPTOMS: Primary | ICD-10-CM

## 2024-12-05 DIAGNOSIS — M25.562 ACUTE PAIN OF LEFT KNEE: ICD-10-CM

## 2024-12-05 DIAGNOSIS — S60.512A ABRASION OF LEFT HAND, INITIAL ENCOUNTER: ICD-10-CM

## 2024-12-05 PROBLEM — J32.9 SINUSITIS: Status: RESOLVED | Noted: 2019-07-20 | Resolved: 2024-12-05

## 2024-12-05 PROBLEM — J40 BRONCHITIS: Status: RESOLVED | Noted: 2019-07-20 | Resolved: 2024-12-05

## 2024-12-05 LAB
BILIRUBIN, UA POC OHS: NEGATIVE
BLOOD, UA POC OHS: NEGATIVE
CLARITY, UA POC OHS: ABNORMAL
COLOR, UA POC OHS: YELLOW
GLUCOSE, UA POC OHS: >=1000
KETONES, UA POC OHS: NEGATIVE
LEUKOCYTES, UA POC OHS: NEGATIVE
NITRITE, UA POC OHS: NEGATIVE
PH, UA POC OHS: 5.5
PROTEIN, UA POC OHS: NEGATIVE
SPECIFIC GRAVITY, UA POC OHS: 1.01
UROBILINOGEN, UA POC OHS: 0.2

## 2024-12-05 PROCEDURE — 3061F NEG MICROALBUMINURIA REV: CPT | Mod: CPTII,S$GLB,, | Performed by: NURSE PRACTITIONER

## 2024-12-05 PROCEDURE — 1125F AMNT PAIN NOTED PAIN PRSNT: CPT | Mod: CPTII,S$GLB,, | Performed by: NURSE PRACTITIONER

## 2024-12-05 PROCEDURE — 73562 X-RAY EXAM OF KNEE 3: CPT | Mod: TC,FY,PO,RT

## 2024-12-05 PROCEDURE — 3074F SYST BP LT 130 MM HG: CPT | Mod: CPTII,S$GLB,, | Performed by: NURSE PRACTITIONER

## 2024-12-05 PROCEDURE — 99214 OFFICE O/P EST MOD 30 MIN: CPT | Mod: S$GLB,,, | Performed by: NURSE PRACTITIONER

## 2024-12-05 PROCEDURE — 81003 URINALYSIS AUTO W/O SCOPE: CPT | Mod: QW,S$GLB,, | Performed by: NURSE PRACTITIONER

## 2024-12-05 PROCEDURE — 3044F HG A1C LEVEL LT 7.0%: CPT | Mod: CPTII,S$GLB,, | Performed by: NURSE PRACTITIONER

## 2024-12-05 PROCEDURE — 1159F MED LIST DOCD IN RCRD: CPT | Mod: CPTII,S$GLB,, | Performed by: NURSE PRACTITIONER

## 2024-12-05 PROCEDURE — 3008F BODY MASS INDEX DOCD: CPT | Mod: CPTII,S$GLB,, | Performed by: NURSE PRACTITIONER

## 2024-12-05 PROCEDURE — 3078F DIAST BP <80 MM HG: CPT | Mod: CPTII,S$GLB,, | Performed by: NURSE PRACTITIONER

## 2024-12-05 PROCEDURE — 73562 X-RAY EXAM OF KNEE 3: CPT | Mod: 26,59,RT, | Performed by: RADIOLOGY

## 2024-12-05 PROCEDURE — 3288F FALL RISK ASSESSMENT DOCD: CPT | Mod: CPTII,S$GLB,, | Performed by: NURSE PRACTITIONER

## 2024-12-05 PROCEDURE — 73564 X-RAY EXAM KNEE 4 OR MORE: CPT | Mod: 26,LT,, | Performed by: RADIOLOGY

## 2024-12-05 PROCEDURE — 1100F PTFALLS ASSESS-DOCD GE2>/YR: CPT | Mod: CPTII,S$GLB,, | Performed by: NURSE PRACTITIONER

## 2024-12-05 PROCEDURE — 1160F RVW MEDS BY RX/DR IN RCRD: CPT | Mod: CPTII,S$GLB,, | Performed by: NURSE PRACTITIONER

## 2024-12-05 PROCEDURE — 3066F NEPHROPATHY DOC TX: CPT | Mod: CPTII,S$GLB,, | Performed by: NURSE PRACTITIONER

## 2024-12-05 RX ORDER — NYSTATIN 100000 [USP'U]/G
POWDER TOPICAL 4 TIMES DAILY
Qty: 60 G | Refills: 2 | Status: SHIPPED | OUTPATIENT
Start: 2024-12-05

## 2024-12-05 RX ORDER — NYSTATIN 100000 U/G
CREAM TOPICAL 2 TIMES DAILY
Qty: 30 G | Refills: 3 | Status: SHIPPED | OUTPATIENT
Start: 2024-12-05

## 2024-12-05 RX ORDER — TRAZODONE HYDROCHLORIDE 50 MG/1
50 TABLET ORAL NIGHTLY
Qty: 30 TABLET | Refills: 3 | Status: SHIPPED | OUTPATIENT
Start: 2024-12-05 | End: 2025-12-05

## 2024-12-05 RX ORDER — FLUCONAZOLE 150 MG/1
150 TABLET ORAL DAILY
Qty: 3 TABLET | Refills: 0 | Status: SHIPPED | OUTPATIENT
Start: 2024-12-05 | End: 2024-12-08

## 2024-12-05 NOTE — PROGRESS NOTES
Subjective:       Patient ID: Sneha Mcghee is a 74 y.o. female.    Chief Complaint: Urinary Tract Infection    Urinary Tract Infection   Associated symptoms include frequency, urgency and rash. Pertinent negatives include no nausea or vomiting.     Here with concerns for UTI. Itching, burning vaginal area. Having dysuria, frequency. She was seen at Urgent care on 11/3 for cellulitis left upper arm. Was started on Keflex. She then had follow up 11/21 and given 10 days of Doxycycline.  States started with some diarrhea after that. Has been trying to hydrate better. She has not noticed any hematuria.   She has rash to skin folds abdomen, under breasts. Burning type rash. Sometimes itches. Worse when she gets hot.     She tripped and fell at [employer] Wednesday. She landed on her left side, on her knees. She has bruise to left outer breast, left knee and abrasion to palm of left hand. States she was able to get up and walk without any concerns, but is still having some soreness to her left knee. Abrasion cleaned well.  Will schedule xray of left knee.  She has history of left knee osteoarthritis and is followed by orthopedics. She was told she needs a TKR but does not want done at this time. She states she has done PT to help with knee pain and balance. She feels that really helped.  States she fell again on Sunday, fell when getting out of bed. States she has unsteady gait, falls often. She would like referral to Physical Therapy to work on her endurance and gait again.    She has CAD s/p CABG x 4 vessels in 2012 with 8 stents.  She reports a cardiomyopathy with chronic heart failure.  She has paroxysmal Afib for many years. Echo on 8/2022 showed EF 65%, positive diastolic dysfunction, cLVH, moderate AS (HAMILTON 1.49 and gradient of 23), mild TR and PAP of 29. Nuclear stress on 3/2023 was negative. She is taking sotalol 80 mg bid and eliquis 5 mg bid and metoprolol xl 50 mg daily. [id number] Score: 6 (stoke risk of  9.7%/year).  She was seen by cardiology on 7/2024 and no changes were made.   States she had recent work up by Dr. Cortez in Cardiology. States had stress echo recently with good results. Will request copy of records for her PCP.     She is requesting refill of her Trazodone that she takes for her insomnia.           The following portion of the patients history was reviewed and updated as appropriate: allergies, current medications, past medical and surgical history. Past social history and problem list reviewed. Family PMH and Past social history reviewed. Tobacco, Illicit drug use reviewed.      Review of patient's allergies indicates:   Allergen Reactions    Codeine Nausea Only         Current Outpatient Medications:     albuterol (PROVENTIL/VENTOLIN HFA) 90 mcg/actuation inhaler, INHALE 2 PUFFS INTO THE LUNGS EVERY 6 HOURS AS NEEDED FOR WHEEZING, Disp: 25.5 g, Rfl: 3    aspirin (ASPIR-81 ORAL), Take 81 mg by mouth once daily., Disp: , Rfl:     atorvastatin (LIPITOR) 40 MG tablet, Take 1 tablet (40 mg total) by mouth once daily., Disp: 90 tablet, Rfl: 3    augmented betamethasone dipropionate (DIPROLENE-AF) 0.05 % cream, Apply topically 2 (two) times daily., Disp: 50 g, Rfl: 2    blood sugar diagnostic Strp, To check BG once a day and PRN low sugars, to use with insurance preferred meter, Disp: 100 strip, Rfl: 3    ELIQUIS 5 mg Tab, Take 5 mg by mouth 2 (two) times daily., Disp: , Rfl:     FARXIGA 10 mg tablet, TAKE 1 TABLET(10 MG) BY MOUTH EVERY DAY, Disp: 90 tablet, Rfl: 1    fluticasone propionate (FLONASE) 50 mcg/actuation nasal spray, SHAKE LIQUID AND USE 2 SPRAYS(100 MCG) IN EACH NOSTRIL EVERY DAY, Disp: 48 g, Rfl: 3    furosemide (LASIX) 20 MG tablet, TAKE 1 TABLET BY MOUTH EVERY DAY, Disp: 90 tablet, Rfl: 2    lancets Misc, To check BG once a day and PRN lows, to use with insurance preferred meter, Disp: 100 each, Rfl: 3    latanoprost 0.005 % ophthalmic solution, Place 1 drop into both eyes every  evening., Disp: 7.5 mL, Rfl: 4    levothyroxine (SYNTHROID) 88 MCG tablet, TAKE 1 TABLET(88 MCG) BY MOUTH BEFORE BREAKFAST, Disp: 90 tablet, Rfl: 3    melatonin 10 mg Cap, Take 20 mg by mouth., Disp: , Rfl:     metFORMIN (GLUCOPHAGE-XR) 500 MG ER 24hr tablet, TAKE 1 TABLET(500 MG) BY MOUTH TWICE DAILY, Disp: 180 tablet, Rfl: 3    metoprolol succinate (TOPROL-XL) 50 MG 24 hr tablet, Take 50 mg by mouth., Disp: , Rfl:     mometasone 0.1% (ELOCON) 0.1 % cream, APPLY TOPICALLY TO THE AFFECTED AREA EVERY DAY, Disp: , Rfl:     nystatin (MYCOSTATIN) cream, Apply topically., Disp: , Rfl:     omega-3 fatty acids/fish oil (FISH OIL-OMEGA-3 FATTY ACIDS) 300-1,000 mg capsule, Take 1 capsule by mouth once daily., Disp: , Rfl:     pantoprazole (PROTONIX) 40 MG tablet, Take 40 mg by mouth every morning., Disp: , Rfl:     sertraline (ZOLOFT) 50 MG tablet, Take 1.5 tablets (75 mg total) by mouth once daily., Disp: 135 tablet, Rfl: 2    timoloL (BETIMOL) 0.5 % ophthalmic solution, Place 1 drop into both eyes every morning., Disp: , Rfl:     timolol maleate 0.5% (TIMOPTIC) 0.5 % Drop, Place 1 drop into both eyes 2 (two) times daily., Disp: 15 mL, Rfl: 11    triamcinolone acetonide 0.1% (KENALOG) 0.1 % cream, Apply topically 2 (two) times daily. for 7 days, Disp: 80 g, Rfl: 1    vit C/E/Zn/coppr/lutein/zeaxan (PRESERVISION AREDS-2 ORAL), Take by mouth once daily at 6am., Disp: , Rfl:     vitamin E 400 UNIT capsule, , Disp: , Rfl:     blood-glucose meter kit, To check BG qam and PRN lows, to use with insurance preferred meter (Patient not taking: Reported on 11/18/2024), Disp: 1 each, Rfl: 0    hydrOXYzine HCL (ATARAX) 25 MG tablet, Take 0.5 tablets (12.5 mg total) by mouth 3 (three) times daily as needed for Itching. (Patient not taking: Reported on 12/5/2024), Disp: 20 tablet, Rfl: 0    ketoconazole (NIZORAL) 2 % shampoo, Apply topically twice a week. (Patient not taking: Reported on 11/18/2024), Disp: 120 mL, Rfl: 0    nystatin  (MYCOSTATIN) cream, Apply 1 application as needed by topical route for 30 days. (Patient not taking: Reported on 12/5/2024), Disp: , Rfl:     potassium chloride (K-TAB) 20 mEq, , Disp: , Rfl:     potassium chloride (MICRO-K) 10 MEQ CpSR, Take 10 mEq by mouth once., Disp: , Rfl:     predniSONE (DELTASONE) 10 MG tablet, Take 2 pills daily for 5 days then 1 pill daily for 2 days then stop (Patient not taking: Reported on 11/18/2024), Disp: 12 tablet, Rfl: 0    silver sulfADIAZINE 1% (SILVADENE) 1 % cream, Apply topically 2 (two) times daily. (Patient not taking: Reported on 12/5/2024), Disp: 85 g, Rfl: 1    sotaloL (SOTALOL AF) 80 MG tablet, Take 80 mg by mouth 2 (two) times daily. (Patient not taking: Reported on 12/5/2024), Disp: , Rfl:     Past Medical History:   Diagnosis Date    Asthma     Atrial fibrillation     CAD (coronary artery disease)     CABG x 4    Cataract     os    Depression     Diabetes mellitus type II     Heart failure     Hyperlipidemia     Hypertension     Hypothyroidism, unspecified     Intermediate stage nonexudative age-related macular degeneration of both eyes 4/4/2022    Primary osteoarthritis of right knee 01/19/2021    PVD (peripheral vascular disease)     Stenosis of left carotid artery 01/19/2021       Past Surgical History:   Procedure Laterality Date    APPENDECTOMY      BACK SURGERY      discetomy     CARDIOVERSION      CARPAL TUNNEL RELEASE      R     CATARACT EXTRACTION      od d 5-15-13    CORONARY ARTERY BYPASS GRAFT  2012    x 4    CORONARY STENT PLACEMENT      8 stents    KNEE ARTHROSCOPY W/ PARTIAL MEDIAL MENISCECTOMY         Social History     Socioeconomic History    Marital status:    Occupational History    Occupation: retired     Tobacco Use    Smoking status: Never    Smokeless tobacco: Never   Substance and Sexual Activity    Alcohol use: Not Currently    Drug use: No    Sexual activity: Not Currently     Review of Systems   Constitutional:   "Negative for fatigue and fever.   HENT: Negative.     Eyes:  Negative for visual disturbance.   Respiratory:  Negative for cough, chest tightness, shortness of breath and wheezing.    Cardiovascular:  Negative for chest pain, palpitations and leg swelling.   Gastrointestinal:  Negative for abdominal pain, blood in stool, diarrhea, nausea and vomiting.   Genitourinary:  Positive for dysuria, frequency, urgency and vaginal discharge (itching).   Musculoskeletal:  Positive for arthralgias and gait problem. Negative for back pain.   Skin:  Positive for rash.        bruising   Neurological:  Negative for headaches.   Psychiatric/Behavioral:  Positive for sleep disturbance. Negative for dysphoric mood. The patient is not nervous/anxious.        Objective:      /68 (BP Location: Right arm, Patient Position: Sitting)   Pulse 60   Temp 98.1 °F (36.7 °C)   Resp 16   Ht 5' 5" (1.651 m)   Wt 99.5 kg (219 lb 4 oz)   SpO2 97%   BMI 36.48 kg/m²      Physical Exam  Constitutional:       Appearance: Normal appearance. She is obese.   HENT:      Head: Normocephalic.   Eyes:      Pupils: Pupils are equal, round, and reactive to light.   Cardiovascular:      Rate and Rhythm: Normal rate and regular rhythm.      Pulses: Normal pulses.      Heart sounds: Murmur heard.      Systolic murmur is present with a grade of 2/6.   Pulmonary:      Effort: Pulmonary effort is normal.      Breath sounds: Normal breath sounds. No wheezing.   Abdominal:      General: Bowel sounds are normal.      Tenderness: There is no abdominal tenderness.   Musculoskeletal:      Cervical back: Normal range of motion.      Left knee: Effusion present. Decreased range of motion. Tenderness present.      Right lower leg: No edema.      Left lower leg: No edema.      Comments: Gait normal.  strong, equal   Skin:     General: Skin is warm and dry.      Capillary Refill: Capillary refill takes less than 2 seconds.      Findings: Abrasion, bruising and " rash present.      Comments: Bruise outer left breast, left knee   Abrasion palm left hand  Candida rash under breasts and abdominal folds   Neurological:      General: No focal deficit present.      Mental Status: She is alert.   Psychiatric:         Attention and Perception: Attention and perception normal.         Mood and Affect: Mood and affect normal.         Speech: Speech normal.         Behavior: Behavior normal.         Assessment:       1. UTI symptoms    2. Rash    3. Unsteady gait    4. Acute pain of left knee    5. Vaginal candida    6. Yeast infection of the skin    7. Fall, initial encounter    8. Contusion of left breast, initial encounter    9. Contusion of left knee, initial encounter    10. Abrasion of left hand, initial encounter    11. Primary insomnia        Plan:       UTI symptoms: urine did not show any indication of infection. Hydrate well.   -     POCT Urinalysis(Instrument)    Rash: refer to Dermatology  -     Ambulatory referral/consult to Dermatology; Future; Expected date: 12/12/2024    Unsteady gait: refer to Physical therapy  -     Ambulatory referral/consult to Physical/Occupational Therapy; Future; Expected date: 12/12/2024    Acute pain of left knee: will get Xray. Follow up with her Orthopedic doctor  -     X-ray Knee Ortho Left with Flexion; Future; Expected date: 12/05/2024    Vaginal candida: will treat with Diflucan.     Yeast infection of the skin: nystatin cream/powder as needed.     Fall, initial encounter: bruising improving. Physical Therapy to help with gait     Contusion of left breast, initial encounter: healing    Contusion of left knee, initial encounter: healing    Abrasion of left hand, initial encounter: healing. Keep clean.     Primary insomnia: trazodone as prescribed.     Other orders  -     fluconazole (DIFLUCAN) 150 MG Tab; Take 1 tablet (150 mg total) by mouth once daily. for 3 days  Dispense: 3 tablet; Refill: 0  -     nystatin (MYCOSTATIN) powder; Apply  topically 4 (four) times daily.  Dispense: 60 g; Refill: 2  -     nystatin (MYCOSTATIN) cream; Apply topically 2 (two) times daily.  Dispense: 30 g; Refill: 3  -     traZODone (DESYREL) 50 MG tablet; Take 1 tablet (50 mg total) by mouth every evening.  Dispense: 30 tablet; Refill: 3       Continue current medication  Take medications only as prescribed  Healthy diet, exercise  Adequate rest  Adequate hydration  Avoid allergens  Avoid excessive caffeine     Keep follow up with PCP

## 2024-12-06 ENCOUNTER — PATIENT MESSAGE (OUTPATIENT)
Dept: FAMILY MEDICINE | Facility: CLINIC | Age: 74
End: 2024-12-06
Payer: MEDICARE

## 2024-12-09 ENCOUNTER — PATIENT MESSAGE (OUTPATIENT)
Dept: FAMILY MEDICINE | Facility: CLINIC | Age: 74
End: 2024-12-09
Payer: MEDICARE

## 2024-12-11 NOTE — TELEPHONE ENCOUNTER
"Spoke to pt and read her Lainey's message of "Your xray showed degenerative changes to both knees. Looks like you had some infection/damage to left femur in the past. I recommend seeing Orthopedics. Do you want me to put in referral for you?"  Pt states she is starting PT today and will see if that helps.  If not, she will call back to get referral.  "

## 2025-01-10 RX ORDER — FLUTICASONE PROPIONATE 50 MCG
2 SPRAY, SUSPENSION (ML) NASAL DAILY
Qty: 48 G | Refills: 2 | Status: SHIPPED | OUTPATIENT
Start: 2025-01-10

## 2025-01-10 NOTE — TELEPHONE ENCOUNTER
Care Due:                  Date            Visit Type   Department     Provider  --------------------------------------------------------------------------------                                EP -                              PRIMARY      [facility]  Last Visit: 09-      CARE ([facility])   GIANNA Jacobo                              EP -                              PRIMARY      [facility]  Next Visit: 02-      CARE ([facility])   [facility]       Elizabeth Jacobo                                                            Last  Test          Frequency    Reason                     Performed    Due Date  --------------------------------------------------------------------------------    HBA1C.......  6 months...  FARXIGA, metFORMIN.......  09-   03-    [facility] Embedded Care Due Messages. Reference number: [id number].   1/10/2025 12:19:02 PM CST

## 2025-01-10 NOTE — TELEPHONE ENCOUNTER
----- Message from Tonya sent at 1/10/2025 11:20 AM CST -----  Regarding: Refill  Type:  RX Refill Request    Who Called: Pt    Refill or New Rx:Refill    RX Name and Strength:fluticasone propionate (FLONASE) 50 mcg/actuation nasal spray     How is the patient currently taking it? (ex. 1XDay):2xday    Preferred Pharmacy with phone number:  [id number] - [facility] [id number] [place] [address] AT [facility] [address] [facility]  [address] [place] [id number]  Phone: [phone] Fax: [phone]      Local or Mail Order:local    Ordering Provider:.    Would the patient rather a call back or a response via [facility]? Call    Best Call Back Number:[phone]    Additional Information: Need a refill. Thanks

## 2025-01-19 DIAGNOSIS — E78.2 MIXED HYPERLIPIDEMIA: ICD-10-CM

## 2025-01-19 NOTE — TELEPHONE ENCOUNTER
No care due was identified.  [facility] Embedded Care Due Messages. Reference number: [id number].   1/19/2025 3:43:53 PM CST

## 2025-01-21 NOTE — TELEPHONE ENCOUNTER
Refill Routing Note   Medication(s) are not appropriate for processing by [facility] for the following reason(s):        No active prescription written by provider    [facility] action(s):  Defer               Appointments  past 12m or future 3m with PCP    Date Provider   Last Visit   9/25/2024 Elizabeth Jacobo, DO   Next Visit   2/4/2025 Elizabeth Jacobo, DO   ED visits in past 90 days: 0        Note composed:9:56 AM 01/21/2025

## 2025-01-22 RX ORDER — ATORVASTATIN CALCIUM 40 MG/1
40 TABLET, FILM COATED ORAL
Qty: 90 TABLET | Refills: 1 | Status: SHIPPED | OUTPATIENT
Start: 2025-01-22

## 2025-01-28 ENCOUNTER — TELEPHONE (OUTPATIENT)
Dept: FAMILY MEDICINE | Facility: CLINIC | Age: 75
End: 2025-01-28
Payer: MEDICARE

## 2025-01-28 NOTE — TELEPHONE ENCOUNTER
Spoke to pt, supplies at  for her to  to collect her urine sample at home prior to her lab appt on 1/30

## 2025-01-28 NOTE — TELEPHONE ENCOUNTER
----- Message from Hazel sent at 1/28/2025 11:04 AM CST -----  Contact: Patient  Type:  Needs Medical Advice    Who Called: Patient    Symptoms (please be specific): Shy bladder       Would the patient rather a call back or a response via [facility]? Call    Best Call Back Number: [phone]    Additional Information: Patient is requesting arrangements to be able to collect a urine sample at home. Please call to advise

## 2025-01-30 ENCOUNTER — LAB VISIT (OUTPATIENT)
Dept: LAB | Facility: HOSPITAL | Age: 75
End: 2025-01-30
Attending: INTERNAL MEDICINE
Payer: MEDICARE

## 2025-01-30 DIAGNOSIS — E11.22 TYPE 2 DIABETES MELLITUS WITH STAGE 3B CHRONIC KIDNEY DISEASE, WITHOUT LONG-TERM CURRENT USE OF INSULIN: ICD-10-CM

## 2025-01-30 DIAGNOSIS — I10 ESSENTIAL HYPERTENSION: ICD-10-CM

## 2025-01-30 DIAGNOSIS — N18.32 TYPE 2 DIABETES MELLITUS WITH STAGE 3B CHRONIC KIDNEY DISEASE, WITHOUT LONG-TERM CURRENT USE OF INSULIN: ICD-10-CM

## 2025-01-30 LAB
ALBUMIN SERPL BCP-MCNC: 3.7 G/DL (ref 3.5–5.2)
ALP SERPL-CCNC: 108 U/L (ref 40–150)
ALT SERPL W/O P-5'-P-CCNC: 12 U/L (ref 10–44)
ANION GAP SERPL CALC-SCNC: 10 MMOL/L (ref 8–16)
AST SERPL-CCNC: 16 U/L (ref 10–40)
BASOPHILS # BLD AUTO: 0.05 K/UL (ref 0–0.2)
BASOPHILS NFR BLD: 0.6 % (ref 0–1.9)
BILIRUB SERPL-MCNC: 0.5 MG/DL (ref 0.1–1)
BUN SERPL-MCNC: 31 MG/DL (ref 8–23)
CALCIUM SERPL-MCNC: 9.8 MG/DL (ref 8.7–10.5)
CHLORIDE SERPL-SCNC: 106 MMOL/L (ref 95–110)
CHOLEST SERPL-MCNC: 164 MG/DL (ref 120–199)
CHOLEST/HDLC SERPL: 3.7 {RATIO} (ref 2–5)
CO2 SERPL-SCNC: 25 MMOL/L (ref 23–29)
CREAT SERPL-MCNC: 1.1 MG/DL (ref 0.5–1.4)
DIFFERENTIAL METHOD BLD: ABNORMAL
EOSINOPHIL # BLD AUTO: 0.4 K/UL (ref 0–0.5)
EOSINOPHIL NFR BLD: 5 % (ref 0–8)
ERYTHROCYTE [DISTWIDTH] IN BLOOD BY AUTOMATED COUNT: 16.4 % (ref 11.5–14.5)
EST. GFR  (NO RACE VARIABLE): 52.7 ML/MIN/1.73 M^2
ESTIMATED AVG GLUCOSE: 134 MG/DL (ref 68–131)
GLUCOSE SERPL-MCNC: 150 MG/DL (ref 70–110)
HBA1C MFR BLD: 6.3 % (ref 4–5.6)
HCT VFR BLD AUTO: 43.3 % (ref 37–48.5)
HDLC SERPL-MCNC: 44 MG/DL (ref 40–75)
HDLC SERPL: 26.8 % (ref 20–50)
HGB BLD-MCNC: 13.6 G/DL (ref 12–16)
IMM GRANULOCYTES # BLD AUTO: 0.02 K/UL (ref 0–0.04)
IMM GRANULOCYTES NFR BLD AUTO: 0.3 % (ref 0–0.5)
LDLC SERPL CALC-MCNC: 93.6 MG/DL (ref 63–159)
LYMPHOCYTES # BLD AUTO: 1.4 K/UL (ref 1–4.8)
LYMPHOCYTES NFR BLD: 17.9 % (ref 18–48)
MCH RBC QN AUTO: 26.6 PG (ref 27–31)
MCHC RBC AUTO-ENTMCNC: 31.4 G/DL (ref 32–36)
MCV RBC AUTO: 85 FL (ref 82–98)
MONOCYTES # BLD AUTO: 0.7 K/UL (ref 0.3–1)
MONOCYTES NFR BLD: 9.3 % (ref 4–15)
NEUTROPHILS # BLD AUTO: 5.3 K/UL (ref 1.8–7.7)
NEUTROPHILS NFR BLD: 66.9 % (ref 38–73)
NONHDLC SERPL-MCNC: 120 MG/DL
NRBC BLD-RTO: 0 /100 WBC
PLATELET # BLD AUTO: 223 K/UL (ref 150–450)
PMV BLD AUTO: 11.6 FL (ref 9.2–12.9)
POTASSIUM SERPL-SCNC: 4.9 MMOL/L (ref 3.5–5.1)
PROT SERPL-MCNC: 7.9 G/DL (ref 6–8.4)
RBC # BLD AUTO: 5.12 M/UL (ref 4–5.4)
SODIUM SERPL-SCNC: 141 MMOL/L (ref 136–145)
TRIGL SERPL-MCNC: 132 MG/DL (ref 30–150)
TSH SERPL DL<=0.005 MIU/L-ACNC: 1.89 UIU/ML (ref 0.4–4)
WBC # BLD AUTO: 7.93 K/UL (ref 3.9–12.7)

## 2025-01-30 PROCEDURE — 84443 ASSAY THYROID STIM HORMONE: CPT | Performed by: INTERNAL MEDICINE

## 2025-01-30 PROCEDURE — 85025 COMPLETE CBC W/AUTO DIFF WBC: CPT | Performed by: INTERNAL MEDICINE

## 2025-01-30 PROCEDURE — 36415 COLL VENOUS BLD VENIPUNCTURE: CPT | Mod: PO | Performed by: INTERNAL MEDICINE

## 2025-01-30 PROCEDURE — 83036 HEMOGLOBIN GLYCOSYLATED A1C: CPT | Performed by: INTERNAL MEDICINE

## 2025-01-30 PROCEDURE — 80053 COMPREHEN METABOLIC PANEL: CPT | Performed by: INTERNAL MEDICINE

## 2025-01-30 PROCEDURE — 80061 LIPID PANEL: CPT | Performed by: INTERNAL MEDICINE

## 2025-02-03 ENCOUNTER — OFFICE VISIT (OUTPATIENT)
Dept: OPHTHALMOLOGY | Facility: CLINIC | Age: 75
End: 2025-02-03
Payer: MEDICARE

## 2025-02-03 ENCOUNTER — PATIENT MESSAGE (OUTPATIENT)
Dept: FAMILY MEDICINE | Facility: CLINIC | Age: 75
End: 2025-02-03
Payer: MEDICARE

## 2025-02-03 DIAGNOSIS — H43.811 POSTERIOR VITREOUS DETACHMENT, RIGHT: ICD-10-CM

## 2025-02-03 DIAGNOSIS — H35.3221 EXUDATIVE AGE-RELATED MACULAR DEGENERATION OF LEFT EYE WITH ACTIVE CHOROIDAL NEOVASCULARIZATION: Primary | ICD-10-CM

## 2025-02-03 DIAGNOSIS — H35.3132 INTERMEDIATE STAGE NONEXUDATIVE AGE-RELATED MACULAR DEGENERATION OF BOTH EYES: ICD-10-CM

## 2025-02-03 DIAGNOSIS — H35.033 HYPERTENSIVE RETINOPATHY, BILATERAL: ICD-10-CM

## 2025-02-03 PROCEDURE — 92014 COMPRE OPH EXAM EST PT 1/>: CPT | Mod: S$GLB,,, | Performed by: OPHTHALMOLOGY

## 2025-02-03 PROCEDURE — 1101F PT FALLS ASSESS-DOCD LE1/YR: CPT | Mod: CPTII,S$GLB,, | Performed by: OPHTHALMOLOGY

## 2025-02-03 PROCEDURE — 1159F MED LIST DOCD IN RCRD: CPT | Mod: CPTII,S$GLB,, | Performed by: OPHTHALMOLOGY

## 2025-02-03 PROCEDURE — 99999 PR PBB SHADOW E&M-EST. PATIENT-LVL III: CPT | Mod: PBBFAC,,, | Performed by: OPHTHALMOLOGY

## 2025-02-03 PROCEDURE — 3061F NEG MICROALBUMINURIA REV: CPT | Mod: CPTII,S$GLB,, | Performed by: OPHTHALMOLOGY

## 2025-02-03 PROCEDURE — 92134 CPTRZ OPH DX IMG PST SGM RTA: CPT | Mod: S$GLB,,, | Performed by: OPHTHALMOLOGY

## 2025-02-03 PROCEDURE — 3044F HG A1C LEVEL LT 7.0%: CPT | Mod: CPTII,S$GLB,, | Performed by: OPHTHALMOLOGY

## 2025-02-03 PROCEDURE — 3066F NEPHROPATHY DOC TX: CPT | Mod: CPTII,S$GLB,, | Performed by: OPHTHALMOLOGY

## 2025-02-03 PROCEDURE — 3288F FALL RISK ASSESSMENT DOCD: CPT | Mod: CPTII,S$GLB,, | Performed by: OPHTHALMOLOGY

## 2025-02-03 PROCEDURE — 1160F RVW MEDS BY RX/DR IN RCRD: CPT | Mod: CPTII,S$GLB,, | Performed by: OPHTHALMOLOGY

## 2025-02-03 NOTE — PROGRESS NOTES
HPI     armd   oct  dfe           Additional comments: ARMD   Prior  injections in the past     Pt c/o  more faces look very grey  from eyes and nose down   Blurry more in OS than OD     Gtts  Timolol  qam  ou  Latanaprost ou qhs   Areds  bid po           Last edited by Janet Osborn on 2/3/2025  9:41 AM.              OCT - Drusen OU  OD PVD  OS VMA at [facility].  PED - SRF Resolved. Collapsed PED with outer  segment loss    Prior FP - OD with white choroidal patches - stable.  Possible just tesselated fundus appearance  OS PED with small subfoveal SRF =- resolved    Prior FA - OD - late staiing of drusen, no [facility] uptake   OS minimal leakage at apex of PED with Avastin on baord      A/P    1. Dry AMD OU  AREDS/AG    Wet AMD OS  4/23 - increased in PED with apical SRF  S/p Avastin OS x 3  S/p Eylea OS x 4 (last 8/5/24)  9/23 - trace increase in SRF at 14 weeks  12/23 - increase OS at 10 weeks      Stable again - continue  obs today      2. Scattered white choroidal patches   Stable FP  No sig leakage or uptake on FA OD  Possible just tesselated fundus appearance    3. PVD OD    4. HTN Ret OU    5.  PCIOL OU      4 months OCT and dilate  - LDFE 2/25    Letter TO Dr. Tucker

## 2025-02-04 ENCOUNTER — OFFICE VISIT (OUTPATIENT)
Dept: FAMILY MEDICINE | Facility: CLINIC | Age: 75
End: 2025-02-04
Payer: MEDICARE

## 2025-02-04 VITALS
WEIGHT: 226.44 LBS | BODY MASS INDEX: 37.73 KG/M2 | OXYGEN SATURATION: 92 % | HEIGHT: 65 IN | RESPIRATION RATE: 14 BRPM | DIASTOLIC BLOOD PRESSURE: 62 MMHG | TEMPERATURE: 97 F | SYSTOLIC BLOOD PRESSURE: 124 MMHG | HEART RATE: 68 BPM

## 2025-02-04 DIAGNOSIS — J45.40 MODERATE PERSISTENT ASTHMA WITHOUT COMPLICATION: ICD-10-CM

## 2025-02-04 DIAGNOSIS — E66.01 MORBID OBESITY WITH BMI OF 40.0-44.9, ADULT: ICD-10-CM

## 2025-02-04 DIAGNOSIS — R41.3 MEMORY PROBLEM: ICD-10-CM

## 2025-02-04 DIAGNOSIS — Z12.11 SCREEN FOR COLON CANCER: ICD-10-CM

## 2025-02-04 DIAGNOSIS — K21.9 GASTROESOPHAGEAL REFLUX DISEASE WITHOUT ESOPHAGITIS: ICD-10-CM

## 2025-02-04 DIAGNOSIS — I38 VALVULAR HEART DISEASE: ICD-10-CM

## 2025-02-04 DIAGNOSIS — I35.0 NONRHEUMATIC AORTIC VALVE STENOSIS: ICD-10-CM

## 2025-02-04 DIAGNOSIS — G47.33 OSA (OBSTRUCTIVE SLEEP APNEA): ICD-10-CM

## 2025-02-04 DIAGNOSIS — N32.81 OAB (OVERACTIVE BLADDER): ICD-10-CM

## 2025-02-04 DIAGNOSIS — I73.9 PAD (PERIPHERAL ARTERY DISEASE): ICD-10-CM

## 2025-02-04 DIAGNOSIS — J30.9 CHRONIC ALLERGIC RHINITIS: ICD-10-CM

## 2025-02-04 DIAGNOSIS — E03.9 HYPOTHYROIDISM, UNSPECIFIED TYPE: ICD-10-CM

## 2025-02-04 DIAGNOSIS — I10 ESSENTIAL HYPERTENSION: ICD-10-CM

## 2025-02-04 DIAGNOSIS — I48.0 PAROXYSMAL ATRIAL FIBRILLATION: ICD-10-CM

## 2025-02-04 DIAGNOSIS — I25.5 ISCHEMIC CARDIOMYOPATHY: ICD-10-CM

## 2025-02-04 DIAGNOSIS — M17.12 PRIMARY OSTEOARTHRITIS OF LEFT KNEE: ICD-10-CM

## 2025-02-04 DIAGNOSIS — K76.0 FATTY LIVER: ICD-10-CM

## 2025-02-04 DIAGNOSIS — H35.30 MACULAR DEGENERATION, UNSPECIFIED LATERALITY, UNSPECIFIED TYPE: ICD-10-CM

## 2025-02-04 DIAGNOSIS — F33.0 MAJOR DEPRESSIVE DISORDER, RECURRENT EPISODE, MILD: ICD-10-CM

## 2025-02-04 DIAGNOSIS — D64.9 ANEMIA, UNSPECIFIED TYPE: ICD-10-CM

## 2025-02-04 DIAGNOSIS — N18.32 STAGE 3B CHRONIC KIDNEY DISEASE: ICD-10-CM

## 2025-02-04 DIAGNOSIS — E11.22 TYPE 2 DIABETES MELLITUS WITH STAGE 3B CHRONIC KIDNEY DISEASE, WITHOUT LONG-TERM CURRENT USE OF INSULIN: ICD-10-CM

## 2025-02-04 DIAGNOSIS — I25.118 CORONARY ARTERY DISEASE OF NATIVE ARTERY OF NATIVE HEART WITH STABLE ANGINA PECTORIS: Primary | ICD-10-CM

## 2025-02-04 DIAGNOSIS — E78.5 HYPERLIPIDEMIA, UNSPECIFIED HYPERLIPIDEMIA TYPE: ICD-10-CM

## 2025-02-04 DIAGNOSIS — I50.32 CHRONIC DIASTOLIC HEART FAILURE: ICD-10-CM

## 2025-02-04 DIAGNOSIS — F51.01 PRIMARY INSOMNIA: ICD-10-CM

## 2025-02-04 DIAGNOSIS — I65.22 STENOSIS OF LEFT CAROTID ARTERY: ICD-10-CM

## 2025-02-04 DIAGNOSIS — I27.20 PULMONARY HYPERTENSION: ICD-10-CM

## 2025-02-04 DIAGNOSIS — N18.32 TYPE 2 DIABETES MELLITUS WITH STAGE 3B CHRONIC KIDNEY DISEASE, WITHOUT LONG-TERM CURRENT USE OF INSULIN: ICD-10-CM

## 2025-02-04 PROCEDURE — 3288F FALL RISK ASSESSMENT DOCD: CPT | Mod: CPTII,S$GLB,, | Performed by: INTERNAL MEDICINE

## 2025-02-04 PROCEDURE — 3074F SYST BP LT 130 MM HG: CPT | Mod: CPTII,S$GLB,, | Performed by: INTERNAL MEDICINE

## 2025-02-04 PROCEDURE — 1126F AMNT PAIN NOTED NONE PRSNT: CPT | Mod: CPTII,S$GLB,, | Performed by: INTERNAL MEDICINE

## 2025-02-04 PROCEDURE — 99215 OFFICE O/P EST HI 40 MIN: CPT | Mod: S$GLB,,, | Performed by: INTERNAL MEDICINE

## 2025-02-04 PROCEDURE — 1100F PTFALLS ASSESS-DOCD GE2>/YR: CPT | Mod: CPTII,S$GLB,, | Performed by: INTERNAL MEDICINE

## 2025-02-04 PROCEDURE — 3008F BODY MASS INDEX DOCD: CPT | Mod: CPTII,S$GLB,, | Performed by: INTERNAL MEDICINE

## 2025-02-04 PROCEDURE — 3061F NEG MICROALBUMINURIA REV: CPT | Mod: CPTII,S$GLB,, | Performed by: INTERNAL MEDICINE

## 2025-02-04 PROCEDURE — 1159F MED LIST DOCD IN RCRD: CPT | Mod: CPTII,S$GLB,, | Performed by: INTERNAL MEDICINE

## 2025-02-04 PROCEDURE — 3078F DIAST BP <80 MM HG: CPT | Mod: CPTII,S$GLB,, | Performed by: INTERNAL MEDICINE

## 2025-02-04 PROCEDURE — 1160F RVW MEDS BY RX/DR IN RCRD: CPT | Mod: CPTII,S$GLB,, | Performed by: INTERNAL MEDICINE

## 2025-02-04 PROCEDURE — G2211 COMPLEX E/M VISIT ADD ON: HCPCS | Mod: S$GLB,,, | Performed by: INTERNAL MEDICINE

## 2025-02-04 PROCEDURE — 3066F NEPHROPATHY DOC TX: CPT | Mod: CPTII,S$GLB,, | Performed by: INTERNAL MEDICINE

## 2025-02-04 PROCEDURE — 3044F HG A1C LEVEL LT 7.0%: CPT | Mod: CPTII,S$GLB,, | Performed by: INTERNAL MEDICINE

## 2025-02-04 RX ORDER — SOTALOL HYDROCHLORIDE 80 MG/1
1 TABLET ORAL 2 TIMES DAILY
COMMUNITY
Start: 2025-01-27

## 2025-02-04 RX ORDER — BUDESONIDE AND FORMOTEROL FUMARATE DIHYDRATE 160; 4.5 UG/1; UG/1
2 AEROSOL RESPIRATORY (INHALATION) EVERY 12 HOURS
Qty: 10.2 G | Refills: 6 | Status: SHIPPED | OUTPATIENT
Start: 2025-02-04 | End: 2026-02-04

## 2025-02-04 NOTE — PROGRESS NOTES
Subjective:       Patient ID: Sneha Mcghee is a 74 y.o. female.    Medication List with Changes/Refills   New Medications    BUDESONIDE-FORMOTEROL 160-4.5 MCG (SYMBICORT) 160-4.5 MCG/ACTUATION HFAA    Inhale 2 puffs into the lungs every 12 (twelve) hours. Controller   Current Medications    ALBUTEROL (PROVENTIL/VENTOLIN HFA) 90 MCG/ACTUATION INHALER    INHALE 2 PUFFS INTO THE LUNGS EVERY 6 HOURS AS NEEDED FOR WHEEZING    ASPIRIN (ASPIR-81 ORAL)    Take 81 mg by mouth once daily.    ATORVASTATIN (LIPITOR) 40 MG TABLET    TAKE 1 TABLET(40 MG) BY MOUTH EVERY DAY    AUGMENTED BETAMETHASONE DIPROPIONATE (DIPROLENE-AF) 0.05 % CREAM    Apply topically 2 (two) times daily.    BLOOD SUGAR DIAGNOSTIC STRP    To check BG once a day and PRN low sugars, to use with insurance preferred meter    BLOOD-GLUCOSE METER KIT    To check BG qam and PRN lows, to use with insurance preferred meter    ELIQUIS 5 MG TAB    Take 5 mg by mouth 2 (two) times daily.    FARXIGA 10 MG TABLET    TAKE 1 TABLET(10 MG) BY MOUTH EVERY DAY    FLUTICASONE PROPIONATE (FLONASE) 50 MCG/ACTUATION NASAL SPRAY    2 sprays (100 mcg total) by Each Nostril route once daily.    FUROSEMIDE (LASIX) 20 MG TABLET    TAKE 1 TABLET BY MOUTH EVERY DAY    LANCETS MISC    To check BG once a day and PRN lows, to use with insurance preferred meter    LATANOPROST 0.005 % OPHTHALMIC SOLUTION    Place 1 drop into both eyes every evening.    LEVOTHYROXINE (SYNTHROID) 88 MCG TABLET    TAKE 1 TABLET(88 MCG) BY MOUTH BEFORE BREAKFAST    MELATONIN 10 MG CAP    Take 20 mg by mouth.    METFORMIN (GLUCOPHAGE-XR) 500 MG ER 24HR TABLET    TAKE 1 TABLET(500 MG) BY MOUTH TWICE DAILY    METOPROLOL SUCCINATE (TOPROL-XL) 50 MG 24 HR TABLET    Take 50 mg by mouth.    MOMETASONE 0.1% (ELOCON) 0.1 % CREAM    APPLY TOPICALLY TO THE AFFECTED AREA EVERY DAY    NYSTATIN (MYCOSTATIN) CREAM    Apply topically.    NYSTATIN (MYCOSTATIN) CREAM    Apply topically 2 (two) times daily.    NYSTATIN  (MYCOSTATIN) POWDER    Apply topically 4 (four) times daily.    OMEGA-3 FATTY ACIDS/FISH OIL (FISH OIL-OMEGA-3 FATTY ACIDS) 300-1,000 MG CAPSULE    Take 1 capsule by mouth once daily.    PANTOPRAZOLE (PROTONIX) 40 MG TABLET    Take 40 mg by mouth every morning.    POTASSIUM CHLORIDE (K-TAB) 20 MEQ        POTASSIUM CHLORIDE (MICRO-K) 10 MEQ CPSR    Take 10 mEq by mouth once.    SERTRALINE (ZOLOFT) 50 MG TABLET    Take 1.5 tablets (75 mg total) by mouth once daily.    SOTALOL (BETAPACE) 80 MG TABLET    Take 1 tablet by mouth 2 (two) times daily.    TIMOLOL (BETIMOL) 0.5 % OPHTHALMIC SOLUTION    Place 1 drop into both eyes every morning.    TIMOLOL MALEATE 0.5% (TIMOPTIC) 0.5 % DROP    Place 1 drop into both eyes 2 (two) times daily.    TRIAMCINOLONE ACETONIDE 0.1% (KENALOG) 0.1 % CREAM    Apply topically 2 (two) times daily. for 7 days    VIT C/E/ZN/COPPR/LUTEIN/ZEAXAN (PRESERVISION AREDS-2 ORAL)    Take by mouth once daily at 6am.    VITAMIN E 400 UNIT CAPSULE       Discontinued Medications    TRAZODONE (DESYREL) 50 MG TABLET    Take 1 tablet (50 mg total) by mouth every evening.       Chief Complaint: Follow-up (4 month follow up )  She is here today to f/u on chronic medical issues.       She has CAD s/p CABG x 4 vessels in 2012 with 8 stents.  She reports a cardiomyopathy with chronic heart failure.  She has paroxysmal Afib for many years. Echo on 11/2024 showed EF 62%, grade III diastolic dysfunction, normal wall motion, moderate LAE, severe aortic valve calcification, moderate to severe AS (HAMILTON 1.1, gradient 36), mild AR, severe MAC, moderate MR, mild TR, PAP of 42.  Nuclear stress on 3/2023 was negative. She is taking sotalol 80 mg bid and eliquis 5 mg bid and metoprolol xl 50 mg daily. [id number] Score: 6 (stoke risk of 9.7%/year).  She was seen by cardiology on 11/2024 and no changes were made. Cardiology is considering sometime of surgery (she is not sure what it would be for).  She does continues to  orthopnea most nights. No chest pain or shortness of breath with exertion. No lower leg swelling.      She has hyperlipidemia and is taking atorvastatin 40 mg daily.  Lipids on 1/2025 were 164/132/44/93. She continues on aspirin daily.      She has carotid artery stenosis and is s/p left carotid stent.  Carotid u/s on 8/2022 showed patent stent in left carotid bulb and no significant stenosis.      She has PVD but has not had stenting of lower legs. She has chronic venous insufficiency and is s/p multiple ELVT.  She has chronic stasis dermatitis left > right.  She had a prolonged course for a non-healing ulcer of left lower leg that is now healed.  Venous u/s on 1/2022 showed venous reflux.  Arterial ultrasound on 1/2022 showed 50-75% stenosis of proximal left anterior tibial artery, SARITHA right 1.01 and left 0.97. She denies any recurrence of open wounds. She continues on lasix 20 mg daily.   Starting farxiga has helped her lower leg swelling.      She has diabetes and is taking metformin 500 mg bid and farxiga 10 mg daily. Her HbA1c was 6.3 on 1/2025.  She does not check her glucose at home.  She is UTD on her eye exam and due for a foot exam. Her microalbumin is negative on 1/2025.        She has CKD  with increased creatinine of 1.1 with GFR of 52 on 1/2025.      She has asthma since 2012 that started after her heart surgery. Her symptoms are chest tightness and wheezing.  She denies any known triggers. She uses albuterol for her increased symptoms with good relief. Today she reports having wheezing every night and will get relief with albuterol nightly. No daytime symptoms.     She has chronic allergies and is using flonase nightly. She does report frequent nose bleeds over the last couple weeks.      She has hypothyroid and is taking levothyroxine 88 mcg daily. Her TSH on 1/2025 was normal.      She has glaucoma and is using timolol and xalatan drops. She is followed by ophthalmology and reports her pressures  have been stable but has wet macular degeneration in the left eye and continues on  intraocular injections.      She has GERD and is taking pantoprazole 40 mg daily.      She has fatty liver and normal LFTs on 1/2025.       She has depression and is taking zoloft 50 mg qday. She feel her anxiety and depression are controlled.  No suicidal ideations.  She has insomnia with difficulty with waking during the night. She tried trazodone but this did not help.      She complains of memory problems .  Recall she is very forgetful and this worsens when she is very stressed or left alone. She forgets names and places but denies getting loss. She denies any dangerous behaviors. She continues to do her finances without problems. She lives alone but her son is involved. He had noticed worsening memory and feels she struggles to remember her medications. She feels she is doing better. Labs on 5/2024 were reassuring.      She has VALENTE and refuses to wear CPAP.      She has left knee osteoarthritis and is followed by orthopedics. She was told she needs a TKR but does not want done at this time. She is currently in PT to help with left pain and balance.      She lives with her niece who moved in temporary and feels safe.  She does not exercise and is very sedentary. She does eat heathy.  She has poor balance but denies any recent falls.      Colonoscopy---4/2016 repeat in 5 years ([facility])---has fecal kit at home but does not want to do at this time.   Mammogram----3/2024 neg   DEXA-----7/2023 normal  Tdap--9/2016  Influenza vaccine---9/2024  Pneumovax 23----11/2016  Prevnar 13----10/2017  Shingles vaccine-----5/2023, 3/2024  Covid vaccine---- 2 doses      RSV vaccine----none      Review of Systems   Constitutional:  Negative for appetite change, fatigue, fever and unexpected weight change.   HENT:  Negative for congestion, ear pain, hearing loss, sore throat and trouble swallowing.    Eyes:  Negative for pain and visual  "disturbance.   Respiratory:  Positive for shortness of breath and wheezing. Negative for cough and chest tightness.    Cardiovascular:  Negative for chest pain, palpitations and leg swelling.   Gastrointestinal:  Negative for abdominal pain, blood in stool, constipation, diarrhea, nausea and vomiting.   Endocrine: Negative for polyuria.   Genitourinary:  Negative for dysuria and hematuria.   Musculoskeletal:  Positive for arthralgias. Negative for back pain and myalgias.   Skin:  Negative for rash.   Neurological:  Negative for dizziness, weakness, numbness and headaches.   Hematological:  Does not bruise/bleed easily.   Psychiatric/Behavioral:  Positive for sleep disturbance. Negative for dysphoric mood and suicidal ideas. The patient is not nervous/anxious.        Objective:      Vitals:    02/04/25 0858   BP: 124/62   BP Location: Right arm   Patient Position: Sitting   Pulse: 68   Resp: 14   Temp: 97 °F (36.1 °C)   TempSrc: Temporal   SpO2: (!) 92%   Weight: 102.7 kg (226 lb 6.6 oz)   Height: 5' 5" (1.651 m)     Body mass index is 37.68 kg/m².  Physical Exam    General appearance: No acute distress, cooperative  Eyes: PERRL, EOMI, conjunctiva clear  Ears: normal external ear and pinna, tm clear without drainage, canals clear  Nose: Normal mucosa without drainage, septum with oozing   Throat: no exudates or erythema, tonsils not enlarged  Mouth: no sores or lesions, moist mucous membranes  Neck: FROM, soft, supple, no thyromegaly, no bruits  Lymph: no anterior or posterior cervical adenopathy  Heart::  Regular rate and rhythm, 3/6 systolic murmur  Lung: Clear to ascultation bilaterally, no wheezing, no rales, no rhonchi, no distress  Abdomen: Soft, nontender, no distention, no hepatosplenomegaly, bowel sounds normal, no guarding, no rebound, no peritoneal signs  Skin: no rashes, no lesions  Extremities: no edema, venous stasis changes   Diabetic foot exam:   Left: Pulses: diminished pedal pulses   Sensation: " normal   Filament test present   Apperance: no ulcers, yes callous formation, no deformities, yes onychomycosis, yes thickened nails   Right: Pulses: diminished pedal pulses   Sensation: normal   Filament test present   Appearance: no ulcers, yes callous formation, no deformities, yes onychomycosis, yes thickened nails  Neuro: CN 2-12 intact, 5/5 muscle strength upper and lower extremity bilaterally, 2+ DTRs UE and LE bilaterally, unsteady gait  Peripheral pulses: diminished pedal pulses bilaterally  Musculoskeletal: FROM, good strenth, no tenderness  Joint: normal appearance, no swelling, no warmth, no deformity in all joints    Assessment:       1. Coronary artery disease of native artery of native heart with stable angina pectoris    2. Ischemic cardiomyopathy    3. Chronic diastolic heart failure    4. Paroxysmal atrial fibrillation    5. Valvular heart disease    6. Nonrheumatic aortic valve stenosis    7. Pulmonary hypertension    8. Essential hypertension    9. PAD (peripheral artery disease)    10. Stenosis of left carotid artery    11. Hyperlipidemia, unspecified hyperlipidemia type    12. Moderate persistent asthma without complication    13. Chronic allergic rhinitis    14. Hypothyroidism, unspecified type    15. Type 2 diabetes mellitus with stage 3b chronic kidney disease, without long-term current use of insulin    16. Stage 3b chronic kidney disease    17. Anemia, unspecified type    18. Gastroesophageal reflux disease without esophagitis    19. Fatty liver    20. OAB (overactive bladder)    21. Major depressive disorder, recurrent episode, mild    22. Primary insomnia    23. Memory problem    24. Macular degeneration, unspecified laterality, unspecified type    25. Primary osteoarthritis of left knee    26. VALENTE (obstructive sleep apnea)    27. Morbid obesity with BMI of 40.0-44.9, adult    28. Screen for colon cancer        Plan:       Coronary artery disease of native artery of native heart with  stable angina pectoris  Uncontrolled and followed by cardiology.  Considering an intervention    Ischemic cardiomyopathy with Chronic diastolic heart failure  Well compensated on lasix daily    Paroxysmal atrial fibrillation  NSR today on exam. Continue on sotalol and eliquis    Valvular heart disease with Nonrheumatic aortic valve stenosis  This continues to progress and is followed closely by cardiology    Pulmonary hypertension  Uncontrolled and continue to monitor    Essential hypertension  Well controlled and continue current regimen.     PAD (peripheral artery disease)  Continue statin and aspirin    Stenosis of left carotid artery  Continue statin and aspirin    Hyperlipidemia, unspecified hyperlipidemia type  Continue atorvastatin and aspirin    Moderate persistent asthma without complication  Uncontrolled and will start preventative therapy with symbicort bid.   -     budesonide-formoterol 160-4.5 mcg (SYMBICORT) 160-4.5 mcg/actuation HFAA; Inhale 2 puffs into the lungs every 12 (twelve) hours. Controller  Dispense: 10.2 g; Refill: 6    Chronic allergic rhinitis  Uncontrolled but flonase is causing nose bleeds. Hold flonase at this time. Okay to use OTC antihistamines    Hypothyroidism, unspecified type  Good control on this dose of levothyroxine    Type 2 diabetes mellitus with stage 3b chronic kidney disease, without long-term current use of insulin  Good control on this regimen. Foot exam done today.   -     CBC Auto Differential; Future; Expected date: 02/04/2025  -     Hemoglobin A1C; Future; Expected date: 02/04/2025  -     Basic Metabolic Panel; Future; Expected date: 02/04/2025  -     Ambulatory referral/consult to Podiatry; Future; Expected date: 02/11/2025    Stage 3b chronic kidney disease  Stable and continue to monitor    Anemia, unspecified type  Improved and continue to monitor    Gastroesophageal reflux disease without esophagitis  Good control on pantoprazole PRN    Fatty liver  Normal LFTs  and encourage weight loss    OAB (overactive bladder)  She is not on treatment. Continue to monitor    Major depressive disorder, recurrent episode, mild  Good control on zoloft    Primary insomnia  Uncontrolled and advised to try melatonin.     Memory problem  Continue to monitor. Family is very concerned    Macular degeneration, unspecified laterality, unspecified type  Continue to follow with ophthalmology    Primary osteoarthritis of left knee  She is doing well in PT    VALENTE (obstructive sleep apnea)  Uncontrolled but she would benefit from use of cpap. She does not want at this time.   \  Morbid obesity with BMI of 40.0-44.9, adult  Long discussion on the benefits of healthy eating and regular exercise to help lose weight and help control diabetes, cad, hypertension and hyperlipidemia.     Screen for colon cancer  -     Fecal Immunochemical Test (iFOBT); Future; Expected date: 02/04/2025    Follow up in about 4 months (around 6/4/2025) for chronic medical issues.     55 minutes of total time spent on the encounter, which includes face to face time and non-face to face time preparing to see the patient (eg, review of tests), Obtaining and/or reviewing separately obtained history, documenting clinical information in the electronic or other health record, independently interpreting results (not separately reported) and communicating results to the patient/family/caregiver, or Care coordination (not separately reported).

## 2025-02-04 NOTE — PATIENT INSTRUCTIONS
Get the RSV vaccine at the pharmacy.       For your asthma----start symbicort inhaler 2 puffs twice a day.      Continue to use albuterol inhaler as needed.     Stop flonase    Okay to take antihistamines (claritin, allegra or zyrtec) once a day

## 2025-02-21 NOTE — TELEPHONE ENCOUNTER
No care due was identified.  [facility] Embedded Care Due Messages. Reference number: [id number].   2/21/2025 1:17:46 PM CST

## 2025-02-21 NOTE — TELEPHONE ENCOUNTER
Refill Routing Note   Medication(s) are not appropriate for processing by [facility] for the following reason(s):        No active prescription written by provider    [facility] action(s):  Defer             Appointments  past 12m or future 3m with PCP    Date Provider   Last Visit   2/4/2025 Elizabeth Jacobo, DO   Next Visit   6/26/2025 Elizabeth Jacobo, DO   ED visits in past 90 days: 0        Note composed:4:09 PM 02/21/2025

## 2025-02-24 RX ORDER — METOPROLOL SUCCINATE 50 MG/1
50 TABLET, EXTENDED RELEASE ORAL
Qty: 90 TABLET | Refills: 3 | Status: SHIPPED | OUTPATIENT
Start: 2025-02-24

## 2025-02-25 ENCOUNTER — TELEPHONE (OUTPATIENT)
Dept: FAMILY MEDICINE | Facility: CLINIC | Age: 75
End: 2025-02-25
Payer: MEDICARE

## 2025-02-25 DIAGNOSIS — E11.22 TYPE 2 DIABETES MELLITUS WITH STAGE 3B CHRONIC KIDNEY DISEASE, WITHOUT LONG-TERM CURRENT USE OF INSULIN: ICD-10-CM

## 2025-02-25 DIAGNOSIS — N18.32 TYPE 2 DIABETES MELLITUS WITH STAGE 3B CHRONIC KIDNEY DISEASE, WITHOUT LONG-TERM CURRENT USE OF INSULIN: ICD-10-CM

## 2025-02-25 DIAGNOSIS — I73.9 PAD (PERIPHERAL ARTERY DISEASE): Primary | ICD-10-CM

## 2025-02-25 NOTE — TELEPHONE ENCOUNTER
----- Message from Shaniqua sent at 2/25/2025 11:25 AM CST -----  Contact: self  Type: Needs Medical AdviceWho Called:  pt Symptoms (please be specific):  needs referrals for two different doctors.  Needs a referral for Dr Bryce Bhakta for her big toes on both feet after walking both hurt and has appt w/him on 3/25/25 then needs a referral to see Dr Jovany Childers wound care doctor has seen him before for when she get ulcers but needs new referral according to her insurance, he is with [facility] so just needs referral put in [place] Call Back Number: [phone] Information: Please call pt back after getting these put in, she is luis e to see Dr Childers on 3/10/25 at 9 AM.  Thank you

## 2025-03-08 NOTE — TELEPHONE ENCOUNTER
No care due was identified.  [facility] Embedded Care Due Messages. Reference number: [id number].   3/08/2025 5:21:43 AM CST

## 2025-03-09 RX ORDER — FUROSEMIDE 20 MG/1
20 TABLET ORAL
Qty: 90 TABLET | Refills: 3 | Status: SHIPPED | OUTPATIENT
Start: 2025-03-09

## 2025-03-09 NOTE — TELEPHONE ENCOUNTER
Refill Decision Note   Sneha Mcghee  is requesting a refill authorization.  Brief Assessment and Rationale for Refill:  Approve     Medication Therapy Plan:         Comments:     Note composed:2:14 PM 03/09/2025             Appointments     Last Visit   2/4/2025 Elizabeth Jacobo, DO   Next Visit   6/26/2025 Elizabeth Jacobo, DO

## 2025-03-18 ENCOUNTER — TELEPHONE (OUTPATIENT)
Dept: PODIATRY | Facility: CLINIC | Age: 75
End: 2025-03-18
Payer: MEDICARE

## 2025-03-18 NOTE — TELEPHONE ENCOUNTER
LMOM that I cancelled her appt with Dr Bhakta , he will be in surgery. Please call back to reschedule.

## 2025-05-16 ENCOUNTER — TELEPHONE (OUTPATIENT)
Dept: FAMILY MEDICINE | Facility: CLINIC | Age: 75
End: 2025-05-16
Payer: MEDICARE

## 2025-05-16 NOTE — TELEPHONE ENCOUNTER
----- Message from Festus sent at 5/16/2025  1:31 PM CDT -----  Contact: Pt [phone]  Type:  Needs Medical AdviceWho Called: Pt Would the patient rather a call back or a response via [facility]? [place] Call Back Number: [phone] Additional Information: Pt was not sure if her PCP is aware she is having a TAVR done, and adv she can drop off some paperwork from her outside cardiologist if needed. Pls call back and adv. Thank you.

## 2025-05-24 NOTE — TELEPHONE ENCOUNTER
Care Due:                  Date            Visit Type   Department     Provider  --------------------------------------------------------------------------------                                EP -                              PRIMARY      [facility]  Last Visit: 02-      CARE ([facility])   GIANNA Jacobo                              EP -                              PRIMARY      [facility]  Next Visit: 06-      CARE ([facility])   [facility]       Elizabeth Jacobo                                                            Last  Test          Frequency    Reason                     Performed    Due Date  --------------------------------------------------------------------------------    HBA1C.......  6 months...  FARXIGA, metFORMIN.......  01- 07-    [facility] Embedded Care Due Messages. Reference number: [id number].   5/24/2025 2:21:41 PM CDT

## 2025-05-25 RX ORDER — DAPAGLIFLOZIN 10 MG/1
10 TABLET, FILM COATED ORAL
Qty: 90 TABLET | Refills: 0 | Status: SHIPPED | OUTPATIENT
Start: 2025-05-25

## 2025-05-26 NOTE — TELEPHONE ENCOUNTER
Refill Decision Note   Sneha Taz  is requesting a refill authorization.  Brief Assessment and Rationale for Refill:  Approve     Medication Therapy Plan:  FLOS      Comments:     Note composed:9:38 PM 05/25/2025

## 2025-06-09 ENCOUNTER — OFFICE VISIT (OUTPATIENT)
Dept: OPHTHALMOLOGY | Facility: CLINIC | Age: 75
End: 2025-06-09
Payer: MEDICARE

## 2025-06-09 DIAGNOSIS — H35.033 HYPERTENSIVE RETINOPATHY, BILATERAL: ICD-10-CM

## 2025-06-09 DIAGNOSIS — H35.3221 EXUDATIVE AGE-RELATED MACULAR DEGENERATION OF LEFT EYE WITH ACTIVE CHOROIDAL NEOVASCULARIZATION: Primary | ICD-10-CM

## 2025-06-09 DIAGNOSIS — H43.811 POSTERIOR VITREOUS DETACHMENT, RIGHT: ICD-10-CM

## 2025-06-09 DIAGNOSIS — H35.3132 INTERMEDIATE STAGE NONEXUDATIVE AGE-RELATED MACULAR DEGENERATION OF BOTH EYES: ICD-10-CM

## 2025-06-09 PROCEDURE — 1160F RVW MEDS BY RX/DR IN RCRD: CPT | Mod: CPTII,S$GLB,, | Performed by: OPHTHALMOLOGY

## 2025-06-09 PROCEDURE — 1159F MED LIST DOCD IN RCRD: CPT | Mod: CPTII,S$GLB,, | Performed by: OPHTHALMOLOGY

## 2025-06-09 PROCEDURE — 92201 OPSCPY EXTND RTA DRAW UNI/BI: CPT | Mod: S$GLB,,, | Performed by: OPHTHALMOLOGY

## 2025-06-09 PROCEDURE — 1101F PT FALLS ASSESS-DOCD LE1/YR: CPT | Mod: CPTII,S$GLB,, | Performed by: OPHTHALMOLOGY

## 2025-06-09 PROCEDURE — 3044F HG A1C LEVEL LT 7.0%: CPT | Mod: CPTII,S$GLB,, | Performed by: OPHTHALMOLOGY

## 2025-06-09 PROCEDURE — 92134 CPTRZ OPH DX IMG PST SGM RTA: CPT | Mod: S$GLB,,, | Performed by: OPHTHALMOLOGY

## 2025-06-09 PROCEDURE — 3066F NEPHROPATHY DOC TX: CPT | Mod: CPTII,S$GLB,, | Performed by: OPHTHALMOLOGY

## 2025-06-09 PROCEDURE — 3288F FALL RISK ASSESSMENT DOCD: CPT | Mod: CPTII,S$GLB,, | Performed by: OPHTHALMOLOGY

## 2025-06-09 PROCEDURE — 99999 PR PBB SHADOW E&M-EST. PATIENT-LVL III: CPT | Mod: PBBFAC,,, | Performed by: OPHTHALMOLOGY

## 2025-06-09 PROCEDURE — 92014 COMPRE OPH EXAM EST PT 1/>: CPT | Mod: S$GLB,,, | Performed by: OPHTHALMOLOGY

## 2025-06-09 PROCEDURE — 3061F NEG MICROALBUMINURIA REV: CPT | Mod: CPTII,S$GLB,, | Performed by: OPHTHALMOLOGY

## 2025-06-09 NOTE — PROGRESS NOTES
HPI     dfe     oct     drusen   pvd        Additional comments: Dfe    Oct    Drusen   Armd    Dls 02/03/25          Last edited by Janet Osborn on 6/9/2025  2:17 PM.                 OCT - Drusen OU  OD PVD  OS VMA at [facility].  PED - SRF Resolved. Collapsed PED with outer  segment loss    Prior FP - OD with white choroidal patches - stable.  Possible just tesselated fundus appearance  OS PED with small subfoveal SRF =- resolved    Prior FA - OD - late staiing of drusen, no [facility] uptake   OS minimal leakage at apex of PED with Avastin on baord      A/P    1. Dry AMD OU  AREDS/AG    Wet AMD OS  4/23 - increased in PED with apical SRF  S/p Avastin OS x 3  S/p Eylea OS x 4 (last 8/5/24)  9/23 - trace increase in SRF at 14 weeks  12/23 - increase OS at 10 weeks      Stable again - continue  obs today      2. Scattered white choroidal patches   Stable FP  No sig leakage or uptake on FA OD  Possible just tesselated fundus appearance    3. PVD OD    4. HTN Ret OU    5.  PCIOL OU      6 months OCT and dilate  - LDFE 6/25

## 2025-06-17 ENCOUNTER — TELEPHONE (OUTPATIENT)
Dept: FAMILY MEDICINE | Facility: CLINIC | Age: 75
End: 2025-06-17
Payer: MEDICARE

## 2025-06-17 NOTE — TELEPHONE ENCOUNTER
Returned call to pt.  She thought she had urine orders for her lab appt on Thursday.  No urine orders pending.  Pt is aware.

## 2025-06-17 NOTE — TELEPHONE ENCOUNTER
Copied from CRM #[id number]. Topic: General Inquiry - Patient Advice  >> Jun 17, 2025 11:04 CHRISTINA Watts wrote:  Type:  Needs Medical Advice    Who Called: Pt  Symptoms (please be specific): Shy Blocked Bladder     Would the patient rather a call back or a response via [facility]? Call Back   Best Call Back Number: [phone]    Additional Information: Pt wants to  specimen cup from the  of office. Pt states she has done this before and she will drop it off to the lab location herself

## 2025-06-19 ENCOUNTER — LAB VISIT (OUTPATIENT)
Dept: LAB | Facility: HOSPITAL | Age: 75
End: 2025-06-19
Attending: INTERNAL MEDICINE
Payer: MEDICARE

## 2025-06-19 DIAGNOSIS — N18.32 TYPE 2 DIABETES MELLITUS WITH STAGE 3B CHRONIC KIDNEY DISEASE, WITHOUT LONG-TERM CURRENT USE OF INSULIN: ICD-10-CM

## 2025-06-19 DIAGNOSIS — E11.22 TYPE 2 DIABETES MELLITUS WITH STAGE 3B CHRONIC KIDNEY DISEASE, WITHOUT LONG-TERM CURRENT USE OF INSULIN: ICD-10-CM

## 2025-06-19 LAB
ABSOLUTE EOSINOPHIL (OHS): 0.46 K/UL
ABSOLUTE MONOCYTE (OHS): 0.82 K/UL (ref 0.3–1)
ABSOLUTE NEUTROPHIL COUNT (OHS): 5.32 K/UL (ref 1.8–7.7)
ANION GAP (OHS): 10 MMOL/L (ref 8–16)
BASOPHILS # BLD AUTO: 0.07 K/UL
BASOPHILS NFR BLD AUTO: 0.8 %
BUN SERPL-MCNC: 25 MG/DL (ref 8–23)
CALCIUM SERPL-MCNC: 9.1 MG/DL (ref 8.7–10.5)
CHLORIDE SERPL-SCNC: 106 MMOL/L (ref 95–110)
CO2 SERPL-SCNC: 27 MMOL/L (ref 23–29)
CREAT SERPL-MCNC: 1 MG/DL (ref 0.5–1.4)
EAG (OHS): 148 MG/DL (ref 68–131)
ERYTHROCYTE [DISTWIDTH] IN BLOOD BY AUTOMATED COUNT: 16.2 % (ref 11.5–14.5)
GFR SERPLBLD CREATININE-BSD FMLA CKD-EPI: 59 ML/MIN/1.73/M2
GLUCOSE SERPL-MCNC: 124 MG/DL (ref 70–110)
HBA1C MFR BLD: 6.8 % (ref 4–5.6)
HCT VFR BLD AUTO: 42.6 % (ref 37–48.5)
HGB BLD-MCNC: 13.6 GM/DL (ref 12–16)
IMM GRANULOCYTES # BLD AUTO: 0.03 K/UL (ref 0–0.04)
IMM GRANULOCYTES NFR BLD AUTO: 0.4 % (ref 0–0.5)
LYMPHOCYTES # BLD AUTO: 1.59 K/UL (ref 1–4.8)
MCH RBC QN AUTO: 26.2 PG (ref 27–31)
MCHC RBC AUTO-ENTMCNC: 31.9 G/DL (ref 32–36)
MCV RBC AUTO: 82 FL (ref 82–98)
NUCLEATED RBC (/100WBC) (OHS): 0 /100 WBC
PLATELET # BLD AUTO: 224 K/UL (ref 150–450)
PMV BLD AUTO: 10 FL (ref 9.2–12.9)
POTASSIUM SERPL-SCNC: 5 MMOL/L (ref 3.5–5.1)
RBC # BLD AUTO: 5.19 M/UL (ref 4–5.4)
RELATIVE EOSINOPHIL (OHS): 5.5 %
RELATIVE LYMPHOCYTE (OHS): 19.2 % (ref 18–48)
RELATIVE MONOCYTE (OHS): 9.9 % (ref 4–15)
RELATIVE NEUTROPHIL (OHS): 64.2 % (ref 38–73)
SODIUM SERPL-SCNC: 143 MMOL/L (ref 136–145)
WBC # BLD AUTO: 8.29 K/UL (ref 3.9–12.7)

## 2025-06-19 PROCEDURE — 85025 COMPLETE CBC W/AUTO DIFF WBC: CPT | Mod: PO

## 2025-06-19 PROCEDURE — 80048 BASIC METABOLIC PNL TOTAL CA: CPT

## 2025-06-19 PROCEDURE — 36415 COLL VENOUS BLD VENIPUNCTURE: CPT | Mod: PO

## 2025-06-19 PROCEDURE — 83036 HEMOGLOBIN GLYCOSYLATED A1C: CPT

## 2025-06-20 ENCOUNTER — PATIENT MESSAGE (OUTPATIENT)
Dept: FAMILY MEDICINE | Facility: CLINIC | Age: 75
End: 2025-06-20
Payer: MEDICARE

## 2025-06-26 ENCOUNTER — OFFICE VISIT (OUTPATIENT)
Dept: FAMILY MEDICINE | Facility: CLINIC | Age: 75
End: 2025-06-26
Payer: MEDICARE

## 2025-06-26 VITALS
DIASTOLIC BLOOD PRESSURE: 60 MMHG | TEMPERATURE: 98 F | WEIGHT: 224 LBS | BODY MASS INDEX: 37.32 KG/M2 | RESPIRATION RATE: 18 BRPM | SYSTOLIC BLOOD PRESSURE: 90 MMHG | OXYGEN SATURATION: 95 % | HEART RATE: 64 BPM | HEIGHT: 65 IN

## 2025-06-26 DIAGNOSIS — I35.0 NONRHEUMATIC AORTIC VALVE STENOSIS: ICD-10-CM

## 2025-06-26 DIAGNOSIS — K76.0 FATTY LIVER: ICD-10-CM

## 2025-06-26 DIAGNOSIS — K21.9 GASTROESOPHAGEAL REFLUX DISEASE WITHOUT ESOPHAGITIS: ICD-10-CM

## 2025-06-26 DIAGNOSIS — M17.12 PRIMARY OSTEOARTHRITIS OF LEFT KNEE: ICD-10-CM

## 2025-06-26 DIAGNOSIS — N18.32 STAGE 3B CHRONIC KIDNEY DISEASE: ICD-10-CM

## 2025-06-26 DIAGNOSIS — I73.9 PAD (PERIPHERAL ARTERY DISEASE): ICD-10-CM

## 2025-06-26 DIAGNOSIS — Z95.3 S/P TAVR (TRANSCATHETER AORTIC VALVE REPLACEMENT): ICD-10-CM

## 2025-06-26 DIAGNOSIS — I65.22 STENOSIS OF LEFT CAROTID ARTERY: ICD-10-CM

## 2025-06-26 DIAGNOSIS — Z12.11 SCREEN FOR COLON CANCER: ICD-10-CM

## 2025-06-26 DIAGNOSIS — E66.01 MORBID OBESITY WITH BMI OF 40.0-44.9, ADULT: ICD-10-CM

## 2025-06-26 DIAGNOSIS — N18.32 TYPE 2 DIABETES MELLITUS WITH STAGE 3B CHRONIC KIDNEY DISEASE, WITHOUT LONG-TERM CURRENT USE OF INSULIN: ICD-10-CM

## 2025-06-26 DIAGNOSIS — I50.32 CHRONIC DIASTOLIC HEART FAILURE: ICD-10-CM

## 2025-06-26 DIAGNOSIS — N32.81 OAB (OVERACTIVE BLADDER): ICD-10-CM

## 2025-06-26 DIAGNOSIS — I27.20 PULMONARY HYPERTENSION: ICD-10-CM

## 2025-06-26 DIAGNOSIS — F51.01 PRIMARY INSOMNIA: ICD-10-CM

## 2025-06-26 DIAGNOSIS — I10 ESSENTIAL HYPERTENSION: ICD-10-CM

## 2025-06-26 DIAGNOSIS — G47.33 OSA (OBSTRUCTIVE SLEEP APNEA): ICD-10-CM

## 2025-06-26 DIAGNOSIS — E11.22 TYPE 2 DIABETES MELLITUS WITH STAGE 3B CHRONIC KIDNEY DISEASE, WITHOUT LONG-TERM CURRENT USE OF INSULIN: ICD-10-CM

## 2025-06-26 DIAGNOSIS — Z78.0 ASYMPTOMATIC MENOPAUSAL STATE: ICD-10-CM

## 2025-06-26 DIAGNOSIS — H35.30 MACULAR DEGENERATION, UNSPECIFIED LATERALITY, UNSPECIFIED TYPE: ICD-10-CM

## 2025-06-26 DIAGNOSIS — R41.3 MEMORY PROBLEM: ICD-10-CM

## 2025-06-26 DIAGNOSIS — F33.0 MAJOR DEPRESSIVE DISORDER, RECURRENT EPISODE, MILD: ICD-10-CM

## 2025-06-26 DIAGNOSIS — Z12.31 ENCOUNTER FOR SCREENING MAMMOGRAM FOR MALIGNANT NEOPLASM OF BREAST: ICD-10-CM

## 2025-06-26 DIAGNOSIS — J45.40 MODERATE PERSISTENT ASTHMA WITHOUT COMPLICATION: ICD-10-CM

## 2025-06-26 DIAGNOSIS — E78.5 HYPERLIPIDEMIA, UNSPECIFIED HYPERLIPIDEMIA TYPE: ICD-10-CM

## 2025-06-26 DIAGNOSIS — I25.5 ISCHEMIC CARDIOMYOPATHY: ICD-10-CM

## 2025-06-26 DIAGNOSIS — I48.0 PAROXYSMAL ATRIAL FIBRILLATION: ICD-10-CM

## 2025-06-26 DIAGNOSIS — E03.9 HYPOTHYROIDISM, UNSPECIFIED TYPE: ICD-10-CM

## 2025-06-26 DIAGNOSIS — I25.118 CORONARY ARTERY DISEASE OF NATIVE ARTERY OF NATIVE HEART WITH STABLE ANGINA PECTORIS: Primary | ICD-10-CM

## 2025-06-26 DIAGNOSIS — J30.9 CHRONIC ALLERGIC RHINITIS: ICD-10-CM

## 2025-06-26 PROBLEM — E11.3213 TYPE 2 DIABETES MELLITUS WITH BOTH EYES AFFECTED BY MILD NONPROLIFERATIVE RETINOPATHY AND MACULAR EDEMA, WITHOUT LONG-TERM CURRENT USE OF INSULIN: Status: ACTIVE | Noted: 2025-06-26

## 2025-06-26 PROBLEM — E11.3213 TYPE 2 DIABETES MELLITUS WITH BOTH EYES AFFECTED BY MILD NONPROLIFERATIVE RETINOPATHY AND MACULAR EDEMA, WITHOUT LONG-TERM CURRENT USE OF INSULIN: Status: RESOLVED | Noted: 2025-06-26 | Resolved: 2025-06-26

## 2025-06-26 PROCEDURE — G2211 COMPLEX E/M VISIT ADD ON: HCPCS | Mod: S$GLB,,, | Performed by: INTERNAL MEDICINE

## 2025-06-26 PROCEDURE — 99215 OFFICE O/P EST HI 40 MIN: CPT | Mod: S$GLB,,, | Performed by: INTERNAL MEDICINE

## 2025-06-26 PROCEDURE — 1101F PT FALLS ASSESS-DOCD LE1/YR: CPT | Mod: CPTII,S$GLB,, | Performed by: INTERNAL MEDICINE

## 2025-06-26 PROCEDURE — 3074F SYST BP LT 130 MM HG: CPT | Mod: CPTII,S$GLB,, | Performed by: INTERNAL MEDICINE

## 2025-06-26 PROCEDURE — 1160F RVW MEDS BY RX/DR IN RCRD: CPT | Mod: CPTII,S$GLB,, | Performed by: INTERNAL MEDICINE

## 2025-06-26 PROCEDURE — 3078F DIAST BP <80 MM HG: CPT | Mod: CPTII,S$GLB,, | Performed by: INTERNAL MEDICINE

## 2025-06-26 PROCEDURE — 3288F FALL RISK ASSESSMENT DOCD: CPT | Mod: CPTII,S$GLB,, | Performed by: INTERNAL MEDICINE

## 2025-06-26 PROCEDURE — 1126F AMNT PAIN NOTED NONE PRSNT: CPT | Mod: CPTII,S$GLB,, | Performed by: INTERNAL MEDICINE

## 2025-06-26 PROCEDURE — 1159F MED LIST DOCD IN RCRD: CPT | Mod: CPTII,S$GLB,, | Performed by: INTERNAL MEDICINE

## 2025-06-26 PROCEDURE — 3061F NEG MICROALBUMINURIA REV: CPT | Mod: CPTII,S$GLB,, | Performed by: INTERNAL MEDICINE

## 2025-06-26 PROCEDURE — 3008F BODY MASS INDEX DOCD: CPT | Mod: CPTII,S$GLB,, | Performed by: INTERNAL MEDICINE

## 2025-06-26 PROCEDURE — 3066F NEPHROPATHY DOC TX: CPT | Mod: CPTII,S$GLB,, | Performed by: INTERNAL MEDICINE

## 2025-06-26 PROCEDURE — 3044F HG A1C LEVEL LT 7.0%: CPT | Mod: CPTII,S$GLB,, | Performed by: INTERNAL MEDICINE

## 2025-06-26 RX ORDER — METOPROLOL TARTRATE 100 MG/1
TABLET ORAL
COMMUNITY

## 2025-06-26 RX ORDER — TIRZEPATIDE 2.5 MG/.5ML
2.5 INJECTION, SOLUTION SUBCUTANEOUS
Qty: 4 PEN | Refills: 6 | Status: SHIPPED | OUTPATIENT
Start: 2025-06-26

## 2025-06-26 RX ORDER — MULTIVIT-MIN/IRON/FOLIC ACID/K 18-600-40
CAPSULE ORAL
COMMUNITY

## 2025-06-26 RX ORDER — ZOLPIDEM TARTRATE 5 MG/1
5 TABLET ORAL NIGHTLY PRN
COMMUNITY
Start: 2025-06-19 | End: 2026-06-19

## 2025-06-26 RX ORDER — ACETAMINOPHEN 500 MG
1 TABLET ORAL
COMMUNITY

## 2025-06-26 NOTE — PROGRESS NOTES
Subjective:       Patient ID: Sneha Mcghee is a 74 y.o. female.    Medication List with Changes/Refills   New Medications    TIRZEPATIDE (MOUNJARO) 2.5 MG/0.5 ML PNIJ    Inject 2.5 mg into the skin every 7 days.   Current Medications    ALBUTEROL (PROVENTIL/VENTOLIN HFA) 90 MCG/ACTUATION INHALER    INHALE 2 PUFFS INTO THE LUNGS EVERY 6 HOURS AS NEEDED FOR WHEEZING    ASCORBIC ACID, VITAMIN C, 500 MG CAP    Orally Twice daily    ASPIRIN (ASPIR-81 ORAL)    Take 81 mg by mouth once daily.    ATORVASTATIN (LIPITOR) 40 MG TABLET    TAKE 1 TABLET(40 MG) BY MOUTH EVERY DAY    BLOOD SUGAR DIAGNOSTIC STRP    To check BG once a day and PRN low sugars, to use with insurance preferred meter    BLOOD-GLUCOSE METER KIT    To check BG qam and PRN lows, to use with insurance preferred meter    CHOLECALCIFEROL, VITAMIN D3, 125 MCG (5,000 UNIT) TAB    Take 1 tablet by mouth.    ELIQUIS 5 MG TAB    Take 5 mg by mouth 2 (two) times daily.    FARXIGA 10 MG TABLET    TAKE 1 TABLET(10 MG) BY MOUTH EVERY DAY    FLUTICASONE PROPIONATE (FLONASE) 50 MCG/ACTUATION NASAL SPRAY    2 sprays (100 mcg total) by Each Nostril route once daily.    FUROSEMIDE (LASIX) 20 MG TABLET    TAKE 1 TABLET BY MOUTH EVERY DAY    LANCETS MISC    To check BG once a day and PRN lows, to use with insurance preferred meter    LATANOPROST 0.005 % OPHTHALMIC SOLUTION    Place 1 drop into both eyes every evening.    LEVOTHYROXINE (SYNTHROID) 88 MCG TABLET    TAKE 1 TABLET(88 MCG) BY MOUTH BEFORE BREAKFAST    MELATONIN 10 MG CAP    Take 20 mg by mouth.    METFORMIN (GLUCOPHAGE-XR) 500 MG ER 24HR TABLET    TAKE 1 TABLET(500 MG) BY MOUTH TWICE DAILY    METOPROLOL SUCCINATE (TOPROL-XL) 50 MG 24 HR TABLET    TAKE 1 TABLET BY MOUTH DAILY    METOPROLOL TARTRATE (LOPRESSOR) 100 MG TABLET    1 tablet Orally once daily    MOMETASONE 0.1% (ELOCON) 0.1 % CREAM    APPLY TOPICALLY TO THE AFFECTED AREA EVERY DAY    NYSTATIN (MYCOSTATIN) CREAM    Apply topically 2 (two) times daily.     NYSTATIN (MYCOSTATIN) POWDER    Apply topically 4 (four) times daily.    OMEGA-3 FATTY ACIDS/FISH OIL (FISH OIL-OMEGA-3 FATTY ACIDS) 300-1,000 MG CAPSULE    Take 1 capsule by mouth once daily.    PANTOPRAZOLE (PROTONIX) 40 MG TABLET    Take 40 mg by mouth every morning.    POTASSIUM CHLORIDE (K-TAB) 20 MEQ        SERTRALINE (ZOLOFT) 50 MG TABLET    Take 1.5 tablets (75 mg total) by mouth once daily.    SOTALOL (BETAPACE) 80 MG TABLET    Take 1 tablet by mouth 2 (two) times daily.    TIMOLOL (BETIMOL) 0.5 % OPHTHALMIC SOLUTION    Place 1 drop into both eyes every morning.    TIMOLOL MALEATE 0.5% (TIMOPTIC) 0.5 % DROP    Place 1 drop into both eyes 2 (two) times daily.    ZOLPIDEM (AMBIEN) 5 MG TAB    Take 5 mg by mouth nightly as needed.   Discontinued Medications    AUGMENTED BETAMETHASONE DIPROPIONATE (DIPROLENE-AF) 0.05 % CREAM    Apply topically 2 (two) times daily.    BUDESONIDE-FORMOTEROL 160-4.5 MCG (SYMBICORT) 160-4.5 MCG/ACTUATION HFAA    Inhale 2 puffs into the lungs every 12 (twelve) hours. Controller    CLOBETASOL 0.05% (TEMOVATE) 0.05 % OINT    Apply topically 2 (two) times daily.    NYSTATIN (MYCOSTATIN) CREAM    Apply topically.    POTASSIUM CHLORIDE (MICRO-K) 10 MEQ CPSR    Take 10 mEq by mouth once.    TRIAMCINOLONE ACETONIDE 0.1% (KENALOG) 0.1 % CREAM    Apply topically 2 (two) times daily. for 7 days    VIT C/E/ZN/COPPR/LUTEIN/ZEAXAN (PRESERVISION AREDS-2 ORAL)    Take by mouth once daily at 6am.    VITAMIN E 400 UNIT CAPSULE           Chief Complaint: Follow-up  She is here today to f/u on chronic medical issues.       She has CAD s/p CABG x 4 vessels in 2012 with 8 stents.  She reports a cardiomyopathy with chronic heart failure.  She has paroxysmal Afib for many years. Echo on 11/2024 showed EF 62%, grade III diastolic dysfunction, normal wall motion, moderate LAE, severe aortic valve calcification, moderate to severe AS (HAMILTON 1.1, gradient 36), mild AR, severe MAC, moderate MR, mild TR, PAP  of 42.  Nuclear stress on 3/2023 was negative. Repeat echo on 4/2025 showed severe AS with HAMILTON of 0.7 and gradient of 77.  LHC on 4/2025 showed AS with HAMILTON of 0.5. On 5/27/2025 she underwent TAVR.  She was admitted overnight to hospital on 6/20/2025 for fluid overload that was treated with lasix.  Since that time she denies any shortness of breath or lower leg swelling. No chest pain or palpitations.  She continues sotalol 80 mg bid and eliquis 5 mg bid and metoprolol xl 50 mg daily. [id number] Score: 6 (stoke risk of 9.7%/year).      She has hyperlipidemia and is taking atorvastatin 40 mg daily.  Lipids on 1/2025 were 164/132/44/93. She continues on aspirin daily.      She has carotid artery stenosis and is s/p left carotid stent.  Carotid u/s on 8/2022 showed patent stent in left carotid bulb and no significant stenosis.      She has PVD but has not had stenting of lower legs. She has chronic venous insufficiency and is s/p multiple ELVT.  She has chronic stasis dermatitis left > right.  She had a prolonged course for a non-healing ulcer of left lower leg that is now healed.  Venous u/s on 1/2022 showed venous reflux.  Arterial ultrasound on 1/2022 showed 50-75% stenosis of proximal left anterior tibial artery, SARITHA right 1.01 and left 0.97. She denies any recurrence of open wounds. She continues on lasix 20 mg daily.   Starting farxiga has helped her lower leg swelling.      She has diabetes and is taking metformin 500 mg bid and farxiga 10 mg daily. Her HbA1c was 6.8 on 6/2025.  She does not check her glucose at home.  She is UTD on her eye exam and UTD for a foot exam. Her microalbumin is negative on 1/2025.        She has CKD  with increased creatinine of 1.1 with GFR of 52 on 1/2025.  Repeat labs on 6/2025 show creatinine of 0.83 with GFR of 74.      She has asthma since 2012 that started after her heart surgery. Her symptoms are chest tightness and wheezing.  She denies any known triggers. She uses albuterol  for her increased symptoms with good relief. She denies any active symptoms.     She has chronic allergies and is using flonase nightly. She denies any active symptoms.      She has hypothyroid and is taking levothyroxine 88 mcg daily. Her TSH on 1/2025 was normal.      She has glaucoma and is using timolol and xalatan drops. She is followed by ophthalmology and reports her pressures have been stable but has wet macular degeneration in the left eye and completed intraocular injections.      She has GERD and is taking pantoprazole 40 mg daily.      She has fatty liver and normal LFTs on 6/2025.       She has depression and is taking zoloft 75 mg qday. She feel her anxiety and depression are controlled.  No suicidal ideations.  She has insomnia with difficulty with waking during the night. She tried trazodone but this did not help.      She complains of memory problems .  Recall she is very forgetful and this worsens when she is very stressed or left alone. She forgets names and places but denies getting loss. She denies any dangerous behaviors. She continues to do her finances without problems. She lives alone but her son is involved. He had noticed worsening memory and feels she struggles to remember her medications. She feels she is doing better. Labs on 5/2024 were reassuring.      She has VALENTE and refuses to wear CPAP.      She has left knee osteoarthritis and is followed by orthopedics. She was told she needs a TKR but does not want done at this time. She is currently in PT to help with left pain and balance.      She lives alone and feels safe.  She is exercising with cardiac rehab twice a week. She does eat heathy.  She has poor balance but denies any recent falls.      Colonoscopy---4/2016 repeat in 5 years ([facility])---willing to do a fecal kit  Mammogram----3/2024 neg   DEXA-----7/2023 normal  Tdap--9/2016  Influenza vaccine---9/2024  Pneumovax 23----11/2016  Prevnar 13----10/2017  Shingles  "vaccine-----5/2023, 3/2024  Covid vaccine---- 2 doses      RSV vaccine----none    Review of Systems   Constitutional:  Negative for appetite change, fatigue, fever and unexpected weight change.   HENT:  Negative for congestion, ear pain, hearing loss, sore throat and trouble swallowing.    Eyes:  Negative for pain and visual disturbance.   Respiratory:  Negative for cough, chest tightness, shortness of breath and wheezing.    Cardiovascular:  Negative for chest pain, palpitations and leg swelling.   Gastrointestinal:  Negative for abdominal pain, blood in stool, constipation, diarrhea, nausea and vomiting.   Endocrine: Negative for polyuria.   Genitourinary:  Negative for difficulty urinating, dysuria, frequency, hematuria and urgency.   Musculoskeletal:  Positive for arthralgias. Negative for back pain and myalgias.   Skin:  Negative for rash.   Neurological:  Negative for dizziness, weakness, numbness and headaches.   Hematological:  Does not bruise/bleed easily.   Psychiatric/Behavioral:  Negative for dysphoric mood, sleep disturbance and suicidal ideas. The patient is not nervous/anxious.        Objective:      Vitals:    06/26/25 0904   BP: 90/60   BP Location: Left arm   Patient Position: Sitting   Pulse: 64   Resp: 18   Temp: 98.2 °F (36.8 °C)   TempSrc: Temporal   SpO2: 95%   Weight: 101.6 kg (223 lb 15.8 oz)   Height: 5' 5" (1.651 m)     Body mass index is 37.27 kg/m².  Physical Exam    General appearance: No acute distress, cooperative  Eyes: PERRL, EOMI, conjunctiva clear  Ears: normal external ear and pinna, tm clear without drainage, canals clear  Nose: Normal mucosa without drainage  Throat: no exudates or erythema, tonsils not enlarged  Mouth: no sores or lesions, moist mucous membranes  Neck: FROM, soft, supple, no thyromegaly, no bruits  Lymph: no anterior or posterior cervical adenopathy  Heart::  Regular rate and rhythm, no murmur  Lung: Clear to ascultation bilaterally, no wheezing, no rales, no " rhonchi, no distress  Abdomen: Soft, nontender, no distention, no hepatosplenomegaly, bowel sounds normal, no guarding, no rebound, no peritoneal signs  Skin: no rashes, no lesions  Extremities: no edema  Neuro: CN 2-12 intact, 5/5 muscle strength upper and lower extremity bilaterally, 2+ DTRs UE and LE bilaterally, limping gait  Peripheral pulses: diminished pedal pulses bilaterally, venous stasis changes with erythema, no warmth   Musculoskeletal: FROM, good strenth, no tenderness  Joint: normal appearance, no swelling, no warmth, no deformity in all joints    Assessment:       1. Coronary artery disease of native artery of native heart with stable angina pectoris    2. Ischemic cardiomyopathy    3. Chronic diastolic heart failure    4. Paroxysmal atrial fibrillation    5. Nonrheumatic aortic valve stenosis    6. S/P TAVR (transcatheter aortic valve replacement)    7. Pulmonary hypertension    8. Essential hypertension    9. Hyperlipidemia, unspecified hyperlipidemia type    10. Stenosis of left carotid artery    11. PAD (peripheral artery disease)    12. Moderate persistent asthma without complication    13. Chronic allergic rhinitis    14. Hypothyroidism, unspecified type    15. Type 2 diabetes mellitus with stage 3b chronic kidney disease, without long-term current use of insulin    16. Stage 3b chronic kidney disease    17. Gastroesophageal reflux disease without esophagitis    18. Fatty liver    19. OAB (overactive bladder)    20. Major depressive disorder, recurrent episode, mild    21. Primary insomnia    22. Memory problem    23. Macular degeneration, unspecified laterality, unspecified type    24. Primary osteoarthritis of left knee    25. VALENTE (obstructive sleep apnea)    26. Morbid obesity with BMI of 40.0-44.9, adult    27. Asymptomatic menopausal state    28. Encounter for screening mammogram for malignant neoplasm of breast    29. Screen for colon cancer        Plan:       Coronary artery disease of  native artery of native heart with stable angina pectoris  Stable and recent LHC showed patent grafts.     Ischemic cardiomyopathy with Chronic diastolic heart failure  Well compensated. She continues on lasix daily and farxiga.     Paroxysmal atrial fibrillation  NSR on exam today. Continue on sotalol and eliquis    Nonrheumatic aortic valve stenosis S/P TAVR (transcatheter aortic valve replacement)  She has done very well and shortness of breath is improved. Continue to follow with cardiology    Pulmonary hypertension  Stable and continue to monitor    Essential hypertension  Well controlled and continue current regimen.   -     CBC Auto Differential; Future; Expected date: 06/26/2025  -     Comprehensive Metabolic Panel; Future; Expected date: 06/26/2025  -     Lipid Panel; Future; Expected date: 06/26/2025  -     TSH; Future; Expected date: 06/26/2025    Hyperlipidemia, unspecified hyperlipidemia type  Good control on atorvastatin    Stenosis of left carotid artery  Continue statin    PAD (peripheral artery disease)  Continue statin    Moderate persistent asthma without complication  No active symptoms    Chronic allergic rhinitis  Well controlled and continue current regimen.     Hypothyroidism, unspecified type  Good control on this dose of levothyroxine    Type 2 diabetes mellitus with stage 3b chronic kidney disease, without long-term current use of insulin  Uncontrolled and will add mounjaro 2.5 mg weekly to her farxiga and metformin.  Will contact her in 3 weeks to increase the dose if tolerating.   -     tirzepatide (MOUNJARO) 2.5 mg/0.5 mL PnIj; Inject 2.5 mg into the skin every 7 days.  Dispense: 4 Pen; Refill: 6  -     Hemoglobin A1C; Future; Expected date: 06/26/2025    Stage 3b chronic kidney disease  Improved and continue to monitor    Gastroesophageal reflux disease without esophagitis  Well controlled and continue current regimen.     Fatty liver  Normal LFTs. She would benefit from weight  loss    OAB (overactive bladder)  No complaints today    Major depressive disorder, recurrent episode, mild  Doing well on zoloft    Primary insomnia  She has ambien (not sure who started this medication).  This is not a good long term options for sleep.     Memory problem  Stable    Macular degeneration, unspecified laterality, unspecified type  Stable and continue to follow with ophthalmology    Primary osteoarthritis of left knee  Still with symptoms of left knee pain.  Continue to follow with orthopedics    VALENTE (obstructive sleep apnea)  Stable and patient is compliant with use. Patient benefits from regular use of CPAP.     Morbid obesity with BMI of 40.0-44.9, adult  Long discussion on the benefits of healthy eating and regular exercise to help lose weight and help control hypertension, cad, hyperlipidemia, VALENTE and fatty liver and diabetes.     Asymptomatic menopausal state  -     DXA Bone Density Axial Skeleton 1 or more sites; Future; Expected date: 06/26/2025    Encounter for screening mammogram for malignant neoplasm of breast  -     Mammo Digital Screening Bilat w/ Tu (XPD); Future; Expected date: 06/26/2025    Screen for colon cancer  -     Fecal Immunochemical Test (iFOBT); Future; Expected date: 06/26/2025    Follow up in about 4 months (around 10/26/2025) for chronic medical issues.     55 minutes of total time spent on the encounter, which includes face to face time and non-face to face time preparing to see the patient (eg, review of tests), Obtaining and/or reviewing separately obtained history, documenting clinical information in the electronic or other health record, independently interpreting results (not separately reported) and communicating results to the patient/family/caregiver, or Care coordination (not separately reported).

## 2025-07-01 DIAGNOSIS — F33.0 MAJOR DEPRESSIVE DISORDER, RECURRENT EPISODE, MILD: ICD-10-CM

## 2025-07-01 NOTE — TELEPHONE ENCOUNTER
No care due was identified.  [facility] Embedded Care Due Messages. Reference number: [id number].   7/01/2025 2:20:06 PM CDT

## 2025-07-01 NOTE — TELEPHONE ENCOUNTER
Refill Routing Note   Medication(s) are not appropriate for processing by [facility] for the following reason(s):        Clarification of medication (Rx) details    [facility] action(s):  Defer        Medication Therapy Plan: not sure if pt is taking 1mg or 1.5mg      Appointments  past 12m or future 3m with PCP    Date Provider   Last Visit   6/26/2025 Elizabeth Jacobo, DO   Next Visit   10/28/2025 Elizabeth Jacobo, DO   ED visits in past 90 days: 0        Note composed:2:25 PM 07/01/2025

## 2025-07-02 RX ORDER — SERTRALINE HYDROCHLORIDE 50 MG/1
50 TABLET, FILM COATED ORAL
Qty: 90 TABLET | Refills: 3 | Status: SHIPPED | OUTPATIENT
Start: 2025-07-02

## 2025-07-17 ENCOUNTER — PATIENT MESSAGE (OUTPATIENT)
Dept: FAMILY MEDICINE | Facility: CLINIC | Age: 75
End: 2025-07-17
Payer: MEDICARE

## 2025-07-19 DIAGNOSIS — E03.9 HYPOTHYROIDISM, UNSPECIFIED TYPE: ICD-10-CM

## 2025-07-19 RX ORDER — LEVOTHYROXINE SODIUM 88 UG/1
88 TABLET ORAL
Qty: 90 TABLET | Refills: 1 | Status: SHIPPED | OUTPATIENT
Start: 2025-07-19

## 2025-07-19 NOTE — TELEPHONE ENCOUNTER
No care due was identified.  [facility] Embedded Care Due Messages. Reference number: [id number].   7/19/2025 5:30:25 AM CDT

## 2025-07-19 NOTE — TELEPHONE ENCOUNTER
Refill Decision Note   Snehaedmar LeesTaz  is requesting a refill authorization.  Brief Assessment and Rationale for Refill:  Approve     Medication Therapy Plan:  MRM resolved; APPROVE      Comments:     Note composed:11:52 AM 07/19/2025

## 2025-07-23 DIAGNOSIS — E11.22 TYPE 2 DIABETES MELLITUS WITH STAGE 3B CHRONIC KIDNEY DISEASE, WITHOUT LONG-TERM CURRENT USE OF INSULIN: ICD-10-CM

## 2025-07-23 DIAGNOSIS — I48.0 PAROXYSMAL ATRIAL FIBRILLATION: Primary | ICD-10-CM

## 2025-07-23 DIAGNOSIS — N18.32 TYPE 2 DIABETES MELLITUS WITH STAGE 3B CHRONIC KIDNEY DISEASE, WITHOUT LONG-TERM CURRENT USE OF INSULIN: ICD-10-CM

## 2025-07-23 DIAGNOSIS — E78.2 MIXED HYPERLIPIDEMIA: ICD-10-CM

## 2025-07-23 RX ORDER — ATORVASTATIN CALCIUM 40 MG/1
40 TABLET, FILM COATED ORAL
Qty: 90 TABLET | Refills: 1 | Status: SHIPPED | OUTPATIENT
Start: 2025-07-23

## 2025-07-23 NOTE — TELEPHONE ENCOUNTER
No care due was identified.  [facility] Embedded Care Due Messages. Reference number: [id number].   7/23/2025 5:20:23 AM CDT

## 2025-07-23 NOTE — TELEPHONE ENCOUNTER
Refill Decision Note   Sneha Leesobald  is requesting a refill authorization.  Brief Assessment and Rationale for Refill:  Approve     Medication Therapy Plan:         Comments:     Note composed:2:19 PM 07/23/2025

## 2025-07-24 NOTE — TELEPHONE ENCOUNTER
Copied from CRM #[id number]. Topic: General Inquiry - Return Call  >> Jul 24, 2025 10:14 AM Jerald wrote:  Type:  Patient Returning Call    Who Called:patient  Who Left Message for Patient:nurse  Does the patient know what this is regarding?:medication question    tirzepatide (MOUNJARO) 2.5 mg/0.5 mL PnIj/   increase to 5 mg  Would the patient rather a call back or a response via [facility]? Please call to advise  Best Call Back Number:[phone]  Additional Information:

## 2025-07-26 RX ORDER — TIRZEPATIDE 5 MG/.5ML
5 INJECTION, SOLUTION SUBCUTANEOUS
Qty: 4 PEN | Refills: 6 | Status: SHIPPED | OUTPATIENT
Start: 2025-07-26

## 2025-08-15 ENCOUNTER — TELEPHONE (OUTPATIENT)
Dept: FAMILY MEDICINE | Facility: CLINIC | Age: 75
End: 2025-08-15
Payer: MEDICARE

## 2025-08-22 RX ORDER — METFORMIN HYDROCHLORIDE 500 MG/1
500 TABLET, EXTENDED RELEASE ORAL 2 TIMES DAILY
Qty: 180 TABLET | Refills: 1 | Status: SHIPPED | OUTPATIENT
Start: 2025-08-22

## 2025-08-25 DIAGNOSIS — E11.22 TYPE 2 DIABETES MELLITUS WITH STAGE 3B CHRONIC KIDNEY DISEASE, WITHOUT LONG-TERM CURRENT USE OF INSULIN: ICD-10-CM

## 2025-08-25 DIAGNOSIS — N18.32 TYPE 2 DIABETES MELLITUS WITH STAGE 3B CHRONIC KIDNEY DISEASE, WITHOUT LONG-TERM CURRENT USE OF INSULIN: ICD-10-CM

## 2025-08-26 RX ORDER — DAPAGLIFLOZIN 10 MG/1
10 TABLET, FILM COATED ORAL
Qty: 90 TABLET | Refills: 1 | Status: SHIPPED | OUTPATIENT
Start: 2025-08-26

## 2025-08-26 RX ORDER — BLOOD SUGAR DIAGNOSTIC
STRIP MISCELLANEOUS
Qty: 100 STRIP | Refills: 3 | Status: SHIPPED | OUTPATIENT
Start: 2025-08-26

## 2025-08-28 ENCOUNTER — TELEPHONE (OUTPATIENT)
Dept: OPHTHALMOLOGY | Facility: CLINIC | Age: 75
End: 2025-08-28
Payer: MEDICARE

## 2025-08-28 ENCOUNTER — TELEPHONE (OUTPATIENT)
Dept: FAMILY MEDICINE | Facility: CLINIC | Age: 75
End: 2025-08-28
Payer: MEDICARE